# Patient Record
Sex: MALE | Race: WHITE | NOT HISPANIC OR LATINO
[De-identification: names, ages, dates, MRNs, and addresses within clinical notes are randomized per-mention and may not be internally consistent; named-entity substitution may affect disease eponyms.]

---

## 2017-03-16 ENCOUNTER — APPOINTMENT (OUTPATIENT)
Dept: INFECTIOUS DISEASE | Facility: CLINIC | Age: 47
End: 2017-03-16

## 2017-03-16 ENCOUNTER — OUTPATIENT (OUTPATIENT)
Dept: OUTPATIENT SERVICES | Facility: HOSPITAL | Age: 47
LOS: 1 days | End: 2017-03-16

## 2017-03-16 VITALS
WEIGHT: 266 LBS | RESPIRATION RATE: 14 BRPM | HEIGHT: 73 IN | HEART RATE: 90 BPM | SYSTOLIC BLOOD PRESSURE: 128 MMHG | TEMPERATURE: 98.6 F | BODY MASS INDEX: 35.25 KG/M2 | DIASTOLIC BLOOD PRESSURE: 110 MMHG

## 2017-03-16 DIAGNOSIS — Z82.49 FAMILY HISTORY OF ISCHEMIC HEART DISEASE AND OTHER DISEASES OF THE CIRCULATORY SYSTEM: ICD-10-CM

## 2017-03-16 DIAGNOSIS — Z87.2 PERSONAL HISTORY OF DISEASES OF THE SKIN AND SUBCUTANEOUS TISSUE: ICD-10-CM

## 2017-03-16 DIAGNOSIS — Z83.3 FAMILY HISTORY OF DIABETES MELLITUS: ICD-10-CM

## 2017-03-16 DIAGNOSIS — Z87.09 PERSONAL HISTORY OF OTHER DISEASES OF THE RESPIRATORY SYSTEM: ICD-10-CM

## 2017-03-16 PROBLEM — Z00.00 ENCOUNTER FOR PREVENTIVE HEALTH EXAMINATION: Status: ACTIVE | Noted: 2017-03-16

## 2017-03-16 LAB
ALBUMIN SERPL ELPH-MCNC: 4.1 G/DL — SIGNIFICANT CHANGE UP (ref 3.4–5)
ALP SERPL-CCNC: 86 U/L — SIGNIFICANT CHANGE UP (ref 40–120)
ALT FLD-CCNC: 105 U/L — HIGH (ref 12–42)
ANION GAP SERPL CALC-SCNC: 9 MMOL/L — SIGNIFICANT CHANGE UP (ref 9–16)
APPEARANCE UR: CLEAR — SIGNIFICANT CHANGE UP
AST SERPL-CCNC: 72 U/L — HIGH (ref 15–37)
BASOPHILS NFR BLD AUTO: 0.8 % — SIGNIFICANT CHANGE UP (ref 0–2)
BILIRUB SERPL-MCNC: 0.4 MG/DL — SIGNIFICANT CHANGE UP (ref 0.2–1.2)
BILIRUB UR-MCNC: NEGATIVE — SIGNIFICANT CHANGE UP
BUN SERPL-MCNC: 16 MG/DL — SIGNIFICANT CHANGE UP (ref 7–23)
CALCIUM SERPL-MCNC: 8.5 MG/DL — SIGNIFICANT CHANGE UP (ref 8.5–10.5)
CHLORIDE SERPL-SCNC: 105 MMOL/L — SIGNIFICANT CHANGE UP (ref 96–108)
CHOLEST SERPL-MCNC: 235 MG/DL — HIGH
CO2 SERPL-SCNC: 25 MMOL/L — SIGNIFICANT CHANGE UP (ref 22–31)
COLOR SPEC: YELLOW — SIGNIFICANT CHANGE UP
CREAT SERPL-MCNC: 0.86 MG/DL — SIGNIFICANT CHANGE UP (ref 0.5–1.3)
DIFF PNL FLD: NEGATIVE — SIGNIFICANT CHANGE UP
EOSINOPHIL NFR BLD AUTO: 6.1 % — HIGH (ref 0–6)
GLUCOSE SERPL-MCNC: 106 MG/DL — HIGH (ref 70–99)
GLUCOSE UR QL: NEGATIVE — SIGNIFICANT CHANGE UP
HBA1C BLD-MCNC: 6.3 % — HIGH (ref 4.8–5.6)
HCT VFR BLD CALC: 45 % — SIGNIFICANT CHANGE UP (ref 39–50)
HCV AB S/CO SERPL IA: 0.1 S/CO — SIGNIFICANT CHANGE UP
HCV AB SERPL-IMP: SIGNIFICANT CHANGE UP
HDLC SERPL-MCNC: 48 MG/DL — SIGNIFICANT CHANGE UP
HGB BLD-MCNC: 15.7 G/DL — SIGNIFICANT CHANGE UP (ref 13–17)
KETONES UR-MCNC: NEGATIVE — SIGNIFICANT CHANGE UP
LEUKOCYTE ESTERASE UR-ACNC: NEGATIVE — SIGNIFICANT CHANGE UP
LIPID PNL WITH DIRECT LDL SERPL: 146 MG/DL — HIGH
LYMPHOCYTES # BLD AUTO: 32.2 % — SIGNIFICANT CHANGE UP (ref 13–44)
MCHC RBC-ENTMCNC: 30.7 PG — SIGNIFICANT CHANGE UP (ref 27–34)
MCHC RBC-ENTMCNC: 34.9 G/DL — SIGNIFICANT CHANGE UP (ref 32–36)
MCV RBC AUTO: 87.9 FL — SIGNIFICANT CHANGE UP (ref 80–100)
MONOCYTES NFR BLD AUTO: 7.4 % — SIGNIFICANT CHANGE UP (ref 2–14)
NEUTROPHILS NFR BLD AUTO: 53.5 % — SIGNIFICANT CHANGE UP (ref 43–77)
NITRITE UR-MCNC: NEGATIVE — SIGNIFICANT CHANGE UP
PH UR: 5.5 — SIGNIFICANT CHANGE UP (ref 4–8)
PLATELET # BLD AUTO: 236 K/UL — SIGNIFICANT CHANGE UP (ref 150–400)
POTASSIUM SERPL-MCNC: 4.3 MMOL/L — SIGNIFICANT CHANGE UP (ref 3.5–5.3)
POTASSIUM SERPL-SCNC: 4.3 MMOL/L — SIGNIFICANT CHANGE UP (ref 3.5–5.3)
PROT SERPL-MCNC: 7.9 G/DL — SIGNIFICANT CHANGE UP (ref 6.4–8.2)
PROT UR-MCNC: NEGATIVE MG/DL — SIGNIFICANT CHANGE UP
RBC # BLD: 5.12 M/UL — SIGNIFICANT CHANGE UP (ref 4.2–5.8)
RBC # FLD: 13 % — SIGNIFICANT CHANGE UP (ref 10.3–16.9)
SODIUM SERPL-SCNC: 139 MMOL/L — SIGNIFICANT CHANGE UP (ref 135–145)
SP GR SPEC: 1.02 — SIGNIFICANT CHANGE UP (ref 1–1.03)
TOTAL CHOLESTEROL/HDL RATIO MEASUREMENT: 4.9 RATIO — SIGNIFICANT CHANGE UP
TRIGL SERPL-MCNC: 203 MG/DL — HIGH
UROBILINOGEN FLD QL: 0.2 E.U./DL — SIGNIFICANT CHANGE UP
WBC # BLD: 8.5 K/UL — SIGNIFICANT CHANGE UP (ref 3.8–10.5)
WBC # FLD AUTO: 8.5 K/UL — SIGNIFICANT CHANGE UP (ref 3.8–10.5)

## 2017-03-17 PROBLEM — Z87.2 HISTORY OF PSORIASIS: Status: RESOLVED | Noted: 2017-03-16 | Resolved: 2017-03-17

## 2017-03-17 LAB
24R-OH-CALCIDIOL SERPL-MCNC: 8.2 NG/ML — LOW (ref 30–100)
T PALLIDUM AB TITR SER: NEGATIVE — SIGNIFICANT CHANGE UP

## 2017-03-18 LAB
HIV1 RNA # SERPL NAA+PROBE: SIGNIFICANT CHANGE UP
HIV1 RNA SERPL NAA+PROBE-LOG#: SIGNIFICANT CHANGE UP LG10COP/ML
M TB TUBERC IFN-G BLD QL: 0 IU/ML — SIGNIFICANT CHANGE UP
M TB TUBERC IFN-G BLD QL: 0.02 IU/ML — SIGNIFICANT CHANGE UP
M TB TUBERC IFN-G BLD QL: NEGATIVE — SIGNIFICANT CHANGE UP
MITOGEN IGNF BCKGRD COR BLD-ACNC: >10 IU/ML — SIGNIFICANT CHANGE UP

## 2017-03-20 DIAGNOSIS — Z21 ASYMPTOMATIC HUMAN IMMUNODEFICIENCY VIRUS [HIV] INFECTION STATUS: ICD-10-CM

## 2017-03-20 DIAGNOSIS — G47.00 INSOMNIA, UNSPECIFIED: ICD-10-CM

## 2017-03-20 DIAGNOSIS — J45.909 UNSPECIFIED ASTHMA, UNCOMPLICATED: ICD-10-CM

## 2017-03-20 DIAGNOSIS — L40.9 PSORIASIS, UNSPECIFIED: ICD-10-CM

## 2017-03-20 LAB
4/8 RATIO: 1.32 RATIO — SIGNIFICANT CHANGE UP (ref 0.9–3.6)
ABS CD8: 950 /UL — HIGH (ref 136–757)
CD3 BLASTS SPEC-ACNC: 2194 /UL — HIGH (ref 799–2171)
CD3 BLASTS SPEC-ACNC: 75 % — SIGNIFICANT CHANGE UP (ref 59–85)
CD4 %: 43 % — SIGNIFICANT CHANGE UP (ref 36–65)
CD8 %: 33 % — SIGNIFICANT CHANGE UP (ref 11–36)
T-CELL CD4 SUBSET PNL BLD: 1252 /UL — SIGNIFICANT CHANGE UP (ref 489–1457)

## 2017-03-30 ENCOUNTER — APPOINTMENT (OUTPATIENT)
Dept: INFECTIOUS DISEASE | Facility: CLINIC | Age: 47
End: 2017-03-30

## 2017-03-30 ENCOUNTER — OUTPATIENT (OUTPATIENT)
Dept: OUTPATIENT SERVICES | Facility: HOSPITAL | Age: 47
LOS: 1 days | End: 2017-03-30

## 2017-03-30 VITALS
SYSTOLIC BLOOD PRESSURE: 159 MMHG | OXYGEN SATURATION: 97 % | HEART RATE: 85 BPM | DIASTOLIC BLOOD PRESSURE: 86 MMHG | RESPIRATION RATE: 15 BRPM

## 2017-03-30 RX ORDER — EFAVIRENZ, EMTRICITABINE, AND TENOFOVIR DISOPROXIL FUMARATE 600; 200; 300 MG/1; MG/1; MG/1
600-200-300 TABLET, FILM COATED ORAL DAILY
Qty: 30 | Refills: 2 | Status: DISCONTINUED | COMMUNITY
Start: 2017-03-16 | End: 2017-03-30

## 2017-04-03 DIAGNOSIS — Z21 ASYMPTOMATIC HUMAN IMMUNODEFICIENCY VIRUS [HIV] INFECTION STATUS: ICD-10-CM

## 2017-04-03 DIAGNOSIS — E55.9 VITAMIN D DEFICIENCY, UNSPECIFIED: ICD-10-CM

## 2017-04-03 DIAGNOSIS — Z72.0 TOBACCO USE: ICD-10-CM

## 2017-04-03 DIAGNOSIS — R73.03 PREDIABETES: ICD-10-CM

## 2017-04-03 DIAGNOSIS — G47.33 OBSTRUCTIVE SLEEP APNEA (ADULT) (PEDIATRIC): ICD-10-CM

## 2017-04-27 ENCOUNTER — OUTPATIENT (OUTPATIENT)
Dept: OUTPATIENT SERVICES | Facility: HOSPITAL | Age: 47
LOS: 1 days | End: 2017-04-27

## 2017-04-27 ENCOUNTER — APPOINTMENT (OUTPATIENT)
Dept: INFECTIOUS DISEASE | Facility: CLINIC | Age: 47
End: 2017-04-27

## 2017-04-27 VITALS
HEART RATE: 82 BPM | WEIGHT: 259 LBS | BODY MASS INDEX: 34.33 KG/M2 | HEIGHT: 73 IN | OXYGEN SATURATION: 94 % | SYSTOLIC BLOOD PRESSURE: 141 MMHG | RESPIRATION RATE: 12 BRPM | DIASTOLIC BLOOD PRESSURE: 91 MMHG

## 2017-04-27 RX ORDER — CHOLECALCIFEROL (VITAMIN D3) 1250 MCG
1.25 MG CAPSULE ORAL
Qty: 8 | Refills: 0 | Status: DISCONTINUED | COMMUNITY
Start: 2017-03-23 | End: 2017-04-27

## 2017-05-05 ENCOUNTER — MEDICATION RENEWAL (OUTPATIENT)
Age: 47
End: 2017-05-05

## 2017-05-05 DIAGNOSIS — E78.5 HYPERLIPIDEMIA, UNSPECIFIED: ICD-10-CM

## 2017-05-05 DIAGNOSIS — R73.03 PREDIABETES: ICD-10-CM

## 2017-05-05 DIAGNOSIS — Z21 ASYMPTOMATIC HUMAN IMMUNODEFICIENCY VIRUS [HIV] INFECTION STATUS: ICD-10-CM

## 2017-05-05 DIAGNOSIS — Z72.0 TOBACCO USE: ICD-10-CM

## 2017-07-05 ENCOUNTER — MEDICATION RENEWAL (OUTPATIENT)
Age: 47
End: 2017-07-05

## 2017-07-06 ENCOUNTER — APPOINTMENT (OUTPATIENT)
Dept: INFECTIOUS DISEASE | Facility: CLINIC | Age: 47
End: 2017-07-06

## 2017-07-06 ENCOUNTER — OUTPATIENT (OUTPATIENT)
Dept: OUTPATIENT SERVICES | Facility: HOSPITAL | Age: 47
LOS: 1 days | End: 2017-07-06

## 2017-07-06 VITALS
WEIGHT: 241 LBS | RESPIRATION RATE: 14 BRPM | BODY MASS INDEX: 31.8 KG/M2 | DIASTOLIC BLOOD PRESSURE: 86 MMHG | TEMPERATURE: 98 F | HEART RATE: 88 BPM | SYSTOLIC BLOOD PRESSURE: 125 MMHG | OXYGEN SATURATION: 95 %

## 2017-07-06 LAB
ALBUMIN SERPL ELPH-MCNC: 4.3 G/DL — SIGNIFICANT CHANGE UP (ref 3.3–5)
ALP SERPL-CCNC: 67 U/L — SIGNIFICANT CHANGE UP (ref 40–120)
ALT FLD-CCNC: 26 U/L — SIGNIFICANT CHANGE UP (ref 10–45)
ANION GAP SERPL CALC-SCNC: 18 MMOL/L — HIGH (ref 5–17)
AST SERPL-CCNC: 26 U/L — SIGNIFICANT CHANGE UP (ref 10–40)
BILIRUB SERPL-MCNC: 0.5 MG/DL — SIGNIFICANT CHANGE UP (ref 0.2–1.2)
BUN SERPL-MCNC: 15 MG/DL — SIGNIFICANT CHANGE UP (ref 7–23)
CALCIUM SERPL-MCNC: 9.4 MG/DL — SIGNIFICANT CHANGE UP (ref 8.4–10.5)
CHLORIDE SERPL-SCNC: 98 MMOL/L — SIGNIFICANT CHANGE UP (ref 96–108)
CO2 SERPL-SCNC: 22 MMOL/L — SIGNIFICANT CHANGE UP (ref 22–31)
CREAT SERPL-MCNC: 1 MG/DL — SIGNIFICANT CHANGE UP (ref 0.5–1.3)
GLUCOSE SERPL-MCNC: 77 MG/DL — SIGNIFICANT CHANGE UP (ref 70–99)
HBA1C BLD-MCNC: 5.3 % — SIGNIFICANT CHANGE UP (ref 4–5.6)
POTASSIUM SERPL-MCNC: 3.9 MMOL/L — SIGNIFICANT CHANGE UP (ref 3.5–5.3)
POTASSIUM SERPL-SCNC: 3.9 MMOL/L — SIGNIFICANT CHANGE UP (ref 3.5–5.3)
PROT SERPL-MCNC: 7.8 G/DL — SIGNIFICANT CHANGE UP (ref 6–8.3)
SODIUM SERPL-SCNC: 138 MMOL/L — SIGNIFICANT CHANGE UP (ref 135–145)

## 2017-07-10 LAB
4/8 RATIO: 1.19 RATIO — SIGNIFICANT CHANGE UP (ref 0.9–3.6)
ABS CD8: 809 /UL — HIGH (ref 136–757)
CD3 BLASTS SPEC-ACNC: 1764 /UL — SIGNIFICANT CHANGE UP (ref 799–2171)
CD3 BLASTS SPEC-ACNC: 76 % — SIGNIFICANT CHANGE UP (ref 59–85)
CD4 %: 42 % — SIGNIFICANT CHANGE UP (ref 36–65)
CD8 %: 35 % — SIGNIFICANT CHANGE UP (ref 11–36)
HIV-1 VIRAL LOAD RESULT: SIGNIFICANT CHANGE UP
HIV1 RNA # SERPL NAA+PROBE: SIGNIFICANT CHANGE UP
HIV1 RNA SER-IMP: SIGNIFICANT CHANGE UP
HIV1 RNA SERPL NAA+PROBE-ACNC: SIGNIFICANT CHANGE UP
HIV1 RNA SERPL NAA+PROBE-LOG#: SIGNIFICANT CHANGE UP LG COP/ML
T-CELL CD4 SUBSET PNL BLD: 965 /UL — SIGNIFICANT CHANGE UP (ref 489–1457)

## 2017-07-13 DIAGNOSIS — R73.03 PREDIABETES: ICD-10-CM

## 2017-07-13 DIAGNOSIS — Z21 ASYMPTOMATIC HUMAN IMMUNODEFICIENCY VIRUS [HIV] INFECTION STATUS: ICD-10-CM

## 2017-07-13 DIAGNOSIS — L40.9 PSORIASIS, UNSPECIFIED: ICD-10-CM

## 2017-07-13 DIAGNOSIS — J45.909 UNSPECIFIED ASTHMA, UNCOMPLICATED: ICD-10-CM

## 2017-07-13 DIAGNOSIS — Z72.0 TOBACCO USE: ICD-10-CM

## 2017-07-27 ENCOUNTER — MEDICATION RENEWAL (OUTPATIENT)
Age: 47
End: 2017-07-27

## 2017-08-28 ENCOUNTER — MEDICATION RENEWAL (OUTPATIENT)
Age: 47
End: 2017-08-28

## 2017-09-25 ENCOUNTER — RX RENEWAL (OUTPATIENT)
Age: 47
End: 2017-09-25

## 2017-10-17 ENCOUNTER — OUTPATIENT (OUTPATIENT)
Dept: OUTPATIENT SERVICES | Facility: HOSPITAL | Age: 47
LOS: 1 days | End: 2017-10-17

## 2017-10-17 ENCOUNTER — APPOINTMENT (OUTPATIENT)
Dept: INFECTIOUS DISEASE | Facility: CLINIC | Age: 47
End: 2017-10-17
Payer: COMMERCIAL

## 2017-10-17 VITALS
HEIGHT: 73 IN | HEART RATE: 89 BPM | BODY MASS INDEX: 33 KG/M2 | WEIGHT: 249 LBS | DIASTOLIC BLOOD PRESSURE: 82 MMHG | OXYGEN SATURATION: 98 % | SYSTOLIC BLOOD PRESSURE: 141 MMHG | RESPIRATION RATE: 11 BRPM | TEMPERATURE: 98.3 F

## 2017-10-17 DIAGNOSIS — G47.33 OBSTRUCTIVE SLEEP APNEA (ADULT) (PEDIATRIC): ICD-10-CM

## 2017-10-17 DIAGNOSIS — Z23 ENCOUNTER FOR IMMUNIZATION: ICD-10-CM

## 2017-10-17 DIAGNOSIS — R73.03 PREDIABETES.: ICD-10-CM

## 2017-10-17 DIAGNOSIS — G47.00 INSOMNIA, UNSPECIFIED: ICD-10-CM

## 2017-10-17 LAB
ALBUMIN SERPL ELPH-MCNC: 4.1 G/DL — SIGNIFICANT CHANGE UP (ref 3.3–5)
ALP SERPL-CCNC: 58 U/L — SIGNIFICANT CHANGE UP (ref 40–120)
ALT FLD-CCNC: 26 U/L — SIGNIFICANT CHANGE UP (ref 10–45)
ANION GAP SERPL CALC-SCNC: 17 MMOL/L — SIGNIFICANT CHANGE UP (ref 5–17)
AST SERPL-CCNC: 22 U/L — SIGNIFICANT CHANGE UP (ref 10–40)
BILIRUB SERPL-MCNC: 0.5 MG/DL — SIGNIFICANT CHANGE UP (ref 0.2–1.2)
BUN SERPL-MCNC: 15 MG/DL — SIGNIFICANT CHANGE UP (ref 7–23)
CALCIUM SERPL-MCNC: 9.2 MG/DL — SIGNIFICANT CHANGE UP (ref 8.4–10.5)
CHLORIDE SERPL-SCNC: 98 MMOL/L — SIGNIFICANT CHANGE UP (ref 96–108)
CHOLEST SERPL-MCNC: 171 MG/DL — SIGNIFICANT CHANGE UP (ref 10–199)
CO2 SERPL-SCNC: 23 MMOL/L — SIGNIFICANT CHANGE UP (ref 22–31)
CREAT SERPL-MCNC: 1.03 MG/DL — SIGNIFICANT CHANGE UP (ref 0.5–1.3)
GLUCOSE SERPL-MCNC: 102 MG/DL — HIGH (ref 70–99)
HCT VFR BLD CALC: 41.4 % — SIGNIFICANT CHANGE UP (ref 39–50)
HDLC SERPL-MCNC: 48 MG/DL — SIGNIFICANT CHANGE UP (ref 40–125)
HGB BLD-MCNC: 14.3 G/DL — SIGNIFICANT CHANGE UP (ref 13–17)
LIPID PNL WITH DIRECT LDL SERPL: 102 MG/DL — SIGNIFICANT CHANGE UP
MCHC RBC-ENTMCNC: 30.5 PG — SIGNIFICANT CHANGE UP (ref 27–34)
MCHC RBC-ENTMCNC: 34.5 G/DL — SIGNIFICANT CHANGE UP (ref 32–36)
MCV RBC AUTO: 88.3 FL — SIGNIFICANT CHANGE UP (ref 80–100)
PLATELET # BLD AUTO: 235 K/UL — SIGNIFICANT CHANGE UP (ref 150–400)
POTASSIUM SERPL-MCNC: 3.6 MMOL/L — SIGNIFICANT CHANGE UP (ref 3.5–5.3)
POTASSIUM SERPL-SCNC: 3.6 MMOL/L — SIGNIFICANT CHANGE UP (ref 3.5–5.3)
PROT SERPL-MCNC: 8 G/DL — SIGNIFICANT CHANGE UP (ref 6–8.3)
RBC # BLD: 4.69 M/UL — SIGNIFICANT CHANGE UP (ref 4.2–5.8)
RBC # FLD: 13.9 % — SIGNIFICANT CHANGE UP (ref 10.3–16.9)
SODIUM SERPL-SCNC: 138 MMOL/L — SIGNIFICANT CHANGE UP (ref 135–145)
TOTAL CHOLESTEROL/HDL RATIO MEASUREMENT: 3.6 RATIO — SIGNIFICANT CHANGE UP (ref 3.4–9.6)
TRIGL SERPL-MCNC: 107 MG/DL — SIGNIFICANT CHANGE UP (ref 10–149)
WBC # BLD: 11.4 K/UL — HIGH (ref 3.8–10.5)
WBC # FLD AUTO: 11.4 K/UL — HIGH (ref 3.8–10.5)

## 2017-10-17 PROCEDURE — 99214 OFFICE O/P EST MOD 30 MIN: CPT | Mod: GC

## 2017-10-18 LAB
4/8 RATIO: 1.62 RATIO — SIGNIFICANT CHANGE UP (ref 0.9–3.6)
ABS CD8: 970 /UL — HIGH (ref 136–757)
CD3 BLASTS SPEC-ACNC: 2528 /UL — HIGH (ref 799–2171)
CD3 BLASTS SPEC-ACNC: 76 % — SIGNIFICANT CHANGE UP (ref 59–85)
CD4 %: 47 % — SIGNIFICANT CHANGE UP (ref 36–65)
CD8 %: 29 % — SIGNIFICANT CHANGE UP (ref 11–36)
HIV-1 VIRAL LOAD RESULT: SIGNIFICANT CHANGE UP
HIV1 RNA # SERPL NAA+PROBE: SIGNIFICANT CHANGE UP
HIV1 RNA SER-IMP: SIGNIFICANT CHANGE UP
HIV1 RNA SERPL NAA+PROBE-ACNC: SIGNIFICANT CHANGE UP
HIV1 RNA SERPL NAA+PROBE-LOG#: SIGNIFICANT CHANGE UP LG COP/ML
T-CELL CD4 SUBSET PNL BLD: 1571 /UL — HIGH (ref 489–1457)

## 2017-10-25 DIAGNOSIS — G47.00 INSOMNIA, UNSPECIFIED: ICD-10-CM

## 2017-10-25 DIAGNOSIS — F17.200 NICOTINE DEPENDENCE, UNSPECIFIED, UNCOMPLICATED: ICD-10-CM

## 2017-10-25 DIAGNOSIS — Z23 ENCOUNTER FOR IMMUNIZATION: ICD-10-CM

## 2017-10-25 DIAGNOSIS — G47.33 OBSTRUCTIVE SLEEP APNEA (ADULT) (PEDIATRIC): ICD-10-CM

## 2017-10-25 DIAGNOSIS — B20 HUMAN IMMUNODEFICIENCY VIRUS [HIV] DISEASE: ICD-10-CM

## 2018-04-19 ENCOUNTER — APPOINTMENT (OUTPATIENT)
Dept: INFECTIOUS DISEASE | Facility: CLINIC | Age: 48
End: 2018-04-19
Payer: COMMERCIAL

## 2018-04-19 ENCOUNTER — OUTPATIENT (OUTPATIENT)
Dept: OUTPATIENT SERVICES | Facility: HOSPITAL | Age: 48
LOS: 1 days | End: 2018-04-19

## 2018-04-19 ENCOUNTER — EMERGENCY (EMERGENCY)
Facility: HOSPITAL | Age: 48
LOS: 1 days | Discharge: HOME | End: 2018-04-19
Attending: EMERGENCY MEDICINE | Admitting: EMERGENCY MEDICINE
Payer: COMMERCIAL

## 2018-04-19 VITALS
OXYGEN SATURATION: 95 % | RESPIRATION RATE: 20 BRPM | HEIGHT: 73 IN | WEIGHT: 235.01 LBS | TEMPERATURE: 98 F | HEART RATE: 108 BPM | SYSTOLIC BLOOD PRESSURE: 134 MMHG | DIASTOLIC BLOOD PRESSURE: 81 MMHG

## 2018-04-19 VITALS
RESPIRATION RATE: 18 BRPM | OXYGEN SATURATION: 97 % | DIASTOLIC BLOOD PRESSURE: 77 MMHG | HEART RATE: 99 BPM | SYSTOLIC BLOOD PRESSURE: 130 MMHG | TEMPERATURE: 98 F

## 2018-04-19 VITALS
DIASTOLIC BLOOD PRESSURE: 85 MMHG | TEMPERATURE: 98 F | SYSTOLIC BLOOD PRESSURE: 127 MMHG | WEIGHT: 233 LBS | BODY MASS INDEX: 30.88 KG/M2 | RESPIRATION RATE: 16 BRPM | HEIGHT: 73 IN | HEART RATE: 113 BPM | OXYGEN SATURATION: 96 %

## 2018-04-19 DIAGNOSIS — R06.02 SHORTNESS OF BREATH: ICD-10-CM

## 2018-04-19 DIAGNOSIS — R05 COUGH: ICD-10-CM

## 2018-04-19 DIAGNOSIS — F17.200 NICOTINE DEPENDENCE, UNSPECIFIED, UNCOMPLICATED: ICD-10-CM

## 2018-04-19 DIAGNOSIS — J44.9 CHRONIC OBSTRUCTIVE PULMONARY DISEASE, UNSPECIFIED: ICD-10-CM

## 2018-04-19 DIAGNOSIS — E55.9 VITAMIN D DEFICIENCY, UNSPECIFIED: ICD-10-CM

## 2018-04-19 LAB
ALBUMIN SERPL ELPH-MCNC: 4.2 G/DL — SIGNIFICANT CHANGE UP (ref 3.3–5)
ALP SERPL-CCNC: 79 U/L — SIGNIFICANT CHANGE UP (ref 40–120)
ALT FLD-CCNC: 19 U/L — SIGNIFICANT CHANGE UP (ref 10–45)
ANION GAP SERPL CALC-SCNC: 11 MMOL/L — SIGNIFICANT CHANGE UP (ref 5–17)
APTT BLD: 31.1 SEC — SIGNIFICANT CHANGE UP (ref 27.5–37.4)
AST SERPL-CCNC: 28 U/L — SIGNIFICANT CHANGE UP (ref 10–40)
BASOPHILS NFR BLD AUTO: 0.5 % — SIGNIFICANT CHANGE UP (ref 0–2)
BILIRUB SERPL-MCNC: 0.3 MG/DL — SIGNIFICANT CHANGE UP (ref 0.2–1.2)
BUN SERPL-MCNC: 14 MG/DL — SIGNIFICANT CHANGE UP (ref 7–23)
CALCIUM SERPL-MCNC: 8.9 MG/DL — SIGNIFICANT CHANGE UP (ref 8.4–10.5)
CHLORIDE SERPL-SCNC: 102 MMOL/L — SIGNIFICANT CHANGE UP (ref 96–108)
CO2 SERPL-SCNC: 26 MMOL/L — SIGNIFICANT CHANGE UP (ref 22–31)
CREAT SERPL-MCNC: 0.86 MG/DL — SIGNIFICANT CHANGE UP (ref 0.5–1.3)
EOSINOPHIL NFR BLD AUTO: 10.8 % — HIGH (ref 0–6)
EXTRA SST TUBE: SIGNIFICANT CHANGE UP
GLUCOSE SERPL-MCNC: 126 MG/DL — HIGH (ref 70–99)
HCT VFR BLD CALC: 40.5 % — SIGNIFICANT CHANGE UP (ref 39–50)
HGB BLD-MCNC: 13.8 G/DL — SIGNIFICANT CHANGE UP (ref 13–17)
INR BLD: 1.04 — SIGNIFICANT CHANGE UP (ref 0.88–1.16)
LYMPHOCYTES # BLD AUTO: 27.4 % — SIGNIFICANT CHANGE UP (ref 13–44)
MCHC RBC-ENTMCNC: 29.1 PG — SIGNIFICANT CHANGE UP (ref 27–34)
MCHC RBC-ENTMCNC: 34.1 G/DL — SIGNIFICANT CHANGE UP (ref 32–36)
MCV RBC AUTO: 85.4 FL — SIGNIFICANT CHANGE UP (ref 80–100)
MONOCYTES NFR BLD AUTO: 8.5 % — SIGNIFICANT CHANGE UP (ref 2–14)
NEUTROPHILS NFR BLD AUTO: 52.8 % — SIGNIFICANT CHANGE UP (ref 43–77)
PLATELET # BLD AUTO: 217 K/UL — SIGNIFICANT CHANGE UP (ref 150–400)
POTASSIUM SERPL-MCNC: 4 MMOL/L — SIGNIFICANT CHANGE UP (ref 3.5–5.3)
POTASSIUM SERPL-SCNC: 4 MMOL/L — SIGNIFICANT CHANGE UP (ref 3.5–5.3)
PROT SERPL-MCNC: 8 G/DL — SIGNIFICANT CHANGE UP (ref 6–8.3)
PROTHROM AB SERPL-ACNC: 11.5 SEC — SIGNIFICANT CHANGE UP (ref 9.8–12.7)
RBC # BLD: 4.74 M/UL — SIGNIFICANT CHANGE UP (ref 4.2–5.8)
RBC # FLD: 13.5 % — SIGNIFICANT CHANGE UP (ref 10.3–16.9)
SODIUM SERPL-SCNC: 139 MMOL/L — SIGNIFICANT CHANGE UP (ref 135–145)
WBC # BLD: 11 K/UL — HIGH (ref 3.8–10.5)
WBC # FLD AUTO: 11 K/UL — HIGH (ref 3.8–10.5)

## 2018-04-19 PROCEDURE — 93005 ELECTROCARDIOGRAM TRACING: CPT

## 2018-04-19 PROCEDURE — 99284 EMERGENCY DEPT VISIT MOD MDM: CPT | Mod: 25

## 2018-04-19 PROCEDURE — 96374 THER/PROPH/DIAG INJ IV PUSH: CPT

## 2018-04-19 PROCEDURE — 85610 PROTHROMBIN TIME: CPT

## 2018-04-19 PROCEDURE — 71046 X-RAY EXAM CHEST 2 VIEWS: CPT | Mod: 26

## 2018-04-19 PROCEDURE — 93010 ELECTROCARDIOGRAM REPORT: CPT

## 2018-04-19 PROCEDURE — 36415 COLL VENOUS BLD VENIPUNCTURE: CPT

## 2018-04-19 PROCEDURE — 85730 THROMBOPLASTIN TIME PARTIAL: CPT

## 2018-04-19 PROCEDURE — 96375 TX/PRO/DX INJ NEW DRUG ADDON: CPT

## 2018-04-19 PROCEDURE — 85025 COMPLETE CBC W/AUTO DIFF WBC: CPT

## 2018-04-19 PROCEDURE — 94640 AIRWAY INHALATION TREATMENT: CPT

## 2018-04-19 PROCEDURE — 71046 X-RAY EXAM CHEST 2 VIEWS: CPT

## 2018-04-19 PROCEDURE — 99285 EMERGENCY DEPT VISIT HI MDM: CPT | Mod: 25

## 2018-04-19 PROCEDURE — 99215 OFFICE O/P EST HI 40 MIN: CPT

## 2018-04-19 PROCEDURE — 80053 COMPREHEN METABOLIC PANEL: CPT

## 2018-04-19 RX ORDER — SODIUM CHLORIDE 9 MG/ML
2000 INJECTION INTRAMUSCULAR; INTRAVENOUS; SUBCUTANEOUS ONCE
Qty: 0 | Refills: 0 | Status: COMPLETED | OUTPATIENT
Start: 2018-04-19 | End: 2018-04-19

## 2018-04-19 RX ORDER — IPRATROPIUM/ALBUTEROL SULFATE 18-103MCG
3 AEROSOL WITH ADAPTER (GRAM) INHALATION ONCE
Qty: 0 | Refills: 0 | Status: COMPLETED | OUTPATIENT
Start: 2018-04-19 | End: 2018-04-19

## 2018-04-19 RX ORDER — AZITHROMYCIN 500 MG/1
1 TABLET, FILM COATED ORAL
Qty: 4 | Refills: 0 | OUTPATIENT
Start: 2018-04-19 | End: 2018-04-22

## 2018-04-19 RX ORDER — NICOTINE 21 MG/24HR
14 PATCH, TRANSDERMAL 24 HOURS TRANSDERMAL
Qty: 30 | Refills: 1 | Status: DISCONTINUED | COMMUNITY
Start: 2017-03-30 | End: 2018-04-19

## 2018-04-19 RX ORDER — AZITHROMYCIN 500 MG/1
500 TABLET, FILM COATED ORAL ONCE
Qty: 0 | Refills: 0 | Status: COMPLETED | OUTPATIENT
Start: 2018-04-19 | End: 2018-04-19

## 2018-04-19 RX ORDER — BUPROPION HYDROCHLORIDE 150 MG/1
150 TABLET, FILM COATED, EXTENDED RELEASE ORAL
Qty: 60 | Refills: 2 | Status: DISCONTINUED | COMMUNITY
Start: 2017-03-30 | End: 2018-04-19

## 2018-04-19 RX ORDER — ALBUTEROL 90 MCG
90 AEROSOL (GRAM) INHALATION
Refills: 0 | Status: DISCONTINUED | COMMUNITY
End: 2018-04-19

## 2018-04-19 RX ORDER — CEFTRIAXONE 500 MG/1
1 INJECTION, POWDER, FOR SOLUTION INTRAMUSCULAR; INTRAVENOUS ONCE
Qty: 0 | Refills: 0 | Status: COMPLETED | OUTPATIENT
Start: 2018-04-19 | End: 2018-04-19

## 2018-04-19 RX ADMIN — Medication 40 MILLIGRAM(S): at 19:22

## 2018-04-19 RX ADMIN — AZITHROMYCIN 255 MILLIGRAM(S): 500 TABLET, FILM COATED ORAL at 19:22

## 2018-04-19 RX ADMIN — Medication 3 MILLILITER(S): at 19:22

## 2018-04-19 RX ADMIN — SODIUM CHLORIDE 1000 MILLILITER(S): 9 INJECTION INTRAMUSCULAR; INTRAVENOUS; SUBCUTANEOUS at 18:51

## 2018-04-19 RX ADMIN — CEFTRIAXONE 100 GRAM(S): 500 INJECTION, POWDER, FOR SOLUTION INTRAMUSCULAR; INTRAVENOUS at 20:39

## 2018-04-19 NOTE — ED ADULT NURSE NOTE - PMH
Asthma    COPD (chronic obstructive pulmonary disease)    HIV (human immunodeficiency virus infection)

## 2018-04-19 NOTE — ED PROVIDER NOTE - MEDICAL DECISION MAKING DETAILS
afebrile pt, hiv on meds, smoker, hx of asthma and copd, xray noted no obvious infiltrate, given hx of copd/asthma, smoker status, and hx of hiv, will trial of nebs w/ steroids, rocephin and azithromycin, pt no tachypnea, no resp distress, speaking in full sentences, well appearing on exam

## 2018-04-19 NOTE — ED PROVIDER NOTE - PROGRESS NOTE DETAILS
pt feels improved xray no obvious consolidation, possible asthma/copd exacerbation w/ active smoking status, vs bronchitis, will give azithromycin for antiinflammatory purpose

## 2018-04-19 NOTE — ED ADULT NURSE NOTE - OBJECTIVE STATEMENT
Patient arrived to ED via walk-in, AAOx3, in NAD, tachy, complaining of productive cough with green sputum and ROB for three days.  Patient denies any CP, SOB at rest, fevers, chills, N/V/D, abdominal pain or any other complaints.  PIV placed, labs drawn and sent, EKG done.  Will continue with plan of care.

## 2018-04-19 NOTE — ED PROVIDER NOTE - OBJECTIVE STATEMENT
48 yom pw productive cough x 2 days, hx of HIV (last checked was 6 mo ago, reported not low, undetectable VL, on medications), COPD/Asthma, daily smoker.  no fever.  +sob.  no cp, no pleurisy, no leg pain or swelling.  no hx of CAD or PE.

## 2018-04-19 NOTE — ED ADULT TRIAGE NOTE - CHIEF COMPLAINT QUOTE
"Dr. Pacheco sent me in they think I have pneumonia" SOB and productive cough for 3 days; denies fevers

## 2018-04-19 NOTE — ED PROVIDER NOTE - PHYSICAL EXAMINATION
CON: ao x 3, HENMT: clear oropharynx, soft neck, HEAD: atraumatic, CV: rrr, equal pulses b/l, RESP: mild end exp wheezing, no tachypnea, no respiratory distress, GI: +BS, soft, nontender, no rebound, no guarding, MSK: no deformities, NEURO: no gross motor or sensory deficit

## 2018-05-01 ENCOUNTER — APPOINTMENT (OUTPATIENT)
Dept: INFECTIOUS DISEASE | Facility: CLINIC | Age: 48
End: 2018-05-01

## 2018-05-01 ENCOUNTER — OUTPATIENT (OUTPATIENT)
Dept: OUTPATIENT SERVICES | Facility: HOSPITAL | Age: 48
LOS: 1 days | End: 2018-05-01

## 2018-05-01 DIAGNOSIS — E55.9 VITAMIN D DEFICIENCY, UNSPECIFIED: ICD-10-CM

## 2018-05-01 DIAGNOSIS — J44.1 CHRONIC OBSTRUCTIVE PULMONARY DISEASE WITH (ACUTE) EXACERBATION: ICD-10-CM

## 2018-05-01 DIAGNOSIS — Z00.00 ENCOUNTER FOR GENERAL ADULT MEDICAL EXAMINATION WITHOUT ABNORMAL FINDINGS: ICD-10-CM

## 2018-05-01 DIAGNOSIS — Z72.0 TOBACCO USE: ICD-10-CM

## 2018-05-01 DIAGNOSIS — B20 HUMAN IMMUNODEFICIENCY VIRUS [HIV] DISEASE: ICD-10-CM

## 2018-05-04 ENCOUNTER — TRANSCRIPTION ENCOUNTER (OUTPATIENT)
Age: 48
End: 2018-05-04

## 2018-05-11 DIAGNOSIS — F32.9 MAJOR DEPRESSIVE DISORDER, SINGLE EPISODE, UNSPECIFIED: ICD-10-CM

## 2018-05-15 ENCOUNTER — TRANSCRIPTION ENCOUNTER (OUTPATIENT)
Age: 48
End: 2018-05-15

## 2018-06-16 ENCOUNTER — MEDICATION RENEWAL (OUTPATIENT)
Age: 48
End: 2018-06-16

## 2018-06-18 ENCOUNTER — APPOINTMENT (OUTPATIENT)
Dept: INFECTIOUS DISEASE | Facility: CLINIC | Age: 48
End: 2018-06-18

## 2018-07-18 ENCOUNTER — INPATIENT (INPATIENT)
Facility: HOSPITAL | Age: 48
LOS: 0 days | Discharge: HOME | DRG: 872 | End: 2018-07-19
Attending: INTERNAL MEDICINE | Admitting: INTERNAL MEDICINE
Payer: SELF-PAY

## 2018-07-18 VITALS
DIASTOLIC BLOOD PRESSURE: 65 MMHG | OXYGEN SATURATION: 95 % | RESPIRATION RATE: 20 BRPM | HEIGHT: 73 IN | WEIGHT: 235.01 LBS | TEMPERATURE: 100 F | HEART RATE: 108 BPM | SYSTOLIC BLOOD PRESSURE: 100 MMHG

## 2018-07-18 DIAGNOSIS — E87.8 OTHER DISORDERS OF ELECTROLYTE AND FLUID BALANCE, NOT ELSEWHERE CLASSIFIED: ICD-10-CM

## 2018-07-18 DIAGNOSIS — A41.9 SEPSIS, UNSPECIFIED ORGANISM: ICD-10-CM

## 2018-07-18 DIAGNOSIS — Z29.9 ENCOUNTER FOR PROPHYLACTIC MEASURES, UNSPECIFIED: ICD-10-CM

## 2018-07-18 DIAGNOSIS — R63.8 OTHER SYMPTOMS AND SIGNS CONCERNING FOOD AND FLUID INTAKE: ICD-10-CM

## 2018-07-18 DIAGNOSIS — B20 HUMAN IMMUNODEFICIENCY VIRUS [HIV] DISEASE: ICD-10-CM

## 2018-07-18 DIAGNOSIS — Z91.89 OTHER SPECIFIED PERSONAL RISK FACTORS, NOT ELSEWHERE CLASSIFIED: ICD-10-CM

## 2018-07-18 DIAGNOSIS — K52.9 NONINFECTIVE GASTROENTERITIS AND COLITIS, UNSPECIFIED: ICD-10-CM

## 2018-07-18 PROBLEM — J45.909 UNSPECIFIED ASTHMA, UNCOMPLICATED: Chronic | Status: ACTIVE | Noted: 2018-04-19

## 2018-07-18 PROBLEM — J44.9 CHRONIC OBSTRUCTIVE PULMONARY DISEASE, UNSPECIFIED: Chronic | Status: ACTIVE | Noted: 2018-04-19

## 2018-07-18 LAB
ALBUMIN SERPL ELPH-MCNC: 3.9 G/DL — SIGNIFICANT CHANGE UP (ref 3.3–5)
ALP SERPL-CCNC: 55 U/L — SIGNIFICANT CHANGE UP (ref 40–120)
ALT FLD-CCNC: 20 U/L — SIGNIFICANT CHANGE UP (ref 10–45)
ANION GAP SERPL CALC-SCNC: 16 MMOL/L — SIGNIFICANT CHANGE UP (ref 5–17)
AST SERPL-CCNC: 30 U/L — SIGNIFICANT CHANGE UP (ref 10–40)
BASOPHILS NFR BLD AUTO: 0.1 % — SIGNIFICANT CHANGE UP (ref 0–2)
BILIRUB SERPL-MCNC: 0.6 MG/DL — SIGNIFICANT CHANGE UP (ref 0.2–1.2)
BUN SERPL-MCNC: 9 MG/DL — SIGNIFICANT CHANGE UP (ref 7–23)
C DIFF GDH STL QL: NEGATIVE — SIGNIFICANT CHANGE UP
C DIFF GDH STL QL: SIGNIFICANT CHANGE UP
CALCIUM SERPL-MCNC: 8.2 MG/DL — LOW (ref 8.4–10.5)
CHLORIDE SERPL-SCNC: 93 MMOL/L — LOW (ref 96–108)
CO2 SERPL-SCNC: 21 MMOL/L — LOW (ref 22–31)
CREAT SERPL-MCNC: 0.97 MG/DL — SIGNIFICANT CHANGE UP (ref 0.5–1.3)
EOSINOPHIL NFR BLD AUTO: 0.1 % — SIGNIFICANT CHANGE UP (ref 0–6)
EXTRA BLUE TOP TUBE: SIGNIFICANT CHANGE UP
EXTRA SST TUBE: SIGNIFICANT CHANGE UP
GLUCOSE SERPL-MCNC: 118 MG/DL — HIGH (ref 70–99)
HCT VFR BLD CALC: 37.6 % — LOW (ref 39–50)
HGB BLD-MCNC: 12.4 G/DL — LOW (ref 13–17)
LACTATE SERPL-SCNC: 1.3 MMOL/L — SIGNIFICANT CHANGE UP (ref 0.5–2)
LIDOCAIN IGE QN: 16 U/L — SIGNIFICANT CHANGE UP (ref 7–60)
LYMPHOCYTES # BLD AUTO: 6.7 % — LOW (ref 13–44)
MCHC RBC-ENTMCNC: 28.8 PG — SIGNIFICANT CHANGE UP (ref 27–34)
MCHC RBC-ENTMCNC: 33 G/DL — SIGNIFICANT CHANGE UP (ref 32–36)
MCV RBC AUTO: 87.2 FL — SIGNIFICANT CHANGE UP (ref 80–100)
MONOCYTES NFR BLD AUTO: 6.9 % — SIGNIFICANT CHANGE UP (ref 2–14)
NEUTROPHILS NFR BLD AUTO: 86.2 % — HIGH (ref 43–77)
PLATELET # BLD AUTO: 173 K/UL — SIGNIFICANT CHANGE UP (ref 150–400)
POTASSIUM SERPL-MCNC: 3 MMOL/L — LOW (ref 3.5–5.3)
POTASSIUM SERPL-SCNC: 3 MMOL/L — LOW (ref 3.5–5.3)
PROT SERPL-MCNC: 7.3 G/DL — SIGNIFICANT CHANGE UP (ref 6–8.3)
RBC # BLD: 4.31 M/UL — SIGNIFICANT CHANGE UP (ref 4.2–5.8)
RBC # FLD: 13.6 % — SIGNIFICANT CHANGE UP (ref 10.3–16.9)
SODIUM SERPL-SCNC: 130 MMOL/L — LOW (ref 135–145)
WBC # BLD: 18.2 K/UL — HIGH (ref 3.8–10.5)
WBC # FLD AUTO: 18.2 K/UL — HIGH (ref 3.8–10.5)

## 2018-07-18 PROCEDURE — 99285 EMERGENCY DEPT VISIT HI MDM: CPT | Mod: 25

## 2018-07-18 PROCEDURE — 74177 CT ABD & PELVIS W/CONTRAST: CPT | Mod: 26

## 2018-07-18 PROCEDURE — 99223 1ST HOSP IP/OBS HIGH 75: CPT | Mod: GC

## 2018-07-18 PROCEDURE — 93010 ELECTROCARDIOGRAM REPORT: CPT

## 2018-07-18 RX ORDER — SERTRALINE 25 MG/1
50 TABLET, FILM COATED ORAL AT BEDTIME
Qty: 0 | Refills: 0 | Status: DISCONTINUED | OUTPATIENT
Start: 2018-07-18 | End: 2018-07-19

## 2018-07-18 RX ORDER — ZOLPIDEM TARTRATE 10 MG/1
5 TABLET ORAL AT BEDTIME
Qty: 0 | Refills: 0 | Status: DISCONTINUED | OUTPATIENT
Start: 2018-07-18 | End: 2018-07-19

## 2018-07-18 RX ORDER — IOHEXOL 300 MG/ML
30 INJECTION, SOLUTION INTRAVENOUS ONCE
Qty: 0 | Refills: 0 | Status: COMPLETED | OUTPATIENT
Start: 2018-07-18 | End: 2018-07-18

## 2018-07-18 RX ORDER — ACETAMINOPHEN 500 MG
650 TABLET ORAL EVERY 6 HOURS
Qty: 0 | Refills: 0 | Status: DISCONTINUED | OUTPATIENT
Start: 2018-07-18 | End: 2018-07-19

## 2018-07-18 RX ORDER — BUDESONIDE AND FORMOTEROL FUMARATE DIHYDRATE 160; 4.5 UG/1; UG/1
2 AEROSOL RESPIRATORY (INHALATION)
Qty: 0 | Refills: 0 | COMMUNITY

## 2018-07-18 RX ORDER — BUDESONIDE AND FORMOTEROL FUMARATE DIHYDRATE 160; 4.5 UG/1; UG/1
2 AEROSOL RESPIRATORY (INHALATION)
Qty: 0 | Refills: 0 | Status: DISCONTINUED | OUTPATIENT
Start: 2018-07-18 | End: 2018-07-19

## 2018-07-18 RX ORDER — SODIUM CHLORIDE 9 MG/ML
3 INJECTION INTRAMUSCULAR; INTRAVENOUS; SUBCUTANEOUS ONCE
Qty: 0 | Refills: 0 | Status: COMPLETED | OUTPATIENT
Start: 2018-07-18 | End: 2018-07-18

## 2018-07-18 RX ORDER — METRONIDAZOLE 500 MG
500 TABLET ORAL ONCE
Qty: 0 | Refills: 0 | Status: COMPLETED | OUTPATIENT
Start: 2018-07-18 | End: 2018-07-18

## 2018-07-18 RX ORDER — ALBUTEROL 90 UG/1
2 AEROSOL, METERED ORAL EVERY 6 HOURS
Qty: 0 | Refills: 0 | Status: DISCONTINUED | OUTPATIENT
Start: 2018-07-18 | End: 2018-07-19

## 2018-07-18 RX ORDER — CIPROFLOXACIN LACTATE 400MG/40ML
400 VIAL (ML) INTRAVENOUS ONCE
Qty: 0 | Refills: 0 | Status: COMPLETED | OUTPATIENT
Start: 2018-07-18 | End: 2018-07-18

## 2018-07-18 RX ORDER — DOLUTEGRAVIR SODIUM 25 MG/1
50 TABLET, FILM COATED ORAL DAILY
Qty: 0 | Refills: 0 | Status: DISCONTINUED | OUTPATIENT
Start: 2018-07-18 | End: 2018-07-19

## 2018-07-18 RX ORDER — SODIUM CHLORIDE 9 MG/ML
500 INJECTION INTRAMUSCULAR; INTRAVENOUS; SUBCUTANEOUS
Qty: 0 | Refills: 0 | Status: COMPLETED | OUTPATIENT
Start: 2018-07-18 | End: 2018-07-18

## 2018-07-18 RX ORDER — EMTRICITABINE AND TENOFOVIR DISOPROXIL FUMARATE 200; 300 MG/1; MG/1
1 TABLET, FILM COATED ORAL DAILY
Qty: 0 | Refills: 0 | Status: DISCONTINUED | OUTPATIENT
Start: 2018-07-18 | End: 2018-07-19

## 2018-07-18 RX ORDER — POTASSIUM CHLORIDE 20 MEQ
40 PACKET (EA) ORAL ONCE
Qty: 0 | Refills: 0 | Status: COMPLETED | OUTPATIENT
Start: 2018-07-18 | End: 2018-07-18

## 2018-07-18 RX ADMIN — SODIUM CHLORIDE 2000 MILLILITER(S): 9 INJECTION INTRAMUSCULAR; INTRAVENOUS; SUBCUTANEOUS at 16:15

## 2018-07-18 RX ADMIN — SODIUM CHLORIDE 2000 MILLILITER(S): 9 INJECTION INTRAMUSCULAR; INTRAVENOUS; SUBCUTANEOUS at 17:00

## 2018-07-18 RX ADMIN — IOHEXOL 30 MILLILITER(S): 300 INJECTION, SOLUTION INTRAVENOUS at 16:55

## 2018-07-18 RX ADMIN — SODIUM CHLORIDE 2000 MILLILITER(S): 9 INJECTION INTRAMUSCULAR; INTRAVENOUS; SUBCUTANEOUS at 15:45

## 2018-07-18 RX ADMIN — Medication 40 MILLIEQUIVALENT(S): at 16:55

## 2018-07-18 RX ADMIN — Medication 100 MILLIGRAM(S): at 17:30

## 2018-07-18 RX ADMIN — SODIUM CHLORIDE 2000 MILLILITER(S): 9 INJECTION INTRAMUSCULAR; INTRAVENOUS; SUBCUTANEOUS at 16:00

## 2018-07-18 RX ADMIN — SODIUM CHLORIDE 2000 MILLILITER(S): 9 INJECTION INTRAMUSCULAR; INTRAVENOUS; SUBCUTANEOUS at 16:30

## 2018-07-18 RX ADMIN — Medication 200 MILLIGRAM(S): at 16:23

## 2018-07-18 RX ADMIN — SODIUM CHLORIDE 3 MILLILITER(S): 9 INJECTION INTRAMUSCULAR; INTRAVENOUS; SUBCUTANEOUS at 16:23

## 2018-07-18 RX ADMIN — SODIUM CHLORIDE 2000 MILLILITER(S): 9 INJECTION INTRAMUSCULAR; INTRAVENOUS; SUBCUTANEOUS at 17:15

## 2018-07-18 NOTE — ED ADULT TRIAGE NOTE - CHIEF COMPLAINT QUOTE
Patient c/o fever , chills , abdominal pain , nausea and diarrhea for 2 days , denies any vomiting .

## 2018-07-18 NOTE — H&P ADULT - NSHPLABSRESULTS_GEN_ALL_CORE
.  LABS:                         12.4   18.2  )-----------( 173      ( 18 Jul 2018 15:51 )             37.6     07-18    130<L>  |  93<L>  |  9   ----------------------------<  118<H>  3.0<L>   |  21<L>  |  0.97    Ca    8.2<L>      18 Jul 2018 15:51    TPro  7.3  /  Alb  3.9  /  TBili  0.6  /  DBili  x   /  AST  30  /  ALT  20  /  AlkPhos  55  07-18              Lactate, Blood: 1.3 mmoL/L (07-18 @ 15:51)      RADIOLOGY, EKG & ADDITIONAL TESTS: Reviewed.

## 2018-07-18 NOTE — H&P ADULT - PROBLEM SELECTOR PLAN 2
Pt with colitis of descending colon and sigmoid colon seen on CT abd/pelvis without abscess or free air.  - f/u GI PCR

## 2018-07-18 NOTE — H&P ADULT - HISTORY OF PRESENT ILLNESS
48M with HIV on HAART (unknown CD4, VL undetectable) presenting to ED with abd pain x 2 days preceded by nonbloody watery diarrhea. Pt states that the abd pain is 0/10, diffusely throughout the lower abdomen, and of an intermittent cramping nature. States that he has had >10 episodes of diarrhea per day over the last 2 days and has experienced subjective fevers. His  has the same symptoms. Denies any recent travel, abx use, nausea, vomiting.     ED Vitals: T 99.8 F, , /65, RR 20, O2 95% on RA  ED Labs: WBC 18.2K with neutrophil 86% predominance, Hgb 12.4, Na 130, K 3, Cl 93, CO2 21, lactate 1.3  C diff negative  CT abd/pelvis with PO contrast: colitis of descending and sigmoid colon, no abscess or free air  ED adm: cipro 400mg x1, Flagyl 500mg x1,  cc bolus x 6 (total 3L given)  CXR clear 48M with HIV on HAART (unknown CD4, VL undetectable) presenting to ED with abd pain x 2 days preceded by nonbloody watery diarrhea. Pt states that the abd pain is 4/10, diffusely throughout the lower abdomen, and of an intermittent cramping nature. States that he has had >10 episodes of diarrhea per day over the last 2 days and has experienced subjective fevers. His  has the same symptoms. Denies any recent travel, abx use, dietary changes, nausea, vomiting, chest pain, SOB, dysuria.    ED Vitals: T 99.8 F, , /65, RR 20, O2 95% on RA  ED Labs: WBC 18.2K with neutrophil 86% predominance, Hgb 12.4, Na 130, K 3, Cl 93, CO2 21, lactate 1.3  C diff negative  CT abd/pelvis with PO contrast: colitis of descending and sigmoid colon, no abscess or free air  ED adm: cipro 400mg x1, Flagyl 500mg x1,  cc bolus x 6 (total 3L given)  CXR clear

## 2018-07-18 NOTE — ED ADULT NURSE NOTE - OBJECTIVE STATEMENT
pt to ER w/ report of subjective fevers, abd pain, nausea, and diarrhea for two days.  Pt denies cp/sob.  Breathing unlabored, skin warm and dry. IV access established, labs drawn and sent. Will continue to monitor.

## 2018-07-18 NOTE — H&P ADULT - FAMILY HISTORY
Father  Still living? Unknown  Family history of diabetes mellitus, Age at diagnosis: Age Unknown  Family history of glaucoma, Age at diagnosis: Age Unknown  Family history of other heart disease, Age at diagnosis: Age Unknown

## 2018-07-18 NOTE — H&P ADULT - PROBLEM SELECTOR PLAN 1
Pt met sepsis criteria on admission with tachycardia and leukocytosis (, WBC 18.2k). Sepsis protocol total 3L NS given, lactate WNL. Likely 2/2 colitis seen on CT abd/pelvis.   - f/u BCx x2  - s/p IV ciprofloxacin 400mg x1 and metronidazole 500mg x1 in ED  - f/u GI PCR stool  - reperfusion assessment performed, well-perfusing  - Pt met sepsis criteria on admission with tachycardia and leukocytosis (, WBC 18.2k). Sepsis protocol total 3L NS given, lactate WNL. Likely 2/2 colitis seen on CT abd/pelvis.   - f/u BCx x2  - s/p IV ciprofloxacin 400mg x1 and metronidazole 500mg x1 in ED  - f/u GI PCR stool  - reperfusion assessment performed, well-perfusing  - c/w ciprofloxacin and flagyl Pt met sepsis criteria on admission with tachycardia and leukocytosis (, WBC 18.2k). Sepsis protocol total 3L NS given, lactate WNL. Likely 2/2 colitis seen on CT abd/pelvis.   Differentials include E. Coli, Giardia, Campylobacter, Cryptosporidium, vs. less likely viral.  - f/u GI PCR stool  - f/u BCx x2  - s/p IV ciprofloxacin 400mg x1 and metronidazole 500mg x1 in ED  - reperfusion assessment performed, well-perfusing  - c/w ciprofloxacin and flagyl Pt met sepsis criteria on admission with tachycardia and leukocytosis (, WBC 18.2k). Sepsis protocol total 3L NS given, lactate WNL. Likely 2/2 colitis seen on CT abd/pelvis.   Differentials include E. Coli, Giardia, Campylobacter, Cryptosporidium, vs. less likely viral.  - f/u GI PCR stool  - f/u BCx x2  - s/p IV ciprofloxacin 400mg x1 and metronidazole 500mg x1 in ED  - reperfusion assessment performed, well-perfusing  - c/w ciprofloxacin and flagyl  -LA wnl, pt s/p 3L NS

## 2018-07-18 NOTE — ED PROVIDER NOTE - OBJECTIVE STATEMENT
P Pt is HIV positive (unknown CD4 at this time, but no hx low CD4 or opportunistic infections, VL undetectable, on HAART) p/w 2 days of abd pain, diarrhea, and tactile fever. Diarrhea preceded the abdominal pain. The pain is intermittent, cramping, currently 0/10 on the pain scale.  The pain is in the diffuse lower abdomen. No n/v. The diarrhea is watery, brown in color. No blood or mucous. Pt reports > 10 episodes per day. No recent travel or abx use. Pt's  w/ same sx.

## 2018-07-18 NOTE — H&P ADULT - NSHPPHYSICALEXAM_GEN_ALL_CORE
.  VITAL SIGNS:  T(C): 37.7 (07-18-18 @ 22:03), Max: 37.7 (07-18-18 @ 15:17)  T(F): 99.8 (07-18-18 @ 22:03), Max: 99.8 (07-18-18 @ 15:17)  HR: 97 (07-18-18 @ 22:03) (94 - 108)  BP: 99/64 (07-18-18 @ 22:03) (99/64 - 121/71)  BP(mean): --  RR: 20 (07-18-18 @ 22:03) (20 - 20)  SpO2: 96% (07-18-18 @ 22:03) (95% - 96%)  Wt(kg): --    PHYSICAL EXAM:    Constitutional: obese male resting in bed, appears sleepy but in no acute distress  Head: NC/AT  Eyes: EOMI, anicteric sclera  ENT: no nasal discharge; uvula midline, no oropharyngeal erythema or exudates; MMM  Neck: supple; erythema around neck  Respiratory: air movement bilaterally however limited air movement; minimal wheezing; no rhonchi/crackles, no retractions  Cardiac: +S1/S2; RRR; no M/R/G; PMI non-displaced  Gastrointestinal: abdomen soft, tender to deep palpation lower abdomen; no rebound or guarding; +BSx4, not hyperactive  Back: spine midline, no bony tenderness or step-offs; no CVAT B/L  Extremities: WWP, no clubbing or cyanosis; chronic scar on RLE; no peripheral edema;   Musculoskeletal: NROM x4; no joint swelling, tenderness or erythema  Vascular: 2+ radial, PT pulses B/L  Dermatologic: skin warm, dry and intact; R elbow psoriatic lesion; R anterior chest and left antecubital fossa with folliculitis, slightly erythematous but does not appear infected  Lymphatic: no submandibular or cervical LAD  Neurologic: AAOx3; CNII-XII grossly intact; no focal deficits  Psychiatric: affect and characteristics of appearance, verbalizations, behaviors are appropriate

## 2018-07-18 NOTE — H&P ADULT - PROBLEM SELECTOR PLAN 3
Pt with electrolyte abnormalities, showing Na 130, K 3.0, Cl 93 and CO2 21. Likely 2/2 multiple episodes of watery diarrhea.   - s/p 3L NS resuscitation  - monitor BMP and replete PRN

## 2018-07-18 NOTE — ED PROVIDER NOTE - PHYSICAL EXAMINATION
Constitutional: Well appearing, well nourished, awake, alert, oriented to person, place, time/situation and in no apparent distress.  ENMT: Airway patent. Normal MM  Eyes: Clear bilaterally  Cardiac: Normal rate, regular rhythm.  Heart sounds S1, S2.  No murmurs, rubs or gallops.  Respiratory: Breaths sounds equal and clear b/l. No increased WOB, tachypnea, hypoxia, or accessory mm use. Pt speaks in full sentences.   Gastrointestinal: Abd soft, ND, NABS. + difffuse abd ttp. No guarding, rebound, or rigidity. No pulsatile abdominal masses. No organomegaly appreciated. No CVAT   Musculoskeletal: Range of motion is not limited  Neuro: Alert and oriented, grossly non focal  Skin: Skin normal color for race, warm, dry and intact. No evidence of rash.  Psych: Alert and oriented to person, place, time/situation. normal mood and affect. no apparent risk to self or others.

## 2018-07-18 NOTE — ED PROVIDER NOTE - MEDICAL DECISION MAKING DETAILS
Pt p/w abd pain, diarrhea. DDx include colitis, diverticulitis, bacterial diarrheal infection, viral illness. Check labs, CT a/p, stool studies. IVF, abx, NPO. Dispo pending w/u and clinical status never used

## 2018-07-18 NOTE — H&P ADULT - NSHPSOCIALHISTORY_GEN_ALL_CORE
Unemployed, lives with  at home, endorses tobacco use 1/2 PPD x 25 yrs, occasional/rare alcohol use, no drug use

## 2018-07-18 NOTE — H&P ADULT - ASSESSMENT
48M with HIV on HAART (no history of CD4 <200), asthma/COPD, depression, psoriasis, insomnia presenting with diarrhea found to be septic 2/2 descending and sigmoid colitis.

## 2018-07-19 ENCOUNTER — TRANSCRIPTION ENCOUNTER (OUTPATIENT)
Age: 48
End: 2018-07-19

## 2018-07-19 VITALS
SYSTOLIC BLOOD PRESSURE: 99 MMHG | DIASTOLIC BLOOD PRESSURE: 59 MMHG | HEART RATE: 87 BPM | OXYGEN SATURATION: 96 % | RESPIRATION RATE: 17 BRPM | TEMPERATURE: 99 F

## 2018-07-19 DIAGNOSIS — F32.9 MAJOR DEPRESSIVE DISORDER, SINGLE EPISODE, UNSPECIFIED: ICD-10-CM

## 2018-07-19 DIAGNOSIS — J44.9 CHRONIC OBSTRUCTIVE PULMONARY DISEASE, UNSPECIFIED: ICD-10-CM

## 2018-07-19 LAB
ANION GAP SERPL CALC-SCNC: 13 MMOL/L — SIGNIFICANT CHANGE UP (ref 5–17)
BASOPHILS NFR BLD AUTO: 0.1 % — SIGNIFICANT CHANGE UP (ref 0–2)
BUN SERPL-MCNC: 8 MG/DL — SIGNIFICANT CHANGE UP (ref 7–23)
CALCIUM SERPL-MCNC: 8.1 MG/DL — LOW (ref 8.4–10.5)
CHLORIDE SERPL-SCNC: 101 MMOL/L — SIGNIFICANT CHANGE UP (ref 96–108)
CO2 SERPL-SCNC: 24 MMOL/L — SIGNIFICANT CHANGE UP (ref 22–31)
CREAT SERPL-MCNC: 0.94 MG/DL — SIGNIFICANT CHANGE UP (ref 0.5–1.3)
CULTURE RESULTS: SIGNIFICANT CHANGE UP
EOSINOPHIL NFR BLD AUTO: 0.8 % — SIGNIFICANT CHANGE UP (ref 0–6)
GLUCOSE SERPL-MCNC: 103 MG/DL — HIGH (ref 70–99)
HCT VFR BLD CALC: 38.3 % — LOW (ref 39–50)
HGB BLD-MCNC: 13 G/DL — SIGNIFICANT CHANGE UP (ref 13–17)
LYMPHOCYTES # BLD AUTO: 12.3 % — LOW (ref 13–44)
MAGNESIUM SERPL-MCNC: 1.8 MG/DL — SIGNIFICANT CHANGE UP (ref 1.6–2.6)
MCHC RBC-ENTMCNC: 29.1 PG — SIGNIFICANT CHANGE UP (ref 27–34)
MCHC RBC-ENTMCNC: 33.9 G/DL — SIGNIFICANT CHANGE UP (ref 32–36)
MCV RBC AUTO: 85.7 FL — SIGNIFICANT CHANGE UP (ref 80–100)
MONOCYTES NFR BLD AUTO: 7 % — SIGNIFICANT CHANGE UP (ref 2–14)
NEUTROPHILS NFR BLD AUTO: 79.8 % — HIGH (ref 43–77)
PHOSPHATE SERPL-MCNC: 2.8 MG/DL — SIGNIFICANT CHANGE UP (ref 2.5–4.5)
PLATELET # BLD AUTO: 160 K/UL — SIGNIFICANT CHANGE UP (ref 150–400)
POTASSIUM SERPL-MCNC: 3.4 MMOL/L — LOW (ref 3.5–5.3)
POTASSIUM SERPL-SCNC: 3.4 MMOL/L — LOW (ref 3.5–5.3)
RBC # BLD: 4.47 M/UL — SIGNIFICANT CHANGE UP (ref 4.2–5.8)
RBC # FLD: 14.1 % — SIGNIFICANT CHANGE UP (ref 10.3–16.9)
SODIUM SERPL-SCNC: 138 MMOL/L — SIGNIFICANT CHANGE UP (ref 135–145)
SPECIMEN SOURCE: SIGNIFICANT CHANGE UP
WBC # BLD: 15.3 K/UL — HIGH (ref 3.8–10.5)
WBC # FLD AUTO: 15.3 K/UL — HIGH (ref 3.8–10.5)

## 2018-07-19 PROCEDURE — 80048 BASIC METABOLIC PNL TOTAL CA: CPT

## 2018-07-19 PROCEDURE — 87536 HIV-1 QUANT&REVRSE TRNSCRPJ: CPT

## 2018-07-19 PROCEDURE — 87449 NOS EACH ORGANISM AG IA: CPT

## 2018-07-19 PROCEDURE — 80053 COMPREHEN METABOLIC PANEL: CPT

## 2018-07-19 PROCEDURE — 84100 ASSAY OF PHOSPHORUS: CPT

## 2018-07-19 PROCEDURE — 93005 ELECTROCARDIOGRAM TRACING: CPT

## 2018-07-19 PROCEDURE — 94640 AIRWAY INHALATION TREATMENT: CPT

## 2018-07-19 PROCEDURE — 83735 ASSAY OF MAGNESIUM: CPT

## 2018-07-19 PROCEDURE — 87324 CLOSTRIDIUM AG IA: CPT

## 2018-07-19 PROCEDURE — 36415 COLL VENOUS BLD VENIPUNCTURE: CPT

## 2018-07-19 PROCEDURE — 87040 BLOOD CULTURE FOR BACTERIA: CPT

## 2018-07-19 PROCEDURE — 85025 COMPLETE CBC W/AUTO DIFF WBC: CPT

## 2018-07-19 PROCEDURE — 87186 SC STD MICRODIL/AGAR DIL: CPT

## 2018-07-19 PROCEDURE — 83690 ASSAY OF LIPASE: CPT

## 2018-07-19 PROCEDURE — 96374 THER/PROPH/DIAG INJ IV PUSH: CPT | Mod: XU

## 2018-07-19 PROCEDURE — 87507 IADNA-DNA/RNA PROBE TQ 12-25: CPT

## 2018-07-19 PROCEDURE — 96375 TX/PRO/DX INJ NEW DRUG ADDON: CPT

## 2018-07-19 PROCEDURE — 83605 ASSAY OF LACTIC ACID: CPT

## 2018-07-19 PROCEDURE — 74177 CT ABD & PELVIS W/CONTRAST: CPT

## 2018-07-19 PROCEDURE — 99285 EMERGENCY DEPT VISIT HI MDM: CPT | Mod: 25

## 2018-07-19 PROCEDURE — 99239 HOSP IP/OBS DSCHRG MGMT >30: CPT

## 2018-07-19 PROCEDURE — 85027 COMPLETE CBC AUTOMATED: CPT

## 2018-07-19 RX ORDER — POTASSIUM CHLORIDE 20 MEQ
40 PACKET (EA) ORAL ONCE
Qty: 0 | Refills: 0 | Status: COMPLETED | OUTPATIENT
Start: 2018-07-19 | End: 2018-07-19

## 2018-07-19 RX ORDER — CIPROFLOXACIN LACTATE 400MG/40ML
1 VIAL (ML) INTRAVENOUS
Qty: 0 | Refills: 0 | COMMUNITY
Start: 2018-07-19

## 2018-07-19 RX ORDER — METRONIDAZOLE 500 MG
1 TABLET ORAL
Qty: 0 | Refills: 0 | COMMUNITY
Start: 2018-07-19

## 2018-07-19 RX ORDER — HEPARIN SODIUM 5000 [USP'U]/ML
5000 INJECTION INTRAVENOUS; SUBCUTANEOUS EVERY 8 HOURS
Qty: 0 | Refills: 0 | Status: DISCONTINUED | OUTPATIENT
Start: 2018-07-19 | End: 2018-07-19

## 2018-07-19 RX ORDER — CIPROFLOXACIN LACTATE 400MG/40ML
500 VIAL (ML) INTRAVENOUS EVERY 12 HOURS
Qty: 0 | Refills: 0 | Status: DISCONTINUED | OUTPATIENT
Start: 2018-07-19 | End: 2018-07-19

## 2018-07-19 RX ORDER — CEFDINIR 250 MG/5ML
1 POWDER, FOR SUSPENSION ORAL
Qty: 10 | Refills: 0 | OUTPATIENT
Start: 2018-07-19 | End: 2018-07-23

## 2018-07-19 RX ORDER — SODIUM CHLORIDE 9 MG/ML
500 INJECTION INTRAMUSCULAR; INTRAVENOUS; SUBCUTANEOUS ONCE
Qty: 0 | Refills: 0 | Status: COMPLETED | OUTPATIENT
Start: 2018-07-19 | End: 2018-07-19

## 2018-07-19 RX ORDER — METRONIDAZOLE 500 MG
500 TABLET ORAL EVERY 8 HOURS
Qty: 0 | Refills: 0 | Status: DISCONTINUED | OUTPATIENT
Start: 2018-07-19 | End: 2018-07-19

## 2018-07-19 RX ORDER — CEFPODOXIME PROXETIL 100 MG
200 TABLET ORAL ONCE
Qty: 0 | Refills: 0 | Status: COMPLETED | OUTPATIENT
Start: 2018-07-19 | End: 2018-07-19

## 2018-07-19 RX ORDER — POTASSIUM CHLORIDE 20 MEQ
20 PACKET (EA) ORAL ONCE
Qty: 0 | Refills: 0 | Status: DISCONTINUED | OUTPATIENT
Start: 2018-07-19 | End: 2018-07-19

## 2018-07-19 RX ORDER — SODIUM CHLORIDE 9 MG/ML
1000 INJECTION INTRAMUSCULAR; INTRAVENOUS; SUBCUTANEOUS
Qty: 0 | Refills: 0 | Status: DISCONTINUED | OUTPATIENT
Start: 2018-07-19 | End: 2018-07-19

## 2018-07-19 RX ORDER — SERTRALINE 25 MG/1
50 TABLET, FILM COATED ORAL DAILY
Qty: 0 | Refills: 0 | Status: DISCONTINUED | OUTPATIENT
Start: 2018-07-19 | End: 2018-07-19

## 2018-07-19 RX ADMIN — EMTRICITABINE AND TENOFOVIR DISOPROXIL FUMARATE 1 TABLET(S): 200; 300 TABLET, FILM COATED ORAL at 04:58

## 2018-07-19 RX ADMIN — Medication 500 MILLIGRAM(S): at 09:30

## 2018-07-19 RX ADMIN — Medication 650 MILLIGRAM(S): at 00:04

## 2018-07-19 RX ADMIN — Medication 200 MILLIGRAM(S): at 16:25

## 2018-07-19 RX ADMIN — Medication 500 MILLIGRAM(S): at 04:59

## 2018-07-19 RX ADMIN — DOLUTEGRAVIR SODIUM 50 MILLIGRAM(S): 25 TABLET, FILM COATED ORAL at 04:58

## 2018-07-19 RX ADMIN — SERTRALINE 50 MILLIGRAM(S): 25 TABLET, FILM COATED ORAL at 14:05

## 2018-07-19 RX ADMIN — EMTRICITABINE AND TENOFOVIR DISOPROXIL FUMARATE 1 TABLET(S): 200; 300 TABLET, FILM COATED ORAL at 13:17

## 2018-07-19 RX ADMIN — HEPARIN SODIUM 5000 UNIT(S): 5000 INJECTION INTRAVENOUS; SUBCUTANEOUS at 13:17

## 2018-07-19 RX ADMIN — BUDESONIDE AND FORMOTEROL FUMARATE DIHYDRATE 2 PUFF(S): 160; 4.5 AEROSOL RESPIRATORY (INHALATION) at 10:11

## 2018-07-19 RX ADMIN — SODIUM CHLORIDE 125 MILLILITER(S): 9 INJECTION INTRAMUSCULAR; INTRAVENOUS; SUBCUTANEOUS at 09:30

## 2018-07-19 RX ADMIN — HEPARIN SODIUM 5000 UNIT(S): 5000 INJECTION INTRAVENOUS; SUBCUTANEOUS at 05:29

## 2018-07-19 RX ADMIN — Medication 1 APPLICATION(S): at 09:30

## 2018-07-19 RX ADMIN — Medication 40 MILLIEQUIVALENT(S): at 07:53

## 2018-07-19 RX ADMIN — SODIUM CHLORIDE 125 MILLILITER(S): 9 INJECTION INTRAMUSCULAR; INTRAVENOUS; SUBCUTANEOUS at 17:10

## 2018-07-19 RX ADMIN — DOLUTEGRAVIR SODIUM 50 MILLIGRAM(S): 25 TABLET, FILM COATED ORAL at 13:17

## 2018-07-19 RX ADMIN — SODIUM CHLORIDE 1000 MILLILITER(S): 9 INJECTION INTRAMUSCULAR; INTRAVENOUS; SUBCUTANEOUS at 17:11

## 2018-07-19 NOTE — PROGRESS NOTE ADULT - ASSESSMENT
48M with HIV on HAART (no history of CD4 <200), asthma/COPD, depression, psoriasis, insomnia presenting with diarrhea found to be septic 2/2 descending and sigmoid colitis. 48M with HIV on HAART (no history of CD4 <200), asthma/COPD, depression, psoriasis, insomnia presenting with non-bloody, watery diarrhea found to have sepsis 2/2 descending and sigmoid colitis.

## 2018-07-19 NOTE — PROGRESS NOTE ADULT - PROBLEM SELECTOR PLAN 1
Pt met sepsis criteria on admission with tachycardia and leukocytosis (, WBC 18.2k). Sepsis protocol total 3L NS given, lactate WNL. Likely 2/2 colitis seen on CT abd/pelvis.   Differentials include E. Coli, Giardia, Campylobacter, Cryptosporidium, vs. less likely viral.  - f/u GI PCR stool  - f/u BCx x2  - s/p IV ciprofloxacin 400mg x1 and metronidazole 500mg x1 in ED  - reperfusion assessment performed, well-perfusing  - c/w ciprofloxacin and flagyl  -LA wnl, pt s/p 3L NS Pt met sepsis criteria on admission with tachycardia and leukocytosis (, WBC 18.2k). Sepsis protocol total 3L NS given, lactate WNL. Likely 2/2 colitis seen on CT abd/pelvis.   Differentials include E. Coli, Salmonella, Shigella, Giardia, Campylobacter, Cryptosporidium, vs. less likely viral.  - f/u GI PCR stool  - f/u BCx x2  - s/p IV ciprofloxacin 400mg x1 and metronidazole 500mg x1 in ED  - c/w ciprofloxacin and flagyl PO  - reperfusion assessment performed, well-perfusing  - LA wnl, pt s/p 3L NS

## 2018-07-19 NOTE — PROGRESS NOTE ADULT - PROBLEM SELECTOR PLAN 2
Pt with colitis of descending colon and sigmoid colon seen on CT abd/pelvis without abscess or free air.  - f/u GI PCR  - antibiotics as above. Pt with colitis of descending colon and sigmoid colon seen on CT abd/pelvis without abscess or free air. Likely infectious due to WBC count, fever, sick contact vs less likely inflammatory (hx of psoriasis)  - f/u GI PCR  - antibiotics as above.

## 2018-07-19 NOTE — DISCHARGE NOTE ADULT - INSTRUCTIONS
No fluid or diet restrictions. Please drink plenty of water to stay hydrated as you are losing volume through your diarrhea.

## 2018-07-19 NOTE — PROGRESS NOTE ADULT - ATTENDING COMMENTS
Patient was seen and examined by me at bedside. I agree with resident's note, subjective, objective physical exam, assessment and plan with following modifications/additions.     1) Sepsis (POA) 2/2 infectious colitis  2) Colitis 2/2 Shigella, E. coli: enteroinvasive and enteropathogenic.   3) Abdominal pain  4) diarrhea  5) HIV    Plan: Patient is clinically improved. Still with watery diarrhea. HIV consulted.   Will DC patient home on Cefdinir PO for 5 days.   Patient to follow up with Dr. Pacheco next Tuesday.

## 2018-07-19 NOTE — PROGRESS NOTE ADULT - PROBLEM SELECTOR PROBLEM 5
Nutrition, metabolism, and development symptoms Chronic obstructive pulmonary disease, unspecified COPD type

## 2018-07-19 NOTE — MEDICAL STUDENT PROGRESS NOTE(EDUCATION) - SUBJECTIVE AND OBJECTIVE BOX
INTERVAL HPI/OVERNIGHT EVENTS: Pt reports 11 episodes of watery diarrhea since midnight. Fever peaked to 101.7 w/ , RR 22 overnight.     Subjective: Patient seen and examined at bedside. Pt continues to c/o crampy abdominal pain (4/10) focused to LLQ with episodes of watery diarrhea. Admits to feeling feverish with chills. Patient notes skin changes (previously undocumented in chart) with a petechial rash on his bilateral upper extremities and upper abdomen that started 2 days ago with GI symptoms. Denies N/V, chest pain, SOB.    Objective:     VITAL SIGNS:  ICU Vital Signs Last 24 Hrs  T(C): 37 (19 Jul 2018 09:12), Max: 38.7 (18 Jul 2018 23:47)  T(F): 98.6 (19 Jul 2018 09:12), Max: 101.7 (18 Jul 2018 23:47)  HR: 95 (19 Jul 2018 09:12) (89 - 108)  BP: 106/67 (19 Jul 2018 09:12) (99/64 - 121/71)  BP(mean): --  ABP: --  ABP(mean): --  RR: 16 (19 Jul 2018 09:12)(20 - 22)  SpO2: 96% (19 Jul 2018 09:12) (95% - 97%)      PHYSICAL EXAM:    General: Laying in bed, looks unwell, in NAD  HEENT: NC/AT; PERRL, clear conjunctiva  Neck: supple, no JVD noted  Respiratory: scattered wheezes b/l  Cardiovascular: +S1/S2; RRR, no murmurs, rubs, or gallops  Abdomen: soft, non-distended, TTP in lower quadrants L>R; +BS in all 4 quadrants  Extremities: WWP, 2+ peripheral pulses b/l; no LE edema  Skin: normal color and turgor; psoriasis lesions noted on both arms, petechiae on bilateral forarms and R upper abdomen.   Neurological: AOx3, no focal deficits noted    MEDICATIONS:  MEDICATIONS  (STANDING):  buDESOnide  80 MICROgram(s)/formoterol 4.5 MICROgram(s) Inhaler 2 Puff(s) Inhalation two times a day  ciprofloxacin     Tablet 500 milliGRAM(s) Oral every 12 hours  clobetasol 0.05% Cream 1 Application(s) Topical two times a day  dolutegravir 50 milliGRAM(s) Oral daily  emtricitabine 200 mG/tenofovir alafenamide 25 mG (DESCOVY) Tablet 1 Tablet(s) Oral daily  heparin  Injectable 5000 Unit(s) SubCutaneous every 8 hours  metroNIDAZOLE    Tablet 500 milliGRAM(s) Oral every 8 hours  potassium chloride    Tablet ER 40 milliEquivalent(s) Oral once  sertraline 50 milliGRAM(s) Oral at bedtime    MEDICATIONS  (PRN):  acetaminophen   Tablet 650 milliGRAM(s) Oral every 6 hours PRN For Temp greater than 38 C (100.4 F)  ALBUTerol    90 MICROgram(s) HFA Inhaler 2 Puff(s) Inhalation every 6 hours PRN Shortness of Breath and/or Wheezing  zolpidem 5 milliGRAM(s) Oral at bedtime PRN Insomnia      ALLERGIES: No Known Allergies    LABS:                        13.0   15.3  )-----------( 160      ( 19 Jul 2018 05:48 )             38.3     07-19    138  |  101  |  8   ----------------------------<  103<H>  3.4<L>   |  24  |  0.94    Ca    8.1<L>      19 Jul 2018 05:48  Phos  2.8     07-19  Mg     1.8     07-19    TPro  7.3  /  Alb  3.9  /  TBili  0.6  /  DBili  x   /  AST  30  /  ALT  20  /  AlkPhos  55  07-18      RADIOLOGY & ADDITIONAL TESTS:     CT A/P; 1.  Findings consistent with colitis involving the descending and sigmoid colon. No pericolonic fluid collection or abscess is identified. No free air. Additionally, there is thickening of the cecum with surrounding inflammatory change, suggestive of an additional focal colitis. Infectious and inflammatory etiologies should be considered.

## 2018-07-19 NOTE — DISCHARGE NOTE ADULT - HOSPITAL COURSE
The patient is a 47 yo male with PMH of HIV controlled with HAART (CD4 >1500 cells/mL; VLUD 10/17/17), COPD, anxiety, depression, and psoriasis presenting with crampy abdominal pain and watery diarrhea lasting two days with fever (Tmax 101.7), WBC 18.2, admitted for sepsis likely 2/2 colitis. Given 3 L NS in the ED for adequate reperfusion. Reperfusion assessment completed. GI PCR panel was sent and new CD4 and viral load were sent. Patient was given IV Cipro 500x1 and IV Flagyl 500 x1 in the ED. Overnight, the patient was switched over to PO of these same medications as they have the same bioavailability as the IV form. Patient continued to have diarrhea and abdominal pain throughout the night. In the morning, his symptoms began to improve and he was able to tolerate PO intake at both breakfast and lunch. GI PCR came back positive for Shigella/ EIEC and EPEC. Plan is to send patient home with Cefdinir BID for 5 days with follow-up with PCP next Tuesday (7/24).

## 2018-07-19 NOTE — MEDICAL STUDENT PROGRESS NOTE(EDUCATION) - NS MD HP STUD ASPLAN ASSES FT
CALVIN is a 49 yo male with PMH of HIV controlled with HAART (CD4 >1500 cells/mL; VLUD 10/17/17), COPD, anxiety, depression, and psoriasis presenting with crampy abdominal pain and watery diarrhea lasting two days with fever (Tmax 101.7), WBC 18.2, admitted for sepsis likely 2/2 colitis.

## 2018-07-19 NOTE — PROGRESS NOTE ADULT - PROBLEM SELECTOR PLAN 3
Pt with electrolyte abnormalities, showing Na 130, K 3.0, Cl 93 and CO2 21. Likely 2/2 multiple episodes of watery diarrhea.   - s/p 3L NS resuscitation  -Continue to trend BMP  - monitor BMP and replete PRN Pt with electrolyte abnormalities, showing Na 130, K 3.0, Cl 93 and CO2 21. Likely 2/2 multiple episodes of watery diarrhea.   - s/p 3L NS resuscitation  - repleted K+ x2.  -Continue to trend BMP  - monitor BMP and replete PRN

## 2018-07-19 NOTE — MEDICAL STUDENT PROGRESS NOTE(EDUCATION) - NS MD HP STUD ASPLAN PLAN FT
Problem/Plan - 1:  ·  Problem: Sepsis.  Plan: Pt met sepsis criteria on admission with tachycardia and leukocytosis (, WBC 18.2k). Sepsis protocol total 3L NS given, lactate WNL. Likely 2/2 colitis seen on CT abd/pelvis.   Differentials include E. Coli, Salmonella, Shigella, Giardia, Campylobacter, Cryptosporidium, vs. less likely viral.  - f/u GI PCR stool  - f/u BCx x2  - s/p IV ciprofloxacin 400mg x1 and metronidazole 500mg x1 in ED  - c/w ciprofloxacin 500 mg ORAL q12 and flagyl 500 mg ORAL q8  - reperfusion assessment performed, well-perfusing  - LA wnl, pt s/p 3L NS.     Problem/Plan - 2:  ·  Problem: Colitis, acute.  Plan: Pt with colitis of descending colon and sigmoid colon seen on CT abd/pelvis without abscess or free air. Likely infectious due to WBC count, fever, sick contact vs less likely inflammatory (hx of psoriasis)  - f/u GI PCR  - antibiotics as above.     Problem/Plan - 3:  ·  Problem: Electrolyte abnormality.  Plan: Pt with electrolyte abnormalities, showing Na 130, K 3.0, Cl 93 and CO2 21. Likely 2/2 multiple episodes of watery diarrhea.   - s/p 3L NS resuscitation  - repleted K+ x2.  -Continue to trend BMP  - monitor BMP and replete PRN.     Problem/Plan - 4:  ·  Problem: HIV (human immunodeficiency virus infection).  Plan: Pt on HAART therapy.   - c/w dolutegravir 50 mg oral qd, emtricitabine 200mg/ tenofovir alafenamide 25 mg (descovy) oral qd   - f/u CD4  - f/u VL  - Notify HIV team (Dr. Pacheco).      Problem/Plan - 5:  ·  Problem: Chronic obstructive pulmonary disease, unspecified COPD type. Plan: Not an acute exacerbation.   - Continue with home albuterol PRN  - Continue Symbicort.     Problem/Plan - 6:  Problem: Depression, unspecified depression type.Plan: -Continue home Zoloft.     Problem/Plan - 7:  ·  Problem: Nutrition, metabolism, and development symptoms. Plan: F: none, s/p 3L NS in ED  E: replete PRN  N: regular diet    -continue zolpidem for insomnia.     Problem/Plan - 8:  ·  Problem: Prophylactic measure.  Plan: HSQ.      Problem/Plan - 9:  ·  Problem: Transition of care performed with sharing of clinical summary.  Plan: Dr. Pacheco's patient, HIV team contacted 7/19/18. Aware that patient is hospitalized.

## 2018-07-19 NOTE — PROGRESS NOTE ADULT - SUBJECTIVE AND OBJECTIVE BOX
INTERVAL HPI/OVERNIGHT EVENTS:    SUBJECTIVE: Patient seen and examined at bedside.    OBJECTIVE:    VITAL SIGNS:  ICU Vital Signs Last 24 Hrs  T(C): 36.6 (19 Jul 2018 05:26), Max: 38.7 (18 Jul 2018 23:47)  T(F): 97.9 (19 Jul 2018 05:26), Max: 101.7 (18 Jul 2018 23:47)  HR: 89 (19 Jul 2018 05:26) (89 - 108)  BP: 104/69 (19 Jul 2018 05:26) (99/64 - 121/71)  BP(mean): --  ABP: --  ABP(mean): --  RR: 20 (19 Jul 2018 05:26) (20 - 22)  SpO2: 97% (19 Jul 2018 05:26) (95% - 97%)        CAPILLARY BLOOD GLUCOSE          PHYSICAL EXAM:    General: NAD  HEENT: NC/AT; PERRL, clear conjunctiva  Neck: supple  Respiratory: lungs CTA b/l, no wheezes or rhonchi  Cardiovascular: +S1/S2; RRR, no murmurs, rubs, or gallops  Abdomen: soft, non-tender, non-distended; +BS in all 4 quadrants  Extremities: WWP, 2+ peripheral pulses b/l; no LE edema  Skin: normal color and turgor; no rash  Neurological: AOx3, no focal deficits noted    MEDICATIONS:  MEDICATIONS  (STANDING):  buDESOnide  80 MICROgram(s)/formoterol 4.5 MICROgram(s) Inhaler 2 Puff(s) Inhalation two times a day  ciprofloxacin     Tablet 500 milliGRAM(s) Oral every 12 hours  clobetasol 0.05% Cream 1 Application(s) Topical two times a day  dolutegravir 50 milliGRAM(s) Oral daily  emtricitabine 200 mG/tenofovir alafenamide 25 mG (DESCOVY) Tablet 1 Tablet(s) Oral daily  heparin  Injectable 5000 Unit(s) SubCutaneous every 8 hours  metroNIDAZOLE    Tablet 500 milliGRAM(s) Oral every 8 hours  potassium chloride    Tablet ER 40 milliEquivalent(s) Oral once  sertraline 50 milliGRAM(s) Oral at bedtime    MEDICATIONS  (PRN):  acetaminophen   Tablet 650 milliGRAM(s) Oral every 6 hours PRN For Temp greater than 38 C (100.4 F)  ALBUTerol    90 MICROgram(s) HFA Inhaler 2 Puff(s) Inhalation every 6 hours PRN Shortness of Breath and/or Wheezing  zolpidem 5 milliGRAM(s) Oral at bedtime PRN Insomnia      ALLERGIES:  Allergies    No Known Allergies    Intolerances        LABS:                        13.0   15.3  )-----------( 160      ( 19 Jul 2018 05:48 )             38.3     07-19    138  |  101  |  8   ----------------------------<  103<H>  3.4<L>   |  24  |  0.94    Ca    8.1<L>      19 Jul 2018 05:48  Phos  2.8     07-19  Mg     1.8     07-19    TPro  7.3  /  Alb  3.9  /  TBili  0.6  /  DBili  x   /  AST  30  /  ALT  20  /  AlkPhos  55  07-18          RADIOLOGY & ADDITIONAL TESTS: INTERVAL HPI/OVERNIGHT EVENTS: 10 episodes of diarrhea overnight. Fever overnight to 101.7.    SUBJECTIVE: Patient seen and examined at bedside. This morning the patient is continuing to complain of lower abdominal crampy pain and continued episode of watery diarrhea. Endorses subjective fever, chills, and also mentions a rash on his abdomen and forearm. Otherwise, the patient denies chest pain, palpitations, nausea, or vomiting.     OBJECTIVE:    VITAL SIGNS:  ICU Vital Signs Last 24 Hrs  T(C): 36.6 (19 Jul 2018 05:26), Max: 38.7 (18 Jul 2018 23:47)  T(F): 97.9 (19 Jul 2018 05:26), Max: 101.7 (18 Jul 2018 23:47)  HR: 89 (19 Jul 2018 05:26) (89 - 108)  BP: 104/69 (19 Jul 2018 05:26) (99/64 - 121/71)  BP(mean): --  ABP: --  ABP(mean): --  RR: 20 (19 Jul 2018 05:26) (20 - 22)  SpO2: 97% (19 Jul 2018 05:26) (95% - 97%)      PHYSICAL EXAM:    General: Laying in bed, looks unwell, in NAD  HEENT: NC/AT; PERRL, clear conjunctiva  Neck: supple, no JVD noted  Respiratory: scattered wheezes b/l  Cardiovascular: +S1/S2; RRR, no murmurs, rubs, or gallops  Abdomen: soft, non-distended, TTP in lower quadrants L>R; +BS in all 4 quadrants  Extremities: WWP, 2+ peripheral pulses b/l; no LE edema  Skin: normal color and turgor; psoriasis lesions noted on both arms, petechiae on R upper abdomen.   Neurological: AOx3, no focal deficits noted    MEDICATIONS:  MEDICATIONS  (STANDING):  buDESOnide  80 MICROgram(s)/formoterol 4.5 MICROgram(s) Inhaler 2 Puff(s) Inhalation two times a day  ciprofloxacin     Tablet 500 milliGRAM(s) Oral every 12 hours  clobetasol 0.05% Cream 1 Application(s) Topical two times a day  dolutegravir 50 milliGRAM(s) Oral daily  emtricitabine 200 mG/tenofovir alafenamide 25 mG (DESCOVY) Tablet 1 Tablet(s) Oral daily  heparin  Injectable 5000 Unit(s) SubCutaneous every 8 hours  metroNIDAZOLE    Tablet 500 milliGRAM(s) Oral every 8 hours  potassium chloride    Tablet ER 40 milliEquivalent(s) Oral once  sertraline 50 milliGRAM(s) Oral at bedtime    MEDICATIONS  (PRN):  acetaminophen   Tablet 650 milliGRAM(s) Oral every 6 hours PRN For Temp greater than 38 C (100.4 F)  ALBUTerol    90 MICROgram(s) HFA Inhaler 2 Puff(s) Inhalation every 6 hours PRN Shortness of Breath and/or Wheezing  zolpidem 5 milliGRAM(s) Oral at bedtime PRN Insomnia      ALLERGIES:  Allergies    No Known Allergies    LABS:                        13.0   15.3  )-----------( 160      ( 19 Jul 2018 05:48 )             38.3     07-19    138  |  101  |  8   ----------------------------<  103<H>  3.4<L>   |  24  |  0.94    Ca    8.1<L>      19 Jul 2018 05:48  Phos  2.8     07-19  Mg     1.8     07-19    TPro  7.3  /  Alb  3.9  /  TBili  0.6  /  DBili  x   /  AST  30  /  ALT  20  /  AlkPhos  55  07-18      RADIOLOGY & ADDITIONAL TESTS:     CT A/P; 1.  Findings consistent with colitis involving the descending and sigmoid colon. No pericolonic fluid collection or abscess is identified. No free air. Additionally, there is thickening of the cecum with surrounding inflammatory change, suggestive of an additional focal colitis. Infectious and inflammatory etiologies should be considered.

## 2018-07-19 NOTE — DISCHARGE NOTE ADULT - SECONDARY DIAGNOSIS.
Colitis, acute HIV (human immunodeficiency virus infection) Chronic obstructive pulmonary disease, unspecified COPD type Electrolyte abnormality

## 2018-07-19 NOTE — DISCHARGE NOTE ADULT - ADDITIONAL INSTRUCTIONS
The patient is a 48M with HIV on HAART (VL undetectable), COPD/asthma,  presenting to ED with abd pain x 2 days preceded by nonbloody watery diarrhea. Pt states that the abd pain is 4/10, diffusely throughout the lower abdomen, and of an intermittent cramping nature. States that he has had >10 episodes of diarrhea per day over the last 2 days and has experienced subjective fevers. His  has the same symptoms. Denies any recent travel, abx use, dietary changes, nausea, vomiting, chest pain, SOB, dysuria.    ED Vitals: T 99.8 F, , /65, RR 20, O2 95% on RA  ED Labs: WBC 18.2K with neutrophil 86% predominance, Hgb 12.4, Na 130, K 3, Cl 93, CO2 21, lactate 1.3  C diff negative  CT abd/pelvis with PO contrast: colitis of descending and sigmoid colon, no abscess or free air  ED adm: cipro 400mg x1, Flagyl 500mg x1,  cc bolus x 6 (total 3L given)  CXR clear Please follow up with Dr. Pacheco next Tuesday 7/24/18 at 10 AM.

## 2018-07-19 NOTE — DISCHARGE NOTE ADULT - PLAN OF CARE
Resolution of infection with complete course of antibiotics. You came to the hospital with abdominal pain and diarrhea. You also had a high white blood cell count, fever, and a high heart rate which means that you had sepsis. You were started on antibiotics to help treat this. You were also given IV fluids as part of sepsis treatment. We sent a GI panel to determine exactly what organism caused your infection so that we could choose the antibioitic that would best treat your specific infection. You came to the hospital with abdominal pain and diarrhea. Imaging showed that you had an infection in the descending and sigmoid colon causing your symptoms. We started you on antibiotics to help treat your the colitis and thus the diarrhea. We sent a GI panel to determine exactly what organism caused your infection so that we could choose the antibioitic that would best treat your specific infection. This is a chronic condition for you. We continued you on your home medications. Please follow-up as needed with Dr. Pacheco as appropriate. This is a chronic condition for you. We continued you on your home medications. Please follow-up as needed with your primary care physician. You came into the hospital with a low level of potassium, likely due to loss of it in the diarrhea. We gave you some potassium back to bring it to a normal level. You came to the hospital with abdominal pain and diarrhea. You also had a high white blood cell count, fever, and a high heart rate which means that you had sepsis. You were started on antibiotics to help treat this. You were also given IV fluids as part of sepsis treatment. We sent a GI panel to determine exactly what organism caused your infection so that we could choose the antibiotic (Cefdinir) that would best treat your specific infection. You came to the hospital with abdominal pain and diarrhea. Imaging showed that you had an infection in the descending and sigmoid colon causing your symptoms. We started you on antibiotics to help treat your the colitis and thus the diarrhea. We sent a GI panel to determine exactly what organism caused your infection so that we could choose the antibiotic (Cefdinir) that would best treat your specific infection.

## 2018-07-19 NOTE — CONSULT NOTE ADULT - SUBJECTIVE AND OBJECTIVE BOX
INTERNAL MEDICINE SERVICE INITIAL CONSULT NOTE    HPI:  48M with HIV on HAART (unknown CD4, VL undetectable) presenting to ED with abd pain x 2 days preceded by nonbloody watery diarrhea. Pt states that the abd pain is 4/10, diffusely throughout the lower abdomen, and of an intermittent cramping nature. States that he has had >10 episodes of diarrhea per day over the last 2 days and has experienced subjective fevers. His  has the same symptoms. Denies any recent travel, abx use, dietary changes, nausea, vomiting, chest pain, SOB, dysuria.      ADDITIONAL HIV HPI: Patient known to [facility], last seen in October 2017, VLUD and CD4 1571, on descovy and tivicay. States he is compliant with medication, may have missed 5 doses in the last month due to travels. Diarrhea improved but still with malaise. Able to eat and drink without issues.     REVIEW OF SYSTEMS:   REVIEW OF SYSTEMS:    CONSTITUTIONAL: Patient denies weakness, fevers or chills  EYES/ENT: Patient denies visual changes;  denies vertigo or throat pain   NECK: Patient denies pain or stiffness  RESPIRATORY: Patient denies cough, wheezing, hemoptysis; denies shortness of breath  CARDIOVASCULAR: Patient denies chest pain or palpitations  GASTROINTESTINAL: As above  GENITOURINARY: Patient denies dysuria, frequency or hematuria  NEUROLOGICAL: Patient denies numbness or weakness  SKIN: Patient denies itching, burning, rashes, or lesions   All other review of systems is negative unless indicated above.  Otherwise negative except as specified in HPI    MEDICATIONS:  MEDICATIONS  (STANDING):  buDESOnide  80 MICROgram(s)/formoterol 4.5 MICROgram(s) Inhaler 2 Puff(s) Inhalation two times a day  clobetasol 0.05% Cream 1 Application(s) Topical two times a day  dolutegravir 50 milliGRAM(s) Oral daily  emtricitabine 200 mG/tenofovir alafenamide 25 mG (DESCOVY) Tablet 1 Tablet(s) Oral daily  heparin  Injectable 5000 Unit(s) SubCutaneous every 8 hours  sertraline 50 milliGRAM(s) Oral daily  sodium chloride 0.9% Bolus 500 milliLiter(s) IV Bolus once  sodium chloride 0.9%. 1000 milliLiter(s) (125 mL/Hr) IV Continuous <Continuous>    MEDICATIONS  (PRN):  acetaminophen   Tablet 650 milliGRAM(s) Oral every 6 hours PRN For Temp greater than 38 C (100.4 F)  ALBUTerol    90 MICROgram(s) HFA Inhaler 2 Puff(s) Inhalation every 6 hours PRN Shortness of Breath and/or Wheezing  zolpidem 5 milliGRAM(s) Oral at bedtime PRN Insomnia      ALLERGIES:  Allergies    No Known Allergies    Intolerances        VITAL SIGNS:  Vital Signs Last 24 Hrs  T(C): 37.4 (19 Jul 2018 16:02), Max: 38.7 (18 Jul 2018 23:47)  T(F): 99.4 (19 Jul 2018 16:02), Max: 101.7 (18 Jul 2018 23:47)  HR: 87 (19 Jul 2018 16:02) (87 - 100)  BP: 99/59 (19 Jul 2018 16:02) (99/59 - 121/71)  BP(mean): --  RR: 17 (19 Jul 2018 16:02) (16 - 22)  SpO2: 96% (19 Jul 2018 16:02) (95% - 97%)      PHYSICAL EXAM:  Constitutional: WDWN resting comfortably in bed; NAD  Head: NC/AT  Eyes: PERRL, EOMI, anicteric sclera  ENT: no nasal discharge; uvula midline, no oropharyngeal erythema or exudates; MMM  Neck: supple; no JVD or thyromegaly  Respiratory: CTA B/L; no W/R/R, no retractions  Cardiac: +S1/S2; RRR; no M/R/G; PMI non-displaced  Gastrointestinal: Left sided pain on deep palpation  Back: spine midline, no bony tenderness or step-offs; no CVAT B/L  Extremities: WWP, no clubbing or cyanosis; no peripheral edema  Musculoskeletal: NROM x4; no joint swelling, tenderness or erythema  Vascular: 2+ radial, femoral, DP/PT pulses B/L  Dermatologic: skin warm, dry and intact; no rashes, wounds, or scars  Lymphatic: no submandibular or cervical LAD  Neurologic: AAOx3; CNII-XII grossly intact; no focal deficits  Psychiatric: affect and characteristics of appearance, verbalizations, behaviors are appropriate    LABS:                        13.0   15.3  )-----------( 160      ( 19 Jul 2018 05:48 )             38.3     07-19    138  |  101  |  8   ----------------------------<  103<H>  3.4<L>   |  24  |  0.94    Ca    8.1<L>      19 Jul 2018 05:48  Phos  2.8     07-19  Mg     1.8     07-19    TPro  7.3  /  Alb  3.9  /  TBili  0.6  /  DBili  x   /  AST  30  /  ALT  20  /  AlkPhos  55  07-18            CAPILLARY BLOOD GLUCOSE          GI PCR Panel, Stool (collected 07-19-18 @ 08:25)  Source: .Stool Feces  Final Report (07-19-18 @ 12:48):    Shigella/Enteroinvasive E. coli (EIEC)    Enteropathogenic E. coli (EPEC)    DETECTED by PCR    *******Please Note:*******    GI panel PCR evaluates for:    Campylobacter, Plesiomonas shigelloides, Salmonella,    Vibrio, Yersinia enterocolitica, Enteroaggregative    Escherichia coli (EAEC), Enteropathogenic E.coli (EPEC),    Enterotoxigenic E. coli (ETEC) lt/st, Shiga-like    toxin-producing E. coli (STEC) stx1/stx2,    Shigella/ Enteroinvasive E. coli (EIEC), Cryptosporidium,    Cyclospora cayetanensis, Entamoeba histolytica,    Giardia lamblia, Adenovirus F 40/41, Astrovirus,    Norovirus GI/GII, Rotavirus A, Sapovirus    Culture - Blood (collected 07-18-18 @ 18:50)  Source: .Blood Blood-Peripheral  Preliminary Report (07-19-18 @ 07:01):    No growth at 12 hours    Culture - Blood (collected 07-18-18 @ 18:50)  Source: .Blood Blood-Peripheral  Preliminary Report (07-19-18 @ 07:01):    No growth at 12 hours          RADIOLOGY & ADDITIONAL TESTS: Reviewed.

## 2018-07-19 NOTE — PROGRESS NOTE ADULT - PROBLEM SELECTOR PLAN 5
F: none, s/p 3L NS in ED  E: replete PRN  N: regular diet Not an acute exacerbation.   - Continue with home albuterol PRN  - Continue Symbicort

## 2018-07-19 NOTE — DISCHARGE NOTE ADULT - PATIENT PORTAL LINK FT
You can access the [facility] Patient Portal, offered by [facility], by registering with the following website: [email or web address]

## 2018-07-19 NOTE — DISCHARGE NOTE ADULT - CARE PROVIDER_API CALL
Tammi Pacheco (MD), Internal Medicine  [address] [address]  Phone: [phone]  Fax: [phone]

## 2018-07-19 NOTE — CONSULT NOTE ADULT - ASSESSMENT
Patient is a 48M with HIV last seen in October 2017, VLUD and CD4 1571, on descovy and tivicay, presented with diarrhea  #HIV   - Continue on descovy tivicay  - Will follow up CD4 count and viral load in clinic on tuesday, patient as appointment at 10am  - No other active HIV issues    Discussed with attending and primary team

## 2018-07-19 NOTE — PROGRESS NOTE ADULT - PROBLEM SELECTOR PLAN 7
Dr. Pachceo's patient, to be contacted in AM along with HIV consult F: none, s/p 3L NS in ED  E: replete PRN  N: regular diet    -continue zolpidem for insomnia.

## 2018-07-19 NOTE — DISCHARGE NOTE ADULT - CARE PLAN
Principal Discharge DX:	Sepsis, due to unspecified organism  Goal:	Resolution of infection with complete course of antibiotics.  Assessment and plan of treatment:	You came to the hospital with abdominal pain and diarrhea. You also had a high white blood cell count, fever, and a high heart rate which means that you had sepsis. You were started on antibiotics to help treat this. You were also given IV fluids as part of sepsis treatment. We sent a GI panel to determine exactly what organism caused your infection so that we could choose the antibioitic that would best treat your specific infection.  Secondary Diagnosis:	Colitis, acute  Assessment and plan of treatment:	You came to the hospital with abdominal pain and diarrhea. Imaging showed that you had an infection in the descending and sigmoid colon causing your symptoms. We started you on antibiotics to help treat your the colitis and thus the diarrhea. We sent a GI panel to determine exactly what organism caused your infection so that we could choose the antibioitic that would best treat your specific infection.  Secondary Diagnosis:	HIV (human immunodeficiency virus infection)  Assessment and plan of treatment:	This is a chronic condition for you. We continued you on your home medications. Please follow-up as needed with Dr. Pacheco as appropriate.  Secondary Diagnosis:	Chronic obstructive pulmonary disease, unspecified COPD type  Assessment and plan of treatment:	This is a chronic condition for you. We continued you on your home medications. Please follow-up as needed with your primary care physician.  Secondary Diagnosis:	Electrolyte abnormality  Assessment and plan of treatment:	You came into the hospital with a low level of potassium, likely due to loss of it in the diarrhea. We gave you some potassium back to bring it to a normal level. Principal Discharge DX:	Sepsis, due to unspecified organism  Goal:	Resolution of infection with complete course of antibiotics.  Assessment and plan of treatment:	You came to the hospital with abdominal pain and diarrhea. You also had a high white blood cell count, fever, and a high heart rate which means that you had sepsis. You were started on antibiotics to help treat this. You were also given IV fluids as part of sepsis treatment. We sent a GI panel to determine exactly what organism caused your infection so that we could choose the antibiotic (Cefdinir) that would best treat your specific infection.  Secondary Diagnosis:	Colitis, acute  Assessment and plan of treatment:	You came to the hospital with abdominal pain and diarrhea. Imaging showed that you had an infection in the descending and sigmoid colon causing your symptoms. We started you on antibiotics to help treat your the colitis and thus the diarrhea. We sent a GI panel to determine exactly what organism caused your infection so that we could choose the antibiotic (Cefdinir) that would best treat your specific infection.  Secondary Diagnosis:	HIV (human immunodeficiency virus infection)  Assessment and plan of treatment:	This is a chronic condition for you. We continued you on your home medications. Please follow-up as needed with Dr. Pacheco as appropriate.  Secondary Diagnosis:	Chronic obstructive pulmonary disease, unspecified COPD type  Assessment and plan of treatment:	This is a chronic condition for you. We continued you on your home medications. Please follow-up as needed with your primary care physician.  Secondary Diagnosis:	Electrolyte abnormality  Assessment and plan of treatment:	You came into the hospital with a low level of potassium, likely due to loss of it in the diarrhea. We gave you some potassium back to bring it to a normal level.

## 2018-07-19 NOTE — DISCHARGE NOTE ADULT - MEDICATION SUMMARY - MEDICATIONS TO TAKE
I will START or STAY ON the medications listed below when I get home from the hospital:    sertraline 50 mg oral tablet  -- 1 tab(s) by mouth once a day  -- Indication: For Depression, unspecified depression type    Descovy 200 mg-25 mg oral tablet  -- 1 tab(s) by mouth once a day  -- Indication: For HIV (human immunodeficiency virus infection)    Tivicay 50 mg oral tablet  -- 1 tab(s) by mouth once a day  -- Indication: For HIV (human immunodeficiency virus infection)    Ambien 10 mg oral tablet  -- 1 tab(s) by mouth once a day (at bedtime)  -- Indication: For Insomnia    ProAir HFA 90 mcg/inh inhalation aerosol  -- 2 puff(s) inhaled 4 times a day, As Needed  -- Indication: For Chronic obstructive pulmonary disease, unspecified COPD type    Symbicort 80 mcg-4.5 mcg/inh inhalation aerosol  -- 2 puff(s) inhaled 2 times a day  -- Indication: For Chronic obstructive pulmonary disease, unspecified COPD type    cefdinir 300 mg oral capsule  -- 1 cap(s) by mouth every 12 hours   -- Finish all this medication unless otherwise directed by prescriber.    -- Indication: For Colitis, acute    Clobex 0.05% topical spray  -- Apply on skin to affected area once a day  -- Indication: For Psoriasis

## 2018-07-20 LAB
4/8 RATIO: 0.92 RATIO — SIGNIFICANT CHANGE UP (ref 0.9–3.6)
ABS CD8: 681 /UL — SIGNIFICANT CHANGE UP (ref 136–757)
CD16+CD56+ CELLS NFR BLD: 8 % — SIGNIFICANT CHANGE UP (ref 4–25)
CD16+CD56+ CELLS NFR SPEC: 156 /UL — SIGNIFICANT CHANGE UP (ref 64–494)
CD19 BLASTS SPEC-ACNC: 17 % — SIGNIFICANT CHANGE UP (ref 5–22)
CD19 BLASTS SPEC-ACNC: 322 /UL — SIGNIFICANT CHANGE UP (ref 68–528)
CD3 BLASTS SPEC-ACNC: 1331 /UL — SIGNIFICANT CHANGE UP (ref 799–2171)
CD3 BLASTS SPEC-ACNC: 73 % — SIGNIFICANT CHANGE UP (ref 59–85)
CD4 %: 35 % — LOW (ref 36–65)
CD8 %: 38 % — HIGH (ref 11–36)
HIV-1 VIRAL LOAD RESULT: SIGNIFICANT CHANGE UP
HIV1 RNA # SERPL NAA+PROBE: SIGNIFICANT CHANGE UP
HIV1 RNA SER-IMP: SIGNIFICANT CHANGE UP
HIV1 RNA SERPL NAA+PROBE-ACNC: SIGNIFICANT CHANGE UP
HIV1 RNA SERPL NAA+PROBE-LOG#: SIGNIFICANT CHANGE UP LG COP/ML
T-CELL CD4 SUBSET PNL BLD: 629 /UL — SIGNIFICANT CHANGE UP (ref 489–1457)

## 2018-07-22 LAB
-  AMPICILLIN: SIGNIFICANT CHANGE UP
-  CIPROFLOXACIN: SIGNIFICANT CHANGE UP
-  TRIMETHOPRIM/SULFAMETHOXAZOLE: SIGNIFICANT CHANGE UP
CULTURE RESULTS: SIGNIFICANT CHANGE UP
METHOD TYPE: SIGNIFICANT CHANGE UP
ORGANISM # SPEC MICROSCOPIC CNT: SIGNIFICANT CHANGE UP
ORGANISM # SPEC MICROSCOPIC CNT: SIGNIFICANT CHANGE UP

## 2018-07-23 LAB
CULTURE RESULTS: SIGNIFICANT CHANGE UP
CULTURE RESULTS: SIGNIFICANT CHANGE UP
SPECIMEN SOURCE: SIGNIFICANT CHANGE UP
SPECIMEN SOURCE: SIGNIFICANT CHANGE UP

## 2018-07-24 ENCOUNTER — APPOINTMENT (OUTPATIENT)
Dept: INFECTIOUS DISEASE | Facility: CLINIC | Age: 48
End: 2018-07-24

## 2018-07-24 ENCOUNTER — OUTPATIENT (OUTPATIENT)
Dept: OUTPATIENT SERVICES | Facility: HOSPITAL | Age: 48
LOS: 1 days | End: 2018-07-24

## 2018-07-24 VITALS
HEART RATE: 90 BPM | SYSTOLIC BLOOD PRESSURE: 131 MMHG | HEIGHT: 73 IN | DIASTOLIC BLOOD PRESSURE: 99 MMHG | OXYGEN SATURATION: 96 % | RESPIRATION RATE: 12 BRPM | BODY MASS INDEX: 30.35 KG/M2 | WEIGHT: 229 LBS | TEMPERATURE: 98 F

## 2018-07-24 DIAGNOSIS — Z21 ASYMPTOMATIC HUMAN IMMUNODEFICIENCY VIRUS [HIV] INFECTION STATUS: ICD-10-CM

## 2018-07-24 DIAGNOSIS — K52.9 NONINFECTIVE GASTROENTERITIS AND COLITIS, UNSPECIFIED: ICD-10-CM

## 2018-07-24 DIAGNOSIS — A09 INFECTIOUS GASTROENTERITIS AND COLITIS, UNSPECIFIED: ICD-10-CM

## 2018-07-24 DIAGNOSIS — A41.9 SEPSIS, UNSPECIFIED ORGANISM: ICD-10-CM

## 2018-07-24 DIAGNOSIS — Z83.511 FAMILY HISTORY OF GLAUCOMA: ICD-10-CM

## 2018-07-24 DIAGNOSIS — F41.9 ANXIETY DISORDER, UNSPECIFIED: ICD-10-CM

## 2018-07-24 DIAGNOSIS — L40.9 PSORIASIS, UNSPECIFIED: ICD-10-CM

## 2018-07-24 DIAGNOSIS — F32.9 MAJOR DEPRESSIVE DISORDER, SINGLE EPISODE, UNSPECIFIED: ICD-10-CM

## 2018-07-24 DIAGNOSIS — Z82.49 FAMILY HISTORY OF ISCHEMIC HEART DISEASE AND OTHER DISEASES OF THE CIRCULATORY SYSTEM: ICD-10-CM

## 2018-07-24 DIAGNOSIS — A03.9 SHIGELLOSIS, UNSPECIFIED: ICD-10-CM

## 2018-07-24 DIAGNOSIS — B96.20 UNSPECIFIED ESCHERICHIA COLI [E. COLI] AS THE CAUSE OF DISEASES CLASSIFIED ELSEWHERE: ICD-10-CM

## 2018-07-24 DIAGNOSIS — G47.00 INSOMNIA, UNSPECIFIED: ICD-10-CM

## 2018-07-24 DIAGNOSIS — J44.9 CHRONIC OBSTRUCTIVE PULMONARY DISEASE, UNSPECIFIED: ICD-10-CM

## 2018-07-24 DIAGNOSIS — Z83.3 FAMILY HISTORY OF DIABETES MELLITUS: ICD-10-CM

## 2018-07-24 DIAGNOSIS — F17.210 NICOTINE DEPENDENCE, CIGARETTES, UNCOMPLICATED: ICD-10-CM

## 2018-07-24 DIAGNOSIS — E87.8 OTHER DISORDERS OF ELECTROLYTE AND FLUID BALANCE, NOT ELSEWHERE CLASSIFIED: ICD-10-CM

## 2018-07-24 DIAGNOSIS — Z79.52 LONG TERM (CURRENT) USE OF SYSTEMIC STEROIDS: ICD-10-CM

## 2018-07-24 RX ORDER — ATORVASTATIN CALCIUM 10 MG/1
10 TABLET, FILM COATED ORAL
Qty: 30 | Refills: 3 | Status: DISCONTINUED | COMMUNITY
Start: 2017-04-27 | End: 2018-07-24

## 2018-07-24 NOTE — HEALTH RISK ASSESSMENT
[Intercurrent hospitalizations] : was admitted to the hospital  [No falls in past year] : Patient reported no falls in the past year [0] : 2) Feeling down, depressed, or hopeless: Not at all (0) [] : No [[id number]] : 0

## 2018-07-24 NOTE — ASSESSMENT
[FreeTextEntry1] : 48 M w/ HIV (VL UD, CD4 629 checked July 2018) on Tivicay and Descovy, Psoriasis, tobacco use and asthma use presenting for post hospital follow up visit and also w/ complaints of right ear pressure and discomfort. \par \par #Colitits \par -Patient w/ recent hospitalization from 7/18-7/19 for colitis secondary to shigella/ EIEC and EPEC. Patient discharged on 5 days of Cefdinir. Will complete course on 7/25. \par -patient reports improvement of abdominal pain and bowel movement consistency \par \par #Right ear external otitis media \par -Right ear w/ normal TM that is non-erythemaouts and visible cone of light \par -erythematous middle ear that is tender\par -Patient given prescription for Cipro ear drops \par -If no improvement of symtpoms enocuraged patient to return to clinic in 1 week \par \par #HIV \par -VL UD, CD4 629, checked in July 2018\par -C/w Tivicay and Descovy\par -Encouraged patient to remain complaint to his medications\par \par #Psoriasis \par -Dermatology referral \par \par #Asthma\par -Currently no wheezing on exam and reports that it is well controlled\par -Refill for ProAir sent to pharmacy \par \par #Tobacco use \par -Counselled patient for 4 min regarding tobacco cessation \par -Patient reports that he is willing to quit tobacco use this month. Is open to medication use but will like to try on his own for now \par \par RTC in 3 months.

## 2018-07-24 NOTE — PHYSICAL EXAM
[No Acute Distress] : no acute distress [Well Nourished] : well nourished [Well Developed] : well developed [Well-Appearing] : well-appearing [Normal Sclera/Conjunctiva] : normal sclera/conjunctiva [PERRL] : pupils equal round and reactive to light [EOMI] : extraocular movements intact [Normal Outer Ear/Nose] : the outer ears and nose were normal in appearance [Normal Oropharynx] : the oropharynx was normal [No JVD] : no jugular venous distention [Supple] : supple [No Lymphadenopathy] : no lymphadenopathy [Thyroid Normal, No Nodules] : the thyroid was normal and there were no nodules present [No Respiratory Distress] : no respiratory distress  [Clear to Auscultation] : lungs were clear to auscultation bilaterally [No Accessory Muscle Use] : no accessory muscle use [Normal Rate] : normal rate  [Regular Rhythm] : with a regular rhythm [Normal S1, S2] : normal S1 and S2 [No Murmur] : no murmur heard [No Carotid Bruits] : no carotid bruits [No Abdominal Bruit] : a ~M bruit was not heard ~T in the abdomen [Pedal Pulses Present] : the pedal pulses are present [No Edema] : there was no peripheral edema [No Extremity Clubbing/Cyanosis] : no extremity clubbing/cyanosis [Normal Supraclavicular Nodes] : no supraclavicular lymphadenopathy [Normal Posterior Cervical Nodes] : no posterior cervical lymphadenopathy [Normal Anterior Cervical Nodes] : no anterior cervical lymphadenopathy [No Joint Swelling] : no joint swelling [Grossly Normal Strength/Tone] : grossly normal strength/tone [de-identified] : Right ear with erythematous outer ear. Tympanic membrane and cone of light well visualized. TM w/out erythema.  [de-identified] : Pt w/ erythemaous scaly plaques on extensor surfaces of bilateral arms.

## 2018-07-24 NOTE — HISTORY OF PRESENT ILLNESS
[de-identified] : 47 y/o M w/ hx of HIV (VL UD, CD4 629, checked in July 2018) on Descovy and Tivicay, psoriasis, asthma, insomnia, tobacco use who is presenting for follow up. 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 Patient was discharged on 5 days of Cefdinir for which he reports he only has one day remaining. Patient reports that he no longer is experiencing any abdominal tenderness and his bowel movements have become more formed. Has not noted any blood in his bowel movements. No associated fever/chills, headaches/dizziness. Of note patient reports a crackling/bubbling noise in his right ear that he first noted about 2 weeks ago. Does not report any decreased hearing. Patient reports he tried using some ear drops which did not provide him with any relief. He reports that he also flushed his ears with peroxide about 3 days ago which did not bring him any relief either. \par \par Patient is currently sexually active with his  of 18 years. He reports that he is has missed about 1 dose of his HIV medication.

## 2018-08-02 DIAGNOSIS — E78.5 HYPERLIPIDEMIA, UNSPECIFIED: ICD-10-CM

## 2018-08-02 DIAGNOSIS — H92.09 OTALGIA, UNSPECIFIED EAR: ICD-10-CM

## 2018-08-02 DIAGNOSIS — B20 HUMAN IMMUNODEFICIENCY VIRUS [HIV] DISEASE: ICD-10-CM

## 2018-08-02 DIAGNOSIS — L40.9 PSORIASIS, UNSPECIFIED: ICD-10-CM

## 2018-08-07 ENCOUNTER — APPOINTMENT (OUTPATIENT)
Dept: INFECTIOUS DISEASE | Facility: CLINIC | Age: 48
End: 2018-08-07

## 2018-08-07 ENCOUNTER — OUTPATIENT (OUTPATIENT)
Dept: OUTPATIENT SERVICES | Facility: HOSPITAL | Age: 48
LOS: 1 days | End: 2018-08-07

## 2018-08-21 DIAGNOSIS — F41.1 GENERALIZED ANXIETY DISORDER: ICD-10-CM

## 2018-08-27 LAB — CULTURE RESULTS: SIGNIFICANT CHANGE UP

## 2018-09-09 ENCOUNTER — INPATIENT (INPATIENT)
Facility: HOSPITAL | Age: 48
LOS: 1 days | Discharge: HOME | DRG: 312 | End: 2018-09-11
Attending: PSYCHIATRY & NEUROLOGY | Admitting: PSYCHIATRY & NEUROLOGY
Payer: MEDICAID

## 2018-09-09 VITALS
OXYGEN SATURATION: 98 % | DIASTOLIC BLOOD PRESSURE: 96 MMHG | TEMPERATURE: 99 F | SYSTOLIC BLOOD PRESSURE: 147 MMHG | HEART RATE: 106 BPM | RESPIRATION RATE: 18 BRPM | WEIGHT: 226.64 LBS

## 2018-09-09 DIAGNOSIS — B20 HUMAN IMMUNODEFICIENCY VIRUS [HIV] DISEASE: ICD-10-CM

## 2018-09-09 DIAGNOSIS — I61.9 NONTRAUMATIC INTRACEREBRAL HEMORRHAGE, UNSPECIFIED: ICD-10-CM

## 2018-09-09 DIAGNOSIS — Z29.9 ENCOUNTER FOR PROPHYLACTIC MEASURES, UNSPECIFIED: ICD-10-CM

## 2018-09-09 DIAGNOSIS — R63.8 OTHER SYMPTOMS AND SIGNS CONCERNING FOOD AND FLUID INTAKE: ICD-10-CM

## 2018-09-09 DIAGNOSIS — R29.898 OTHER SYMPTOMS AND SIGNS INVOLVING THE MUSCULOSKELETAL SYSTEM: ICD-10-CM

## 2018-09-09 DIAGNOSIS — Z91.89 OTHER SPECIFIED PERSONAL RISK FACTORS, NOT ELSEWHERE CLASSIFIED: ICD-10-CM

## 2018-09-09 DIAGNOSIS — J45.909 UNSPECIFIED ASTHMA, UNCOMPLICATED: ICD-10-CM

## 2018-09-09 LAB
ALBUMIN SERPL ELPH-MCNC: 4 G/DL — SIGNIFICANT CHANGE UP (ref 3.3–5)
ALP SERPL-CCNC: 94 U/L — SIGNIFICANT CHANGE UP (ref 40–120)
ALT FLD-CCNC: 15 U/L — SIGNIFICANT CHANGE UP (ref 10–45)
ANION GAP SERPL CALC-SCNC: 16 MMOL/L — SIGNIFICANT CHANGE UP (ref 5–17)
APTT BLD: 31.2 SEC — SIGNIFICANT CHANGE UP (ref 27.5–37.4)
AST SERPL-CCNC: 20 U/L — SIGNIFICANT CHANGE UP (ref 10–40)
BASOPHILS NFR BLD AUTO: 0.4 % — SIGNIFICANT CHANGE UP (ref 0–2)
BILIRUB SERPL-MCNC: 0.2 MG/DL — SIGNIFICANT CHANGE UP (ref 0.2–1.2)
BLD GP AB SCN SERPL QL: NEGATIVE — SIGNIFICANT CHANGE UP
BUN SERPL-MCNC: 10 MG/DL — SIGNIFICANT CHANGE UP (ref 7–23)
CALCIUM SERPL-MCNC: 9.6 MG/DL — SIGNIFICANT CHANGE UP (ref 8.4–10.5)
CHLORIDE SERPL-SCNC: 102 MMOL/L — SIGNIFICANT CHANGE UP (ref 96–108)
CO2 SERPL-SCNC: 25 MMOL/L — SIGNIFICANT CHANGE UP (ref 22–31)
CREAT SERPL-MCNC: 0.82 MG/DL — SIGNIFICANT CHANGE UP (ref 0.5–1.3)
EOSINOPHIL NFR BLD AUTO: 8.1 % — HIGH (ref 0–6)
GLUCOSE SERPL-MCNC: 106 MG/DL — HIGH (ref 70–99)
HCT VFR BLD CALC: 38 % — LOW (ref 39–50)
HGB BLD-MCNC: 12.6 G/DL — LOW (ref 13–17)
INR BLD: 1.01 — SIGNIFICANT CHANGE UP (ref 0.88–1.16)
LYMPHOCYTES # BLD AUTO: 28.5 % — SIGNIFICANT CHANGE UP (ref 13–44)
MCHC RBC-ENTMCNC: 28 PG — SIGNIFICANT CHANGE UP (ref 27–34)
MCHC RBC-ENTMCNC: 33.2 G/DL — SIGNIFICANT CHANGE UP (ref 32–36)
MCV RBC AUTO: 84.4 FL — SIGNIFICANT CHANGE UP (ref 80–100)
MONOCYTES NFR BLD AUTO: 9.8 % — SIGNIFICANT CHANGE UP (ref 2–14)
NEUTROPHILS NFR BLD AUTO: 53.2 % — SIGNIFICANT CHANGE UP (ref 43–77)
PLATELET # BLD AUTO: 252 K/UL — SIGNIFICANT CHANGE UP (ref 150–400)
POTASSIUM SERPL-MCNC: 3.3 MMOL/L — LOW (ref 3.5–5.3)
POTASSIUM SERPL-SCNC: 3.3 MMOL/L — LOW (ref 3.5–5.3)
PROT SERPL-MCNC: 7.7 G/DL — SIGNIFICANT CHANGE UP (ref 6–8.3)
PROTHROM AB SERPL-ACNC: 11.2 SEC — SIGNIFICANT CHANGE UP (ref 9.8–12.7)
RBC # BLD: 4.5 M/UL — SIGNIFICANT CHANGE UP (ref 4.2–5.8)
RBC # FLD: 14.1 % — SIGNIFICANT CHANGE UP (ref 10.3–16.9)
RH IG SCN BLD-IMP: POSITIVE — SIGNIFICANT CHANGE UP
SODIUM SERPL-SCNC: 143 MMOL/L — SIGNIFICANT CHANGE UP (ref 135–145)
TROPONIN T SERPL-MCNC: <0.01 NG/ML — SIGNIFICANT CHANGE UP (ref 0–0.01)
WBC # BLD: 7.2 K/UL — SIGNIFICANT CHANGE UP (ref 3.8–10.5)
WBC # FLD AUTO: 7.2 K/UL — SIGNIFICANT CHANGE UP (ref 3.8–10.5)

## 2018-09-09 PROCEDURE — 71045 X-RAY EXAM CHEST 1 VIEW: CPT | Mod: 26

## 2018-09-09 PROCEDURE — 72125 CT NECK SPINE W/O DYE: CPT | Mod: 26

## 2018-09-09 PROCEDURE — 93010 ELECTROCARDIOGRAM REPORT: CPT

## 2018-09-09 PROCEDURE — 99285 EMERGENCY DEPT VISIT HI MDM: CPT | Mod: 25

## 2018-09-09 PROCEDURE — 70450 CT HEAD/BRAIN W/O DYE: CPT | Mod: 26

## 2018-09-09 RX ORDER — ALBUTEROL 90 UG/1
2 AEROSOL, METERED ORAL EVERY 6 HOURS
Qty: 0 | Refills: 0 | Status: DISCONTINUED | OUTPATIENT
Start: 2018-09-09 | End: 2018-09-11

## 2018-09-09 RX ORDER — EMTRICITABINE AND TENOFOVIR DISOPROXIL FUMARATE 200; 300 MG/1; MG/1
1 TABLET, FILM COATED ORAL DAILY
Qty: 0 | Refills: 0 | Status: DISCONTINUED | OUTPATIENT
Start: 2018-09-09 | End: 2018-09-11

## 2018-09-09 RX ORDER — SERTRALINE 25 MG/1
50 TABLET, FILM COATED ORAL DAILY
Qty: 0 | Refills: 0 | Status: DISCONTINUED | OUTPATIENT
Start: 2018-09-09 | End: 2018-09-11

## 2018-09-09 RX ORDER — DOLUTEGRAVIR SODIUM 25 MG/1
50 TABLET, FILM COATED ORAL DAILY
Qty: 0 | Refills: 0 | Status: DISCONTINUED | OUTPATIENT
Start: 2018-09-09 | End: 2018-09-11

## 2018-09-09 RX ORDER — INFLUENZA VIRUS VACCINE 15; 15; 15; 15 UG/.5ML; UG/.5ML; UG/.5ML; UG/.5ML
0.5 SUSPENSION INTRAMUSCULAR ONCE
Qty: 0 | Refills: 0 | Status: COMPLETED | OUTPATIENT
Start: 2018-09-09 | End: 2018-09-09

## 2018-09-09 RX ORDER — IPRATROPIUM/ALBUTEROL SULFATE 18-103MCG
3 AEROSOL WITH ADAPTER (GRAM) INHALATION EVERY 6 HOURS
Qty: 0 | Refills: 0 | Status: DISCONTINUED | OUTPATIENT
Start: 2018-09-09 | End: 2018-09-11

## 2018-09-09 RX ORDER — BUDESONIDE AND FORMOTEROL FUMARATE DIHYDRATE 160; 4.5 UG/1; UG/1
2 AEROSOL RESPIRATORY (INHALATION)
Qty: 0 | Refills: 0 | Status: DISCONTINUED | OUTPATIENT
Start: 2018-09-09 | End: 2018-09-11

## 2018-09-09 RX ADMIN — BUDESONIDE AND FORMOTEROL FUMARATE DIHYDRATE 2 PUFF(S): 160; 4.5 AEROSOL RESPIRATORY (INHALATION) at 22:46

## 2018-09-09 NOTE — ED ADULT NURSE NOTE - OBJECTIVE STATEMENT
pt to ER w/ report of LOC and falling on Sat 9/8/18 at 3pm while sitting on park bench, and being brought to [facility] where he was dx'd w/ brain bleed.  pt signed out AMA last night and went home.  Pt noted to have approx 1 cm abrasion to forehead.  Pt reports some  deficit to R. hand first noted last night when leaving [facility].  No other neuro deficits noted:  PERRLA, arm and leg strength 5/5, smile and shoulder shrug symmetric, no dysmetria noted.  pt ambulatory.  IV access established, labs drawn and sent. Pt to CT.  Will continue to monitor.

## 2018-09-09 NOTE — PROGRESS NOTE ADULT - PROBLEM SELECTOR PLAN 1
-CT done here with no acute changes. NSG consulted by the ED, as per verbal signout, no intervention recommened, follow up official note.   -MRI head  -Syncope work up  -Echocardiogram  -vEEG  -PT -CT done here with no acute changes. NSG consulted by the ED, as per verbal signout, no intervention recommended, follow up official note.   -Follow up MRI head for further assessment.  -Syncope work up given patient with LOC with no recollection of events  -Echocardiogram  -vEEG to r/o seizures   -Frequent neuro checks  -PT -CT done here with no acute changes. NSG consulted by the ED, as per verbal sign out, no intervention recommended, follow up official note.   -Follow up MRI head for further assessment.  -Syncope work up given patient with LOC with no recollection of events  -Check orthostatics   -Echocardiogram; EKG with no ischemic changes, cardiac enzymes negative.   -vEEG to r/o seizures   -Frequent neuro checks  -PT -CT done here with no acute changes. NSG consulted by the ED, no urgent surgical intervention recommended.   -Obtain collateral regarding work up including imaging done at [facility] [place].  -Follow up MRI head for further assessment.  -Syncope work up given patient with LOC with no recollection of events  -Check orthostatics   -Echocardiogram; EKG with no ischemic changes, cardiac enzymes negative.   -vEEG to r/o seizures   -Frequent neuro checks  -PT

## 2018-09-09 NOTE — PROGRESS NOTE ADULT - SUBJECTIVE AND OBJECTIVE BOX
HPI:  Patient is a 49 yo M with pmhx of HIV, anxiety, asthma who presented with the following complaints. Patient states that last thing he remembers from the event was he was sitting on a park bench near a stadium and next thing he was at the [facility] ED, he has no recollection of the events, does not remember if he felt dizzy or lightheaded, does not remember if he fell. On arrival to the ED, he states he was experiencing right hand weakness, numbness and decrease sensation, he had imaging with CT done with showed a small ICH, he was supposed to be admitted for further work up with an MRI however did not like the facility so left AMA, went home and returned to the ED for re evaluation. He states his symptoms have improved since yesterday, denies any headaches, vision changes, speech/language or coordination symptoms. Denies any weakness/sensory changes in remaining limbs, no previous similar episode. He denies any alcohol or drug abuse. No past hx of cardiac dx, stroke or seizure disorder.     Pmhx: Asthma, HIV, Anxiety  Pshx: no s/f hx  FHx: Cardiac dx in father  Social: 1PPD since 1980s, denies any alcohol or drug abuse.    Medications:   Descovy 200 mg-25 mg oral tablet: 1 tab(s) orally once a day  Tivicay 50 mg oral tablet: 1 tab(s) orally once a day  ProAir HFA 90 mcg/inh inhalation aerosol: 2 puff(s) inhaled 4 times a day, As Needed  sertraline 50 mg oral tablet: 1 tab(s) orally once a day  Ambien 10 mg oral tablet: 1 tab(s) orally once a day (at bedtime)  Clobex 0.05% topical spray: Apply topically to affected area once a day  Symbicort 80 mcg-4.5 mcg/inh inhalation aerosol: 2 puff(s) inhaled 2 times a day    VITAL SIGNS:  T(C): 36.6 (09-09-18 @ 21:02), Max: 37 (09-09-18 @ 17:20)  T(F): 97.9 (09-09-18 @ 21:02), Max: 98.6 (09-09-18 @ 17:20)  HR: 96 (09-09-18 @ 21:02) (95 - 106)  BP: 149/82 (09-09-18 @ 21:02) (141/87 - 149/82)  BP(mean): --  RR: 18 (09-09-18 @ 21:02) (18 - 18)  SpO2: 98% (09-09-18 @ 21:02) (97% - 98%)  Wt(kg): --    PHYSICAL EXAM:  Constitutional: WDWN resting comfortably in bed; NAD  Head: NC/AT  Eyes: PERRL, EOMI, anicteric sclera  ENT: no nasal discharge; uvula midline, no oropharyngeal erythema or exudates; MMM  Neck: supple; no JVD or thyromegaly  Respiratory: CTA B/L; no W/R/R, no retractions  Cardiac: +S1/S2; RRR; no M/R/G; PMI non-displaced  Gastrointestinal: abdomen soft, NT/ND; no rebound or guarding; +BSx4  Back: spine midline, no bony tenderness or step-offs; no CVAT B/L  Extremities: WWP, no clubbing or cyanosis; no peripheral edema  Musculoskeletal: NROM x4; no joint swelling, tenderness or erythema  Vascular: 2+ radial, femoral, DP/PT pulses B/L  Dermatologic: skin warm, dry and intact; no rashes, wounds, or scars  Lymphatic: no submandibular or cervical LAD  Neurologic: AAOx3; CNII-XII grossly intact; no focal deficits  Psychiatric: affect and characteristics of appearance, verbalizations, behaviors are appropriate HPI:  Patient is a 47 yo M with pmhx of HIV, anxiety, asthma who presented with the following complaints. Patient states that last thing he remembers from the event was he was sitting on a park bench near a stadium and next thing he was at the [facility] ED, he has no recollection of the events, does not remember if he felt dizzy or lightheaded, does not remember if he fell. On arrival to the ED, he states he was experiencing right hand weakness, numbness and decrease sensation, he had imaging with CT done with showed a small ICH, he was supposed to be admitted for further work up with an MRI however did not like the facility so left AMA, went home and returned to the ED for re evaluation. He states his symptoms have improved since yesterday, denies any headaches, vision changes, speech/language or coordination symptoms. Denies any weakness/sensory changes in remaining limbs, no previous similar episode. He denies any alcohol or drug abuse. No past hx of cardiac dx, stroke or seizure disorder.     Pmhx: Asthma, HIV, Anxiety  Pshx: no s/f hx  FHx: Cardiac dx in father  Social: 1PPD since 1980s, denies any alcohol or drug abuse.    Medications:   Descovy 200 mg-25 mg oral tablet: 1 tab(s) orally once a day  Tivicay 50 mg oral tablet: 1 tab(s) orally once a day  ProAir HFA 90 mcg/inh inhalation aerosol: 2 puff(s) inhaled 4 times a day, As Needed  sertraline 50 mg oral tablet: 1 tab(s) orally once a day  Ambien 10 mg oral tablet: 1 tab(s) orally once a day (at bedtime)  Clobex 0.05% topical spray: Apply topically to affected area once a day  Symbicort 80 mcg-4.5 mcg/inh inhalation aerosol: 2 puff(s) inhaled 2 times a day    VITAL SIGNS:  T(C): 36.6 (09-09-18 @ 21:02), Max: 37 (09-09-18 @ 17:20)  T(F): 97.9 (09-09-18 @ 21:02), Max: 98.6 (09-09-18 @ 17:20)  HR: 96 (09-09-18 @ 21:02) (95 - 106)  BP: 149/82 (09-09-18 @ 21:02) (141/87 - 149/82)  BP(mean): --  RR: 18 (09-09-18 @ 21:02) (18 - 18)  SpO2: 98% (09-09-18 @ 21:02) (97% - 98%)  Wt(kg): --    PHYSICAL EXAM:  Constitutional: WDWN resting comfortably in bed; NAD  Head: NC/AT  Eyes: PERRL, EOMI, anicteric sclera  ENT: no nasal discharge; uvula midline, no oropharyngeal erythema or exudates; MMM  Neck: supple; no JVD or thyromegaly  Respiratory: CTA B/L; no W/R/R, no retractions  Cardiac: +S1/S2; RRR; no M/R/G; PMI non-displaced  Gastrointestinal: abdomen soft, NT/ND; no rebound or guarding; +BSx4  Back: spine midline, no bony tenderness or step-offs; no CVAT B/L  Extremities: WWP, no clubbing or cyanosis; no peripheral edema  Musculoskeletal: NROM x4; no joint swelling, tenderness or erythema  Vascular: 2+ radial, femoral, DP/PT pulses B/L  Dermatologic: skin warm, dry and intact; no rashes, wounds, or scars  Lymphatic: no submandibular or cervical LAD  Neurologic: Mental Status: AAXO3 to self, place and year.  Cranial Nerves: PERRL, EOMI, visual fields intact. No facial asymmetry, no facial droop present.     visual fields full, PERRL, EOMI, visual fields intact.   Cranial Nerv. Discs and eye grounds normal. No facial asymmetry, facial sens intact. Hearing normal. Tongue, palate, shrug normal. Muscle tone, bulk normal. Very subtle R upper limb pronator drift, weak wrist dorsiflexion and MCP finger extension. Otherwise power is intact. Patchy decrease in pinprick over dorsum of R hand. Sens otherwise normal. Gait and coord are normal. DTR symmetric. No long tract signs.  Psychiatric: affect and characteristics of appearance, verbalizations, behaviors are appropriate HPI:  Patient is a 49 yo M with pmhx of HIV, anxiety, asthma who presented with the following complaints. Patient states that last thing he remembers from the event was he was sitting on a park bench near a stadium and next thing he was at the [facility] ED, he has no recollection of the events, does not remember if he felt dizzy or lightheaded, does not remember if he fell. On arrival to the ED, he states he was experiencing right hand weakness, numbness and decrease sensation, he had imaging with CT done with showed a small left ICH, he was supposed to be admitted for further work up with an MRI however did not like the facility so left AMA, went home and returned to the ED for re evaluation. He states his symptoms have improved since yesterday, denies any headaches, vision changes, speech/language or coordination symptoms. Denies any weakness/sensory changes in remaining limbs, no previous similar episode. He denies any alcohol or drug abuse. No past hx of cardiac dx, stroke or seizure disorder.     Pmhx: Asthma, HIV, Anxiety  Pshx: no s/f hx  FHx: Cardiac dx in father  Social: 1PPD since 1980s, denies any alcohol or drug abuse.    Medications:   Descovy 200 mg-25 mg oral tablet: 1 tab(s) orally once a day  Tivicay 50 mg oral tablet: 1 tab(s) orally once a day  ProAir HFA 90 mcg/inh inhalation aerosol: 2 puff(s) inhaled 4 times a day, As Needed  sertraline 50 mg oral tablet: 1 tab(s) orally once a day  Ambien 10 mg oral tablet: 1 tab(s) orally once a day (at bedtime)  Clobex 0.05% topical spray: Apply topically to affected area once a day  Symbicort 80 mcg-4.5 mcg/inh inhalation aerosol: 2 puff(s) inhaled 2 times a day    VITAL SIGNS:  T(C): 36.6 (09-09-18 @ 21:02), Max: 37 (09-09-18 @ 17:20)  T(F): 97.9 (09-09-18 @ 21:02), Max: 98.6 (09-09-18 @ 17:20)  HR: 96 (09-09-18 @ 21:02) (95 - 106)  BP: 149/82 (09-09-18 @ 21:02) (141/87 - 149/82)  BP(mean): --  RR: 18 (09-09-18 @ 21:02) (18 - 18)  SpO2: 98% (09-09-18 @ 21:02) (97% - 98%)  Wt(kg): --    PHYSICAL EXAM:  Constitutional: WDWN resting comfortably in bed; NAD  Head: NC/AT  Eyes: PERRL, EOMI, anicteric sclera  ENT: no nasal discharge; uvula midline, no oropharyngeal erythema or exudates; MMM  Neck: supple; no JVD or thyromegaly  Respiratory: CTA B/L; no W/R/R, no retractions  Cardiac: +S1/S2; RRR; no M/R/G; PMI non-displaced  Gastrointestinal: abdomen soft, NT/ND; no rebound or guarding; +BSx4  Back: spine midline, no bony tenderness or step-offs; no CVAT B/L  Extremities: WWP, no clubbing or cyanosis; no peripheral edema  Musculoskeletal: NROM x4; no joint swelling, tenderness or erythema  Vascular: 2+ radial, femoral, DP/PT pulses B/L  Dermatologic: skin warm, dry and intact; no rashes, wounds, or scars  Lymphatic: no submandibular or cervical LAD  Neurologic: Mental Status: AAXO3 to self, place and year.  Cranial Nerves: PERRL, EOMI, visual fields intact. No facial asymmetry, no facial droop present.     visual fields full, PERRL, EOMI, visual fields intact.   Cranial Nerv. Discs and eye grounds normal. No facial asymmetry, facial sens intact. Hearing normal. Tongue, palate, shrug normal. Muscle tone, bulk normal. Very subtle R upper limb pronator drift, weak wrist dorsiflexion and MCP finger extension. Otherwise power is intact. Patchy decrease in pinprick over dorsum of R hand. Sens otherwise normal. Gait and coord are normal. DTR symmetric. No long tract signs.  Psychiatric: affect and characteristics of appearance, verbalizations, behaviors are appropriate HPI:  Patient is a 47 yo M with pmhx of HIV, anxiety, asthma who presented with the following complaints. Patient states that last thing he remembers from the event was he was sitting on a park bench near a stadium and next thing he was at the [facility] ED, he has no recollection of the events, does not remember if he felt dizzy or lightheaded, does not remember if he fell. On arrival to the ED, he states he was experiencing right hand weakness, numbness and decrease sensation, he had imaging with CT done with showed a small left ICH, he was supposed to be admitted for further work up with an MRI however did not like the facility so left AMA, went home and returned to the ED for re evaluation. He states his symptoms have improved since yesterday, denies any headaches, vision changes, speech/language or coordination symptoms. Denies any weakness/sensory changes in remaining limbs, no previous similar episode. He denies any alcohol or drug abuse. No past hx of cardiac dx, stroke or seizure disorder.     Pmhx: Asthma, HIV, Anxiety  Pshx: no s/f hx  FHx: Cardiac dx in father  Social: 1PPD since 1980s, denies any alcohol or drug abuse.    Medications:   Descovy 200 mg-25 mg oral tablet: 1 tab(s) orally once a day  Tivicay 50 mg oral tablet: 1 tab(s) orally once a day  ProAir HFA 90 mcg/inh inhalation aerosol: 2 puff(s) inhaled 4 times a day, As Needed  sertraline 50 mg oral tablet: 1 tab(s) orally once a day  Ambien 10 mg oral tablet: 1 tab(s) orally once a day (at bedtime)  Clobex 0.05% topical spray: Apply topically to affected area once a day  Symbicort 80 mcg-4.5 mcg/inh inhalation aerosol: 2 puff(s) inhaled 2 times a day    VITAL SIGNS:  T(C): 36.6 (09-09-18 @ 21:02), Max: 37 (09-09-18 @ 17:20)  T(F): 97.9 (09-09-18 @ 21:02), Max: 98.6 (09-09-18 @ 17:20)  HR: 96 (09-09-18 @ 21:02) (95 - 106)  BP: 149/82 (09-09-18 @ 21:02) (141/87 - 149/82)  BP(mean): --  RR: 18 (09-09-18 @ 21:02) (18 - 18)  SpO2: 98% (09-09-18 @ 21:02) (97% - 98%)  Wt(kg): --    PHYSICAL EXAM:  Constitutional: WDWN resting comfortably in bed; NAD  Head: NC/AT  Eyes: PERRL, EOMI, anicteric sclera  ENT: no nasal discharge; uvula midline, no oropharyngeal erythema or exudates; MMM  Neck: supple; no JVD or thyromegaly  Respiratory: CTA B/L; no W/R/R, no retractions  Cardiac: +S1/S2; RRR; no M/R/G; PMI non-displaced  Gastrointestinal: abdomen soft, NT/ND; no rebound or guarding; +BSx4  Back: spine midline, no bony tenderness or step-offs; no CVAT B/L  Extremities: WWP, no clubbing or cyanosis; no peripheral edema  Musculoskeletal: NROM x4; no joint swelling, tenderness or erythema  Vascular: 2+ radial, femoral, DP/PT pulses B/L  Dermatologic: skin warm, dry and intact; no rashes, wounds, or scars  Lymphatic: no submandibular or cervical LAD  Neurologic:  Mental status: AAOX3 to self, place and year. Speaking in full sentences, no dysarthria. Remote and distant memory intact. Fund of knowledge appropriate.   Cranial nerves: PERRL, EOMI, visual fields full to confrontation, face is symmetric with normal eye closure and smile, no facial droop, hearing is intact to finger rub, tongue protrudes in the midline    Motor:  Normal bulk and tone, Right  strength equal 3-4/5. All remaining muscle groups 5/5 strength.   Sensation: Decrease sensation in right hand as compared to left. All remaining sensation intact.   Coordination: Normal. No dysmetria on finger to nose test.   Reflexes: 2+ in upper and lower extremities  Gait: Normal. HPI:  Patient is a 47 yo M with pmhx of HIV, anxiety, asthma who presented with the following complaints. Patient states that last thing he remembers from the event was he was sitting on a park bench near a stadium and next thing he was at the [facility] ED, he has no recollection of the events, does not remember if he felt dizzy or lightheaded, does not remember if he fell. On arrival to the ED, he states he was experiencing right hand weakness, numbness and decrease sensation, he had imaging with CT done with showed a small left ICH, he was supposed to be admitted for further work up with an MRI however did not like the facility so left AMA, went home and returned to the ED for re evaluation. He states his symptoms have improved since yesterday, denies any headaches, vision changes, speech/language or coordination symptoms. Denies any weakness/sensory changes in remaining limbs, no previous similar episode. He denies any alcohol or drug abuse. No past hx of cardiac dx, stroke or seizure disorder.     Pmhx: Asthma, HIV, Anxiety  Pshx: no s/f hx  FHx: Cardiac dx in father  Social: 1 PPD since 1980s, denies any alcohol or drug abuse.    Medications:   Descovy 200 mg-25 mg oral tablet: 1 tab(s) orally once a day  Tivicay 50 mg oral tablet: 1 tab(s) orally once a day  ProAir HFA 90 mcg/inh inhalation aerosol: 2 puff(s) inhaled 4 times a day, As Needed  sertraline 50 mg oral tablet: 1 tab(s) orally once a day  Ambien 10 mg oral tablet: 1 tab(s) orally once a day (at bedtime)  Clobex 0.05% topical spray: Apply topically to affected area once a day  Symbicort 80 mcg-4.5 mcg/inh inhalation aerosol: 2 puff(s) inhaled 2 times a day    VITAL SIGNS:  T(C): 36.6 (09-09-18 @ 21:02), Max: 37 (09-09-18 @ 17:20)  T(F): 97.9 (09-09-18 @ 21:02), Max: 98.6 (09-09-18 @ 17:20)  HR: 96 (09-09-18 @ 21:02) (95 - 106)  BP: 149/82 (09-09-18 @ 21:02) (141/87 - 149/82)  BP(mean): --  RR: 18 (09-09-18 @ 21:02) (18 - 18)  SpO2: 98% (09-09-18 @ 21:02) (97% - 98%)  Wt(kg): --    PHYSICAL EXAM:  Constitutional: NAD.   Head: NC/AT  Eyes: PERRL, EOMI, anicteric sclera  ENT: no nasal discharge; uvula midline, no oropharyngeal erythema or exudates; MMM  Neck: supple; no JVD or thyromegaly  Respiratory: Mild expiratory wheezing lower lung fields, no crackles.   Cardiac: +S1/S2; RRR; no M/R/G; PMI non-displaced  Gastrointestinal: abdomen soft, NT/ND; no rebound or guarding; +BSx4  Back: spine midline, no bony tenderness or step-offs; no CVAT B/L  Extremities: WWP, no clubbing or cyanosis; no peripheral edema  Musculoskeletal: NROM x4; no joint swelling, tenderness or erythema  Vascular: 2+ radial, DP/PT pulses B/L  Dermatologic: Abrasions noted diffusely through out b/l extremities, no laceration.   Lymphatic: no submandibular or cervical LAD  Neurologic:  Mental status: AAOX3 to self, place and year. Speaking in full sentences, no dysarthria. Remote and distant memory intact. Fund of knowledge appropriate.   Cranial nerves: PERRL, EOMI, visual fields full to confrontation, face is symmetric with normal eye closure and smile, no facial droop, hearing is intact to finger rub, tongue protrudes in the midline    Motor:  Normal bulk and tone, Right  strength equal 3-4/5. All remaining muscle groups 5/5 strength.   Sensation: Decrease sensation in right hand as compared to left. All remaining sensation intact.   Coordination: Normal. No dysmetria on finger to nose test.   Reflexes: 2+ in upper and lower extremities  Gait: Normal. HPI:  Patient is a 47 yo M with pmhx of HIV, anxiety, asthma who presented with the following complaints. Patient states that last thing he remembers from the event was he was sitting on a park bench near a stadium and next thing he was at the [facility] ED, he has no recollection of the events, does not remember if he felt dizzy or lightheaded, does not remember if he fell. On arrival to the ED, he states he was experiencing right hand weakness, numbness and decrease sensation, he had imaging with CT done with showed a small left basal ganglia ICH, he was supposed to be admitted for further work up with an MRI however did not like the facility so left AMA, went home and returned to the ED for re evaluation. He states his symptoms have improved since yesterday, denies any headaches, vision changes, speech/language or coordination symptoms. Denies any weakness/sensory changes in remaining limbs, no previous similar episode. He denies any alcohol or drug abuse. No past hx of cardiac dx, stroke or seizure disorder.   Collateral was obtained by the NS team who speak to the team at [facility], as per their note, patient had a repeat CT head in 6 hours which was negative and the bleed had resolved, CT angio head/neck were done as well which were negative for any acute findings.     Pmhx: Asthma, HIV, Anxiety  Pshx: no s/f hx  FHx: Cardiac dx in father  Social: 1 PPD since 1980s, denies any alcohol or drug abuse.    Medications:   Descovy 200 mg-25 mg oral tablet: 1 tab(s) orally once a day  Tivicay 50 mg oral tablet: 1 tab(s) orally once a day  ProAir HFA 90 mcg/inh inhalation aerosol: 2 puff(s) inhaled 4 times a day, As Needed  sertraline 50 mg oral tablet: 1 tab(s) orally once a day  Ambien 10 mg oral tablet: 1 tab(s) orally once a day (at bedtime)  Clobex 0.05% topical spray: Apply topically to affected area once a day  Symbicort 80 mcg-4.5 mcg/inh inhalation aerosol: 2 puff(s) inhaled 2 times a day    VITAL SIGNS:  T(C): 36.6 (09-09-18 @ 21:02), Max: 37 (09-09-18 @ 17:20)  T(F): 97.9 (09-09-18 @ 21:02), Max: 98.6 (09-09-18 @ 17:20)  HR: 96 (09-09-18 @ 21:02) (95 - 106)  BP: 149/82 (09-09-18 @ 21:02) (141/87 - 149/82)  BP(mean): --  RR: 18 (09-09-18 @ 21:02) (18 - 18)  SpO2: 98% (09-09-18 @ 21:02) (97% - 98%)  Wt(kg): --    PHYSICAL EXAM:  Constitutional: NAD.   Head: NC/AT  Eyes: PERRL, EOMI, anicteric sclera  ENT: no nasal discharge; uvula midline, no oropharyngeal erythema or exudates; MMM  Neck: supple; no JVD or thyromegaly  Respiratory: Mild expiratory wheezing lower lung fields, no crackles.   Cardiac: +S1/S2; RRR; no M/R/G; PMI non-displaced  Gastrointestinal: abdomen soft, NT/ND; no rebound or guarding; +BSx4  Back: spine midline, no bony tenderness or step-offs; no CVAT B/L  Extremities: WWP, no clubbing or cyanosis; no peripheral edema  Musculoskeletal: NROM x4; no joint swelling, tenderness or erythema  Vascular: 2+ radial, DP/PT pulses B/L  Dermatologic: Abrasions noted diffusely through out b/l extremities, no laceration.   Lymphatic: no submandibular or cervical LAD  Neurologic:  Mental status: AAOX3 to self, place and year. Speaking in full sentences, no dysarthria. Remote and distant memory intact. Fund of knowledge appropriate.   Cranial nerves: PERRL, EOMI, visual fields full to confrontation, face is symmetric with normal eye closure and smile, no facial droop, hearing is intact to finger rub, tongue protrudes in the midline    Motor:  Normal bulk and tone, Right  strength 3-4/5. All remaining muscle groups 5/5 strength.   Sensation: Decrease sensation in right hand as compared to left. All remaining sensation intact.   Coordination: Normal. No dysmetria on finger to nose test.   Reflexes: 2+ in upper and lower extremities  Gait: Normal.

## 2018-09-09 NOTE — ED PROVIDER NOTE - OBJECTIVE STATEMENT
48 year old male with history of HIV, anxiety, RAD, recently diagnosed ICH at another area facility presents to ED today after leaving their facility AMA yesterday.  Patient states he was awaiting placement into an ICU bed when he became frustrated at the wait and signed himself out.  Patient states he went home yesterday and went to bed.  Upon awakening this afternoon, he decided to come to ED for re evaluation.  He states [place] and then next thing he recalls he was in the ED.  He denies any drug or alcohol use leading up to this event that he can recall.  On ED arrival today, patient notes ongonig right upper extremity weakness, which he states he began to feel yesterday after arrival to ED.  Patient denies associated headache, chest pain, shortness of breath changed from his baseline, fever, chills, changes in vision or any additional acute complaints or concerns at this time.

## 2018-09-09 NOTE — ED ADULT NURSE NOTE - CHIEF COMPLAINT QUOTE
" I have a brain bleed, and I left [facility] last night because I was waiting too long for an MRI". Denies any HA right now. CT results show "tiny hemorrhagic contusion in the anterior left basal ganglia".

## 2018-09-09 NOTE — PROGRESS NOTE ADULT - PROBLEM SELECTOR PLAN 5
VTE ppx: IMPROVE score of 0, no ppx indicated given low risk, ambulating.     Dispo: Admit to [facility]  Code Status: Full Code Diet: Regular  Replete lytes prn  No IVF indicated, tolerating PO intake. -Continue with home albuterol prn, symbicort BID  -Duonebs prn

## 2018-09-09 NOTE — PROGRESS NOTE ADULT - PROBLEM SELECTOR PLAN 6
1) PCP Contacted on Admission: NO. Dr. Tammi Pacheco: PCP: [phone]  2) Date of Contact with PCP: N/A  3) PCP Contacted at Discharge: N/A  4) Summary of Handoff Given to PCP: N/A   5) Post-Discharge Appointment Date and Location: N/A VTE ppx: IMPROVE score of 0, no ppx indicated given low risk, ambulating.     Dispo: Admit to [facility]  Code Status: Full Code Diet: Regular  Replete lytes prn  No IVF indicated, tolerating PO intake.

## 2018-09-09 NOTE — H&P ADULT - NSHPPHYSICALEXAM_GEN_ALL_CORE
147/96 mm Hg  106 /min 18 /min 98.6 F  WD/WN, NAD  Ecchymosis, erosions on limbs, both sides. Small hematoma over L forehead and over occiput. No arm pains. FROM at arm joints.  Neck supple; no pain with motion.  Cor RRS1S2, no m; carotids, pulses normal.  Neuro: Alert, normal speech, language, insight, judgement, mental status. Euthymic. Goal oriented.   Visual fields full, GOSIA, eye movements full. Discs and eye grounds normal. No facial asymmetry, facial sens intact. Hearing normal. Tongue, palate, shrug normal. Muscle tone, bulk normal. Very subtle R upper limb pronator drift, weak wrist dorsiflexion and MCP finger extension. Otherwise power is intact. Patchy decrease in pinprick over dorsum of R hand. Sens otherwise normal. Gait and coord are normal. DTR symmetric. No long tract signs.

## 2018-09-09 NOTE — PROGRESS NOTE ADULT - PROBLEM SELECTOR PLAN 3
-Continue with home albuterol prn, symbicort BID  -Duonebs prn -Continue with home Descovy and Tivicay daily -See plan above.  -PT

## 2018-09-09 NOTE — ED ADULT TRIAGE NOTE - CHIEF COMPLAINT QUOTE
" I have a brain bleed, and I left [facility] because I was waiting too long for an MRI". Denies any HA right now. " I have a brain bleed, and I left [facility] last night because I was waiting too long for an MRI". Denies any HA right now. CT results show "tiny hemorrhagic contusion in the anterior left basal ganglia".

## 2018-09-09 NOTE — ED PROVIDER NOTE - PROGRESS NOTE DETAILS
Patient without acute process on CT head.  I did speak w neuro sx on call given patient's imaging report from yesterday.  Will see in ED, however do not feel they will have any further recs for imaging at this time.  Given patient's RUE weakness, will consult with neuro on call.  Patient aware of plan and appears to be resting comfortably.

## 2018-09-09 NOTE — H&P ADULT - HISTORY OF PRESENT ILLNESS
In brief,  Came to in [facility] ED; last thing he remembers is sitting on a park bench near [facility]. No recollection of events; no clue as to what could have happened. Has erosions over limbs on both sides and small bump on L forehead and occiput. In ED noted some trouble with R hand, with poor  and hand movements at the wrist. No arm or neck pain. No headaches, visual, speech, language, coordination symptoms.     No hx of seizure like or stroke like spells. No hx of heart disease, cardiac symptoms. On antiHIV tx.     Has asthma, depression, on meds.    No signif fam hx

## 2018-09-09 NOTE — ED PROVIDER NOTE - NEUROLOGICAL, MLM
Alert and oriented, no facial asymmetry.  Speech is not slurred.  3+/5 strength to RUE.  5/5 strength to bilateral LEs and LUE.  Sensation intact x 4 extremities.

## 2018-09-09 NOTE — PROGRESS NOTE ADULT - ASSESSMENT
Patient is a 49 yo M with pmhx of HIV, anxiety, asthma presenting with right hand numbness, weakness and decrease ROM, recent CT with small left basal ganglia ICH, CT on admission with no acute intracranial findings, admitted to [facility] for further management.

## 2018-09-09 NOTE — PATIENT PROFILE ADULT. - TEACHING/LEARNING LEARNING PREFERENCES
computer/internet/group instruction/audio/verbal instruction/individual instruction/written material/skill demonstration

## 2018-09-09 NOTE — ED PROVIDER NOTE - MEDICAL DECISION MAKING DETAILS
pt in ED with c pt in ED with cc ICH on outside facility and RUE weakness - most pronounced with decreased  strength.  Labs and imaging reviewed.  CT head without evidence of ICH.  I did speak with neuro sx on call in consultation re further imaging, etc.  Neuro sx will see patient, however they do not feel further imaging is warranted at this time.  Given patient's deficits, I spoke with neuro on call, Dr. Horn, who is agreeable to plan for admission to his service with further neuro eval.  He is in ED to eval patient at this time.  Patient aware of plan for admission and agreeable.  Will admit at this time.

## 2018-09-09 NOTE — PROGRESS NOTE ADULT - PROBLEM SELECTOR PLAN 4
Diet: Regular  Replete lytes prn  No IVF indicated, tolerating PO intake. -Continue with home albuterol prn, symbicort BID  -Duonebs prn -Continue with home Descovy and Tivicay daily

## 2018-09-09 NOTE — H&P ADULT - ASSESSMENT
1. Spell, with loss of consciousness, cause undetermined.   2. Intracerebral hemorrhage, L, on prior CT  3. R radial nerve palsy (+/- CNS origin?)    Need to:  1. Investigate cause of spell: Seizure v syncope (with head trauma and concussion explaining prolonged loss of consciousness) should be considered. Check vEEG, cardiac investigations for syncope  2. Follow up on blood/contusion noted on original CT: significance, anatomy? MRI of brain C-/C+  3. PT for R hand weakness, splint    Further actions to depend on additional data.    Discussed above at length with pt ( present). They expressed understanding.

## 2018-09-09 NOTE — PROGRESS NOTE ADULT - PROBLEM SELECTOR PLAN 7
1) PCP Contacted on Admission: NO. Dr. Tammi Pacheco: PCP: [phone]  2) Date of Contact with PCP: N/A  3) PCP Contacted at Discharge: N/A  4) Summary of Handoff Given to PCP: N/A   5) Post-Discharge Appointment Date and Location: N/A VTE ppx: IMPROVE score of 0, no ppx indicated given low risk, ambulating.     Dispo: Admit to [facility]  Code Status: Full Code

## 2018-09-09 NOTE — H&P ADULT - NSHPLABSRESULTS_GEN_ALL_CORE
CT head here: neg for blood; at [facility] yesterday: L basal ganglia blood ("contusion"?).  Others per records (reviewed)

## 2018-09-09 NOTE — ED PROVIDER NOTE - NEURO NEGATIVE STATEMENT, MLM
+ recent loss of consciousness, no gait abnormality, no headache, no sensory deficits, + RUE weakness.

## 2018-09-10 DIAGNOSIS — E87.6 HYPOKALEMIA: ICD-10-CM

## 2018-09-10 DIAGNOSIS — R55 SYNCOPE AND COLLAPSE: ICD-10-CM

## 2018-09-10 DIAGNOSIS — F41.9 ANXIETY DISORDER, UNSPECIFIED: ICD-10-CM

## 2018-09-10 LAB
ANION GAP SERPL CALC-SCNC: 13 MMOL/L — SIGNIFICANT CHANGE UP (ref 5–17)
ANION GAP SERPL CALC-SCNC: 14 MMOL/L — SIGNIFICANT CHANGE UP (ref 5–17)
BASOPHILS NFR BLD AUTO: 0.4 % — SIGNIFICANT CHANGE UP (ref 0–2)
BUN SERPL-MCNC: 10 MG/DL — SIGNIFICANT CHANGE UP (ref 7–23)
BUN SERPL-MCNC: 10 MG/DL — SIGNIFICANT CHANGE UP (ref 7–23)
CALCIUM SERPL-MCNC: 8.7 MG/DL — SIGNIFICANT CHANGE UP (ref 8.4–10.5)
CALCIUM SERPL-MCNC: 9.4 MG/DL — SIGNIFICANT CHANGE UP (ref 8.4–10.5)
CHLORIDE SERPL-SCNC: 102 MMOL/L — SIGNIFICANT CHANGE UP (ref 96–108)
CHLORIDE SERPL-SCNC: 103 MMOL/L — SIGNIFICANT CHANGE UP (ref 96–108)
CO2 SERPL-SCNC: 24 MMOL/L — SIGNIFICANT CHANGE UP (ref 22–31)
CO2 SERPL-SCNC: 25 MMOL/L — SIGNIFICANT CHANGE UP (ref 22–31)
CREAT SERPL-MCNC: 0.73 MG/DL — SIGNIFICANT CHANGE UP (ref 0.5–1.3)
CREAT SERPL-MCNC: 0.83 MG/DL — SIGNIFICANT CHANGE UP (ref 0.5–1.3)
EOSINOPHIL NFR BLD AUTO: 6.4 % — HIGH (ref 0–6)
GLUCOSE SERPL-MCNC: 131 MG/DL — HIGH (ref 70–99)
GLUCOSE SERPL-MCNC: 84 MG/DL — SIGNIFICANT CHANGE UP (ref 70–99)
HCT VFR BLD CALC: 36.3 % — LOW (ref 39–50)
HGB BLD-MCNC: 12.2 G/DL — LOW (ref 13–17)
LYMPHOCYTES # BLD AUTO: 24.1 % — SIGNIFICANT CHANGE UP (ref 13–44)
MAGNESIUM SERPL-MCNC: 1.7 MG/DL — SIGNIFICANT CHANGE UP (ref 1.6–2.6)
MCHC RBC-ENTMCNC: 28.5 PG — SIGNIFICANT CHANGE UP (ref 27–34)
MCHC RBC-ENTMCNC: 33.6 G/DL — SIGNIFICANT CHANGE UP (ref 32–36)
MCV RBC AUTO: 84.8 FL — SIGNIFICANT CHANGE UP (ref 80–100)
MONOCYTES NFR BLD AUTO: 11.9 % — SIGNIFICANT CHANGE UP (ref 2–14)
NEUTROPHILS NFR BLD AUTO: 57.2 % — SIGNIFICANT CHANGE UP (ref 43–77)
PCP SPEC-MCNC: SIGNIFICANT CHANGE UP
PLATELET # BLD AUTO: 229 K/UL — SIGNIFICANT CHANGE UP (ref 150–400)
POTASSIUM SERPL-MCNC: 2.9 MMOL/L — CRITICAL LOW (ref 3.5–5.3)
POTASSIUM SERPL-MCNC: 4.1 MMOL/L — SIGNIFICANT CHANGE UP (ref 3.5–5.3)
POTASSIUM SERPL-SCNC: 2.9 MMOL/L — CRITICAL LOW (ref 3.5–5.3)
POTASSIUM SERPL-SCNC: 4.1 MMOL/L — SIGNIFICANT CHANGE UP (ref 3.5–5.3)
RBC # BLD: 4.28 M/UL — SIGNIFICANT CHANGE UP (ref 4.2–5.8)
RBC # FLD: 14.1 % — SIGNIFICANT CHANGE UP (ref 10.3–16.9)
SODIUM SERPL-SCNC: 140 MMOL/L — SIGNIFICANT CHANGE UP (ref 135–145)
SODIUM SERPL-SCNC: 141 MMOL/L — SIGNIFICANT CHANGE UP (ref 135–145)
WBC # BLD: 7.3 K/UL — SIGNIFICANT CHANGE UP (ref 3.8–10.5)
WBC # FLD AUTO: 7.3 K/UL — SIGNIFICANT CHANGE UP (ref 3.8–10.5)

## 2018-09-10 PROCEDURE — 93306 TTE W/DOPPLER COMPLETE: CPT | Mod: 26

## 2018-09-10 PROCEDURE — 99222 1ST HOSP IP/OBS MODERATE 55: CPT

## 2018-09-10 PROCEDURE — 72141 MRI NECK SPINE W/O DYE: CPT | Mod: 26

## 2018-09-10 PROCEDURE — 99233 SBSQ HOSP IP/OBS HIGH 50: CPT

## 2018-09-10 PROCEDURE — 70553 MRI BRAIN STEM W/O & W/DYE: CPT | Mod: 26

## 2018-09-10 RX ORDER — POTASSIUM CHLORIDE 20 MEQ
10 PACKET (EA) ORAL
Qty: 0 | Refills: 0 | Status: COMPLETED | OUTPATIENT
Start: 2018-09-10 | End: 2018-09-10

## 2018-09-10 RX ORDER — POTASSIUM CHLORIDE 20 MEQ
40 PACKET (EA) ORAL ONCE
Qty: 0 | Refills: 0 | Status: COMPLETED | OUTPATIENT
Start: 2018-09-10 | End: 2018-09-10

## 2018-09-10 RX ORDER — POTASSIUM CHLORIDE 20 MEQ
40 PACKET (EA) ORAL ONCE
Qty: 0 | Refills: 0 | Status: DISCONTINUED | OUTPATIENT
Start: 2018-09-10 | End: 2018-09-10

## 2018-09-10 RX ORDER — POTASSIUM CHLORIDE 20 MEQ
40 PACKET (EA) ORAL EVERY 4 HOURS
Qty: 0 | Refills: 0 | Status: COMPLETED | OUTPATIENT
Start: 2018-09-10 | End: 2018-09-10

## 2018-09-10 RX ADMIN — Medication 100 MILLIEQUIVALENT(S): at 10:19

## 2018-09-10 RX ADMIN — BUDESONIDE AND FORMOTEROL FUMARATE DIHYDRATE 2 PUFF(S): 160; 4.5 AEROSOL RESPIRATORY (INHALATION) at 07:31

## 2018-09-10 RX ADMIN — SERTRALINE 50 MILLIGRAM(S): 25 TABLET, FILM COATED ORAL at 11:55

## 2018-09-10 RX ADMIN — Medication 40 MILLIEQUIVALENT(S): at 22:25

## 2018-09-10 RX ADMIN — Medication 100 MILLIEQUIVALENT(S): at 11:16

## 2018-09-10 RX ADMIN — Medication 100 MILLIEQUIVALENT(S): at 09:26

## 2018-09-10 RX ADMIN — Medication 40 MILLIEQUIVALENT(S): at 18:04

## 2018-09-10 RX ADMIN — Medication 40 MILLIEQUIVALENT(S): at 11:55

## 2018-09-10 RX ADMIN — BUDESONIDE AND FORMOTEROL FUMARATE DIHYDRATE 2 PUFF(S): 160; 4.5 AEROSOL RESPIRATORY (INHALATION) at 18:04

## 2018-09-10 RX ADMIN — EMTRICITABINE AND TENOFOVIR DISOPROXIL FUMARATE 1 TABLET(S): 200; 300 TABLET, FILM COATED ORAL at 11:55

## 2018-09-10 RX ADMIN — DOLUTEGRAVIR SODIUM 50 MILLIGRAM(S): 25 TABLET, FILM COATED ORAL at 11:55

## 2018-09-10 NOTE — PHYSICAL THERAPY INITIAL EVALUATION ADULT - ACTIVE RANGE OF MOTION EXAMINATION, REHAB EVAL
Pt unable to extend right wrist and fingers against gravity. Pt has difficulty with finger opposition, especially pincer . Right forearm supination is also difficult but able to perform.

## 2018-09-10 NOTE — PHYSICAL THERAPY INITIAL EVALUATION ADULT - ADDITIONAL COMMENTS
Prior to incident, pt was fully independent without advice for all ADLs and mobility. Pt lives in apt with his  - 5 steps with rail to access residence.

## 2018-09-10 NOTE — PHYSICAL THERAPY INITIAL EVALUATION ADULT - PERTINENT HX OF CURRENT PROBLEM, REHAB EVAL
Pt is 47yo male with unknown conditions leading to an admission to [facility] on 9/8.  There, he was found to have a small left basal ganglia hemorrhage which was resolved on 6hr stability scan there.  Currently, patient's NCHCT is unremarkable. He complains of RUE weakness.

## 2018-09-10 NOTE — PHYSICAL THERAPY INITIAL EVALUATION ADULT - PLANNED THERAPY INTERVENTIONS, PT EVAL
Pt does not require physical therapy interventions at this time. Please order new P.T. consult if pt has change in status.

## 2018-09-10 NOTE — PROGRESS NOTE ADULT - ATTENDING COMMENTS
48 year old male with hx HIV (on ART, reportedly VLUD and CD4 ct >900), adm after syncopal episode , also found to have R arm weakness.  Pt denies any preceeding pre-synopal symptoms (lightheadedness, blurry vision, etc) prior to the fall, which happened when he was sitting on bench.  Has never has syncope before and feels back to normal, except for R hand weakness which he noticed when he woke up.    No N/T/neck pain/urinary changes. No Ha/N/V.    Weakness has shown some improvement since arrival    CTH at [facility] reportedly showed small L ICH at the BG, although repeat scan does not clearly demonstrate this.  As per my read there is an area of hypodensity at the L BG, with an area of isodensity within, possibly representing petechial conversion of subacute stroke, althougg small subacute ICH is a consideration Not a typical location for a traumatic hemorrhage.    On exam, pt is AAOx3, speech fluent and nondysarthric, follows commands  CN- PERRL, EOMI, face symmetrcial, tongue midline  M- R/L (5,5)  Deltoid (5,5), biceps (5,5), triceps (4+,5), BR (4+,5), protonation (5,5), supination (4+/5), wrist ext (2+,5), wrist flex (5,5), finger ext (3,5), finger flexors (5,5), fingers spread (4/5 but likely limited by difficulty with extension), APB (4+,5 however previously had tendon repair at palmar side of R thumb), FPB and FPL (5,5)  Hip flexion (5,5), knee flex (5,5), knee ext (5,5), dorsiflex (5,5), plantarflex (5,5)  S- patchy sensory loss to PP but most notable around dorsal surface of the thumb and dorsal/lateral forearm  R- 1+ biceps, triceps, BR bilat, 1+ patellars and AJ's , toes down  C-FTN bilat    Echo 9/10: EF60-65% with trace MR/TR/SC no wall abnormalities.     Utox pos for amphet    Etiology of syncope unclear, possibly vasovagal.   CTH concerning for small BG hemorrhage vs. hemorrhagic conversion of small stroke. Awaiting workup inc EEG and Mr imaging.  MR brain w and wo con to better evaluate L BG findings (contrast given hx HIV)  CD4 ct and VL  Although R arm weakness is most consistent with radial palsy at level of axilla, however given possible APB and finger spread weakness following trauma would image C spine  Cardiology as an outpt for further workup (may need long term holter monitoring/loop recorder)  Discussion regarding Utox findings of pos amphetamines, as cocaine can be assoc with vasculopathy/stroke

## 2018-09-10 NOTE — CONSULT NOTE ADULT - SUBJECTIVE AND OBJECTIVE BOX
HISTORY OF PRESENT ILLNESS:   As per primary team H&P  "Patient is a 49 yo M with pmhx of HIV, anxiety, asthma who presented with the following complaints. Patient states that last thing he remembers from the event was he was sitting [place] and next thing he was at the [facility] ED, he has no recollection of the events, does not remember if he felt dizzy or lightheaded, does not remember if he fell. On arrival to the ED, he states he was experiencing right hand weakness, numbness and decrease sensation, he had imaging with CT done with showed a small ICH, he was supposed to be admitted for further work up with an MRI however did not like the facility so left AMA, went home and returned to the ED for re evaluation. He states his symptoms have improved since yesterday, denies any headaches, vision changes, speech/language or coordination symptoms. Denies any weakness/sensory changes in remaining limbs, no previous similar episode. He denies any alcohol or drug abuse. No past hx of cardiac dx, stroke or seizure disorder. "    As per neurosurgeon at [facility], patient was admitted to neurology.  He had a small IPH in left basal ganglia, however, it resolved on a 6 hour CT scan at that facility.  A CTA of head and neck were done as well, which were negative.  Neurology at [facility] wanted MRI for further work up of symptoms.    PAST MEDICAL & SURGICAL HISTORY:  HIV (human immunodeficiency virus infection)  Asthma  COPD (chronic obstructive pulmonary disease)  No significant past surgical history    FAMILY HISTORY:  Family history of other heart disease (Father)  Family history of glaucoma (Father)  Family history of diabetes mellitus (Father)      SOCIAL HISTORY:  Tobacco Use: active cigarette smoker  EtOH use: denies  Substance: denies    Allergies    No Known Allergies    Intolerances        REVIEW OF SYSTEMS  All other ROS negative      MEDICATIONS:  Antibiotics:  dolutegravir 50 milliGRAM(s) Oral daily  emtricitabine 200 mG/tenofovir alafenamide 25 mG (DESCOVY) Tablet 1 Tablet(s) Oral daily    Neuro:  sertraline 50 milliGRAM(s) Oral daily    Anticoagulation:    OTHER:  ALBUTerol    90 MICROgram(s) HFA Inhaler 2 Puff(s) Inhalation every 6 hours PRN  ALBUTerol/ipratropium for Nebulization 3 milliLiter(s) Nebulizer every 6 hours PRN  buDESOnide  80 MICROgram(s)/formoterol 4.5 MICROgram(s) Inhaler 2 Puff(s) Inhalation two times a day  influenza   Vaccine 0.5 milliLiter(s) IntraMuscular once    IVF:      Vital Signs Last 24 Hrs  T(C): 36.8 (09 Sep 2018 21:40), Max: 37 (09 Sep 2018 17:20)  T(F): 98.3 (09 Sep 2018 21:40), Max: 98.6 (09 Sep 2018 17:20)  HR: 94 (09 Sep 2018 21:40) (94 - 106)  BP: 126/79 (09 Sep 2018 21:40) (126/79 - 149/82)  BP(mean): --  RR: 20 (09 Sep 2018 21:40) (18 - 20)  SpO2: 95% (09 Sep 2018 21:40) (95% - 98%)    PHYSICAL EXAM:  Constitutional: NAD, well groomed, well nourished  Respiratory: breathing non-labored, symmetrical chest wall movement  Cardiovascuar: RRR, no murmurs  Gastrointestinal: abdomen soft, non tender  Genitourinary: exam defferred  Neurological:  AAOX3. Verbal function intact  Cranial Nerves: II-XII intact  Motor: 5/5 power in b/l UE and LE  Sensation: intact to touch in al extremities  Pronator Drift: ***  Dysmetria: ***      LABS:                        12.6   7.2   )-----------( 252      ( 09 Sep 2018 17:54 )             38.0     09-09    143  |  102  |  10  ----------------------------<  106<H>  3.3<L>   |  25  |  0.82    Ca    9.6      09 Sep 2018 17:54    TPro  7.7  /  Alb  4.0  /  TBili  0.2  /  DBili  x   /  AST  20  /  ALT  15  /  AlkPhos  94  09-09    PT/INR - ( 09 Sep 2018 17:54 )   PT: 11.2 sec;   INR: 1.01          PTT - ( 09 Sep 2018 17:54 )  PTT:31.2 sec      RADIOLOGY & ADDITIONAL STUDIES:  < from: CT Head No Cont (09.09.18 @ 18:16) >  IMPRESSION:     No acute intracranial abnormality or calvarial fracture.    < end of copied text >    < from: CT Cervical Spine No Cont (09.09.18 @ 18:14) >  IMPRESSION:    No definite acute fracture or spondylolisthesis on this motion degraded   examination.    < end of copied text >      Assessment:  47 y/o male with unknown conditions leading to an admission to [facility] on 9/8.  There, he was found to have a small left BG hemorrhage which was resolved on 6hr stability scan there.  Currently, patient's NCHCT is unremarkable.    Plan:  No neurosurgical interventions  Recommend Neurologic evaluation of patient's hand weakness/complaints.  Further medical management and work up as per neurology team  To be discussed with Dr. Ibarra HISTORY OF PRESENT ILLNESS:   As per primary team H&P (reviewed with patient):  "Patient is a 49 yo M with pmhx of HIV, anxiety, asthma who presented with the following complaints. Patient states that last thing he remembers from the event was he was sitting on a park bench near a stadium and next thing he was at the [facility] ED, he has no recollection of the events, does not remember if he felt dizzy or lightheaded, does not remember if he fell. On arrival to the ED, he states he was experiencing right hand weakness, numbness and decrease sensation, he had imaging with CT done with showed a small ICH, he was supposed to be admitted for further work up with an MRI however did not like the facility so left AMA, went home and returned to the ED for re evaluation. He states his symptoms have improved since yesterday, denies any headaches, vision changes, speech/language or coordination symptoms. Denies any weakness/sensory changes in remaining limbs, no previous similar episode. He denies any alcohol or drug abuse. No past hx of cardiac dx, stroke or seizure disorder. "    Currently, patient continues have right hand numbness and weakness.  He is right hand dominant.    As per neurosurgeon at [facility], patient was admitted to neurology.  He had a small IPH in left basal ganglia, however, it resolved on a 6 hour CT scan at that facility.  A CTA of head and neck were done as well, which were negative.  Neurology at [facility] wanted MRI for further work up of symptoms.    PAST MEDICAL & SURGICAL HISTORY:  HIV (human immunodeficiency virus infection)  Asthma  COPD (chronic obstructive pulmonary disease)  No significant past surgical history    FAMILY HISTORY:  Family history of other heart disease (Father)  Family history of glaucoma (Father)  Family history of diabetes mellitus (Father)      SOCIAL HISTORY:  Tobacco Use: active cigarette smoker  EtOH use: denies  Substance: denies    Allergies    No Known Allergies    Intolerances        REVIEW OF SYSTEMS  All other ROS negative      MEDICATIONS:  Antibiotics:  dolutegravir 50 milliGRAM(s) Oral daily  emtricitabine 200 mG/tenofovir alafenamide 25 mG (DESCOVY) Tablet 1 Tablet(s) Oral daily    Neuro:  sertraline 50 milliGRAM(s) Oral daily    Anticoagulation:    OTHER:  ALBUTerol    90 MICROgram(s) HFA Inhaler 2 Puff(s) Inhalation every 6 hours PRN  ALBUTerol/ipratropium for Nebulization 3 milliLiter(s) Nebulizer every 6 hours PRN  buDESOnide  80 MICROgram(s)/formoterol 4.5 MICROgram(s) Inhaler 2 Puff(s) Inhalation two times a day  influenza   Vaccine 0.5 milliLiter(s) IntraMuscular once    IVF:      Vital Signs Last 24 Hrs  T(C): 36.8 (09 Sep 2018 21:40), Max: 37 (09 Sep 2018 17:20)  T(F): 98.3 (09 Sep 2018 21:40), Max: 98.6 (09 Sep 2018 17:20)  HR: 94 (09 Sep 2018 21:40) (94 - 106)  BP: 126/79 (09 Sep 2018 21:40) (126/79 - 149/82)  BP(mean): --  RR: 20 (09 Sep 2018 21:40) (18 - 20)  SpO2: 95% (09 Sep 2018 21:40) (95% - 98%)    PHYSICAL EXAM:  Constitutional: NAD, well groomed, well nourished  Respiratory: breathing non-labored, symmetrical chest wall movement  Cardiovascuar: RRR, no murmurs  Gastrointestinal: abdomen soft, non tender  Genitourinary: exam defferred  Neurological:  AAOX3. Verbal function intact  Cranial Nerves: II-XII intact  Motor: 4/5 in right hand , otherwise 5/5 power in b/l UE and LE  Sensation: (+) diminished sensation in c6, c7 hand dermatomes. intact to touch in all extremities otherwise  Pronator Drift: (+) on right  Dysmetria: not present      LABS:                        12.6   7.2   )-----------( 252      ( 09 Sep 2018 17:54 )             38.0     09-09    143  |  102  |  10  ----------------------------<  106<H>  3.3<L>   |  25  |  0.82    Ca    9.6      09 Sep 2018 17:54    TPro  7.7  /  Alb  4.0  /  TBili  0.2  /  DBili  x   /  AST  20  /  ALT  15  /  AlkPhos  94  09-09    PT/INR - ( 09 Sep 2018 17:54 )   PT: 11.2 sec;   INR: 1.01          PTT - ( 09 Sep 2018 17:54 )  PTT:31.2 sec      RADIOLOGY & ADDITIONAL STUDIES:  < from: CT Head No Cont (09.09.18 @ 18:16) >  IMPRESSION:     No acute intracranial abnormality or calvarial fracture.    < end of copied text >    < from: CT Cervical Spine No Cont (09.09.18 @ 18:14) >  IMPRESSION:    No definite acute fracture or spondylolisthesis on this motion degraded   examination.    < end of copied text >      Assessment:  49 y/o male with unknown conditions leading to an admission to [facility] on 9/8.  There, he was found to have a small left BG hemorrhage which was resolved on 6hr stability scan there.  Currently, patient's NCHCT is unremarkable.    Plan:  No neurosurgical interventions  Recommend Neurologic evaluation of patient's hand weakness & numbness.  Further medical management and work up as per neurology team  To be discussed with Dr. Ibarra

## 2018-09-10 NOTE — PROGRESS NOTE ADULT - PROBLEM SELECTOR PLAN 3
Pt initially w/ K3.3 downtrended to 2.9 on 9/10. Ordered IV and po repletion with plan for close follow up with BMP.

## 2018-09-10 NOTE — PROGRESS NOTE ADULT - ASSESSMENT
48yoM with PMH of HIV ( on Descovoy/Tricay), HTN(no meds), and Asthma(Proair/Symbicort) presenting after being found to have L-basal ganglia ICH on CTH with resolution on CT-head 6h afterward at [facility] (along with negative CT-angio-neck/head), at which point pt left AMA and came to [facility] ED, admitted for workup of cause of LOC with Syncope workup and VEEG monitoring.     1. Spell, with loss of consciousness, cause undetermined.   2. Intracerebral hemorrhage, L, on prior CT  3. R radial nerve palsy (+/- CNS origin?)    Need to:  1. Investigate cause of spell: Seizure v syncope (with head trauma and concussion explaining prolonged loss of consciousness) should be considered. Check vEEG, cardiac investigations for syncope  2. Follow up on blood/contusion noted on original CT: significance, anatomy? MRI of brain C-/C+  3. PT for R hand weakness, splint    Further actions to depend on additional data.    Discussed above at length with pt ( present). They expressed understanding. 48yoM with PMH of HIV ( on Descovoy/Tricay), HTN(no meds), and Asthma(Proair/Symbicort) presenting after being found to have L-basal ganglia ICH on CTH with resolution on CT-head 6h afterward at [facility] (along with negative CT-angio-neck/head), at which point pt left AMA and came to [facility] ED, admitted for workup of cause of LOC with Syncope workup and VEEG monitoring.

## 2018-09-10 NOTE — PROGRESS NOTE ADULT - PROBLEM SELECTOR PLAN 1
Pt with unknown cause of LO; denying any pre-episodal palpitations, chest pain, confusion, HA or seizure activity. Pt admitted for further workup of syncope.  - Pt on VEEG- no seizure activity thus far   - Echo 9/10: EF60-65% with trace MR/TR/DC no wall abnormalities.   - MRI head- pending read

## 2018-09-10 NOTE — PROGRESS NOTE ADULT - SUBJECTIVE AND OBJECTIVE BOX
OVERNIGHT EVENTS:     SUBJECTIVE / INTERVAL HPI: Patient seen and examined at bedside.     VITAL SIGNS:  Vital Signs Last 24 Hrs  T(C): 36.9 (10 Sep 2018 10:25), Max: 37 (09 Sep 2018 17:20)  T(F): 98.4 (10 Sep 2018 10:25), Max: 98.6 (09 Sep 2018 17:20)  HR: 97 (10 Sep 2018 12:28) (83 - 106)  BP: 145/89 (10 Sep 2018 12:28) (121/66 - 149/82)  BP(mean): --  RR: 20 (10 Sep 2018 10:25) (16 - 20)  SpO2: 97% (10 Sep 2018 12:28) (95% - 98%)    PHYSICAL EXAM:    General: WDWN  HEENT: NC/AT; PERRL, anicteric sclera; MMM  Neck: supple  Cardiovascular: +S1/S2, RRR  Respiratory: CTA B/L; no W/R/R  Gastrointestinal: soft, NT/ND; +BSx4  Extremities: WWP; no edema, clubbing or cyanosis  Vascular: 2+ radial, DP/PT pulses B/L  Neurological: AAOx3; no focal deficits    MEDICATIONS:  MEDICATIONS  (STANDING):  buDESOnide  80 MICROgram(s)/formoterol 4.5 MICROgram(s) Inhaler 2 Puff(s) Inhalation two times a day  dolutegravir 50 milliGRAM(s) Oral daily  emtricitabine 200 mG/tenofovir alafenamide 25 mG (DESCOVY) Tablet 1 Tablet(s) Oral daily  potassium chloride    Tablet ER 40 milliEquivalent(s) Oral every 4 hours  sertraline 50 milliGRAM(s) Oral daily    MEDICATIONS  (PRN):  ALBUTerol    90 MICROgram(s) HFA Inhaler 2 Puff(s) Inhalation every 6 hours PRN Shortness of Breath and/or Wheezing  ALBUTerol/ipratropium for Nebulization 3 milliLiter(s) Nebulizer every 6 hours PRN Shortness of Breath and/or Wheezing      ALLERGIES:  Allergies    No Known Allergies    Intolerances        LABS:                        12.2   7.3   )-----------( 229      ( 10 Sep 2018 06:00 )             36.3     09-10    140  |  102  |  10  ----------------------------<  131<H>  2.9<LL>   |  24  |  0.73    Ca    8.7      10 Sep 2018 06:00  Mg     1.7     09-10    TPro  7.7  /  Alb  4.0  /  TBili  0.2  /  DBili  x   /  AST  20  /  ALT  15  /  AlkPhos  94  09-09    PT/INR - ( 09 Sep 2018 17:54 )   PT: 11.2 sec;   INR: 1.01          PTT - ( 09 Sep 2018 17:54 )  PTT:31.2 sec    CAPILLARY BLOOD GLUCOSE          RADIOLOGY & ADDITIONAL TESTS: Reviewed. OVERNIGHT EVENTS: NAEO admitted to 7W     SUBJECTIVE / INTERVAL HPI: Patient seen and examined at bedside. Pt mentions R-hand decreased sensation has improved, now only present on thenar/radial distribution. Motor weakness has also improved of RUE with improved .     VITAL SIGNS:  Vital Signs Last 24 Hrs  T(C): 36.9 (10 Sep 2018 10:25), Max: 37 (09 Sep 2018 17:20)  T(F): 98.4 (10 Sep 2018 10:25), Max: 98.6 (09 Sep 2018 17:20)  HR: 97 (10 Sep 2018 12:28) (83 - 106)  BP: 145/89 (10 Sep 2018 12:28) (121/66 - 149/82)  BP(mean): --  RR: 20 (10 Sep 2018 10:25) (16 - 20)  SpO2: 97% (10 Sep 2018 12:28) (95% - 98%)    PHYSICAL EXAM:    General: WDWN  HEENT: NC/AT; PERRL, anicteric sclera; MMM  Neck: supple  Cardiovascular: +S1/S2, RRR  Respiratory: CTA B/L; no W/R/R  Gastrointestinal: soft, NT/ND; +BSx4  Extremities: WWP; no edema, clubbing or cyanosis  Vascular: 2+ radial, DP/PT pulses B/L  Neurological: AAOx3; no focal deficits    MEDICATIONS:  MEDICATIONS  (STANDING):  buDESOnide  80 MICROgram(s)/formoterol 4.5 MICROgram(s) Inhaler 2 Puff(s) Inhalation two times a day  dolutegravir 50 milliGRAM(s) Oral daily  emtricitabine 200 mG/tenofovir alafenamide 25 mG (DESCOVY) Tablet 1 Tablet(s) Oral daily  potassium chloride    Tablet ER 40 milliEquivalent(s) Oral every 4 hours  sertraline 50 milliGRAM(s) Oral daily    MEDICATIONS  (PRN):  ALBUTerol    90 MICROgram(s) HFA Inhaler 2 Puff(s) Inhalation every 6 hours PRN Shortness of Breath and/or Wheezing  ALBUTerol/ipratropium for Nebulization 3 milliLiter(s) Nebulizer every 6 hours PRN Shortness of Breath and/or Wheezing      ALLERGIES:  Allergies    No Known Allergies    Intolerances        LABS:                        12.2   7.3   )-----------( 229      ( 10 Sep 2018 06:00 )             36.3     09-10    140  |  102  |  10  ----------------------------<  131<H>  2.9<LL>   |  24  |  0.73    Ca    8.7      10 Sep 2018 06:00  Mg     1.7     09-10    TPro  7.7  /  Alb  4.0  /  TBili  0.2  /  DBili  x   /  AST  20  /  ALT  15  /  AlkPhos  94  09-09    PT/INR - ( 09 Sep 2018 17:54 )   PT: 11.2 sec;   INR: 1.01          PTT - ( 09 Sep 2018 17:54 )  PTT:31.2 sec    CAPILLARY BLOOD GLUCOSE          RADIOLOGY & ADDITIONAL TESTS: Reviewed. OVERNIGHT EVENTS: NAEO admitted to 7Wo     SUBJECTIVE / INTERVAL HPI: Patient seen and examined at bedside. Pt mentions R-hand decreased sensation has improved, now only present on thenar/radial distribution. Motor weakness has also improved of RUE with improved . Otherwise, pt denies any HA, dizziness, changes in vision, dysarthria, new motor or sensory changes. Pt with productive cough mentions it has been present the last couple of days, denies any fever, chills, or SOB.     VITAL SIGNS:  Vital Signs Last 24 Hrs  T(C): 36.9 (10 Sep 2018 10:25), Max: 37 (09 Sep 2018 17:20)  T(F): 98.4 (10 Sep 2018 10:25), Max: 98.6 (09 Sep 2018 17:20)  HR: 97 (10 Sep 2018 12:28) (83 - 106)  BP: 145/89 (10 Sep 2018 12:28) (121/66 - 149/82)  BP(mean): --  RR: 20 (10 Sep 2018 10:25) (16 - 20)  SpO2: 97% (10 Sep 2018 12:28) (95% - 98%)    PHYSICAL EXAM:    General: young  male with VEEG in place in NAD   HEENT: NC/AT; PERRL, anicteric sclera; MMM  Neck: supple  Cardiovascular: +S1/S2, RRR  Respiratory: CTA B/L; scattered ronchi on right   Gastrointestinal: soft, NT/ND; +BSx4  Extremities: WWP; no edema; mild abrasions on LE b/l (mentions s/p fall)   Vascular: 2+ radial, DP/PT pulses B/L  Neurological: AAOx3; CN2-12 intact; pt with decreased sensation on radial distribution of R-hand; pt with motor R- hand  weakness along with dorsiflexion at wrist. The rest of motor exam 5/5.     MEDICATIONS:  MEDICATIONS  (STANDING):  buDESOnide  80 MICROgram(s)/formoterol 4.5 MICROgram(s) Inhaler 2 Puff(s) Inhalation two times a day  dolutegravir 50 milliGRAM(s) Oral daily  emtricitabine 200 mG/tenofovir alafenamide 25 mG (DESCOVY) Tablet 1 Tablet(s) Oral daily  potassium chloride    Tablet ER 40 milliEquivalent(s) Oral every 4 hours  sertraline 50 milliGRAM(s) Oral daily    MEDICATIONS  (PRN):  ALBUTerol    90 MICROgram(s) HFA Inhaler 2 Puff(s) Inhalation every 6 hours PRN Shortness of Breath and/or Wheezing  ALBUTerol/ipratropium for Nebulization 3 milliLiter(s) Nebulizer every 6 hours PRN Shortness of Breath and/or Wheezing      ALLERGIES:  Allergies    No Known Allergies    Intolerances        LABS:                        12.2   7.3   )-----------( 229      ( 10 Sep 2018 06:00 )             36.3     09-10    140  |  102  |  10  ----------------------------<  131<H>  2.9<LL>   |  24  |  0.73    Ca    8.7      10 Sep 2018 06:00  Mg     1.7     09-10    TPro  7.7  /  Alb  4.0  /  TBili  0.2  /  DBili  x   /  AST  20  /  ALT  15  /  AlkPhos  94  09-09    PT/INR - ( 09 Sep 2018 17:54 )   PT: 11.2 sec;   INR: 1.01          PTT - ( 09 Sep 2018 17:54 )  PTT:31.2 sec    CAPILLARY BLOOD GLUCOSE          RADIOLOGY & ADDITIONAL TESTS: Reviewed.

## 2018-09-10 NOTE — CONSULT NOTE ADULT - ATTENDING COMMENTS
Patient seen and examined by me. He apparently had a left basal ganglia ICH over the weekend that is no longer apparent on CT head. He has residual right arm weakness which may be related to this history versus new stroke. He feels that he is improving. No neurosurgical intervention warranted based on current imaging findings. May consider brain MRI to workup stroke/ICH. Will defer to neurology for further management.    Lenny Ibarra M.D.  Neurosurgery

## 2018-09-10 NOTE — PROGRESS NOTE ADULT - PROBLEM SELECTOR PLAN 8
1) PCP Contacted on Admission: (Y/N) --> Name & Phone #:  2) Date of Contact with PCP:  3) PCP Contacted at Discharge: (Y/N, N/A)  4) Summary of Handoff Given to PCP:   5) Post-Discharge Appointment Date and Location:
1) PCP Contacted on Admission: NO. Dr. Tammi Pacheco: PCP: [phone]  2) Date of Contact with PCP: N/A  3) PCP Contacted at Discharge: N/A  4) Summary of Handoff Given to PCP: N/A   5) Post-Discharge Appointment Date and Location: N/A

## 2018-09-10 NOTE — PROGRESS NOTE ADULT - PROBLEM SELECTOR PLAN 2
Pt w/ L basal ganglia ICH with resolution after 6 hrs w/ subsequent CTH at [facility]. CT-head here showing no acute ICH.  - MRI head- pending read

## 2018-09-10 NOTE — PHYSICAL THERAPY INITIAL EVALUATION ADULT - LIGHT TOUCH SENSATION, RUE, REHAB EVAL
decreased in right digits I,II, and III on both dorsal and palmar sides as well as some dosral forearm.

## 2018-09-10 NOTE — PHYSICAL THERAPY INITIAL EVALUATION ADULT - DIAGNOSIS, PT EVAL
5F: Impaired Peripheral Nerve Integrity and Muscle Performance Associated with Peripheral Nerve Injury

## 2018-09-11 ENCOUNTER — APPOINTMENT (OUTPATIENT)
Dept: INFECTIOUS DISEASE | Facility: CLINIC | Age: 48
End: 2018-09-11

## 2018-09-11 ENCOUNTER — TRANSCRIPTION ENCOUNTER (OUTPATIENT)
Age: 48
End: 2018-09-11

## 2018-09-11 VITALS
RESPIRATION RATE: 20 BRPM | SYSTOLIC BLOOD PRESSURE: 128 MMHG | DIASTOLIC BLOOD PRESSURE: 70 MMHG | HEART RATE: 94 BPM | TEMPERATURE: 99 F | OXYGEN SATURATION: 98 %

## 2018-09-11 LAB
4/8 RATIO: 0.65 RATIO — LOW (ref 0.9–3.6)
ABS CD8: 813 /UL — HIGH (ref 142–740)
ANION GAP SERPL CALC-SCNC: 16 MMOL/L — SIGNIFICANT CHANGE UP (ref 5–17)
BUN SERPL-MCNC: 7 MG/DL — SIGNIFICANT CHANGE UP (ref 7–23)
CALCIUM SERPL-MCNC: 9.1 MG/DL — SIGNIFICANT CHANGE UP (ref 8.4–10.5)
CD3 BLASTS SPEC-ACNC: 1352 /UL — SIGNIFICANT CHANGE UP (ref 672–1870)
CD3 BLASTS SPEC-ACNC: 83 % — SIGNIFICANT CHANGE UP (ref 59–83)
CD4 %: 32 % — SIGNIFICANT CHANGE UP (ref 30–62)
CD8 %: 50 % — HIGH (ref 12–36)
CHLORIDE SERPL-SCNC: 98 MMOL/L — SIGNIFICANT CHANGE UP (ref 96–108)
CO2 SERPL-SCNC: 22 MMOL/L — SIGNIFICANT CHANGE UP (ref 22–31)
CREAT SERPL-MCNC: 0.73 MG/DL — SIGNIFICANT CHANGE UP (ref 0.5–1.3)
GLUCOSE SERPL-MCNC: 169 MG/DL — HIGH (ref 70–99)
POTASSIUM SERPL-MCNC: 3.7 MMOL/L — SIGNIFICANT CHANGE UP (ref 3.5–5.3)
POTASSIUM SERPL-SCNC: 3.7 MMOL/L — SIGNIFICANT CHANGE UP (ref 3.5–5.3)
SODIUM SERPL-SCNC: 136 MMOL/L — SIGNIFICANT CHANGE UP (ref 135–145)
T-CELL CD4 SUBSET PNL BLD: 531 /UL — SIGNIFICANT CHANGE UP (ref 489–1457)

## 2018-09-11 PROCEDURE — 93306 TTE W/DOPPLER COMPLETE: CPT

## 2018-09-11 PROCEDURE — 86359 T CELLS TOTAL COUNT: CPT

## 2018-09-11 PROCEDURE — 95951: CPT | Mod: 26

## 2018-09-11 PROCEDURE — 83735 ASSAY OF MAGNESIUM: CPT

## 2018-09-11 PROCEDURE — 80053 COMPREHEN METABOLIC PANEL: CPT

## 2018-09-11 PROCEDURE — A9585: CPT

## 2018-09-11 PROCEDURE — 80048 BASIC METABOLIC PNL TOTAL CA: CPT

## 2018-09-11 PROCEDURE — 87536 HIV-1 QUANT&REVRSE TRNSCRPJ: CPT

## 2018-09-11 PROCEDURE — 86850 RBC ANTIBODY SCREEN: CPT

## 2018-09-11 PROCEDURE — 36415 COLL VENOUS BLD VENIPUNCTURE: CPT

## 2018-09-11 PROCEDURE — 80307 DRUG TEST PRSMV CHEM ANLYZR: CPT

## 2018-09-11 PROCEDURE — 97161 PT EVAL LOW COMPLEX 20 MIN: CPT

## 2018-09-11 PROCEDURE — 99238 HOSP IP/OBS DSCHRG MGMT 30/<: CPT

## 2018-09-11 PROCEDURE — 86901 BLOOD TYPING SEROLOGIC RH(D): CPT

## 2018-09-11 PROCEDURE — 70450 CT HEAD/BRAIN W/O DYE: CPT

## 2018-09-11 PROCEDURE — 71045 X-RAY EXAM CHEST 1 VIEW: CPT

## 2018-09-11 PROCEDURE — 86900 BLOOD TYPING SEROLOGIC ABO: CPT

## 2018-09-11 PROCEDURE — 70553 MRI BRAIN STEM W/O & W/DYE: CPT

## 2018-09-11 PROCEDURE — 95951: CPT

## 2018-09-11 PROCEDURE — 93005 ELECTROCARDIOGRAM TRACING: CPT

## 2018-09-11 PROCEDURE — 84484 ASSAY OF TROPONIN QUANT: CPT

## 2018-09-11 PROCEDURE — 72141 MRI NECK SPINE W/O DYE: CPT

## 2018-09-11 PROCEDURE — 72125 CT NECK SPINE W/O DYE: CPT

## 2018-09-11 PROCEDURE — 85025 COMPLETE CBC W/AUTO DIFF WBC: CPT

## 2018-09-11 PROCEDURE — 85730 THROMBOPLASTIN TIME PARTIAL: CPT

## 2018-09-11 PROCEDURE — 85610 PROTHROMBIN TIME: CPT

## 2018-09-11 PROCEDURE — 99285 EMERGENCY DEPT VISIT HI MDM: CPT | Mod: 25

## 2018-09-11 PROCEDURE — 94640 AIRWAY INHALATION TREATMENT: CPT

## 2018-09-11 RX ORDER — POTASSIUM CHLORIDE 20 MEQ
40 PACKET (EA) ORAL ONCE
Qty: 0 | Refills: 0 | Status: COMPLETED | OUTPATIENT
Start: 2018-09-11 | End: 2018-09-11

## 2018-09-11 RX ADMIN — SERTRALINE 50 MILLIGRAM(S): 25 TABLET, FILM COATED ORAL at 11:28

## 2018-09-11 RX ADMIN — DOLUTEGRAVIR SODIUM 50 MILLIGRAM(S): 25 TABLET, FILM COATED ORAL at 11:28

## 2018-09-11 RX ADMIN — BUDESONIDE AND FORMOTEROL FUMARATE DIHYDRATE 2 PUFF(S): 160; 4.5 AEROSOL RESPIRATORY (INHALATION) at 06:44

## 2018-09-11 RX ADMIN — Medication 40 MILLIEQUIVALENT(S): at 11:28

## 2018-09-11 RX ADMIN — EMTRICITABINE AND TENOFOVIR DISOPROXIL FUMARATE 1 TABLET(S): 200; 300 TABLET, FILM COATED ORAL at 11:28

## 2018-09-11 NOTE — DISCHARGE NOTE ADULT - CARE PLAN
Principal Discharge DX:	Intracerebral hemorrhage  Goal:	to prevent future episodes  Assessment and plan of treatment:	You presented after being seen at [facility] where you were found to have an intracerebral hemorrhage on CT-head at which point follow up CT 6 hours later showed resolution. CT angio neck/head were negative. At [facility], MRI was negative for any hemorrhage or infarct. This area hemorrhage is common in patients with hypertension. You blood pressure has been on the higher end 148/80, considered in the Hypertension range (greater than 130/80). Please follow up with your PCP to further evaluate your blood pressure and see if starting you on anti-hypertension medications is warranted.  Secondary Diagnosis:	RUE weakness  Goal:	to improve symptoms  Assessment and plan of treatment:	After the stroke, you had residual right hand weakness along with decreased sensation. It was thought at first to have a central(brain) cause, but MRI spine illustrated disc herniation with C7 nerve compression. This was not deemed a surgical intervention. To improve your symptoms, please follow up with OT for which you will receive a script.  Secondary Diagnosis:	Syncope and collapse  Goal:	to prevent episodes  Assessment and plan of treatment:	You had an episode of loss of consciousness without any clear evidence of cause. We evaluated your neck to check for any plaques that could have dislodged, but found no source. Echocardiogram of the heart was also normal with normal ejection fraction, not showing any valve dysfunctions or wall abnormalities. Please follow up with Cardiology appointment made to evaluate if any arrythmia of the heart could have been a possible source  Secondary Diagnosis:	HIV (human immunodeficiency virus infection) Principal Discharge DX:	Intracerebral hemorrhage  Goal:	to prevent future episodes  Assessment and plan of treatment:	You presented after being seen at [facility] where you were found to have an intracerebral hemorrhage on CT-head at which point follow up CT 6 hours later showed resolution. CT angio neck/head were negative. At [facility], MRI was negative for any hemorrhage or infarct. This area hemorrhage is common in patients with hypertension. You blood pressure has been on the higher end 148/80, considered in the Hypertension range (greater than 130/80). Please follow up with your PCP to further evaluate your blood pressure and see if starting you on anti-hypertension medications is warranted.  Secondary Diagnosis:	RUE weakness  Goal:	to improve symptoms  Assessment and plan of treatment:	After the stroke, you had residual right hand weakness along with decreased sensation. It was thought at first to have a central(brain) cause, but MRI spine illustrated disc herniation with C7 nerve compression. This was not deemed a surgical intervention. To improve your symptoms, please follow up with OT for which you will receive a script.  Secondary Diagnosis:	Syncope and collapse  Goal:	to prevent episodes  Assessment and plan of treatment:	You had an episode of loss of consciousness without any clear evidence of cause. We evaluated your neck to check for any plaques that could have dislodged, but found no source. Echocardiogram of the heart was also normal with normal ejection fraction, not showing any valve dysfunctions or wall abnormalities. Please follow up with Cardiology appointment made to evaluate if any arrythmia of the heart could have been a possible source of the syncope.   Appointment: Dr. Jones at [address] [place] [address] [phone] 9/18 at 12:30pm  Secondary Diagnosis:	HIV (human immunodeficiency virus infection)  Assessment and plan of treatment:	Please continue to take medications compliantly. Please follow up with Dr. Pacheco.

## 2018-09-11 NOTE — DISCHARGE NOTE ADULT - CONDITION (STATED IN TERMS THAT PERMIT A SPECIFIC MEASURABLE COMPARISON WITH CONDITION ON ADMISSION):
stable with improving Right hand decreased sensation along with motor weakness in radial distribution

## 2018-09-11 NOTE — DISCHARGE NOTE ADULT - PLAN OF CARE
to prevent future episodes You presented after being seen at [facility] where you were found to have an intracerebral hemorrhage on CT-head at which point follow up CT 6 hours later showed resolution. CT angio neck/head were negative. At [facility], MRI was negative for any hemorrhage or infarct. This area hemorrhage is common in patients with hypertension. You blood pressure has been on the higher end 148/80, considered in the Hypertension range (greater than 130/80). Please follow up with your PCP to further evaluate your blood pressure and see if starting you on anti-hypertension medications is warranted. to improve symptoms After the stroke, you had residual right hand weakness along with decreased sensation. It was thought at first to have a central(brain) cause, but MRI spine illustrated disc herniation with C7 nerve compression. This was not deemed a surgical intervention. To improve your symptoms, please follow up with OT for which you will receive a script. to prevent episodes You had an episode of loss of consciousness without any clear evidence of cause. We evaluated your neck to check for any plaques that could have dislodged, but found no source. Echocardiogram of the heart was also normal with normal ejection fraction, not showing any valve dysfunctions or wall abnormalities. Please follow up with Cardiology appointment made to evaluate if any arrythmia of the heart could have been a possible source You had an episode of loss of consciousness without any clear evidence of cause. We evaluated your neck to check for any plaques that could have dislodged, but found no source. Echocardiogram of the heart was also normal with normal ejection fraction, not showing any valve dysfunctions or wall abnormalities. Please follow up with Cardiology appointment made to evaluate if any arrythmia of the heart could have been a possible source of the syncope.   Appointment: Dr. Jones at [address] [place] [address] [phone] 9/18 at 12:30pm Please continue to take medications compliantly. Please follow up with Dr. Pacheco.

## 2018-09-11 NOTE — OCCUPATIONAL THERAPY INITIAL EVALUATION ADULT - PERTINENT HX OF CURRENT PROBLEM, REHAB EVAL
Came to in [facility] ED; last thing he remembers is sitting on a park bench near [facility]. No recollection of events; no clue as to what could have happened. Has erosions over limbs on both sides and small bump on L forehead and occiput. In ED noted some trouble with R hand, with poor  and hand movements at the wrist.

## 2018-09-11 NOTE — OCCUPATIONAL THERAPY INITIAL EVALUATION ADULT - LIGHT TOUCH SENSATION, RUE, REHAB EVAL
reports thumb numbness from wrist to fingertip, palmar/dorsal aspect; also reports tingling sensation lateral aspect dorsum of hand/moderate impairment

## 2018-09-11 NOTE — DISCHARGE NOTE ADULT - MEDICATION SUMMARY - MEDICATIONS TO TAKE
I will START or STAY ON the medications listed below when I get home from the hospital:    sertraline 50 mg oral tablet  -- 1 tab(s) by mouth once a day  -- Indication: For Anxiety    Descovy 200 mg-25 mg oral tablet  -- 1 tab(s) by mouth once a day  -- Indication: For HIV (human immunodeficiency virus infection)    Tivicay 50 mg oral tablet  -- 1 tab(s) by mouth once a day  -- Indication: For HIV (human immunodeficiency virus infection)    Ambien 10 mg oral tablet  -- 1 tab(s) by mouth once a day (at bedtime)  -- Indication: For Anxiety    Symbicort 80 mcg-4.5 mcg/inh inhalation aerosol  -- 2 puff(s) inhaled 2 times a day  -- Indication: For Asthma    ProAir HFA 90 mcg/inh inhalation aerosol  -- 2 puff(s) inhaled 4 times a day, As Needed  -- Indication: For Asthma

## 2018-09-11 NOTE — DISCHARGE NOTE ADULT - ADDITIONAL INSTRUCTIONS
Cardiology appointment: Dr. Jones at [address] [place] [address]  9/18 at 12:30pm   PCP appointment to evaluate hypertension

## 2018-09-11 NOTE — OCCUPATIONAL THERAPY INITIAL EVALUATION ADULT - MANUAL MUSCLE TESTING RESULTS, REHAB EVAL
Smile symmetrical, tongue protrusion in midline, cheeks puff and eyebrows elevation grossly intact; left upper extremity 5/5 throughout, right upper extremity shoulder/elbow flex/ext 5/5, wrist flexion 4+/5, wrist extension 3-/5, hand  4-/5, thumb opposition 4+/5, thumb abduction 4-/5

## 2018-09-11 NOTE — DISCHARGE NOTE ADULT - MEDICATION SUMMARY - MEDICATIONS TO STOP TAKING
I will STOP taking the medications listed below when I get home from the hospital:    Clobex 0.05% topical spray  -- Apply on skin to affected area once a day

## 2018-09-11 NOTE — EEG REPORT - NS EEG TEXT BOX
[facility] Department of Neurology  Inpatient Continuous Video Electroencephalography Report    Patient Name:	DEJA CUNNINGHAM    :	1970  MRN:	[id number]    Study Start Date/Time:  9/10/2018, 10:38:11 AM (interruptions as described below)  Study End Date/Time:	    Referred by: Dr. Yudy Nunez    Brief Clinical History:  DEJA CUNNINGHAM is a 47 yo M with pmhx of HIV, anxiety, asthma presenting with right hand numbness, weakness and decrease ROM, recent CT with small left basal ganglia ICH, CT on admission with no acute intracranial findings.      Diagnosis Code:   R56.9 convulsions/seizure  CPT: 70974 vEEG 12-24 hours    Acquisition Details:  Electroencephalography was acquired using a minimum of 21 channels on an [employer] system v 8.5.1 with electrode placement according to the standard International 10-20 system following ACNS (American Clinical Neurophysiology Society) guidelines for Long-Term Video EEG monitoring.  Anterior temporal T1 and T2 electrodes were utilized whenever possible. The [place] [employer] automated spike & seizure detections were all reviewed in detail, in addition to extensive portions of raw EEG.    Day 1: 9/10/2018 @ 10:38:11 AM to next morning @ 0700 (disconnected 4:25PM to 7:44:41 PM)  Pertinent medications: none  Background   Symmetry: Symmetric.  Frequencies: Predominantly alpha.  Anterior-posterior (AP) gradient: Present.  Posterior Dominant Rhythm: 9 Hz symmetric, well-organized, and well-modulated.  Voltage:  Normal (most activity >20 uV).  Continuity: continuous,   Variability: Yes. 						  Reactivity: Yes.  Breach: No.  N2 sleep: Symmetric spindles and K complexes.  Epileptiform discharges:  No epileptiform discharges.  Abnormal periodic/rhythmic activity: No .  Events:  No electrographic seizures or paroxysmal clinical events.  Provocations: Hyperventilation and photic were not performed  Other findings: None.  ECG: Normal sinus rhythm    Daily summary and impression:  The EEG remained normal during this day of monitoring. There were no electrographic seizures, epileptiform discharges, or paroxysmal clinical events.      Read by:  Kalee Lynch MD

## 2018-09-11 NOTE — OCCUPATIONAL THERAPY INITIAL EVALUATION ADULT - COORDINATION ASSESSED, REHAB EVAL
finger to nose/grossly intact left upper extremity, decreased speed right upper extremity from distal weakness no dysmetria noted grossly intact left upper extremity, decreased speed right upper extremity from distal weakness no dysmetria noted/finger to nose

## 2018-09-11 NOTE — DISCHARGE NOTE ADULT - CARE PROVIDERS DIRECT ADDRESSES
[email or web address]

## 2018-09-11 NOTE — DISCHARGE NOTE ADULT - HOSPITAL COURSE
48yoM with PMH of HIV ( on Descovoy/Tricay), HTN(no meds), and Asthma(Proair/Symbicort) presenting after being found to have L-basal ganglia ICH on CTH with resolution on CT-head 6h afterward at [facility] (along with negative CT-angio-neck/head), at which point pt left AMA and came to [facility] ED, admitted for workup of cause of LOC with Syncope workup and VEEG monitoring. Pt's VSS on admission( /96 hypertensive but hours after being seen at previous hospital, therefore unknown what BP was there or at time of episode). Pt was placed on VEEG monitoring which was negative through hospital course. CT head negative for hemorrhage, MRI head negative for hemorrhage or infarct. Pt had progressive improvement in Right hand decreased sensation (in the radial distribution) along with improved motor weakness in hand  and finger spread maneuvers. MRI spine showed C7 compression by cervical disc impingement, therefore OT evaluated patient and recommended he followed up outpatient. Pt was discharged with VSS with plan to follow up with PCP for hypertension/possible need for antihypertensive medications, along with cardiology appointment with  on 9/18 12:30pm. 48yoM with PMH of HIV ( on Descovoy/Tricay), HTN(no meds), and Asthma(Proair/Symbicort) presenting after being found to have L-basal ganglia ICH on CTH with resolution on CT-head 6h afterward at [facility] (along with negative CT-angio-neck/head), at which point pt left AMA and came to [facility] ED, admitted for workup of cause of LOC with Syncope workup and VEEG monitoring. Pt's VSS on admission( /96 hypertensive but hours after being seen at previous hospital, therefore unknown what BP was there or at time of episode). Pt was placed on VEEG monitoring which was negative through hospital course. CT head negative for hemorrhage, MRI head negative for hemorrhage or infarct. Pt had some improvement in Right hand decreased sensation (in the radial distribution) along with improved motor weakness in hand  and finger spread maneuvers. Although exam is suggestive of a radial nerve palsy above the spiral groove/at the axilla, etiology of finger spread weakness is not clear.  MRI spine showed C7 compression by cervical disc impingement.  Although ADM and dorsal interossei are C8-T1, in the absence of other findings may be due to a C7 radiculopathy. OT evaluated patient and recommended he followed up outpatient. Pt was discharged with VSS with plan to follow up with PCP for hypertension/possible need for antihypertensive medications, along with cardiology appointment with  on 9/18 12:30pm. Also advised to follow up with Neurology in 1 month. 48yoM with PMH of HIV ( on Descovoy/Tricay), HTN(no meds), and Asthma(Proair/Symbicort) presenting after initial imaging suggested L-basal ganglia ICH on CTH, although this was not seen on subsequent imaging. Also had negative CT-angio-neck/head), at which point pt left AMA and came to [facility] ED, admitted for workup of cause of LOC with Syncope workup and VEEG monitoring. Pt's VSS on admission( /96 hypertensive but hours after being seen at previous hospital, therefore unknown what BP was there or at time of episode). Pt was placed on VEEG monitoring which was negative through hospital course. CT head negative for hemorrhage, MRI head negative for hemorrhage or infarct. Pt had some improvement in Right hand decreased sensation (in the radial distribution) along with improved motor weakness in hand  and finger spread maneuvers. Although exam is suggestive of a radial nerve palsy above the spiral groove/at the axilla, etiology of finger spread weakness is not clear.  MRI spine showed C7 compression by cervical disc impingement.  Although ADM and dorsal interossei are C8-T1, in the absence of other findings may be due to a C7 radiculopathy. OT evaluated patient and recommended he followed up outpatient. Pt was discharged with VSS with plan to follow up with PCP for hypertension/possible need for antihypertensive medications, along with cardiology appointment with  on 9/18 12:30pm. Also advised to follow up with Neurology in 1 month.    Exam on discharge:  On exam, pt is AAOx3, speech fluent and nondysarthric, follows commands  CN- PERRL, EOMI, face symmetrcial, tongue midline  M- R/L (5,5)  Deltoid (5,5), biceps (5,5), triceps (5-,5), BR (4+,5), protonation (5,5), supination (4+/5), wrist ext (3,5), wrist flex (5,5), finger ext (3+,5), finger flexors (5,5), fingers spread (4+/5), APB (4+,5 however previously had tendon repair at palmar side of R thumb), FPB and FPL (5,5)  Hip flexion (5,5), knee flex (5,5), knee ext (5,5), dorsiflex (5,5), plantarflex (5,5)  S- patchy sensory loss to PP but most notable around dorsal surface of the thumb and dorsal/lateral forearm  R- 1+ biceps, triceps, BR bilat, 1+ patellars and AJ's , toes down  C-FTN bilat

## 2018-09-11 NOTE — DISCHARGE NOTE ADULT - CARE PROVIDER_API CALL
Tammi Pacheco (MD), Internal Medicine  [address] [address]  Phone: [phone]  Fax: [phone]

## 2018-09-11 NOTE — OCCUPATIONAL THERAPY INITIAL EVALUATION ADULT - RANGE OF MOTION EXAMINATION, UPPER EXTREMITY
Right UE Passive ROM was WNL (within normal limits)/Left UE Passive ROM was WNL (within normal limits)/right shoulder/elbow flex/ext AROM WFL, decreased wrist extension (50% antigravity), digits flex/ext and thumb opposition AROM WFL/Left UE Active ROM was WNL (within normal limits)

## 2018-09-11 NOTE — OCCUPATIONAL THERAPY INITIAL EVALUATION ADULT - GENERAL OBSERVATIONS, REHAB EVAL
Right hand dominant. Patient cleared for Occupational Therapy by MATTHEW Bergeron, patient received supine in non-acute distress. +IV heplock, +EEG. Patient denies pain, no complaint.

## 2018-09-12 LAB
HIV-1 VIRAL LOAD RESULT: ABNORMAL
HIV1 RNA # SERPL NAA+PROBE: SIGNIFICANT CHANGE UP
HIV1 RNA SER-IMP: SIGNIFICANT CHANGE UP
HIV1 RNA SERPL NAA+PROBE-ACNC: ABNORMAL
HIV1 RNA SERPL NAA+PROBE-LOG#: 3.26 — SIGNIFICANT CHANGE UP

## 2018-09-14 DIAGNOSIS — I10 ESSENTIAL (PRIMARY) HYPERTENSION: ICD-10-CM

## 2018-09-14 DIAGNOSIS — J45.909 UNSPECIFIED ASTHMA, UNCOMPLICATED: ICD-10-CM

## 2018-09-14 DIAGNOSIS — M50.223 OTHER CERVICAL DISC DISPLACEMENT AT C6-C7 LEVEL: ICD-10-CM

## 2018-09-14 DIAGNOSIS — J44.9 CHRONIC OBSTRUCTIVE PULMONARY DISEASE, UNSPECIFIED: ICD-10-CM

## 2018-09-14 DIAGNOSIS — G58.8 OTHER SPECIFIED MONONEUROPATHIES: ICD-10-CM

## 2018-09-14 DIAGNOSIS — R29.898 OTHER SYMPTOMS AND SIGNS INVOLVING THE MUSCULOSKELETAL SYSTEM: ICD-10-CM

## 2018-09-14 DIAGNOSIS — G56.31 LESION OF RADIAL NERVE, RIGHT UPPER LIMB: ICD-10-CM

## 2018-09-14 DIAGNOSIS — S00.03XA CONTUSION OF SCALP, INITIAL ENCOUNTER: ICD-10-CM

## 2018-09-14 DIAGNOSIS — Z21 ASYMPTOMATIC HUMAN IMMUNODEFICIENCY VIRUS [HIV] INFECTION STATUS: ICD-10-CM

## 2018-09-14 DIAGNOSIS — F17.200 NICOTINE DEPENDENCE, UNSPECIFIED, UNCOMPLICATED: ICD-10-CM
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
## 2018-09-14 DIAGNOSIS — F41.9 ANXIETY DISORDER, UNSPECIFIED: ICD-10-CM

## 2018-09-14 DIAGNOSIS — R55 SYNCOPE AND COLLAPSE: ICD-10-CM

## 2018-09-14 DIAGNOSIS — E87.6 HYPOKALEMIA: ICD-10-CM
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
## 2018-09-18 ENCOUNTER — APPOINTMENT (OUTPATIENT)
Dept: HEART AND VASCULAR | Facility: CLINIC | Age: 48
End: 2018-09-18

## 2018-09-20 ENCOUNTER — OUTPATIENT (OUTPATIENT)
Dept: OUTPATIENT SERVICES | Facility: HOSPITAL | Age: 48
LOS: 1 days | End: 2018-09-20

## 2018-09-20 ENCOUNTER — APPOINTMENT (OUTPATIENT)
Dept: INFECTIOUS DISEASE | Facility: CLINIC | Age: 48
End: 2018-09-20

## 2018-09-20 DIAGNOSIS — G62.9 POLYNEUROPATHY, UNSPECIFIED: ICD-10-CM

## 2018-09-20 RX ORDER — SERTRALINE HYDROCHLORIDE 50 MG/1
50 TABLET, FILM COATED ORAL DAILY
Qty: 30 | Refills: 0 | Status: DISCONTINUED | COMMUNITY
Start: 2018-05-01 | End: 2018-09-20

## 2018-09-28 DIAGNOSIS — B20 HUMAN IMMUNODEFICIENCY VIRUS [HIV] DISEASE: ICD-10-CM

## 2018-09-28 DIAGNOSIS — G62.9 POLYNEUROPATHY, UNSPECIFIED: ICD-10-CM

## 2018-09-28 DIAGNOSIS — F41.8 OTHER SPECIFIED ANXIETY DISORDERS: ICD-10-CM

## 2018-10-15 ENCOUNTER — TRANSCRIPTION ENCOUNTER (OUTPATIENT)
Age: 48
End: 2018-10-15

## 2018-10-18 ENCOUNTER — APPOINTMENT (OUTPATIENT)
Dept: INFECTIOUS DISEASE | Facility: CLINIC | Age: 48
End: 2018-10-18

## 2018-10-23 ENCOUNTER — APPOINTMENT (OUTPATIENT)
Dept: INFECTIOUS DISEASE | Facility: CLINIC | Age: 48
End: 2018-10-23
Payer: MEDICAID

## 2018-10-23 ENCOUNTER — OUTPATIENT (OUTPATIENT)
Dept: OUTPATIENT SERVICES | Facility: HOSPITAL | Age: 48
LOS: 1 days | End: 2018-10-23

## 2018-10-23 VITALS
BODY MASS INDEX: 30.09 KG/M2 | RESPIRATION RATE: 14 BRPM | HEART RATE: 108 BPM | HEIGHT: 73 IN | DIASTOLIC BLOOD PRESSURE: 80 MMHG | WEIGHT: 227 LBS | OXYGEN SATURATION: 98 % | SYSTOLIC BLOOD PRESSURE: 124 MMHG | TEMPERATURE: 97.8 F

## 2018-10-23 DIAGNOSIS — E09.3399 DRUG OR CHEMICAL INDUCED DIABETES MELLITUS WITH MODERATE NONPROLIFERATIVE DIABETIC RETINOPATHY WITHOUT MACULAR EDEMA, UNSPECIFIED EYE: ICD-10-CM

## 2018-10-23 DIAGNOSIS — G89.29 PAIN IN RIGHT FOOT: ICD-10-CM

## 2018-10-23 DIAGNOSIS — G89.29 PAIN IN LEFT FOOT: ICD-10-CM

## 2018-10-23 DIAGNOSIS — E78.5 HYPERLIPIDEMIA, UNSPECIFIED: ICD-10-CM

## 2018-10-23 DIAGNOSIS — M79.671 PAIN IN RIGHT FOOT: ICD-10-CM

## 2018-10-23 DIAGNOSIS — Z78.9 OTHER SPECIFIED HEALTH STATUS: ICD-10-CM

## 2018-10-23 DIAGNOSIS — F17.200 NICOTINE DEPENDENCE, UNSPECIFIED, UNCOMPLICATED: ICD-10-CM

## 2018-10-23 DIAGNOSIS — M79.672 PAIN IN LEFT FOOT: ICD-10-CM

## 2018-10-23 LAB
BASOPHILS NFR BLD AUTO: 0.6 % — SIGNIFICANT CHANGE UP (ref 0–2)
EOSINOPHIL NFR BLD AUTO: 3.2 % — SIGNIFICANT CHANGE UP (ref 0–6)
FERRITIN SERPL-MCNC: 244 NG/ML — SIGNIFICANT CHANGE UP (ref 30–400)
HCT VFR BLD CALC: 41 % — SIGNIFICANT CHANGE UP (ref 39–50)
HGB BLD-MCNC: 13.5 G/DL — SIGNIFICANT CHANGE UP (ref 13–17)
IRON SATN MFR SERPL: 20 % — SIGNIFICANT CHANGE UP (ref 16–55)
IRON SATN MFR SERPL: 76 UG/DL — SIGNIFICANT CHANGE UP (ref 45–165)
LYMPHOCYTES # BLD AUTO: 40.5 % — SIGNIFICANT CHANGE UP (ref 13–44)
MCHC RBC-ENTMCNC: 28 PG — SIGNIFICANT CHANGE UP (ref 27–34)
MCHC RBC-ENTMCNC: 32.9 G/DL — SIGNIFICANT CHANGE UP (ref 32–36)
MCV RBC AUTO: 85.1 FL — SIGNIFICANT CHANGE UP (ref 80–100)
MONOCYTES NFR BLD AUTO: 9.6 % — SIGNIFICANT CHANGE UP (ref 2–14)
NEUTROPHILS NFR BLD AUTO: 46.1 % — SIGNIFICANT CHANGE UP (ref 43–77)
PLATELET # BLD AUTO: 251 K/UL — SIGNIFICANT CHANGE UP (ref 150–400)
RBC # BLD: 4.82 M/UL — SIGNIFICANT CHANGE UP (ref 4.2–5.8)
RBC # BLD: 4.82 M/UL — SIGNIFICANT CHANGE UP (ref 4.2–5.8)
RBC # FLD: 15.3 % — SIGNIFICANT CHANGE UP (ref 10.3–16.9)
RETICS/RBC NFR: 1.6 % — SIGNIFICANT CHANGE UP (ref 0.5–2.5)
TIBC SERPL-MCNC: 382 UG/DL — SIGNIFICANT CHANGE UP (ref 220–430)
TRANSFERRIN SERPL-MCNC: 304 MG/DL — SIGNIFICANT CHANGE UP (ref 200–360)
UIBC SERPL-MCNC: 306 UG/DL — SIGNIFICANT CHANGE UP (ref 110–370)
WBC # BLD: 8.4 K/UL — SIGNIFICANT CHANGE UP (ref 3.8–10.5)
WBC # FLD AUTO: 8.4 K/UL — SIGNIFICANT CHANGE UP (ref 3.8–10.5)

## 2018-10-23 PROCEDURE — 99215 OFFICE O/P EST HI 40 MIN: CPT | Mod: GC

## 2018-10-24 LAB
4/8 RATIO: 0.56 RATIO — LOW (ref 0.9–3.6)
ABS CD8: 1814 /UL — HIGH (ref 142–740)
CD3 BLASTS SPEC-ACNC: 2854 /UL — HIGH (ref 672–1870)
CD3 BLASTS SPEC-ACNC: 80 % — SIGNIFICANT CHANGE UP (ref 59–83)
CD4 %: 29 % — LOW (ref 30–62)
CD8 %: 51 % — HIGH (ref 12–36)
T-CELL CD4 SUBSET PNL BLD: 1023 /UL — SIGNIFICANT CHANGE UP (ref 489–1457)

## 2018-10-25 LAB
HIV-1 VIRAL LOAD RESULT: ABNORMAL
HIV1 RNA # SERPL NAA+PROBE: SIGNIFICANT CHANGE UP
HIV1 RNA SER-IMP: SIGNIFICANT CHANGE UP
HIV1 RNA SERPL NAA+PROBE-ACNC: ABNORMAL
HIV1 RNA SERPL NAA+PROBE-LOG#: ABNORMAL LG COP/ML

## 2018-10-25 NOTE — ED ADULT NURSE NOTE - CCCP TRG CHIEF CMPLNT
shortness of breath
Normal vision: sees adequately in most situations; can see medication labels, newsprint

## 2018-10-30 DIAGNOSIS — G89.29 OTHER CHRONIC PAIN: ICD-10-CM

## 2018-10-30 DIAGNOSIS — M79.672 PAIN IN LEFT FOOT: ICD-10-CM

## 2018-10-30 DIAGNOSIS — B20 HUMAN IMMUNODEFICIENCY VIRUS [HIV] DISEASE: ICD-10-CM

## 2018-10-30 DIAGNOSIS — M79.671 PAIN IN RIGHT FOOT: ICD-10-CM

## 2018-10-30 DIAGNOSIS — F41.8 OTHER SPECIFIED ANXIETY DISORDERS: ICD-10-CM

## 2018-10-30 DIAGNOSIS — F17.200 NICOTINE DEPENDENCE, UNSPECIFIED, UNCOMPLICATED: ICD-10-CM

## 2018-10-30 DIAGNOSIS — L40.9 PSORIASIS, UNSPECIFIED: ICD-10-CM

## 2018-10-30 DIAGNOSIS — E78.5 HYPERLIPIDEMIA, UNSPECIFIED: ICD-10-CM

## 2018-11-13 ENCOUNTER — RX RENEWAL (OUTPATIENT)
Age: 48
End: 2018-11-13

## 2018-11-15 ENCOUNTER — RX RENEWAL (OUTPATIENT)
Age: 48
End: 2018-11-15

## 2018-11-19 ENCOUNTER — MEDICATION RENEWAL (OUTPATIENT)
Age: 48
End: 2018-11-19

## 2018-11-20 ENCOUNTER — RX RENEWAL (OUTPATIENT)
Age: 48
End: 2018-11-20

## 2019-02-13 ENCOUNTER — MEDICATION RENEWAL (OUTPATIENT)
Age: 49
End: 2019-02-13

## 2019-02-15 ENCOUNTER — TRANSCRIPTION ENCOUNTER (OUTPATIENT)
Age: 49
End: 2019-02-15

## 2019-02-19 ENCOUNTER — TRANSCRIPTION ENCOUNTER (OUTPATIENT)
Age: 49
End: 2019-02-19

## 2019-04-11 ENCOUNTER — MEDICATION RENEWAL (OUTPATIENT)
Age: 49
End: 2019-04-11

## 2019-04-12 ENCOUNTER — EMERGENCY (EMERGENCY)
Facility: HOSPITAL | Age: 49
LOS: 1 days | Discharge: HOME | End: 2019-04-12
Attending: EMERGENCY MEDICINE | Admitting: EMERGENCY MEDICINE
Payer: MEDICAID

## 2019-04-12 VITALS
OXYGEN SATURATION: 98 % | RESPIRATION RATE: 18 BRPM | DIASTOLIC BLOOD PRESSURE: 76 MMHG | HEART RATE: 86 BPM | SYSTOLIC BLOOD PRESSURE: 132 MMHG | TEMPERATURE: 97 F

## 2019-04-12 VITALS
TEMPERATURE: 97 F | RESPIRATION RATE: 19 BRPM | SYSTOLIC BLOOD PRESSURE: 145 MMHG | WEIGHT: 214.95 LBS | OXYGEN SATURATION: 98 % | DIASTOLIC BLOOD PRESSURE: 85 MMHG | HEART RATE: 90 BPM | HEIGHT: 73 IN

## 2019-04-12 DIAGNOSIS — S52.201A UNSPECIFIED FRACTURE OF SHAFT OF RIGHT ULNA, INITIAL ENCOUNTER FOR CLOSED FRACTURE: ICD-10-CM

## 2019-04-12 DIAGNOSIS — J44.9 CHRONIC OBSTRUCTIVE PULMONARY DISEASE, UNSPECIFIED: ICD-10-CM
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
## 2019-04-12 DIAGNOSIS — I10 ESSENTIAL (PRIMARY) HYPERTENSION: ICD-10-CM

## 2019-04-12 DIAGNOSIS — Z79.899 OTHER LONG TERM (CURRENT) DRUG THERAPY: ICD-10-CM
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
## 2019-04-12 DIAGNOSIS — T74.11XA ADULT PHYSICAL ABUSE, CONFIRMED, INITIAL ENCOUNTER: ICD-10-CM

## 2019-04-12 DIAGNOSIS — S69.91XA UNSPECIFIED INJURY OF RIGHT WRIST, HAND AND FINGER(S), INITIAL ENCOUNTER: ICD-10-CM

## 2019-04-12 PROCEDURE — 73090 X-RAY EXAM OF FOREARM: CPT | Mod: 26,RT

## 2019-04-12 PROCEDURE — 73100 X-RAY EXAM OF WRIST: CPT | Mod: 26,RT

## 2019-04-12 PROCEDURE — 99284 EMERGENCY DEPT VISIT MOD MDM: CPT

## 2019-04-12 PROCEDURE — 73080 X-RAY EXAM OF ELBOW: CPT | Mod: 26,RT

## 2019-04-12 RX ORDER — ACETAMINOPHEN 500 MG
975 TABLET ORAL ONCE
Qty: 0 | Refills: 0 | Status: COMPLETED | OUTPATIENT
Start: 2019-04-12 | End: 2019-04-12

## 2019-04-12 RX ADMIN — Medication 975 MILLIGRAM(S): at 23:05

## 2019-04-12 RX ADMIN — Medication 975 MILLIGRAM(S): at 21:27
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
## 2019-04-12 NOTE — ED ADULT NURSE NOTE - NSIMPLEMENTINTERV_GEN_ALL_ED
Implemented All Universal Safety Interventions:  [place] to call system. Call bell, personal items and telephone within reach. Instruct patient to call for assistance. Room bathroom lighting operational. Non-slip footwear when patient is off stretcher. Physically safe environment: no spills, clutter or unnecessary equipment. Stretcher in lowest position, wheels locked, appropriate side rails in place.

## 2019-04-13 PROCEDURE — 73100 X-RAY EXAM OF WRIST: CPT | Mod: 26,RT

## 2019-04-13 PROCEDURE — 73090 X-RAY EXAM OF FOREARM: CPT | Mod: 26,RT

## 2019-04-13 PROCEDURE — 73080 X-RAY EXAM OF ELBOW: CPT

## 2019-04-13 PROCEDURE — 73100 X-RAY EXAM OF WRIST: CPT

## 2019-04-13 PROCEDURE — 25535 CLTX ULNAR SHFT FX W/MNPJ: CPT | Mod: RT

## 2019-04-13 PROCEDURE — 99285 EMERGENCY DEPT VISIT HI MDM: CPT | Mod: 25

## 2019-04-13 PROCEDURE — 73090 X-RAY EXAM OF FOREARM: CPT

## 2019-04-13 RX ORDER — ERYTHROMYCIN BASE 5 MG/GRAM
1 OINTMENT (GRAM) OPHTHALMIC (EYE)
Qty: 1 | Refills: 0 | OUTPATIENT
Start: 2019-04-13 | End: 2019-04-19

## 2019-04-13 RX ORDER — ERYTHROMYCIN BASE 5 MG/GRAM
1 OINTMENT (GRAM) OPHTHALMIC (EYE)
Qty: 3.5 | Refills: 0
Start: 2019-04-13 | End: 2019-04-19
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
## 2019-04-13 NOTE — ED PROVIDER NOTE - NSFOLLOWUPINSTRUCTIONS_ED_ALL_ED_FT
Ulnar Fracture  ImageAn ulnar fracture is a break in the ulna bone, which is the forearm bone that is located on the same side as your little finger. Your forearm is the part of your arm that is between your elbow and your wrist. It is made up of two bones: the radius and ulna. The ulna forms the point of your elbow at its upper end. The lower end can be felt on the outside of your wrist.    An ulnar fracture can happen near the wrist or elbow or in the middle of your forearm. Middle forearm fractures usually break both the radius and the ulna.    What are the causes?  A heavy, direct blow to the forearm is the most common cause of an ulnar fracture. It takes a lot of force to break a bone in your forearm. This type of injury may be caused by:    An accident, such as a car or bike accident.  Falling with your arm outstretched.    What increases the risk?  You may be at greater risk for an ulnar fracture if you:    Play contact sports.  Have a condition that causes your bones to be weak or thin (osteoporosis).    What are the signs or symptoms?  An ulnar fracture causes pain immediately after the injury. You may need to support your forearm with your other hand. Other signs and symptoms include:    An abnormal bend or bump in your arm (deformity).  Swelling.  Bruising.  Numbness or weakness in your hand.  Inability to turn your hand from side to side (rotate).    How is this diagnosed?  Your health care provider may diagnose an ulnar fracture based on:    Your symptoms.  Your medical history, including any recent injury.  A physical exam. Your health care provider will look for any deformity and feel for tenderness over the break. Your health care provider will also check whether the bone is out of place.  An X-ray exam to confirm the diagnosis and learn more about the type of fracture.    How is this treated?  The goals of treatment are to get the bone in proper position for healing and to keep it from moving so it will heal over time. Your treatment will depend on many factors, especially the type of fracture that you have.    If the fractured bone:    Is in the correct position (nondisplaced), you may only need to wear a cast or a splint.  Has a slightly displaced fracture, you may need to have the bones moved back into place manually (closed reduction) before the splint or cast is put on.    You may have a temporary splint before you have a plaster cast. The splint allows room for some swelling. After a few days, a cast can replace the splint.    You may have to wear the cast for about 6 weeks or as directed by your health care provider.  The cast may be changed after about 3 weeks or as directed by your health care provider.    After your cast is taken off, you may need physical therapy to regain full movement in your wrist or elbow.  You may need emergency surgery if you have:    A fractured bone that is out of position (displaced).  A fracture with multiple fragments (comminuted fracture).  A fracture that breaks the skin (open fracture). This type of fracture may require surgical wires, plates, or screws to hold the bone in place.    You may have X-rays every couple of weeks to check on your healing.    Follow these instructions at home:  If you have a cast:     Do not stick anything inside the cast to scratch your skin. Doing that increases your risk of infection.  Do not put pressure on any part of the cast until it is fully hardened. This may take several hours.  Check the skin around the cast every day. Report any concerns to your health care provider. You may put lotion on dry skin around the edges of the cast. Do not apply lotion to the skin underneath the cast.  Do not let your cast get wet if it is not waterproof.  Keep the cast clean.  If you have a splint:     Wear it as told by your health care provider. Remove it only as told by your health care provider.  Do not put pressure on any part of the splint until it is fully hardened. This may take several hours.  Loosen the splint if your fingers become numb and tingle, or if they turn cold and blue.  Do not let your splint get wet if it is not waterproof.  Keep the splint clean.  Bathing     Do not take baths, swim, or use a hot tub until your health care provider approves. Ask your health care provider if you can take showers. You may only be allowed to take sponge baths for bathing.  If your splint is not waterproof, protect it with a watertight covering when you take a bath or a shower.  Managing pain, stiffness, and swelling     If directed, apply ice to the injured area:    Put ice in a plastic bag.  Place a towel between your skin and the bag.  Leave the ice on for 20 minutes, 2–3 times a day.    Move your fingers often to avoid stiffness and to lessen swelling.  Raise the injured area above the level of your heart while you are sitting or lying down.  Driving     Do not drive or operate heavy machinery while taking pain medicine.  Do not drive while wearing a cast or splint on a hand that you use for driving.  Activity     Return to your normal activities as directed by your health care provider. Ask your health care provider what activities are safe for you.  Do exercises as told by your health care provider.  General instructions     Do not use your injured limb to support your body weight until your health care provider says that you can.  Do not use any tobacco products, including cigarettes, chewing tobacco, or electronic cigarettes. Tobacco can delay bone healing. If you need help quitting, ask your health care provider.  Take over-the-counter and prescription medicines only as told by your health care provider.  Keep all follow-up visits as told by your health care provider. This is important.  Contact a health care provider if:  Your pain medicine is not helping.  Your cast gets damaged or it breaks.  Your cast becomes loose.  Your cast gets wet.  You have more severe pain or swelling than you did before the cast.  You have severe pain when stretching your fingers.  You continue to have pain or stiffness in your elbow or your wrist after your cast is taken off.  Get help right away if:  You cannot move your fingers.  You lose feeling in your fingers or your hand.  Your hand or your fingers turn cold and pale or blue.  You notice a bad smell coming from your cast.  You have drainage from underneath your cast.  You have new stains from blood or drainage seeping through your cast.  This information is not intended to replace advice given to you by your health care provider. Make sure you discuss any questions you have with your health care provider.
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
## 2019-04-13 NOTE — ED PROVIDER NOTE - CARE PROVIDER_API CALL
Surya Perez)  Orthopaedic Surgery Surgery  [address] [place] [address]  Phone: [phone]  Fax: [phone]  Follow Up Time:
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
## 2019-04-13 NOTE — ED PROVIDER NOTE - PHYSICAL EXAMINATION
CONSTITUTIONAL: Well appearing, well nourished, awake, alert and in no apparent distress.  HEENT: Head is atraumatic. Eyes clear bilaterally, normal EOMI. Airway patent.  CARDIAC: Normal rate, regular rhythm.  Heart sounds S1, S2.   RESPIRATORY: Breath sounds clear and equal bilaterally. no tachypnea, respiratory distress.   GASTROINTESTINAL: Abdomen soft, non-tender, no guarding, distension.  MUSCULOSKELETAL: Spine appears normal, no midline spinal tenderness, range of motion is not limited, no muscle or joint tenderness. no bony tenderness. R forearm bony etnderness, no hand pain, NVI in r/u/m distribution  NEUROLOGICAL: Alert and oriented, no focal deficits, no motor or sensory deficits.  SKIN: Skin normal color for race, warm, dry and intact. No evidence of rash.  PSYCHIATRIC: Alert and oriented to person, place, time/situation. normal mood and affect. no apparent risk to self or others.

## 2019-07-02 ENCOUNTER — INPATIENT (INPATIENT)
Facility: HOSPITAL | Age: 49
LOS: 1 days | Discharge: HOME | DRG: 872 | End: 2019-07-04
Attending: STUDENT IN AN ORGANIZED HEALTH CARE EDUCATION/TRAINING PROGRAM | Admitting: STUDENT IN AN ORGANIZED HEALTH CARE EDUCATION/TRAINING PROGRAM
Payer: MEDICAID

## 2019-07-02 VITALS
HEIGHT: 68 IN | WEIGHT: 220.46 LBS | RESPIRATION RATE: 18 BRPM | OXYGEN SATURATION: 97 % | HEART RATE: 100 BPM | TEMPERATURE: 98 F | DIASTOLIC BLOOD PRESSURE: 67 MMHG | SYSTOLIC BLOOD PRESSURE: 103 MMHG

## 2019-07-02 DIAGNOSIS — Z91.89 OTHER SPECIFIED PERSONAL RISK FACTORS, NOT ELSEWHERE CLASSIFIED: ICD-10-CM

## 2019-07-02 DIAGNOSIS — K52.9 NONINFECTIVE GASTROENTERITIS AND COLITIS, UNSPECIFIED: ICD-10-CM

## 2019-07-02 DIAGNOSIS — Z29.9 ENCOUNTER FOR PROPHYLACTIC MEASURES, UNSPECIFIED: ICD-10-CM

## 2019-07-02 DIAGNOSIS — A41.9 SEPSIS, UNSPECIFIED ORGANISM: ICD-10-CM

## 2019-07-02 DIAGNOSIS — F32.9 MAJOR DEPRESSIVE DISORDER, SINGLE EPISODE, UNSPECIFIED: ICD-10-CM

## 2019-07-02 DIAGNOSIS — B20 HUMAN IMMUNODEFICIENCY VIRUS [HIV] DISEASE: ICD-10-CM

## 2019-07-02 DIAGNOSIS — J45.909 UNSPECIFIED ASTHMA, UNCOMPLICATED: ICD-10-CM

## 2019-07-02 DIAGNOSIS — R63.8 OTHER SYMPTOMS AND SIGNS CONCERNING FOOD AND FLUID INTAKE: ICD-10-CM

## 2019-07-02 DIAGNOSIS — J44.9 CHRONIC OBSTRUCTIVE PULMONARY DISEASE, UNSPECIFIED: ICD-10-CM

## 2019-07-02 DIAGNOSIS — F17.210 NICOTINE DEPENDENCE, CIGARETTES, UNCOMPLICATED: ICD-10-CM

## 2019-07-02 LAB
4/8 RATIO: 0.9 RATIO — SIGNIFICANT CHANGE UP (ref 0.9–3.6)
ABS CD8: 601 /UL — SIGNIFICANT CHANGE UP (ref 142–740)
ALBUMIN SERPL ELPH-MCNC: 3.5 G/DL — SIGNIFICANT CHANGE UP (ref 3.3–5)
ALP SERPL-CCNC: 64 U/L — SIGNIFICANT CHANGE UP (ref 40–120)
ALT FLD-CCNC: 14 U/L — SIGNIFICANT CHANGE UP (ref 10–45)
ANION GAP SERPL CALC-SCNC: 12 MMOL/L — SIGNIFICANT CHANGE UP (ref 5–17)
AST SERPL-CCNC: 29 U/L — SIGNIFICANT CHANGE UP (ref 10–40)
BASOPHILS # BLD AUTO: 0.02 K/UL — SIGNIFICANT CHANGE UP (ref 0–0.2)
BASOPHILS NFR BLD AUTO: 0.1 % — SIGNIFICANT CHANGE UP (ref 0–2)
BILIRUB SERPL-MCNC: 0.6 MG/DL — SIGNIFICANT CHANGE UP (ref 0.2–1.2)
BUN SERPL-MCNC: 14 MG/DL — SIGNIFICANT CHANGE UP (ref 7–23)
C DIFF GDH STL QL: NEGATIVE — SIGNIFICANT CHANGE UP
C DIFF GDH STL QL: SIGNIFICANT CHANGE UP
CALCIUM SERPL-MCNC: 8.2 MG/DL — LOW (ref 8.4–10.5)
CD3 BLASTS SPEC-ACNC: 1162 /UL — SIGNIFICANT CHANGE UP (ref 672–1870)
CD3 BLASTS SPEC-ACNC: 74 % — SIGNIFICANT CHANGE UP (ref 59–83)
CD4 %: 34 % — SIGNIFICANT CHANGE UP (ref 30–62)
CD8 %: 38 % — HIGH (ref 12–36)
CHLORIDE SERPL-SCNC: 99 MMOL/L — SIGNIFICANT CHANGE UP (ref 96–108)
CO2 SERPL-SCNC: 24 MMOL/L — SIGNIFICANT CHANGE UP (ref 22–31)
CREAT SERPL-MCNC: 0.88 MG/DL — SIGNIFICANT CHANGE UP (ref 0.5–1.3)
CULTURE RESULTS: SIGNIFICANT CHANGE UP
EOSINOPHIL # BLD AUTO: 0.05 K/UL — SIGNIFICANT CHANGE UP (ref 0–0.5)
EOSINOPHIL NFR BLD AUTO: 0.3 % — SIGNIFICANT CHANGE UP (ref 0–6)
GLUCOSE SERPL-MCNC: 91 MG/DL — SIGNIFICANT CHANGE UP (ref 70–99)
HCT VFR BLD CALC: 36.3 % — LOW (ref 39–50)
HGB BLD-MCNC: 11.8 G/DL — LOW (ref 13–17)
IMM GRANULOCYTES NFR BLD AUTO: 0.5 % — SIGNIFICANT CHANGE UP (ref 0–1.5)
LACTATE SERPL-SCNC: 1.2 MMOL/L — SIGNIFICANT CHANGE UP (ref 0.5–2)
LYMPHOCYTES # BLD AUTO: 15.3 % — SIGNIFICANT CHANGE UP (ref 13–44)
LYMPHOCYTES # BLD AUTO: 2.33 K/UL — SIGNIFICANT CHANGE UP (ref 1–3.3)
MCHC RBC-ENTMCNC: 27.6 PG — SIGNIFICANT CHANGE UP (ref 27–34)
MCHC RBC-ENTMCNC: 32.5 GM/DL — SIGNIFICANT CHANGE UP (ref 32–36)
MCV RBC AUTO: 85 FL — SIGNIFICANT CHANGE UP (ref 80–100)
MONOCYTES # BLD AUTO: 1.25 K/UL — HIGH (ref 0–0.9)
MONOCYTES NFR BLD AUTO: 8.2 % — SIGNIFICANT CHANGE UP (ref 2–14)
NEUTROPHILS # BLD AUTO: 11.51 K/UL — HIGH (ref 1.8–7.4)
NEUTROPHILS NFR BLD AUTO: 75.6 % — SIGNIFICANT CHANGE UP (ref 43–77)
NRBC # BLD: 0 /100 WBCS — SIGNIFICANT CHANGE UP (ref 0–0)
PLATELET # BLD AUTO: 227 K/UL — SIGNIFICANT CHANGE UP (ref 150–400)
POTASSIUM SERPL-MCNC: 3.4 MMOL/L — LOW (ref 3.5–5.3)
POTASSIUM SERPL-SCNC: 3.4 MMOL/L — LOW (ref 3.5–5.3)
PROT SERPL-MCNC: 7.1 G/DL — SIGNIFICANT CHANGE UP (ref 6–8.3)
RBC # BLD: 4.27 M/UL — SIGNIFICANT CHANGE UP (ref 4.2–5.8)
RBC # FLD: 13.8 % — SIGNIFICANT CHANGE UP (ref 10.3–14.5)
SODIUM SERPL-SCNC: 135 MMOL/L — SIGNIFICANT CHANGE UP (ref 135–145)
SPECIMEN SOURCE: SIGNIFICANT CHANGE UP
T-CELL CD4 SUBSET PNL BLD: 539 /UL — SIGNIFICANT CHANGE UP (ref 489–1457)
WBC # BLD: 15.23 K/UL — HIGH (ref 3.8–10.5)
WBC # FLD AUTO: 15.23 K/UL — HIGH (ref 3.8–10.5)

## 2019-07-02 PROCEDURE — 74177 CT ABD & PELVIS W/CONTRAST: CPT | Mod: 26

## 2019-07-02 PROCEDURE — 99285 EMERGENCY DEPT VISIT HI MDM: CPT

## 2019-07-02 PROCEDURE — 99223 1ST HOSP IP/OBS HIGH 75: CPT | Mod: GC

## 2019-07-02 RX ORDER — CEFTRIAXONE 500 MG/1
1000 INJECTION, POWDER, FOR SOLUTION INTRAMUSCULAR; INTRAVENOUS EVERY 24 HOURS
Refills: 0 | Status: DISCONTINUED | OUTPATIENT
Start: 2019-07-02 | End: 2019-07-04

## 2019-07-02 RX ORDER — ALBUTEROL 90 UG/1
2 AEROSOL, METERED ORAL EVERY 6 HOURS
Refills: 0 | Status: DISCONTINUED | OUTPATIENT
Start: 2019-07-02 | End: 2019-07-04

## 2019-07-02 RX ORDER — IOHEXOL 300 MG/ML
30 INJECTION, SOLUTION INTRAVENOUS ONCE
Refills: 0 | Status: COMPLETED | OUTPATIENT
Start: 2019-07-02 | End: 2019-07-02

## 2019-07-02 RX ORDER — SODIUM CHLORIDE 9 MG/ML
1000 INJECTION INTRAMUSCULAR; INTRAVENOUS; SUBCUTANEOUS ONCE
Refills: 0 | Status: COMPLETED | OUTPATIENT
Start: 2019-07-02 | End: 2019-07-02

## 2019-07-02 RX ORDER — SERTRALINE 25 MG/1
50 TABLET, FILM COATED ORAL DAILY
Refills: 0 | Status: DISCONTINUED | OUTPATIENT
Start: 2019-07-02 | End: 2019-07-04

## 2019-07-02 RX ORDER — DOLUTEGRAVIR SODIUM 25 MG/1
50 TABLET, FILM COATED ORAL DAILY
Refills: 0 | Status: DISCONTINUED | OUTPATIENT
Start: 2019-07-02 | End: 2019-07-04

## 2019-07-02 RX ORDER — EMTRICITABINE AND TENOFOVIR DISOPROXIL FUMARATE 200; 300 MG/1; MG/1
1 TABLET, FILM COATED ORAL DAILY
Refills: 0 | Status: DISCONTINUED | OUTPATIENT
Start: 2019-07-02 | End: 2019-07-04

## 2019-07-02 RX ORDER — NICOTINE POLACRILEX 2 MG
1 GUM BUCCAL DAILY
Refills: 0 | Status: DISCONTINUED | OUTPATIENT
Start: 2019-07-02 | End: 2019-07-04

## 2019-07-02 RX ORDER — POTASSIUM CHLORIDE 20 MEQ
40 PACKET (EA) ORAL ONCE
Refills: 0 | Status: COMPLETED | OUTPATIENT
Start: 2019-07-02 | End: 2019-07-02

## 2019-07-02 RX ORDER — BUDESONIDE AND FORMOTEROL FUMARATE DIHYDRATE 160; 4.5 UG/1; UG/1
2 AEROSOL RESPIRATORY (INHALATION)
Refills: 0 | Status: DISCONTINUED | OUTPATIENT
Start: 2019-07-02 | End: 2019-07-04

## 2019-07-02 RX ORDER — PIPERACILLIN AND TAZOBACTAM 4; .5 G/20ML; G/20ML
3.38 INJECTION, POWDER, LYOPHILIZED, FOR SOLUTION INTRAVENOUS ONCE
Refills: 0 | Status: COMPLETED | OUTPATIENT
Start: 2019-07-02 | End: 2019-07-02

## 2019-07-02 RX ORDER — ACETAMINOPHEN 500 MG
650 TABLET ORAL EVERY 6 HOURS
Refills: 0 | Status: DISCONTINUED | OUTPATIENT
Start: 2019-07-02 | End: 2019-07-04

## 2019-07-02 RX ORDER — MORPHINE SULFATE 50 MG/1
4 CAPSULE, EXTENDED RELEASE ORAL ONCE
Refills: 0 | Status: DISCONTINUED | OUTPATIENT
Start: 2019-07-02 | End: 2019-07-02

## 2019-07-02 RX ORDER — METRONIDAZOLE 500 MG
500 TABLET ORAL EVERY 8 HOURS
Refills: 0 | Status: DISCONTINUED | OUTPATIENT
Start: 2019-07-02 | End: 2019-07-03

## 2019-07-02 RX ORDER — ONDANSETRON 8 MG/1
4 TABLET, FILM COATED ORAL ONCE
Refills: 0 | Status: COMPLETED | OUTPATIENT
Start: 2019-07-02 | End: 2019-07-02

## 2019-07-02 RX ORDER — SODIUM CHLORIDE 9 MG/ML
1000 INJECTION INTRAMUSCULAR; INTRAVENOUS; SUBCUTANEOUS
Refills: 0 | Status: DISCONTINUED | OUTPATIENT
Start: 2019-07-02 | End: 2019-07-04

## 2019-07-02 RX ADMIN — CEFTRIAXONE 100 MILLIGRAM(S): 500 INJECTION, POWDER, FOR SOLUTION INTRAMUSCULAR; INTRAVENOUS at 12:05

## 2019-07-02 RX ADMIN — Medication 1 PATCH: at 18:51

## 2019-07-02 RX ADMIN — IOHEXOL 30 MILLILITER(S): 300 INJECTION, SOLUTION INTRAVENOUS at 04:49

## 2019-07-02 RX ADMIN — Medication 500 MILLIGRAM(S): at 12:00

## 2019-07-02 RX ADMIN — SERTRALINE 50 MILLIGRAM(S): 25 TABLET, FILM COATED ORAL at 12:00

## 2019-07-02 RX ADMIN — Medication 1 PATCH: at 11:59

## 2019-07-02 RX ADMIN — Medication 650 MILLIGRAM(S): at 21:58

## 2019-07-02 RX ADMIN — DOLUTEGRAVIR SODIUM 50 MILLIGRAM(S): 25 TABLET, FILM COATED ORAL at 11:55

## 2019-07-02 RX ADMIN — EMTRICITABINE AND TENOFOVIR DISOPROXIL FUMARATE 1 TABLET(S): 200; 300 TABLET, FILM COATED ORAL at 11:55

## 2019-07-02 RX ADMIN — BUDESONIDE AND FORMOTEROL FUMARATE DIHYDRATE 2 PUFF(S): 160; 4.5 AEROSOL RESPIRATORY (INHALATION) at 21:56

## 2019-07-02 RX ADMIN — SODIUM CHLORIDE 1000 MILLILITER(S): 9 INJECTION INTRAMUSCULAR; INTRAVENOUS; SUBCUTANEOUS at 08:42

## 2019-07-02 RX ADMIN — Medication 40 MILLIEQUIVALENT(S): at 08:42

## 2019-07-02 RX ADMIN — SODIUM CHLORIDE 120 MILLILITER(S): 9 INJECTION INTRAMUSCULAR; INTRAVENOUS; SUBCUTANEOUS at 08:42

## 2019-07-02 RX ADMIN — Medication 500 MILLIGRAM(S): at 18:48

## 2019-07-02 RX ADMIN — ONDANSETRON 4 MILLIGRAM(S): 8 TABLET, FILM COATED ORAL at 04:49

## 2019-07-02 RX ADMIN — PIPERACILLIN AND TAZOBACTAM 200 GRAM(S): 4; .5 INJECTION, POWDER, LYOPHILIZED, FOR SOLUTION INTRAVENOUS at 05:58

## 2019-07-02 RX ADMIN — SODIUM CHLORIDE 1000 MILLILITER(S): 9 INJECTION INTRAMUSCULAR; INTRAVENOUS; SUBCUTANEOUS at 05:57

## 2019-07-02 RX ADMIN — Medication 650 MILLIGRAM(S): at 21:25

## 2019-07-02 RX ADMIN — MORPHINE SULFATE 4 MILLIGRAM(S): 50 CAPSULE, EXTENDED RELEASE ORAL at 04:49

## 2019-07-02 RX ADMIN — SODIUM CHLORIDE 2000 MILLILITER(S): 9 INJECTION INTRAMUSCULAR; INTRAVENOUS; SUBCUTANEOUS at 04:40

## 2019-07-02 RX ADMIN — BUDESONIDE AND FORMOTEROL FUMARATE DIHYDRATE 2 PUFF(S): 160; 4.5 AEROSOL RESPIRATORY (INHALATION) at 08:49

## 2019-07-02 NOTE — H&P ADULT - NSHPPHYSICALEXAM_GEN_ALL_CORE
.  VITAL SIGNS:  T(C): 37.2 (07-02-19 @ 07:41), Max: 37.2 (07-02-19 @ 06:14)  T(F): 98.9 (07-02-19 @ 07:41), Max: 98.9 (07-02-19 @ 06:14)  HR: 98 (07-02-19 @ 07:41) (98 - 100)  BP: 110/58 (07-02-19 @ 07:41) (103/67 - 116/72)  BP(mean): --  RR: 18 (07-02-19 @ 07:41) (18 - 18)  SpO2: 99% (07-02-19 @ 07:41) (97% - 99%)  Wt(kg): --    PHYSICAL EXAM:    Constitutional: WDWN resting comfortably in bed; NAD  Head: NC/AT  Eyes: PERRL, EOMI, anicteric sclera  ENT: no nasal discharge; uvula midline, no oropharyngeal erythema or exudates; MMM  Neck: supple; no JVD or thyromegaly  Respiratory: Upper lung fields CTA b/l, some wheezing noted on right lower lung field, poor inspiratory effort  Cardiac: +S1/S2; RRR; no M/R/G  Gastrointestinal: +BS in all four quadrants, soft, non-distended, RUQ tenderness to deep palpation, no difference to tenderness on inspiration, LLQ tenderness on light palpation,  Extremities: WWP, no clubbing or cyanosis; no peripheral edema  Musculoskeletal: NROM x4; no joint swelling, tenderness or erythema  Dermatologic: skin warm, dry and intact; no rashes, or scars, minor superficial healing wounds on legs  Lymphatic: no submandibular or cervical LAD  Neurologic: AAOx3; CNII-XII grossly intact; no focal deficits VITAL SIGNS:  T(C): 37.2 (07-02-19 @ 07:41), Max: 37.2 (07-02-19 @ 06:14)  T(F): 98.9 (07-02-19 @ 07:41), Max: 98.9 (07-02-19 @ 06:14)  HR: 98 (07-02-19 @ 07:41) (98 - 100)  BP: 110/58 (07-02-19 @ 07:41) (103/67 - 116/72)  BP(mean): --  RR: 18 (07-02-19 @ 07:41) (18 - 18)  SpO2: 99% (07-02-19 @ 07:41) (97% - 99%)  Wt(kg): --    PHYSICAL EXAM:  Constitutional: WDWN resting comfortably in bed; NAD  Head: NC/AT  Eyes: PERRL, EOMI, anicteric sclera  ENT: no nasal discharge; uvula midline, no oropharyngeal erythema or exudates; MMM  Neck: supple; no JVD or thyromegaly  Respiratory: Upper lung fields CTA b/l, some wheezing noted on right lower lung field, poor inspiratory effort  Cardiac: +S1/S2; RRR; no M/R/G  Gastrointestinal: +BS in all four quadrants, soft, non-distended, RUQ tenderness to deep palpation, no difference to tenderness on inspiration, LLQ tenderness on light palpation,  Extremities: WWP, no clubbing or cyanosis; no peripheral edema  Musculoskeletal: NROM x4; no joint swelling, tenderness or erythema  Dermatologic: skin warm, dry and intact; no rashes, or scars, minor superficial healing wounds on legs  Lymphatic: no submandibular or cervical LAD  Neurologic: AAOx3; CNII-XII grossly intact; no focal deficits VITAL SIGNS:  T(C): 37.2 (07-02-19 @ 07:41), Max: 37.2 (07-02-19 @ 06:14)  T(F): 98.9 (07-02-19 @ 07:41), Max: 98.9 (07-02-19 @ 06:14)  HR: 98 (07-02-19 @ 07:41) (98 - 100)  BP: 110/58 (07-02-19 @ 07:41) (103/67 - 116/72)  BP(mean): --  RR: 18 (07-02-19 @ 07:41) (18 - 18)  SpO2: 99% (07-02-19 @ 07:41) (97% - 99%)  Wt(kg): --    PHYSICAL EXAM:  Constitutional: WDWN resting comfortably in bed; NAD  Head: NC/AT  Eyes: PERRL, EOMI, anicteric sclera  ENT: no nasal discharge; uvula midline, no oropharyngeal erythema or exudates; MMM, no candida on oral exam  Neck: supple; no JVD or thyromegaly  Respiratory: Upper lung fields CTA b/l, some wheezing noted on right lower lung field, decreased breath sounds  Cardiac: +S1/S2; RRR; no M/R/G  Gastrointestinal: +BS in all four quadrants, soft, non-distended, RUQ tenderness to deep palpation, no difference to tenderness on inspiration, LLQ tenderness on light palpation,  Extremities: WWP, no clubbing or cyanosis; no peripheral edema  Musculoskeletal: NROM x4; no joint swelling, tenderness or erythema  Dermatologic: skin warm, dry and intact; no rashes, or scars, minor superficial healing wounds on legs  Lymphatic: no submandibular or cervical LAD  Neurologic: AAOx3; CNII-XII grossly intact; no focal deficits

## 2019-07-02 NOTE — H&P ADULT - PROBLEM SELECTOR PLAN 10
1) PCP   Contacted on Admission: No  2) Date of Contact with PCP:  3) PCP Contacted at Discharge: (Y/N)  4) Summary of Handoff Given to PCP:   5) Post-Discharge Appointment Date and Location: 1) PCP Contacted on Admission: Yes. Dr. Pacheco [phone]  2) Date of Contact with PCP: 7/2/2019  3) PCP Contacted at Discharge: (Y/N)  4) Summary of Handoff Given to PCP:   5) Post-Discharge Appointment Date and Location:

## 2019-07-02 NOTE — ED PROVIDER NOTE - ATTENDING CONTRIBUTION TO CARE
Attending Statement: I have personally performed a face to face diagnostic evaluation on this patient. I have reviewed the ACP note and agree with the history, exam and plan of care, except as noted.     Attending Contribution to Care:  49M with pmh of HIV, anxiety and depression c/o watery diarrhea x 3 days and generalized abd pain, Ct shows colitis. Pt admitted for iv abx, fluids and sx control. Stable for RMF.

## 2019-07-02 NOTE — H&P ADULT - PROBLEM SELECTOR PLAN 9
DVT: Improve score 0, no VTE prophylaxsis needed  Activity: Ambulate as tolerated DVT: Improve score 0, no VTE prophylaxsis needed  Activity: Ambulate as tolerated  Dispo: F

## 2019-07-02 NOTE — H&P ADULT - PROBLEM SELECTOR PLAN 3
# Partially compliant on ART Descovy and Tivacy (misses 1-2 times per week) follows with Dr. Palafox   # Last ABS CD4: 1000, viral load <30, 10/2018  - Continue home ART medications; Descovy 200mg-25mg qd and tivacy 50mg qd  - f/u CD4 and viral load  - f/u Dr. Palafox/HIV team recs # Partially compliant on ART Descovy and Tivacy (misses 1-2 times per week) follows with Dr. Pacheco   # Last ABS CD4: 1000, viral load <30, 10/2018  - Continue home ART medications; Descovy 200mg-25mg qd and tivacy 50mg qd  - f/u CD4 and viral load  - f/u Dr. Palafox/HIV team recs # Partially compliant on ART Descovy and Tivacy (misses 1-2 times per week) follows with Dr. Pacheco   # Last ABS CD4: 1000, viral load <30, 10/2018  - Continue home ART medications; Descovy 200mg-25mg qd and tivacy 50mg qd  - f/u CD4 and viral load  - HIV team aware, f/u Dr. Pacheco/HIV team recs # Partially compliant on HAART Descovy and Tivacy (misses 1-2 times per week) follows with Dr. Pacheco   # Last ABS CD4: 1000, viral load <30, 10/2018  - Continue home HAART medications; Descovy 200mg-25mg qd and tivacy 50mg qd  - f/u CD4 and viral load  - HIV team aware, f/u Dr. Pacheco/HIV team recs

## 2019-07-02 NOTE — H&P ADULT - NSICDXPASTMEDICALHX_GEN_ALL_CORE_FT
PAST MEDICAL HISTORY:  Asthma     COPD (chronic obstructive pulmonary disease)     HIV (human immunodeficiency virus infection) PAST MEDICAL HISTORY:  Asthma     COPD (chronic obstructive pulmonary disease)     Depression     HIV (human immunodeficiency virus infection)     Intracerebral hemorrhage     Psoriasis

## 2019-07-02 NOTE — H&P ADULT - HISTORY OF PRESENT ILLNESS
48 M 48 yo  Male with PMHx of HIV (partially compliant on ART (Descovy/Tivacy), last ABS CD4: 1000, viral load <30, 10/2018, follows with Dr. Palafox), COPD, asthma, intracerebral hemorrhage, HTN (no medication), psoriasis who presented to [facility] ED with worsening abdominal pain and diarrhea. Patient states the abdominal pain and diarrhea started around the same time 4 days ago. The patient's  had similar symptoms starting prior to our patient's symptoms. The patient was able to eat, OTC immodium, and 4 tablets of advil (800mg) with some relief. The diarrhea was described as multiple (>5) episodes of watery diarrhea per day with some small blood noticeable in the stool. Patient's last normal bowel movement was 5 days ago, no blood, diarrhea, or tarry stool noted. The patient's abdominal pain is localized to the left lower quadrant, slowly worsened over the 4 days, described as sharp, constant, 6/10 pain severity, non-radiating. Patient admits to sick contact (, similar symptoms), fever (103 at home), nausea, chills, headache, abdominal pain, multiple episodes of diarrhea. Patient denies vomiting, chest pain, SOB, difficulty urinating, rashes or swelling, no recent travel, no recent changes to diet.    admitted previously same 50 yo  Male with PMHx of HIV (partially compliant on ART (Descovy/Tivacy), last ABS CD4: 1000, viral load <30, 10/2018, follows with Dr. Palafox), COPD, asthma, intracerebral hemorrhage, HTN (no medication), psoriasis who presented to [facility] ED with worsening abdominal pain and diarrhea. Patient states the abdominal pain and diarrhea started around the same time 4 days ago. The patient's  had similar symptoms starting prior to our patient's symptoms. The patient was able to eat, OTC immodium, and 4 tablets of advil (800mg) with some relief. The diarrhea was described as multiple (>5) episodes of watery diarrhea per day with some small red blood noticeable in the stool. Patient's last normal bowel movement was 5 days ago, no blood, diarrhea, or tarry stool noted at that time. The patient's abdominal pain is localized to the left lower quadrant, slowly worsened over the 4 days, described as sharp, constant, 6/10 pain severity, non-radiating. Patient was admitted to [facility] July 2018 with similar symptoms and presentation. Patient currently admits to sick contact (, similar symptoms), fever (103 at home), nausea, chills, headache, abdominal pain, multiple episodes of diarrhea. Patient denies vomiting, chest pain, SOB, difficulty urinating, rashes or swelling, no recent travel, no recent changes to diet.    ED Vitals: T 98 P 100 /67 RR 18 O2 97% on RA  ED Course: CT Abdomen findings consistent with colitis of the ascending colon, infectious versus inflammatory. Elevated WBC 15.23. Received 3.375g Zosyn, 2L NS, 4mg morphine, 4mg zofran. Admitted to [facility] for treatment of colitis. 48 yo  Male with PMHx of HIV (partially compliant on ART (Descovy/Tivacy), last ABS CD4: 1000, viral load <30, 10/2018, follows with Dr. Pacheco), COPD, asthma, intracerebral hemorrhage, HTN (no medication), psoriasis who presented to [facility] ED with worsening abdominal pain and diarrhea. Patient states the abdominal pain and diarrhea started around the same time 4 days ago. The patient's  had similar symptoms starting prior to our patient's symptoms. The patient was able to eat, OTC immodium, and 4 tablets of advil (800mg) with some relief. The diarrhea was described as multiple (>5) episodes of watery diarrhea per day with some small red blood noticeable in the stool. Patient's last normal bowel movement was 5 days ago, no blood, diarrhea, or tarry stool noted at that time. The patient's abdominal pain is localized to the left lower quadrant, slowly worsened over the 4 days, described as sharp, constant, 6/10 pain severity, non-radiating. Patient was admitted to [facility] July 2018 with similar symptoms and presentation. Patient currently admits to sick contact (, similar symptoms), fever (103 at home), nausea, chills, headache, abdominal pain, multiple episodes of diarrhea. Patient denies vomiting, chest pain, SOB, difficulty urinating, rashes or swelling, no recent travel, no recent changes to diet.    ED Vitals: T 98 P 100 /67 RR 18 O2 97% on RA  ED Course: CT Abdomen findings consistent with colitis of the ascending colon, infectious versus inflammatory. Elevated WBC 15.23. Received 3.375g Zosyn, 2L NS, 4mg morphine, 4mg zofran. Admitted to [facility] for treatment of colitis. 48 yo  Male with PMHx of HIV (partially compliant on ART (Descovy/Tivacy), last ABS CD4: 1000, viral load <30, 10/2018, follows with Dr. Pacheco), COPD, asthma, intracerebral hemorrhage, HTN (no medication), psoriasis who presented to [facility] ED with worsening abdominal pain and diarrhea. Patient states the abdominal pain and diarrhea started around the same time 4 days ago. The patient's  had similar symptoms starting prior to our patient's symptoms. The patient was able to eat, OTC immodium, and 4 tablets of advil (800mg) with some relief. The diarrhea was described as multiple (>5) episodes of watery diarrhea per day with some small red blood noticeable in the stool. Patient's last normal bowel movement was 5 days ago, no blood, diarrhea, or tarry stool noted at that time. The patient's abdominal pain is localized to the left lower quadrant, slowly worsened over the 4 days, described as sharp, constant, 6/10 pain severity, non-radiating. Patient was admitted to [facility] July 2018 with similar symptoms and presentation and was admitted for colitis 2/2 shigella/enteroinvasive E. coli . Patient currently admits to sick contact (, similar symptoms), fever (103 at home), nausea, chills, headache, abdominal pain, multiple episodes of diarrhea. Patient denies vomiting, chest pain, SOB, difficulty urinating, rashes or swelling, no recent travel, no recent changes to diet.    ED Vitals: T 98 P 100 /67 RR 18 O2 97% on RA  ED Course: CT Abdomen findings consistent with colitis of the ascending colon, infectious versus inflammatory. Elevated WBC 15.23. Received 3.375g Zosyn, 2L NS, 4mg morphine, 4mg zofran. Admitted to [facility] for treatment of colitis. 50 yo  Male with PMHx of HIV (partially compliant on HAART (Descovy/Tivacy), last ABS CD4: 1000, viral load <30, 10/2018, follows with Dr. Pacheco), COPD, asthma, intracerebral hemorrhage, HTN (no medication), psoriasis who presented to [facility] ED with worsening abdominal pain and diarrhea. Patient states the abdominal pain and diarrhea started around the same time 4 days ago. The patient's  had similar symptoms starting prior to our patient's symptoms. The patient was able to eat, OTC immodium, and 4 tablets of advil (800mg) with some relief. The diarrhea was described as multiple (>5) episodes of watery diarrhea per day with some small red blood noticeable in the stool. Patient's last normal bowel movement was 5 days ago, no blood, diarrhea, or tarry stool noted at that time. The patient's abdominal pain is localized to the left lower quadrant, slowly worsened over the 4 days, described as sharp, constant, 6/10 pain severity, non-radiating. Patient was admitted to [facility] July 2018 with similar symptoms and presentation and was admitted for colitis 2/2 shigella/enteroinvasive E. coli . Patient currently admits to sick contact (, similar symptoms), fever (103 at home), nausea, chills, headache, abdominal pain, multiple episodes of diarrhea. Patient denies vomiting, chest pain, SOB, difficulty urinating, rashes or swelling, no recent travel, no recent changes to diet. ROS otherwise negative.     ED Vitals: T 98 P 100 /67 RR 18 O2 97% on RA  ED Course: CT Abdomen findings consistent with colitis of the ascending colon, infectious versus inflammatory. Elevated WBC 15.23. Received 3.375g Zosyn, 2L NS, 4mg morphine, 4mg zofran. Admitted to [facility] for treatment of colitis. 48 yo  Male with PMHx of HIV (partially compliant on HAART (Descovy/Tivacy), last ABS CD4: 1000, viral load <30, 10/2018, follows with Dr. Pacheco), COPD, asthma, intracerebral hemorrhage, HTN (no medication), psoriasis who presented to [facility] ED with worsening abdominal pain and diarrhea. Patient states the abdominal pain and diarrhea started around the same time 4 days ago. The patient's  had similar symptoms starting prior to our patient's symptoms. The patient was able to eat, OTC immodium, and 4 tablets of advil (800mg) with some relief. The diarrhea was described as multiple (>5) episodes of watery diarrhea per day with some small red blood noticeable in the stool. Patient's last normal bowel movement was 5 days ago, no blood, diarrhea, or tarry stool noted at that time. The patient's abdominal pain is localized to the left lower quadrant, slowly worsened over the 4 days, described as sharp, constant, 6/10 pain severity, non-radiating. Patient was admitted to [facility] July 2018 with similar symptoms and presentation and was admitted for colitis 2/2 shigella/enteroinvasive E. coli . Patient currently admits to sick contact (, similar symptoms, diagnosed with "shigellosis"), fever (103 at home), nausea, chills, headache, abdominal pain, multiple episodes of diarrhea. Patient denies vomiting, chest pain, SOB, difficulty urinating, rashes or swelling, no recent travel, no recent changes to diet. ROS otherwise negative.     ED Vitals: T 98 P 100 /67 RR 18 O2 97% on RA  ED Course: CT Abdomen findings consistent with colitis of the ascending colon, infectious versus inflammatory. Elevated WBC 15.23. Received 3.375g Zosyn, 2L NS, 4mg morphine, 4mg zofran. Admitted to [facility] for treatment of colitis.

## 2019-07-02 NOTE — H&P ADULT - NSHPLABSRESULTS_GEN_ALL_CORE
.  LABS:                         11.8   15.23 )-----------( 227      ( 02 Jul 2019 04:42 )             36.3     07-02    135  |  99  |  14  ----------------------------<  91  3.4<L>   |  24  |  0.88    Ca    8.2<L>      02 Jul 2019 04:42    TPro  7.1  /  Alb  3.5  /  TBili  0.6  /  DBili  x   /  AST  29  /  ALT  14  /  AlkPhos  64  07-02    Lactate, Blood: 1.2 mmoL/L (07-02 @ 05:52)      RADIOLOGY, EKG & ADDITIONAL TESTS:     CT Abdomen and Pelvis w/ Oral Cont and w/ IV Cont (07.02.19 @ 06:31)   - Findings consistent with colitis of the ascending colon, infectious versus inflammatory. LABS:                         11.8   15.23 )-----------( 227      ( 02 Jul 2019 04:42 )             36.3     07-02    135  |  99  |  14  ----------------------------<  91  3.4<L>   |  24  |  0.88    Ca    8.2<L>      02 Jul 2019 04:42    TPro  7.1  /  Alb  3.5  /  TBili  0.6  /  DBili  x   /  AST  29  /  ALT  14  /  AlkPhos  64  07-02    Lactate, Blood: 1.2 mmoL/L (07-02 @ 05:52)    CT Abdomen and Pelvis w/ Oral Cont and w/ IV Cont (07.02.19 @ 06:31)   - Findings consistent with colitis of the ascending colon, infectious versus inflammatory  RADIOLOGY, EKG & ADDITIONAL TESTS:

## 2019-07-02 NOTE — ED PROVIDER NOTE - OBJECTIVE STATEMENT
50 yo M with pmh of HIV (unknown CD4 or VL not taking meds x 3 months), anxiety and depression c/o watery diarrhea x 3 days. Pt states he noticed bloody stools for the first 2 days and now it is just watery. Associated with generalized abd pain, nausea but no vomiting. Reports fever earlier today. Pts  was sick with same symptoms first. Pt seen at [facility] earlier today and was admitted but he signed out AMA because he prefers [facility]. Denies dysuria, hematuria, cp, sob, dizziness, recent travel or abx use. Pt admitted back in Sept for colitis (cdiff neg at that time). Pt is hungry but everything he eats goes right through him. 48 yo M with pmh of HIV (unknown CD4 or VL not taking meds x 3 months), COPD, asthma, anxiety and depression c/o watery diarrhea x 3 days. Pt states he noticed bloody stools for the first 2 days and now it is just watery. Associated with generalized abd pain, nausea but no vomiting. Reports fever earlier today. Pts  was sick with same symptoms first. Pt seen at [facility] earlier today and was admitted but he signed out AMA because he prefers [facility]. Denies dysuria, hematuria, cp, sob, dizziness, recent travel or abx use. Pt admitted back in Sept for colitis (cdiff neg at that time). Pt is hungry but everything he eats goes right through him.

## 2019-07-02 NOTE — H&P ADULT - PROBLEM SELECTOR PLAN 4
# No home oxygen, O2 97% on RA  - Continue home medications Symbicort 80mcg-4.5mg 2 puffs twice a day # No home oxygen, O2 97% on RA  - Continue home medications Symbicort 80mcg-4.5mg 2 puffs twice a day  - Proventil prn

## 2019-07-02 NOTE — H&P ADULT - NSICDXFAMILYHX_GEN_ALL_CORE_FT
FAMILY HISTORY:  Father  Still living? Unknown  Family history of diabetes mellitus, Age at diagnosis: Age Unknown  Family history of glaucoma, Age at diagnosis: Age Unknown  Family history of other heart disease, Age at diagnosis: Age Unknown FAMILY HISTORY:  Family history of cardiac disorder in mother    Father  Still living? Unknown  Family history of diabetes mellitus, Age at diagnosis: Age Unknown  Family history of glaucoma, Age at diagnosis: Age Unknown  Family history of other heart disease, Age at diagnosis: Age Unknown

## 2019-07-02 NOTE — H&P ADULT - PROBLEM SELECTOR PLAN 1
# Meets 3/4 SIRS criteria: WBC 15.23K, P 100, T 103 (at home). No evidence of end organ damage, lactate wnl so does not meet severe sepsis criteria  # S/p 2L NS and 3.375g Zosysn in ED, will give another 1 L NS to complete sepsis protocol (30cc/kg fluid resuscitation)  # Focused reperfusion exam complete, mentating well, extremities WWP   # CT abdomen findings consistent with colitis  - start ceftriaxone 1g q24 and flagyl 500mg TID and continue with maintenance NS at 120cc/hr  - f/u GI PCR and C diff  - f/u stool ova and parasites  - f/u blood cultures  - f/u stool cultures  - On C diff precautions, r/o C diff # Meets 3/4 SIRS criteria: WBC 15.23K, P 100, T 103 (at home). No evidence of end organ damage, lactate wnl so does not meet severe sepsis criteria  # S/p 2L NS and 3.375g Zosysn in ED, will give another 1 L NS to complete sepsis protocol (30cc/kg fluid resuscitation)  # Focused reperfusion exam complete, mentating well, extremities WWP   # CT abdomen findings consistent with colitis  - start ceftriaxone 1g q24 and flagyl 500mg TID and continue with maintenance NS at 120cc/hr  - C diff negative  - f/u GI PCR  - f/u stool ova and parasites  - f/u blood cultures  - f/u stool cultures

## 2019-07-02 NOTE — ED PROVIDER NOTE - CLINICAL SUMMARY MEDICAL DECISION MAKING FREE TEXT BOX
48 yo M with pmh of HIV (unknown CD4 or VL not taking meds x 3 months), anxiety and depression c/o watery diarrhea x 3 days. Pt states he noticed bloody stools for the first 2 days and now it is just watery. Associated with generalized abd pain, nausea but no vomiting. Afebrile. Abd soft, +LLQ ttp. r/o diverticulitis vs colitis

## 2019-07-02 NOTE — H&P ADULT - ASSESSMENT
48 yo  Male with PMHx of HIV (partially compliant on ART (Descovy/Tivacy), last ABS CD4: 1000, viral load <30, 10/2018, follows with Dr. Palafox), COPD, asthma, intracerebral hemorrhage, HTN (no medication), psoriasis who presented to [facility] ED with worsening abdominal pain and diarrhea. Patient states the abdominal pain and diarrhea started around the same time 4 days ago. Admitted to [facility] for sepsis 2/2 colitis. 48 yo  Male with PMHx of HIV (partially compliant on ART (Descovy/Tivacy), last ABS CD4: 1000, viral load <30, 10/2018, follows with Dr. Palafox), COPD, asthma, intracerebral hemorrhage, HTN (no medication), psoriasis who presented to [facility] ED with worsening abdominal pain and diarrhea. Admitted to [facility] for sepsis 2/2 colitis.

## 2019-07-02 NOTE — ED ADULT NURSE NOTE - OBJECTIVE STATEMENT
Patient presents to the ED with c/o abdominal pain with diarrhea x 4 days. States nausea, denies vomiting. NAD Noted

## 2019-07-02 NOTE — H&P ADULT - PROBLEM SELECTOR PLAN 7
# 10 cigarettes per day/30 years; 15 year pack history  - Educated on smoking cessation  - Nicotine patch 14mg

## 2019-07-02 NOTE — ED PROVIDER NOTE - PHYSICAL EXAMINATION
CONSTITUTIONAL: Well-appearing; well-nourished; in no apparent distress.   HEAD: Normocephalic; atraumatic.   EYES: PERRL; EOM intact; conjunctiva and sclera clear  ENT: normal nose; no rhinorrhea; normal pharynx with no erythema or lesions.   NECK: Supple; non-tender; no LAD  CARDIOVASCULAR: Normal S1, S2; no murmurs, rubs, or gallops. Regular rate and rhythm.   RESPIRATORY: Breathing easily; breath sounds clear and equal bilaterally; no wheezes, rhonchi, or rales.  GI: Soft; non-distended; +LLQ ttp   MSK: FROM at all extremities, normal tone   EXT: No cyanosis or edema; N/V intact  SKIN: Normal for age and race; warm; dry; good turgor; no apparent lesions or rash.   NEURO: A & O x 3; face symmetric; grossly unremarkable.   PSYCHOLOGICAL: The patient’s mood and manner are appropriate.

## 2019-07-03 ENCOUNTER — TRANSCRIPTION ENCOUNTER (OUTPATIENT)
Age: 49
End: 2019-07-03

## 2019-07-03 LAB
ALBUMIN SERPL ELPH-MCNC: 2.9 G/DL — LOW (ref 3.3–5)
ALP SERPL-CCNC: 67 U/L — SIGNIFICANT CHANGE UP (ref 40–120)
ALT FLD-CCNC: 14 U/L — SIGNIFICANT CHANGE UP (ref 10–45)
ANION GAP SERPL CALC-SCNC: 8 MMOL/L — SIGNIFICANT CHANGE UP (ref 5–17)
AST SERPL-CCNC: 23 U/L — SIGNIFICANT CHANGE UP (ref 10–40)
BASOPHILS # BLD AUTO: 0.03 K/UL — SIGNIFICANT CHANGE UP (ref 0–0.2)
BASOPHILS NFR BLD AUTO: 0.4 % — SIGNIFICANT CHANGE UP (ref 0–2)
BILIRUB SERPL-MCNC: 0.2 MG/DL — SIGNIFICANT CHANGE UP (ref 0.2–1.2)
BUN SERPL-MCNC: 9 MG/DL — SIGNIFICANT CHANGE UP (ref 7–23)
CALCIUM SERPL-MCNC: 8.4 MG/DL — SIGNIFICANT CHANGE UP (ref 8.4–10.5)
CHLORIDE SERPL-SCNC: 107 MMOL/L — SIGNIFICANT CHANGE UP (ref 96–108)
CO2 SERPL-SCNC: 24 MMOL/L — SIGNIFICANT CHANGE UP (ref 22–31)
CREAT SERPL-MCNC: 0.82 MG/DL — SIGNIFICANT CHANGE UP (ref 0.5–1.3)
EOSINOPHIL # BLD AUTO: 0.19 K/UL — SIGNIFICANT CHANGE UP (ref 0–0.5)
EOSINOPHIL NFR BLD AUTO: 2.6 % — SIGNIFICANT CHANGE UP (ref 0–6)
GLUCOSE SERPL-MCNC: 97 MG/DL — SIGNIFICANT CHANGE UP (ref 70–99)
HCT VFR BLD CALC: 32.3 % — LOW (ref 39–50)
HGB BLD-MCNC: 10.5 G/DL — LOW (ref 13–17)
HIV-1 VIRAL LOAD RESULT: ABNORMAL
HIV1 RNA # SERPL NAA+PROBE: SIGNIFICANT CHANGE UP
HIV1 RNA SER-IMP: SIGNIFICANT CHANGE UP
HIV1 RNA SERPL NAA+PROBE-ACNC: ABNORMAL
HIV1 RNA SERPL NAA+PROBE-LOG#: 4.66 — SIGNIFICANT CHANGE UP
IMM GRANULOCYTES NFR BLD AUTO: 0.4 % — SIGNIFICANT CHANGE UP (ref 0–1.5)
LYMPHOCYTES # BLD AUTO: 1.42 K/UL — SIGNIFICANT CHANGE UP (ref 1–3.3)
LYMPHOCYTES # BLD AUTO: 19.3 % — SIGNIFICANT CHANGE UP (ref 13–44)
MAGNESIUM SERPL-MCNC: 1.9 MG/DL — SIGNIFICANT CHANGE UP (ref 1.6–2.6)
MCHC RBC-ENTMCNC: 27.7 PG — SIGNIFICANT CHANGE UP (ref 27–34)
MCHC RBC-ENTMCNC: 32.5 GM/DL — SIGNIFICANT CHANGE UP (ref 32–36)
MCV RBC AUTO: 85.2 FL — SIGNIFICANT CHANGE UP (ref 80–100)
MONOCYTES # BLD AUTO: 0.58 K/UL — SIGNIFICANT CHANGE UP (ref 0–0.9)
MONOCYTES NFR BLD AUTO: 7.9 % — SIGNIFICANT CHANGE UP (ref 2–14)
NEUTROPHILS # BLD AUTO: 5.1 K/UL — SIGNIFICANT CHANGE UP (ref 1.8–7.4)
NEUTROPHILS NFR BLD AUTO: 69.4 % — SIGNIFICANT CHANGE UP (ref 43–77)
NRBC # BLD: 0 /100 WBCS — SIGNIFICANT CHANGE UP (ref 0–0)
PLATELET # BLD AUTO: 177 K/UL — SIGNIFICANT CHANGE UP (ref 150–400)
POTASSIUM SERPL-MCNC: 4 MMOL/L — SIGNIFICANT CHANGE UP (ref 3.5–5.3)
POTASSIUM SERPL-SCNC: 4 MMOL/L — SIGNIFICANT CHANGE UP (ref 3.5–5.3)
PROT SERPL-MCNC: 6.9 G/DL — SIGNIFICANT CHANGE UP (ref 6–8.3)
RBC # BLD: 3.79 M/UL — LOW (ref 4.2–5.8)
RBC # FLD: 14.1 % — SIGNIFICANT CHANGE UP (ref 10.3–14.5)
SODIUM SERPL-SCNC: 139 MMOL/L — SIGNIFICANT CHANGE UP (ref 135–145)
WBC # BLD: 7.35 K/UL — SIGNIFICANT CHANGE UP (ref 3.8–10.5)
WBC # FLD AUTO: 7.35 K/UL — SIGNIFICANT CHANGE UP (ref 3.8–10.5)

## 2019-07-03 PROCEDURE — 99233 SBSQ HOSP IP/OBS HIGH 50: CPT | Mod: GC

## 2019-07-03 RX ADMIN — Medication 1 PATCH: at 11:54

## 2019-07-03 RX ADMIN — BUDESONIDE AND FORMOTEROL FUMARATE DIHYDRATE 2 PUFF(S): 160; 4.5 AEROSOL RESPIRATORY (INHALATION) at 09:03

## 2019-07-03 RX ADMIN — Medication 500 MILLIGRAM(S): at 11:54

## 2019-07-03 RX ADMIN — SERTRALINE 50 MILLIGRAM(S): 25 TABLET, FILM COATED ORAL at 11:54

## 2019-07-03 RX ADMIN — Medication 1 PATCH: at 06:13

## 2019-07-03 RX ADMIN — Medication 1 PATCH: at 11:15

## 2019-07-03 RX ADMIN — Medication 650 MILLIGRAM(S): at 15:15

## 2019-07-03 RX ADMIN — Medication 650 MILLIGRAM(S): at 16:15

## 2019-07-03 RX ADMIN — Medication 650 MILLIGRAM(S): at 06:00

## 2019-07-03 RX ADMIN — BUDESONIDE AND FORMOTEROL FUMARATE DIHYDRATE 2 PUFF(S): 160; 4.5 AEROSOL RESPIRATORY (INHALATION) at 22:55

## 2019-07-03 RX ADMIN — CEFTRIAXONE 100 MILLIGRAM(S): 500 INJECTION, POWDER, FOR SOLUTION INTRAMUSCULAR; INTRAVENOUS at 11:53

## 2019-07-03 RX ADMIN — DOLUTEGRAVIR SODIUM 50 MILLIGRAM(S): 25 TABLET, FILM COATED ORAL at 11:55

## 2019-07-03 RX ADMIN — SODIUM CHLORIDE 120 MILLILITER(S): 9 INJECTION INTRAMUSCULAR; INTRAVENOUS; SUBCUTANEOUS at 11:55

## 2019-07-03 RX ADMIN — EMTRICITABINE AND TENOFOVIR DISOPROXIL FUMARATE 1 TABLET(S): 200; 300 TABLET, FILM COATED ORAL at 11:54

## 2019-07-03 RX ADMIN — Medication 650 MILLIGRAM(S): at 06:39

## 2019-07-03 RX ADMIN — Medication 1 PATCH: at 20:07

## 2019-07-03 RX ADMIN — Medication 500 MILLIGRAM(S): at 05:59

## 2019-07-03 NOTE — DISCHARGE NOTE PROVIDER - NSDCCPCAREPLAN_GEN_ALL_CORE_FT
PRINCIPAL DISCHARGE DIAGNOSIS  Diagnosis: Colitis  Assessment and Plan of Treatment: You had a bacterial infection of your colon. This led to symptoms of diarrhea, fever, and abdominal pain. We are going to send you home on Antibiotics. Please stay hydrated and maintain adequate nutrition status. Make sure to wash your hands regularly.      SECONDARY DISCHARGE DIAGNOSES  Diagnosis: HIV disease  Assessment and Plan of Treatment: please resume taking your HIV medications. Follow up with Dr. Henao at the [facility] for management. PRINCIPAL DISCHARGE DIAGNOSIS  Diagnosis: Colitis  Assessment and Plan of Treatment: You had a bacterial infection of your colon. This led to symptoms of diarrhea, fever, and abdominal pain. Please take your cefpodaxime 200 mg twice per day starting 7/5. Please stay hydrated and maintain adequate nutrition status. Make sure to wash your hands regularly.      SECONDARY DISCHARGE DIAGNOSES  Diagnosis: HIV disease  Assessment and Plan of Treatment: please resume taking your HIV medications. Follow up with Dr. Henao at the [facility] for management.

## 2019-07-03 NOTE — PROGRESS NOTE ADULT - PROBLEM SELECTOR PLAN 3
# noncompliant HAART Descovy and Tivacy (hasn't taken in 2 months) follows with Dr. Pacheco   # Abs CD4 539, RNA viral load 45, 375.          -Last ABS CD4: 1000, viral load <30, (10/2018)  - Continue home HAART medications; Descovy 200mg-25mg qd and tivacy 50mg   - HIV team aware, f/u Dr. Pacheco/HIV team recs

## 2019-07-03 NOTE — PROGRESS NOTE ADULT - PROBLEM SELECTOR PLAN 10
1) PCP Contacted on Admission: Yes. Dr. Pacheco [phone]  2) Date of Contact with PCP: 7/2/2019  3) PCP Contacted at Discharge: (Y/N)  4) Summary of Handoff Given to PCP:   5) Post-Discharge Appointment Date and Location:

## 2019-07-03 NOTE — DISCHARGE NOTE PROVIDER - HOSPITAL COURSE
Patient  PMH of HIV on HAART (Tivacy/ Descovy) with ABS CD4 count of 539 and RNA viral load of 45,375 presented to [facility] with abdominal pain and profuse nonbloody watery diarrhea. Patient noted his partner had similar symptoms a few days prior and was determined to have shigellosis. Patient met SIRS criteria due to WBC and tachycardia. CT scan in the ED showed colitis, and patient was admitted. Patient was started on ceftriaxone 1g qd and flagyl 500mg qd. C diff, stool cultures, blood cultures were all negative. GI PCR grew Shigella and E. Coli (EPEC, EIEC, EAEC). Patient's flagyl discontinued based on results. Patient admits to being noncompliant with HAART medications for the past two months. Patient is followed by Dr. Pacheco at the [facility], and followup appointment has been scheduled. Patient's white count and diarrhea have resolved. Patient will be sent with a 5 day total course of cefpodaxime 200 mg BID. Patient is now stable and ready for discharge to home Patient is a 48 yo  male with PMH of HIV on HAART (Tivacy/ Descovy) with ABS CD4 count of 539 and RNA viral load of 45,375 presented to [facility] with abdominal pain and profuse nonbloody watery diarrhea. Patient noted his partner had similar symptoms a few days prior and was determined to have shigellosis. Patient met SIRS criteria due to WBC and tachycardia. CT scan in the ED showed colitis, and patient was admitted. Patient was started on ceftriaxone 1g qd and flagyl 500mg qd. C diff, stool cultures, blood cultures were all negative. GI PCR grew Shigella and E. Coli (EPEC, EIEC, EAEC). Patient's flagyl discontinued based on results. Patient admits to being noncompliant with HAART medications for the past two months. Patient is followed by Dr. Pacheco at the [facility], and followup appointment has been scheduled. Patient's white count and diarrhea have resolved. Patient will be sent with a 5 day total course of cefpodaxime 200 mg BID. Patient is now stable and ready for discharge to home

## 2019-07-03 NOTE — PROGRESS NOTE ADULT - SUBJECTIVE AND OBJECTIVE BOX
Patient seen at the bedside with some abdominal discomfort. Patient states that he had 14 loose nonbloody bowel movements yesterday. So far today he has had 10 loose nonbloody bowel movements. Patient states that he is still have LLQ abdominal discomfort, and described it as a dull ache. Rated pain 7/10, without pain medication. Patient denies nausea, fever, chills, CP, SOB, HA, light headedness. Patient states he has not taken HIV meds for the last two months, but will followup with Dr. Pacheco. Patient mentioned that once a month he notices b/l pitting leg swelling.     T(C): 36.9 (07-03-19 @ 09:03), Max: 37.2 (07-02-19 @ 20:36)  HR: 88 (07-03-19 @ 09:03) (69 - 97)  BP: 128/89 (07-03-19 @ 09:03) (109/72 - 141/73)  RR: 18 (07-03-19 @ 09:03) (16 - 18)  SpO2: 95% (07-03-19 @ 09:03) (94% - 97%)  Wt(kg): --Vital Signs Last 24 Hrs  T(C): 36.9 (03 Jul 2019 09:03), Max: 37.2 (02 Jul 2019 20:36)  T(F): 98.4 (03 Jul 2019 09:03), Max: 98.9 (02 Jul 2019 20:36)  HR: 88 (03 Jul 2019 09:03) (69 - 97)  BP: 128/89 (03 Jul 2019 09:03) (109/72 - 141/73)  BP(mean): --  RR: 18 (03 Jul 2019 09:03) (16 - 18)  SpO2: 95% (03 Jul 2019 09:03) (94% - 97%)    PHYSICAL EXAM:  GENERAL: NAD, well-groomed, well-developed  HEAD:  Atraumatic, Normocephalic,  EYES: EOMI, PERRLA, conjunctiva and sclera clear  ENMT: Good dentition, No lesions  NECK: Supple, No JVD, Normal thyroid  NERVOUS SYSTEM:  Alert & Oriented X3, Good concentration;   CHEST/LUNG: Clear to percussion bilaterally; No rales, rhonchi, wheezing, or rubs  HEART: Regular rate and rhythm; No murmurs, rubs, or gallops  ABDOMEN: nondistended, pain on palpation to upper quadrants and LLQ (most tender), Bowel sounds present  EXTREMITIES:  2+ Peripheral Pulses, No clubbing, cyanosis, or edema  LYMPH: No lymphadenopathy noted  SKIN:  psoriasis noted on scalp    LABS:                        10.5   7.35  )-----------( 177      ( 03 Jul 2019 06:17 )             32.3     07-03    139  |  107  |  9   ----------------------------<  97  4.0   |  24  |  0.82    Ca    8.4      03 Jul 2019 06:17  Mg     1.9     07-03    TPro  6.9  /  Alb  2.9<L>  /  TBili  0.2  /  DBili  x   /  AST  23  /  ALT  14  /  AlkPhos  67  07-03

## 2019-07-03 NOTE — PROGRESS NOTE ADULT - ASSESSMENT
50 yo  Male with PMHx of HIV (2 month noncompliance (Descovy/Bianka follows with Dr. Palafox), COPD, asthma, intracerebral hemorrhage, HTN (no medication), psoriasis who presented to [facility] ED with worsening abdominal pain and diarrhea. Admitted to [facility] for sepsis 2/2 colitis.

## 2019-07-03 NOTE — PROGRESS NOTE ADULT - PROBLEM SELECTOR PLAN 4
# No home oxygen, O2 97% on RA  - Continue home medications Symbicort 80mcg-4.5mg 2 puffs twice a day  - Proventil prn

## 2019-07-03 NOTE — DISCHARGE NOTE PROVIDER - CARE PROVIDER_API CALL
Tammi Pacheco)  Internal Medicine  [address] [address]  Phone: [phone]  Fax: [phone]  Follow Up Time:

## 2019-07-03 NOTE — PROGRESS NOTE ADULT - PROBLEM SELECTOR PLAN 1
# Met 2/4 SIRS criteria: WBC 15.23K, P 100,   # S/p 2L NS and 3.375g Zosysn in ED, will give another 1 L NS to complete sepsis protocol (30cc/kg fluid resuscitation)  # CT abdomen findings consistent with colitis  - start ceftriaxone 1g q24 and flagyl 500mg TID and continue with maintenance NS at 120cc/hr  - C diff negative  - Stool PCR positive for shigella and E. Coli (EIEC, EPEC, EAEC)  -discontinued Flagyl   - f/u stool ova and parasites  - f/u blood cultures-negative to date

## 2019-07-04 ENCOUNTER — TRANSCRIPTION ENCOUNTER (OUTPATIENT)
Age: 49
End: 2019-07-04

## 2019-07-04 VITALS
DIASTOLIC BLOOD PRESSURE: 85 MMHG | SYSTOLIC BLOOD PRESSURE: 120 MMHG | TEMPERATURE: 98 F | RESPIRATION RATE: 18 BRPM | HEART RATE: 88 BPM | OXYGEN SATURATION: 96 %

## 2019-07-04 DIAGNOSIS — F32.9 MAJOR DEPRESSIVE DISORDER, SINGLE EPISODE, UNSPECIFIED: ICD-10-CM

## 2019-07-04 DIAGNOSIS — J44.9 CHRONIC OBSTRUCTIVE PULMONARY DISEASE, UNSPECIFIED: ICD-10-CM

## 2019-07-04 DIAGNOSIS — B20 HUMAN IMMUNODEFICIENCY VIRUS [HIV] DISEASE: ICD-10-CM

## 2019-07-04 DIAGNOSIS — A41.9 SEPSIS, UNSPECIFIED ORGANISM: ICD-10-CM

## 2019-07-04 DIAGNOSIS — E87.6 HYPOKALEMIA: ICD-10-CM

## 2019-07-04 LAB
ANION GAP SERPL CALC-SCNC: 12 MMOL/L — SIGNIFICANT CHANGE UP (ref 5–17)
BUN SERPL-MCNC: 9 MG/DL — SIGNIFICANT CHANGE UP (ref 7–23)
CALCIUM SERPL-MCNC: 8.4 MG/DL — SIGNIFICANT CHANGE UP (ref 8.4–10.5)
CHLORIDE SERPL-SCNC: 107 MMOL/L — SIGNIFICANT CHANGE UP (ref 96–108)
CO2 SERPL-SCNC: 23 MMOL/L — SIGNIFICANT CHANGE UP (ref 22–31)
CREAT SERPL-MCNC: 0.91 MG/DL — SIGNIFICANT CHANGE UP (ref 0.5–1.3)
GLUCOSE SERPL-MCNC: 84 MG/DL — SIGNIFICANT CHANGE UP (ref 70–99)
HCT VFR BLD CALC: 35.1 % — LOW (ref 39–50)
HGB BLD-MCNC: 11 G/DL — LOW (ref 13–17)
MCHC RBC-ENTMCNC: 27.2 PG — SIGNIFICANT CHANGE UP (ref 27–34)
MCHC RBC-ENTMCNC: 31.3 GM/DL — LOW (ref 32–36)
MCV RBC AUTO: 86.9 FL — SIGNIFICANT CHANGE UP (ref 80–100)
NRBC # BLD: 0 /100 WBCS — SIGNIFICANT CHANGE UP (ref 0–0)
PLATELET # BLD AUTO: 226 K/UL — SIGNIFICANT CHANGE UP (ref 150–400)
POTASSIUM SERPL-MCNC: 4.2 MMOL/L — SIGNIFICANT CHANGE UP (ref 3.5–5.3)
POTASSIUM SERPL-SCNC: 4.2 MMOL/L — SIGNIFICANT CHANGE UP (ref 3.5–5.3)
RBC # BLD: 4.04 M/UL — LOW (ref 4.2–5.8)
RBC # FLD: 13.8 % — SIGNIFICANT CHANGE UP (ref 10.3–14.5)
SODIUM SERPL-SCNC: 142 MMOL/L — SIGNIFICANT CHANGE UP (ref 135–145)
WBC # BLD: 5.81 K/UL — SIGNIFICANT CHANGE UP (ref 3.8–10.5)
WBC # FLD AUTO: 5.81 K/UL — SIGNIFICANT CHANGE UP (ref 3.8–10.5)

## 2019-07-04 PROCEDURE — 99239 HOSP IP/OBS DSCHRG MGMT >30: CPT

## 2019-07-04 RX ADMIN — Medication 1 PATCH: at 06:59

## 2019-07-04 RX ADMIN — EMTRICITABINE AND TENOFOVIR DISOPROXIL FUMARATE 1 TABLET(S): 200; 300 TABLET, FILM COATED ORAL at 11:37

## 2019-07-04 RX ADMIN — CEFTRIAXONE 100 MILLIGRAM(S): 500 INJECTION, POWDER, FOR SOLUTION INTRAMUSCULAR; INTRAVENOUS at 11:36

## 2019-07-04 RX ADMIN — Medication 1 PATCH: at 11:37

## 2019-07-04 RX ADMIN — BUDESONIDE AND FORMOTEROL FUMARATE DIHYDRATE 2 PUFF(S): 160; 4.5 AEROSOL RESPIRATORY (INHALATION) at 10:00

## 2019-07-04 RX ADMIN — Medication 1 PATCH: at 14:19

## 2019-07-04 RX ADMIN — DOLUTEGRAVIR SODIUM 50 MILLIGRAM(S): 25 TABLET, FILM COATED ORAL at 11:37

## 2019-07-04 RX ADMIN — SERTRALINE 50 MILLIGRAM(S): 25 TABLET, FILM COATED ORAL at 11:37

## 2019-07-04 NOTE — DISCHARGE NOTE NURSING/CASE MANAGEMENT/SOCIAL WORK - NSDCDPATPORTLINK_GEN_ALL_CORE
You can access the [facility] Patient Portal, offered by [facility], by registering with the following website: [email or web address]

## 2019-07-04 NOTE — PROGRESS NOTE ADULT - SUBJECTIVE AND OBJECTIVE BOX
Patient is a 49y old  Male who presents with a chief complaint of Sepsis 2/2 Colitis (03 Jul 2019 15:09)      INTERVAL HPI/OVERNIGHT EVENTS:    Pt. seen and examined at 11:30AM  Pt. feels better, reports diarrhea resolving (less frequent, more formed)  Denies F/C, N/V, abdominal pain  Tolerating PO    Review of Systems: 12 point review of systems otherwise negative    MEDICATIONS  (STANDING):  buDESOnide  80 MICROgram(s)/formoterol 4.5 MICROgram(s) Inhaler 2 Puff(s) Inhalation two times a day  cefTRIAXone   IVPB 1000 milliGRAM(s) IV Intermittent every 24 hours  dolutegravir 50 milliGRAM(s) Oral daily  emtricitabine 200 mG/tenofovir alafenamide 25 mG (DESCOVY) Tablet 1 Tablet(s) Oral daily  nicotine -  14 mG/24Hr(s) Patch 1 patch Transdermal daily  sertraline 50 milliGRAM(s) Oral daily  sodium chloride 0.9%. 1000 milliLiter(s) (120 mL/Hr) IV Continuous <Continuous>    MEDICATIONS  (PRN):  acetaminophen    Suspension .. 650 milliGRAM(s) Oral every 6 hours PRN Temp greater or equal to 38C (100.4F), Moderate Pain (4 - 6)  ALBUTerol    90 MICROgram(s) HFA Inhaler 2 Puff(s) Inhalation every 6 hours PRN Shortness of Breath      Allergies    No Known Allergies    Intolerances          Vital Signs Last 24 Hrs  T(C): 36.6 (04 Jul 2019 08:38), Max: 37.1 (03 Jul 2019 20:54)  T(F): 97.8 (04 Jul 2019 08:38), Max: 98.7 (03 Jul 2019 20:54)  HR: 88 (04 Jul 2019 08:38) (76 - 94)  BP: 120/85 (04 Jul 2019 08:38) (118/75 - 138/90)  BP(mean): --  RR: 18 (04 Jul 2019 08:38) (18 - 18)  SpO2: 96% (04 Jul 2019 08:38) (96% - 97%)  CAPILLARY BLOOD GLUCOSE            Physical Exam:  (at 11:30AM)  Daily     Daily   General:  well-appearing in NAD  HEENT:  MMM  Abdomen: soft NT ND  Skin:  WWP  Neuro:  AAOx3    LABS:                        11.0   5.81  )-----------( 226      ( 04 Jul 2019 06:44 )             35.1     07-04    142  |  107  |  9   ----------------------------<  84  4.2   |  23  |  0.91    Ca    8.4      04 Jul 2019 06:44  Mg     1.9     07-03    TPro  6.9  /  Alb  2.9<L>  /  TBili  0.2  /  DBili  x   /  AST  23  /  ALT  14  /  AlkPhos  67  07-03            RADIOLOGY & ADDITIONAL TESTS:    ---------------------------------------------------------------------------  I personally reviewed: [  ]EKG   [  ]CXR    [  ] CT    [  ]Other  ---------------------------------------------------------------------------  PLEASE CHECK WHEN PRESENT:     [  ]Heart Failure     [  ] Acute     [  ] Acute on Chronic     [  ] Chronic  -------------------------------------------------------------------     [  ]Diastolic [HFpEF]     [  ]Systolic [HFrEF]     [  ]Combined [HFpEF & HFrEF]     [  ]Other:  -------------------------------------------------------------------  [  ]SAADIA     [  ]ATN     [  ]Reneal Medullary Necrosis     [  ]Renal Cortical Necrosis     [  ]Other Pathological Lesions:    [  ]CKD 1  [  ]CKD 2  [  ]CKD 3  [  ]CKD 4  [  ]CKD 5  [  ]Other  -------------------------------------------------------------------  [  ]Other/Unspecified:    --------------------------------------------------------------------    Abdominal Nutritional Status  [  ]Malnutrition: See Nutrition Note  [  ]Cachexia  [  ]Other:   [  ]Supplement Ordered:  [  ]Morbid Obesity (BMI >=40]

## 2019-07-04 NOTE — DISCHARGE NOTE NURSING/CASE MANAGEMENT/SOCIAL WORK - NSDCPEWEB_GEN_ALL_CORE
[facility] for Tobacco Control website --- [email or web address] website --- [email or web address]

## 2019-07-04 NOTE — DISCHARGE NOTE NURSING/CASE MANAGEMENT/SOCIAL WORK - NSDCPEPTSTRK_GEN_ALL_CORE
Stroke support groups for patients, families, and friends/Stroke warning signs and symptoms/Risk factors for stroke/Prescribed medications/Need for follow up after discharge/Stroke education booklet/Call 911 for stroke/Signs and symptoms of stroke

## 2019-07-04 NOTE — DISCHARGE NOTE NURSING/CASE MANAGEMENT/SOCIAL WORK - NSDCPEEMAIL_GEN_ALL_CORE
[facility] for Tobacco Control email [email or web address]

## 2019-07-04 NOTE — PROGRESS NOTE ADULT - PROBLEM SELECTOR PLAN 1
POA, d/t infectious diarrhea, d/t Shigella/E. coli; clinically-improved, cont. supportive care, bland diet as tolerated, abx -- can change to PO cefpodoxime x2 more days

## 2019-07-05 RX ORDER — CEFPODOXIME PROXETIL 100 MG
1 TABLET ORAL
Qty: 4 | Refills: 0
Start: 2019-07-05 | End: 2019-07-06

## 2019-07-06 LAB
-  AMPICILLIN: SIGNIFICANT CHANGE UP
-  CIPROFLOXACIN: SIGNIFICANT CHANGE UP
-  TRIMETHOPRIM/SULFAMETHOXAZOLE: SIGNIFICANT CHANGE UP
METHOD TYPE: SIGNIFICANT CHANGE UP
METHOD TYPE: SIGNIFICANT CHANGE UP

## 2019-07-08 DIAGNOSIS — A04.4 OTHER INTESTINAL ESCHERICHIA COLI INFECTIONS: ICD-10-CM

## 2019-07-08 DIAGNOSIS — Z91.14 PATIENT'S OTHER NONCOMPLIANCE WITH MEDICATION REGIMEN: ICD-10-CM

## 2019-07-08 DIAGNOSIS — F17.210 NICOTINE DEPENDENCE, CIGARETTES, UNCOMPLICATED: ICD-10-CM

## 2019-07-08 DIAGNOSIS — A03.9 SHIGELLOSIS, UNSPECIFIED: ICD-10-CM

## 2019-07-08 DIAGNOSIS — E87.6 HYPOKALEMIA: ICD-10-CM

## 2019-07-08 DIAGNOSIS — L40.9 PSORIASIS, UNSPECIFIED: ICD-10-CM

## 2019-07-08 DIAGNOSIS — Z21 ASYMPTOMATIC HUMAN IMMUNODEFICIENCY VIRUS [HIV] INFECTION STATUS: ICD-10-CM

## 2019-07-08 DIAGNOSIS — F32.9 MAJOR DEPRESSIVE DISORDER, SINGLE EPISODE, UNSPECIFIED: ICD-10-CM

## 2019-07-08 DIAGNOSIS — I10 ESSENTIAL (PRIMARY) HYPERTENSION: ICD-10-CM

## 2019-07-08 DIAGNOSIS — A41.9 SEPSIS, UNSPECIFIED ORGANISM: ICD-10-CM

## 2019-07-08 DIAGNOSIS — J44.9 CHRONIC OBSTRUCTIVE PULMONARY DISEASE, UNSPECIFIED: ICD-10-CM

## 2019-07-10 PROCEDURE — 87324 CLOSTRIDIUM AG IA: CPT

## 2019-07-10 PROCEDURE — 87449 NOS EACH ORGANISM AG IA: CPT

## 2019-07-10 PROCEDURE — 96374 THER/PROPH/DIAG INJ IV PUSH: CPT | Mod: XU

## 2019-07-10 PROCEDURE — 87507 IADNA-DNA/RNA PROBE TQ 12-25: CPT

## 2019-07-10 PROCEDURE — 96375 TX/PRO/DX INJ NEW DRUG ADDON: CPT

## 2019-07-10 PROCEDURE — 83735 ASSAY OF MAGNESIUM: CPT

## 2019-07-10 PROCEDURE — 36415 COLL VENOUS BLD VENIPUNCTURE: CPT

## 2019-07-10 PROCEDURE — 80048 BASIC METABOLIC PNL TOTAL CA: CPT

## 2019-07-10 PROCEDURE — 87040 BLOOD CULTURE FOR BACTERIA: CPT

## 2019-07-10 PROCEDURE — 85025 COMPLETE CBC W/AUTO DIFF WBC: CPT

## 2019-07-10 PROCEDURE — 80053 COMPREHEN METABOLIC PANEL: CPT

## 2019-07-10 PROCEDURE — 87046 STOOL CULTR AEROBIC BACT EA: CPT

## 2019-07-10 PROCEDURE — 94640 AIRWAY INHALATION TREATMENT: CPT

## 2019-07-10 PROCEDURE — 99285 EMERGENCY DEPT VISIT HI MDM: CPT | Mod: 25

## 2019-07-10 PROCEDURE — 87045 FECES CULTURE AEROBIC BACT: CPT

## 2019-07-10 PROCEDURE — 86359 T CELLS TOTAL COUNT: CPT

## 2019-07-10 PROCEDURE — 83605 ASSAY OF LACTIC ACID: CPT

## 2019-07-10 PROCEDURE — 85027 COMPLETE CBC AUTOMATED: CPT

## 2019-07-10 PROCEDURE — 74177 CT ABD & PELVIS W/CONTRAST: CPT

## 2019-07-10 PROCEDURE — 87186 SC STD MICRODIL/AGAR DIL: CPT

## 2019-07-10 PROCEDURE — 87536 HIV-1 QUANT&REVRSE TRNSCRPJ: CPT

## 2019-07-11 LAB
CULTURE RESULTS: SIGNIFICANT CHANGE UP
ORGANISM # SPEC MICROSCOPIC CNT: SIGNIFICANT CHANGE UP
SPECIMEN SOURCE: SIGNIFICANT CHANGE UP

## 2019-07-16 PROBLEM — I61.9 NONTRAUMATIC INTRACEREBRAL HEMORRHAGE, UNSPECIFIED: Chronic | Status: ACTIVE | Noted: 2019-07-02

## 2019-07-16 PROBLEM — F32.9 MAJOR DEPRESSIVE DISORDER, SINGLE EPISODE, UNSPECIFIED: Chronic | Status: ACTIVE | Noted: 2019-07-02

## 2019-07-16 PROBLEM — L40.9 PSORIASIS, UNSPECIFIED: Chronic | Status: ACTIVE | Noted: 2019-07-02

## 2019-07-17 ENCOUNTER — APPOINTMENT (OUTPATIENT)
Dept: INFECTIOUS DISEASE | Facility: CLINIC | Age: 49
End: 2019-07-17

## 2019-08-06 ENCOUNTER — TRANSCRIPTION ENCOUNTER (OUTPATIENT)
Age: 49
End: 2019-08-06

## 2019-08-16 VITALS
TEMPERATURE: 99 F | SYSTOLIC BLOOD PRESSURE: 128 MMHG | OXYGEN SATURATION: 97 % | WEIGHT: 214.95 LBS | RESPIRATION RATE: 18 BRPM | DIASTOLIC BLOOD PRESSURE: 80 MMHG | HEART RATE: 100 BPM | HEIGHT: 73 IN

## 2019-08-16 LAB
ALBUMIN SERPL ELPH-MCNC: 4.2 G/DL — SIGNIFICANT CHANGE UP (ref 3.3–5)
ALP SERPL-CCNC: 75 U/L — SIGNIFICANT CHANGE UP (ref 40–120)
ALT FLD-CCNC: 164 U/L — HIGH (ref 10–45)
ANION GAP SERPL CALC-SCNC: 15 MMOL/L — SIGNIFICANT CHANGE UP (ref 5–17)
AST SERPL-CCNC: 99 U/L — HIGH (ref 10–40)
BASE EXCESS BLDV CALC-SCNC: -0.4 MMOL/L — SIGNIFICANT CHANGE UP
BASOPHILS # BLD AUTO: 0.04 K/UL — SIGNIFICANT CHANGE UP (ref 0–0.2)
BASOPHILS NFR BLD AUTO: 0.3 % — SIGNIFICANT CHANGE UP (ref 0–2)
BILIRUB SERPL-MCNC: 0.7 MG/DL — SIGNIFICANT CHANGE UP (ref 0.2–1.2)
BUN SERPL-MCNC: 11 MG/DL — SIGNIFICANT CHANGE UP (ref 7–23)
CALCIUM SERPL-MCNC: 9 MG/DL — SIGNIFICANT CHANGE UP (ref 8.4–10.5)
CHLORIDE SERPL-SCNC: 94 MMOL/L — LOW (ref 96–108)
CO2 SERPL-SCNC: 24 MMOL/L — SIGNIFICANT CHANGE UP (ref 22–31)
CREAT SERPL-MCNC: 0.89 MG/DL — SIGNIFICANT CHANGE UP (ref 0.5–1.3)
EOSINOPHIL # BLD AUTO: 0.03 K/UL — SIGNIFICANT CHANGE UP (ref 0–0.5)
EOSINOPHIL NFR BLD AUTO: 0.2 % — SIGNIFICANT CHANGE UP (ref 0–6)
GAS PNL BLDV: SIGNIFICANT CHANGE UP
GLUCOSE SERPL-MCNC: 131 MG/DL — HIGH (ref 70–99)
HCO3 BLDV-SCNC: 24 MMOL/L — SIGNIFICANT CHANGE UP (ref 20–27)
HCT VFR BLD CALC: 40.3 % — SIGNIFICANT CHANGE UP (ref 39–50)
HGB BLD-MCNC: 13.4 G/DL — SIGNIFICANT CHANGE UP (ref 13–17)
IMM GRANULOCYTES NFR BLD AUTO: 0.4 % — SIGNIFICANT CHANGE UP (ref 0–1.5)
LACTATE SERPL-SCNC: 1.5 MMOL/L — SIGNIFICANT CHANGE UP (ref 0.5–2)
LIDOCAIN IGE QN: 14 U/L — SIGNIFICANT CHANGE UP (ref 7–60)
LYMPHOCYTES # BLD AUTO: 17 % — SIGNIFICANT CHANGE UP (ref 13–44)
LYMPHOCYTES # BLD AUTO: 2.43 K/UL — SIGNIFICANT CHANGE UP (ref 1–3.3)
MCHC RBC-ENTMCNC: 28 PG — SIGNIFICANT CHANGE UP (ref 27–34)
MCHC RBC-ENTMCNC: 33.3 GM/DL — SIGNIFICANT CHANGE UP (ref 32–36)
MCV RBC AUTO: 84.1 FL — SIGNIFICANT CHANGE UP (ref 80–100)
MONOCYTES # BLD AUTO: 1.38 K/UL — HIGH (ref 0–0.9)
MONOCYTES NFR BLD AUTO: 9.7 % — SIGNIFICANT CHANGE UP (ref 2–14)
NEUTROPHILS # BLD AUTO: 10.36 K/UL — HIGH (ref 1.8–7.4)
NEUTROPHILS NFR BLD AUTO: 72.4 % — SIGNIFICANT CHANGE UP (ref 43–77)
NRBC # BLD: 0 /100 WBCS — SIGNIFICANT CHANGE UP (ref 0–0)
PCO2 BLDV: 41 MMHG — SIGNIFICANT CHANGE UP (ref 41–51)
PH BLDV: 7.4 — SIGNIFICANT CHANGE UP (ref 7.32–7.43)
PLATELET # BLD AUTO: 268 K/UL — SIGNIFICANT CHANGE UP (ref 150–400)
PO2 BLDV: 23 MMHG — SIGNIFICANT CHANGE UP
POTASSIUM SERPL-MCNC: 3.3 MMOL/L — LOW (ref 3.5–5.3)
POTASSIUM SERPL-SCNC: 3.3 MMOL/L — LOW (ref 3.5–5.3)
PROT SERPL-MCNC: 8.1 G/DL — SIGNIFICANT CHANGE UP (ref 6–8.3)
RBC # BLD: 4.79 M/UL — SIGNIFICANT CHANGE UP (ref 4.2–5.8)
RBC # FLD: 14.5 % — SIGNIFICANT CHANGE UP (ref 10.3–14.5)
SAO2 % BLDV: 37 % — SIGNIFICANT CHANGE UP
SODIUM SERPL-SCNC: 133 MMOL/L — LOW (ref 135–145)
WBC # BLD: 14.3 K/UL — HIGH (ref 3.8–10.5)
WBC # FLD AUTO: 14.3 K/UL — HIGH (ref 3.8–10.5)

## 2019-08-16 RX ORDER — SODIUM CHLORIDE 9 MG/ML
1000 INJECTION INTRAMUSCULAR; INTRAVENOUS; SUBCUTANEOUS ONCE
Refills: 0 | Status: COMPLETED | OUTPATIENT
Start: 2019-08-16 | End: 2019-08-16

## 2019-08-16 RX ORDER — CEFTRIAXONE 500 MG/1
1000 INJECTION, POWDER, FOR SOLUTION INTRAMUSCULAR; INTRAVENOUS ONCE
Refills: 0 | Status: COMPLETED | OUTPATIENT
Start: 2019-08-16 | End: 2019-08-16

## 2019-08-16 RX ORDER — IOHEXOL 300 MG/ML
30 INJECTION, SOLUTION INTRAVENOUS ONCE
Refills: 0 | Status: COMPLETED | OUTPATIENT
Start: 2019-08-16 | End: 2019-08-16

## 2019-08-16 RX ORDER — MORPHINE SULFATE 50 MG/1
4 CAPSULE, EXTENDED RELEASE ORAL ONCE
Refills: 0 | Status: DISCONTINUED | OUTPATIENT
Start: 2019-08-16 | End: 2019-08-16

## 2019-08-16 RX ORDER — ACETAMINOPHEN 500 MG
975 TABLET ORAL ONCE
Refills: 0 | Status: DISCONTINUED | OUTPATIENT
Start: 2019-08-16 | End: 2019-08-16

## 2019-08-16 RX ORDER — ACETAMINOPHEN 500 MG
975 TABLET ORAL ONCE
Refills: 0 | Status: COMPLETED | OUTPATIENT
Start: 2019-08-16 | End: 2019-08-16

## 2019-08-16 RX ORDER — METRONIDAZOLE 500 MG
500 TABLET ORAL ONCE
Refills: 0 | Status: COMPLETED | OUTPATIENT
Start: 2019-08-16 | End: 2019-08-16

## 2019-08-16 RX ADMIN — Medication 100 MILLIGRAM(S): at 23:34

## 2019-08-16 RX ADMIN — SODIUM CHLORIDE 1000 MILLILITER(S): 9 INJECTION INTRAMUSCULAR; INTRAVENOUS; SUBCUTANEOUS at 22:50

## 2019-08-16 RX ADMIN — MORPHINE SULFATE 4 MILLIGRAM(S): 50 CAPSULE, EXTENDED RELEASE ORAL at 23:16

## 2019-08-16 RX ADMIN — SODIUM CHLORIDE 1000 MILLILITER(S): 9 INJECTION INTRAMUSCULAR; INTRAVENOUS; SUBCUTANEOUS at 23:02

## 2019-08-16 RX ADMIN — CEFTRIAXONE 1000 MILLIGRAM(S): 500 INJECTION, POWDER, FOR SOLUTION INTRAMUSCULAR; INTRAVENOUS at 23:50

## 2019-08-16 RX ADMIN — IOHEXOL 30 MILLILITER(S): 300 INJECTION, SOLUTION INTRAVENOUS at 23:02

## 2019-08-16 RX ADMIN — SODIUM CHLORIDE 1000 MILLILITER(S): 9 INJECTION INTRAMUSCULAR; INTRAVENOUS; SUBCUTANEOUS at 23:50

## 2019-08-16 RX ADMIN — Medication 975 MILLIGRAM(S): at 23:33

## 2019-08-16 RX ADMIN — CEFTRIAXONE 100 MILLIGRAM(S): 500 INJECTION, POWDER, FOR SOLUTION INTRAMUSCULAR; INTRAVENOUS at 23:20

## 2019-08-16 RX ADMIN — MORPHINE SULFATE 4 MILLIGRAM(S): 50 CAPSULE, EXTENDED RELEASE ORAL at 23:01

## 2019-08-16 NOTE — ED ADULT NURSE REASSESSMENT NOTE - NS ED NURSE REASSESS COMMENT FT1
repeat VS as noted, additional labs were obtained and sent as noted, along with additional liter of IVF's, d/w provider, awaiting further orders, assessment on-going

## 2019-08-16 NOTE — ED ADULT NURSE NOTE - OBJECTIVE STATEMENT
abdominal pain with diarrhea for 3 days  states diarrhea has become more sever today, has been producing a lot of mucus, with some blood noted today, reports hx of same symptoms in past w/ dx of food borne illness  reports that he was evaluated at another ER a few days ago for psychiatric complaint (Anxiety), but doesn't believe to be related

## 2019-08-16 NOTE — ED ADULT NURSE NOTE - PMH
Asthma    COPD (chronic obstructive pulmonary disease)    Depression    HIV (human immunodeficiency virus infection)    Intracerebral hemorrhage    Psoriasis

## 2019-08-17 ENCOUNTER — INPATIENT (INPATIENT)
Facility: HOSPITAL | Age: 49
LOS: 0 days | Discharge: HOME | DRG: 975 | End: 2019-08-18
Admitting: INTERNAL MEDICINE
Payer: MEDICAID

## 2019-08-17 DIAGNOSIS — Z98.890 OTHER SPECIFIED POSTPROCEDURAL STATES: Chronic | ICD-10-CM

## 2019-08-17 DIAGNOSIS — A41.9 SEPSIS, UNSPECIFIED ORGANISM: ICD-10-CM

## 2019-08-17 DIAGNOSIS — K52.9 NONINFECTIVE GASTROENTERITIS AND COLITIS, UNSPECIFIED: ICD-10-CM

## 2019-08-17 DIAGNOSIS — J44.9 CHRONIC OBSTRUCTIVE PULMONARY DISEASE, UNSPECIFIED: ICD-10-CM

## 2019-08-17 DIAGNOSIS — F17.210 NICOTINE DEPENDENCE, CIGARETTES, UNCOMPLICATED: ICD-10-CM

## 2019-08-17 DIAGNOSIS — B20 HUMAN IMMUNODEFICIENCY VIRUS [HIV] DISEASE: ICD-10-CM

## 2019-08-17 DIAGNOSIS — F32.9 MAJOR DEPRESSIVE DISORDER, SINGLE EPISODE, UNSPECIFIED: ICD-10-CM

## 2019-08-17 DIAGNOSIS — R63.8 OTHER SYMPTOMS AND SIGNS CONCERNING FOOD AND FLUID INTAKE: ICD-10-CM

## 2019-08-17 DIAGNOSIS — R74.0 NONSPECIFIC ELEVATION OF LEVELS OF TRANSAMINASE AND LACTIC ACID DEHYDROGENASE [LDH]: ICD-10-CM

## 2019-08-17 DIAGNOSIS — Z91.89 OTHER SPECIFIED PERSONAL RISK FACTORS, NOT ELSEWHERE CLASSIFIED: ICD-10-CM

## 2019-08-17 LAB
ALBUMIN SERPL ELPH-MCNC: 3.4 G/DL — SIGNIFICANT CHANGE UP (ref 3.3–5)
ALP SERPL-CCNC: 67 U/L — SIGNIFICANT CHANGE UP (ref 40–120)
ALT FLD-CCNC: 132 U/L — HIGH (ref 10–45)
ANION GAP SERPL CALC-SCNC: 11 MMOL/L — SIGNIFICANT CHANGE UP (ref 5–17)
APPEARANCE UR: CLEAR — SIGNIFICANT CHANGE UP
AST SERPL-CCNC: 84 U/L — HIGH (ref 10–40)
BASOPHILS # BLD AUTO: 0.03 K/UL — SIGNIFICANT CHANGE UP (ref 0–0.2)
BASOPHILS NFR BLD AUTO: 0.3 % — SIGNIFICANT CHANGE UP (ref 0–2)
BILIRUB SERPL-MCNC: 0.5 MG/DL — SIGNIFICANT CHANGE UP (ref 0.2–1.2)
BILIRUB UR-MCNC: NEGATIVE — SIGNIFICANT CHANGE UP
BUN SERPL-MCNC: 8 MG/DL — SIGNIFICANT CHANGE UP (ref 7–23)
CALCIUM SERPL-MCNC: 8.2 MG/DL — LOW (ref 8.4–10.5)
CHLORIDE SERPL-SCNC: 104 MMOL/L — SIGNIFICANT CHANGE UP (ref 96–108)
CO2 SERPL-SCNC: 22 MMOL/L — SIGNIFICANT CHANGE UP (ref 22–31)
COLOR SPEC: YELLOW — SIGNIFICANT CHANGE UP
CREAT SERPL-MCNC: 0.71 MG/DL — SIGNIFICANT CHANGE UP (ref 0.5–1.3)
CULTURE RESULTS: SIGNIFICANT CHANGE UP
DIFF PNL FLD: NEGATIVE — SIGNIFICANT CHANGE UP
EOSINOPHIL # BLD AUTO: 0.08 K/UL — SIGNIFICANT CHANGE UP (ref 0–0.5)
EOSINOPHIL NFR BLD AUTO: 0.9 % — SIGNIFICANT CHANGE UP (ref 0–6)
EXTRA URINE TUBE: SIGNIFICANT CHANGE UP
GLUCOSE SERPL-MCNC: 101 MG/DL — HIGH (ref 70–99)
GLUCOSE UR QL: NEGATIVE — SIGNIFICANT CHANGE UP
HAV IGM SER-ACNC: SIGNIFICANT CHANGE UP
HBV CORE IGM SER-ACNC: SIGNIFICANT CHANGE UP
HBV SURFACE AG SER-ACNC: SIGNIFICANT CHANGE UP
HCT VFR BLD CALC: 36.8 % — LOW (ref 39–50)
HCV AB S/CO SERPL IA: 0.26 S/CO — SIGNIFICANT CHANGE UP
HCV AB SERPL-IMP: SIGNIFICANT CHANGE UP
HGB BLD-MCNC: 12.2 G/DL — LOW (ref 13–17)
IMM GRANULOCYTES NFR BLD AUTO: 0.3 % — SIGNIFICANT CHANGE UP (ref 0–1.5)
KETONES UR-MCNC: NEGATIVE — SIGNIFICANT CHANGE UP
LEUKOCYTE ESTERASE UR-ACNC: ABNORMAL
LYMPHOCYTES # BLD AUTO: 1.56 K/UL — SIGNIFICANT CHANGE UP (ref 1–3.3)
LYMPHOCYTES # BLD AUTO: 17 % — SIGNIFICANT CHANGE UP (ref 13–44)
MAGNESIUM SERPL-MCNC: 1.6 MG/DL — SIGNIFICANT CHANGE UP (ref 1.6–2.6)
MCHC RBC-ENTMCNC: 28.1 PG — SIGNIFICANT CHANGE UP (ref 27–34)
MCHC RBC-ENTMCNC: 33.2 GM/DL — SIGNIFICANT CHANGE UP (ref 32–36)
MCV RBC AUTO: 84.8 FL — SIGNIFICANT CHANGE UP (ref 80–100)
MONOCYTES # BLD AUTO: 1.21 K/UL — HIGH (ref 0–0.9)
MONOCYTES NFR BLD AUTO: 13.2 % — SIGNIFICANT CHANGE UP (ref 2–14)
NEUTROPHILS # BLD AUTO: 6.29 K/UL — SIGNIFICANT CHANGE UP (ref 1.8–7.4)
NEUTROPHILS NFR BLD AUTO: 68.3 % — SIGNIFICANT CHANGE UP (ref 43–77)
NITRITE UR-MCNC: NEGATIVE — SIGNIFICANT CHANGE UP
NRBC # BLD: 0 /100 WBCS — SIGNIFICANT CHANGE UP (ref 0–0)
PH UR: 6.5 — SIGNIFICANT CHANGE UP (ref 5–8)
PHOSPHATE SERPL-MCNC: 3.5 MG/DL — SIGNIFICANT CHANGE UP (ref 2.5–4.5)
PLATELET # BLD AUTO: 212 K/UL — SIGNIFICANT CHANGE UP (ref 150–400)
POTASSIUM SERPL-MCNC: 3.3 MMOL/L — LOW (ref 3.5–5.3)
POTASSIUM SERPL-SCNC: 3.3 MMOL/L — LOW (ref 3.5–5.3)
PROT SERPL-MCNC: 6.9 G/DL — SIGNIFICANT CHANGE UP (ref 6–8.3)
PROT UR-MCNC: NEGATIVE MG/DL — SIGNIFICANT CHANGE UP
RBC # BLD: 4.34 M/UL — SIGNIFICANT CHANGE UP (ref 4.2–5.8)
RBC # FLD: 14.4 % — SIGNIFICANT CHANGE UP (ref 10.3–14.5)
SODIUM SERPL-SCNC: 137 MMOL/L — SIGNIFICANT CHANGE UP (ref 135–145)
SP GR SPEC: <=1.005 — SIGNIFICANT CHANGE UP (ref 1–1.03)
SPECIMEN SOURCE: SIGNIFICANT CHANGE UP
UROBILINOGEN FLD QL: 0.2 E.U./DL — SIGNIFICANT CHANGE UP
WBC # BLD: 9.2 K/UL — SIGNIFICANT CHANGE UP (ref 3.8–10.5)
WBC # FLD AUTO: 9.2 K/UL — SIGNIFICANT CHANGE UP (ref 3.8–10.5)

## 2019-08-17 PROCEDURE — 99223 1ST HOSP IP/OBS HIGH 75: CPT | Mod: GC

## 2019-08-17 PROCEDURE — 74177 CT ABD & PELVIS W/CONTRAST: CPT | Mod: 26

## 2019-08-17 PROCEDURE — 99285 EMERGENCY DEPT VISIT HI MDM: CPT

## 2019-08-17 RX ORDER — SERTRALINE 25 MG/1
1 TABLET, FILM COATED ORAL
Qty: 0 | Refills: 0 | DISCHARGE

## 2019-08-17 RX ORDER — DOLUTEGRAVIR SODIUM 25 MG/1
50 TABLET, FILM COATED ORAL DAILY
Refills: 0 | Status: DISCONTINUED | OUTPATIENT
Start: 2019-08-17 | End: 2019-08-18

## 2019-08-17 RX ORDER — POTASSIUM CHLORIDE 20 MEQ
10 PACKET (EA) ORAL
Refills: 0 | Status: COMPLETED | OUTPATIENT
Start: 2019-08-17 | End: 2019-08-17

## 2019-08-17 RX ORDER — CEFTRIAXONE 500 MG/1
1000 INJECTION, POWDER, FOR SOLUTION INTRAMUSCULAR; INTRAVENOUS EVERY 24 HOURS
Refills: 0 | Status: DISCONTINUED | OUTPATIENT
Start: 2019-08-17 | End: 2019-08-18

## 2019-08-17 RX ORDER — DIPHENHYDRAMINE HYDROCHLORIDE AND LIDOCAINE HYDROCHLORIDE AND ALUMINUM HYDROXIDE AND MAGNESIUM HYDRO
10 KIT
Refills: 0 | Status: DISCONTINUED | OUTPATIENT
Start: 2019-08-17 | End: 2019-08-17

## 2019-08-17 RX ORDER — DIPHENHYDRAMINE HYDROCHLORIDE AND LIDOCAINE HYDROCHLORIDE AND ALUMINUM HYDROXIDE AND MAGNESIUM HYDRO
15 KIT THREE TIMES A DAY
Refills: 0 | Status: DISCONTINUED | OUTPATIENT
Start: 2019-08-17 | End: 2019-08-18

## 2019-08-17 RX ORDER — ENOXAPARIN SODIUM 100 MG/ML
40 INJECTION SUBCUTANEOUS DAILY
Refills: 0 | Status: DISCONTINUED | OUTPATIENT
Start: 2019-08-17 | End: 2019-08-17

## 2019-08-17 RX ORDER — METRONIDAZOLE 500 MG
500 TABLET ORAL EVERY 8 HOURS
Refills: 0 | Status: DISCONTINUED | OUTPATIENT
Start: 2019-08-17 | End: 2019-08-18

## 2019-08-17 RX ORDER — ACETAMINOPHEN 500 MG
650 TABLET ORAL EVERY 6 HOURS
Refills: 0 | Status: DISCONTINUED | OUTPATIENT
Start: 2019-08-17 | End: 2019-08-18

## 2019-08-17 RX ORDER — SODIUM CHLORIDE 9 MG/ML
1000 INJECTION INTRAMUSCULAR; INTRAVENOUS; SUBCUTANEOUS
Refills: 0 | Status: DISCONTINUED | OUTPATIENT
Start: 2019-08-17 | End: 2019-08-18

## 2019-08-17 RX ORDER — MAGNESIUM SULFATE 500 MG/ML
2 VIAL (ML) INJECTION ONCE
Refills: 0 | Status: COMPLETED | OUTPATIENT
Start: 2019-08-17 | End: 2019-08-17

## 2019-08-17 RX ORDER — EMTRICITABINE AND TENOFOVIR DISOPROXIL FUMARATE 200; 300 MG/1; MG/1
1 TABLET, FILM COATED ORAL DAILY
Refills: 0 | Status: DISCONTINUED | OUTPATIENT
Start: 2019-08-17 | End: 2019-08-18

## 2019-08-17 RX ORDER — ESCITALOPRAM OXALATE 10 MG/1
10 TABLET, FILM COATED ORAL DAILY
Refills: 0 | Status: DISCONTINUED | OUTPATIENT
Start: 2019-08-17 | End: 2019-08-18

## 2019-08-17 RX ORDER — NICOTINE POLACRILEX 2 MG
1 GUM BUCCAL DAILY
Refills: 0 | Status: DISCONTINUED | OUTPATIENT
Start: 2019-08-17 | End: 2019-08-18

## 2019-08-17 RX ORDER — POTASSIUM CHLORIDE 20 MEQ
40 PACKET (EA) ORAL ONCE
Refills: 0 | Status: COMPLETED | OUTPATIENT
Start: 2019-08-17 | End: 2019-08-17

## 2019-08-17 RX ORDER — ALBUTEROL 90 UG/1
2 AEROSOL, METERED ORAL EVERY 6 HOURS
Refills: 0 | Status: DISCONTINUED | OUTPATIENT
Start: 2019-08-17 | End: 2019-08-18

## 2019-08-17 RX ORDER — BUDESONIDE AND FORMOTEROL FUMARATE DIHYDRATE 160; 4.5 UG/1; UG/1
2 AEROSOL RESPIRATORY (INHALATION)
Refills: 0 | Status: DISCONTINUED | OUTPATIENT
Start: 2019-08-17 | End: 2019-08-18

## 2019-08-17 RX ORDER — POTASSIUM CHLORIDE 20 MEQ
20 PACKET (EA) ORAL ONCE
Refills: 0 | Status: COMPLETED | OUTPATIENT
Start: 2019-08-17 | End: 2019-08-17

## 2019-08-17 RX ORDER — ZOLPIDEM TARTRATE 10 MG/1
1 TABLET ORAL
Qty: 0 | Refills: 0 | DISCHARGE

## 2019-08-17 RX ADMIN — DIPHENHYDRAMINE HYDROCHLORIDE AND LIDOCAINE HYDROCHLORIDE AND ALUMINUM HYDROXIDE AND MAGNESIUM HYDRO 15 MILLILITER(S): KIT at 22:27

## 2019-08-17 RX ADMIN — Medication 100 MILLIGRAM(S): at 15:18

## 2019-08-17 RX ADMIN — Medication 1 PATCH: at 21:50

## 2019-08-17 RX ADMIN — BUDESONIDE AND FORMOTEROL FUMARATE DIHYDRATE 2 PUFF(S): 160; 4.5 AEROSOL RESPIRATORY (INHALATION) at 06:38

## 2019-08-17 RX ADMIN — SODIUM CHLORIDE 120 MILLILITER(S): 9 INJECTION INTRAMUSCULAR; INTRAVENOUS; SUBCUTANEOUS at 22:28

## 2019-08-17 RX ADMIN — Medication 1 PATCH: at 06:38

## 2019-08-17 RX ADMIN — Medication 650 MILLIGRAM(S): at 04:13

## 2019-08-17 RX ADMIN — BUDESONIDE AND FORMOTEROL FUMARATE DIHYDRATE 2 PUFF(S): 160; 4.5 AEROSOL RESPIRATORY (INHALATION) at 18:54

## 2019-08-17 RX ADMIN — SODIUM CHLORIDE 120 MILLILITER(S): 9 INJECTION INTRAMUSCULAR; INTRAVENOUS; SUBCUTANEOUS at 04:12

## 2019-08-17 RX ADMIN — DOLUTEGRAVIR SODIUM 50 MILLIGRAM(S): 25 TABLET, FILM COATED ORAL at 12:21

## 2019-08-17 RX ADMIN — Medication 100 MILLIEQUIVALENT(S): at 12:21

## 2019-08-17 RX ADMIN — EMTRICITABINE AND TENOFOVIR DISOPROXIL FUMARATE 1 TABLET(S): 200; 300 TABLET, FILM COATED ORAL at 12:21

## 2019-08-17 RX ADMIN — Medication 100 MILLIEQUIVALENT(S): at 11:00

## 2019-08-17 RX ADMIN — ESCITALOPRAM OXALATE 10 MILLIGRAM(S): 10 TABLET, FILM COATED ORAL at 12:21

## 2019-08-17 RX ADMIN — Medication 650 MILLIGRAM(S): at 12:25

## 2019-08-17 RX ADMIN — Medication 20 MILLIEQUIVALENT(S): at 11:00

## 2019-08-17 RX ADMIN — SODIUM CHLORIDE 1000 MILLILITER(S): 9 INJECTION INTRAMUSCULAR; INTRAVENOUS; SUBCUTANEOUS at 00:02

## 2019-08-17 RX ADMIN — DIPHENHYDRAMINE HYDROCHLORIDE AND LIDOCAINE HYDROCHLORIDE AND ALUMINUM HYDROXIDE AND MAGNESIUM HYDRO 15 MILLILITER(S): KIT at 15:18

## 2019-08-17 RX ADMIN — Medication 40 MILLIEQUIVALENT(S): at 04:11

## 2019-08-17 RX ADMIN — Medication 100 MILLIEQUIVALENT(S): at 13:18

## 2019-08-17 RX ADMIN — Medication 100 MILLIGRAM(S): at 23:15

## 2019-08-17 RX ADMIN — Medication 50 GRAM(S): at 11:00

## 2019-08-17 RX ADMIN — Medication 100 MILLIGRAM(S): at 06:37

## 2019-08-17 RX ADMIN — Medication 1 PATCH: at 04:12

## 2019-08-17 RX ADMIN — CEFTRIAXONE 100 MILLIGRAM(S): 500 INJECTION, POWDER, FOR SOLUTION INTRAMUSCULAR; INTRAVENOUS at 22:28

## 2019-08-17 RX ADMIN — Medication 500 MILLIGRAM(S): at 00:30

## 2019-08-17 RX ADMIN — Medication 40 MILLIEQUIVALENT(S): at 01:45

## 2019-08-17 RX ADMIN — Medication 975 MILLIGRAM(S): at 01:30

## 2019-08-17 RX ADMIN — DIPHENHYDRAMINE HYDROCHLORIDE AND LIDOCAINE HYDROCHLORIDE AND ALUMINUM HYDROXIDE AND MAGNESIUM HYDRO 15 MILLILITER(S): KIT at 06:52

## 2019-08-17 RX ADMIN — Medication 650 MILLIGRAM(S): at 11:03

## 2019-08-17 RX ADMIN — Medication 650 MILLIGRAM(S): at 05:13

## 2019-08-17 NOTE — H&P ADULT - PROBLEM SELECTOR PLAN 9
1) PCP Contacted on Admission: (Y/N) --> Name & Phone #: Dr Pacheco   2) Date of Contact with PCP:  3) PCP Contacted at Discharge: (Y/N, N/A)  4) Summary of Handoff Given to PCP:   5) Post-Discharge Appointment Date and Location:

## 2019-08-17 NOTE — H&P ADULT - NSICDXFAMILYHX_GEN_ALL_CORE_FT
FAMILY HISTORY:  Family history of cardiac disorder in mother    Father  Still living? Unknown  Family history of diabetes mellitus, Age at diagnosis: Age Unknown  Family history of glaucoma, Age at diagnosis: Age Unknown  Family history of other heart disease, Age at diagnosis: Age Unknown

## 2019-08-17 NOTE — ED PROVIDER NOTE - ATTENDING CONTRIBUTION TO CARE
Addendum to attending statement: I have reviewed the ACP note and agree with the history, exam, and plan of care. I  was available to JOY Scott  as a supervising provider if needed.   Pt w/ PMHx of HIV, noncompliant with antiretrovirals, and colitis earlier this year for which he was hospitalized presents to ED complaining of LLQ abdominal pain x 2 days. Also reports a subjective fever and diarrhea, with around 5 loose stools a day. Did not take temperature at home. Patient reports that he feels similar to the episode of colitis. Denies vomiting, recent travel, and any sick contacts.  Initial plan for CT. Pt spikes fever in the ED. Cultures, lactate, IVF, and abx for intra-abd pathogens added. + proctocolitis on CT. Admitted to medicine for further treatment.

## 2019-08-17 NOTE — H&P ADULT - PROBLEM SELECTOR PLAN 3
Patient with AST 99,  on ED labs. AST and ALT WNL on prior admission.   -Acute hepatitis panel.   -Trend LFTs.

## 2019-08-17 NOTE — H&P ADULT - HISTORY OF PRESENT ILLNESS
49 year  HIV (on Descovy and Tivicay, CD4 534, VL 45,375, follows with Dr. Pacheco),  COPD/asthma, intracerebral hemorrhage, HTN (on no medications), psoriasis presenting with 2 days of of LLQ abdominal pain. Patient with recent [facility] admission for sepsis 2/2 colitis from 7/2-7/4/19, during which he was trated with Zosyn and started on ceftriaxone and metronidazole. Stool studies were all negative, and metronidazole discontinued based on culture results. Patient was discharged with 5-day course of cefpodoxime.     Also reports a subjective fever and diarrhea, with around 5 loose stools a day. Did not take temperature at home. Patient reports that he feels similar to the episode of colitis. Denies vomiting, recent travel, and any sick contacts. 49 year  HIV (on Descovy and Tivicay, CD4 534, VL 45,375 in July 2019, follows with Dr. Pacheco),  COPD/asthma, intracerebral hemorrhage, HTN (on no medications), psoriasis, depression presenting with 2 days of of LLQ abdominal pain. Patient with recent [facility] admission for sepsis 2/2 colitis from 7/2-7/4/19, during which he was trated with Zosyn and started on ceftriaxone and metronidazole. Stool studies were all negative, and metronidazole discontinued based on culture results. Patient was discharged with 5-day course of cefpodoxime.     Also reports a subjective fever and diarrhea, with around 5 loose stools a day. Did not take temperature at home. Patient reports that he feels similar to the episode of colitis. Denies vomiting, recent travel, and any sick contacts.    In the ED:  Initial vital signs: T: 101.9F F, HR: , BP: 123//83, R: 17-18, SpO2: 95-7% on RA  Labs: significant for WBC 14.30, Na 133, K 3.3; AST 99, ; UA with 5-10 WBCs and trace LEs; lactate WNL  Imaging:  CT abomden/pelvis showed circumferential wall thickening   extending from the proximal-mid sigmoid colon to the rectum, findings are consistent with a proctocolitis.   Patient received acetaminophen 975 mg, ceftriaxone 1000 mg, metronidazole 500 mg, morphine 4 mg, KCl 40 mEq, 2L NS bolus. Blood cultures sent. 49 year  HIV (on Descovy and Tivicay, CD4 534, VL 45,375 in July 2019, follows with Dr. Pacheco),  COPD/asthma, intracerebral hemorrhage, HTN (on no medications), psoriasis, depression presenting with 2 days of of LLQ abdominal pain. Patient with recent [facility] admission for sepsis 2/2 colitis from 7/2-7/4/19, during which he was trated with Zosyn and started on ceftriaxone and metronidazole. Stool studies were all negative, and metronidazole discontinued based on culture results. Patient was discharged with 5-day course of cefpodoxime.     Also reports a subjective fever and diarrhea, with around 5 loose stools a day. Did not take temperature at home. Patient reports that he feels similar to the episode of colitis. Denies vomiting, recent travel, and any sick contacts.    In the ED, vital signs were Tmax: 101.9F F, HR: , BP: 123//83, R: 17-18, SpO2: 95-7% on RA  Labs: significant for WBC 14.30, Na 133, K 3.3; AST 99, ; UA with 5-10 WBCs and trace LEs; lactate WNL  Imaging:  CT abomden/pelvis showed circumferential wall thickening   extending from the proximal-mid sigmoid colon to the rectum, findings are consistent with a proctocolitis.   Patient received acetaminophen 975 mg, ceftriaxone 1000 mg, metronidazole 500 mg, morphine 4 mg, KCl 40 mEq, 2L NS bolus. Blood cultures, O&P, and stool cultures sent 49 year  HIV (on Descovy and Tivicay, CD4 534, VL 45,375 in July 2019, follows with Dr. Pacheco),  COPD/asthma, intracerebral hemorrhage, HTN (on no medications), psoriasis, depression presenting with 2 days of of LLQ abdominal pain. Patient with recent [facility] admission for sepsis 2/2 colitis from 7/2-7/4/19, during which he was trated with Zosyn and started on ceftriaxone and metronidazole. Stool studies were all negative, and metronidazole discontinued based on culture results. Patient was discharged with 5-day course of cefpodoxime, which he reports taking, and his symptoms soon resolved. However, beginning 2 days ago, patient with subjective fevers and chills, cramping abdominal pain of 7-8 out of 10 in severity, and mainly localized to LLQ with no radiation. Pain is intermittent patient unsure of any alleviating or exacerbating factors. Reports associated foul-smelling diarrhea of at least 5-10x per day, sometimes with pink-colored but no jorge alberto blood. Denies any recent travel outside local area, sick contacts, or dietary changes. He says that in his past episodes of colitis, usually his male partner gets similar symptoms first but this is not the  case this time. Denies any associated nausea, vomiting, and reports taking PO as normal, through feels dehydrated from diarrhea. Also denies any SOB, new cough, CP, dysuria, or hematuria. Reports chronic cough due to COPD. Denies any recent antibiotic use since taking  cefpodoxime. Continues to smoke cigarettes, currently about 1 PPD.     Of note patient with history of depression and anxiety, and says due to due to depression, he has stopped taking all of his home meds including HIV meds. Last time he took home meds consistently was about 3-4 months ago. Went to [facility] about 2 days ago and admitted for observation for depression and started on Lexapro but has not taken it since discharge. Denies any SI or HI at this time.     In the ED, vital signs were Tmax: 101.9F F, HR: , BP: 123//83, R: 17-18, SpO2: 95-7% on RA  Labs: significant for WBC 14.30, Na 133, K 3.3; AST 99, ; UA with 5-10 WBCs and trace LEs; lactate WNL  Imaging:  CT abdomen/pelvis showed circumferential wall thickening extending from the proximal-mid sigmoid colon to the rectum, findings are consistent with a proctocolitis.   Patient received acetaminophen 975 mg, ceftriaxone 1000 mg, metronidazole 500 mg, morphine 4 mg, KCl 40 mEq, 2L NS bolus. Blood cultures, O&P, and stool cultures sent 49 year  HIV (on Descovy and Tivicay, CD4 534, VL 45,375 in July 2019, follows with Dr. Pacheco),  COPD/asthma, intracerebral hemorrhage, HTN (on no medications), psoriasis, depression presenting with 2 days of of LLQ abdominal pain. Patient with recent [facility] admission for sepsis 2/2 colitis from 7/2-7/4/19, during which he was trated with Zosyn and started on ceftriaxone and metronidazole. Stool with GI PCR showing EIEC EPEC, and EAEC, , and metronidazole discontinued based on stool results. Patient was discharged with 5-day course of cefpodoxime, which he reports taking, and his symptoms soon resolved. However, beginning 2 days ago, patient with subjective fevers and chills, cramping abdominal pain of 7-8 out of 10 in severity, and mainly localized to LLQ with no radiation. Pain is intermittent patient unsure of any alleviating or exacerbating factors. Reports associated foul-smelling diarrhea of at least 5-10x per day, sometimes with pink-colored but no jorge alberto blood. Denies any recent travel outside local area, sick contacts, or dietary changes. He says that in his past episodes of colitis, usually his male partner gets similar symptoms first but this is not the  case this time. Denies any associated nausea, vomiting, and reports taking PO as normal, through feels dehydrated from diarrhea. Also denies any SOB, new cough, CP, dysuria, or hematuria. Reports chronic cough due to COPD. Denies any recent antibiotic use since taking  cefpodoxime. Continues to smoke cigarettes, currently about 1 PPD.     Of note patient with history of depression and anxiety, and says due to due to depression, he has stopped taking all of his home meds including HIV meds. Last time he took home meds consistently was about 3-4 months ago. Went to [facility] about 2 days ago and admitted for observation for depression and started on Lexapro but has not taken it since discharge. Denies any SI or HI at this time.     In the ED, vital signs were Tmax: 101.9F F, HR: , BP: 123//83, R: 17-18, SpO2: 95-7% on RA  Labs: significant for WBC 14.30, Na 133, K 3.3; AST 99, ; UA with 5-10 WBCs and trace LEs; lactate WNL  Imaging:  CT abdomen/pelvis showed circumferential wall thickening extending from the proximal-mid sigmoid colon to the rectum, findings are consistent with a proctocolitis.   Patient received acetaminophen 975 mg, ceftriaxone 1000 mg, metronidazole 500 mg, morphine 4 mg, KCl 40 mEq, 2L NS bolus. Blood cultures, O&P, and stool cultures sent

## 2019-08-17 NOTE — H&P ADULT - ATTENDING COMMENTS
Patient was seen and examined with the resident team today.  I agree with Dr. Pa's assessment and plan with the following exceptions/additions:     Briefly, this is a 50yo gentleman with a PMH of HIV (on Descovy and Tivicay, CD4 534/VL 45,375 , Dr. Pacheco), multiple prior episodes of infectious diarrhea/dysentery and active MDD who p/w acute onset LLQ abdominal pain and bloody diarrhea x 2 days, subsequently found to have sepsis 2/2 proctocolitis.  Recently treated for E. coli but has had Campylobacter and Shigella in the past associated with bloody diarrhea (mostly clots mixed into the stool).      -- f/u GI PCR  --     Natty Kang  [phone] Patient was seen and examined with the resident team today.  I agree with Dr. Pa's assessment and plan with the following exceptions/additions:     Briefly, this is a 48yo gentleman with a PMH of HIV (on Descovy and Tivicay, CD4 534/VL 45,375 , Dr. Pacheco), multiple prior episodes of infectious diarrhea/dysentery and active MDD who p/w acute onset LLQ abdominal pain and bloody diarrhea x 2 days, subsequently found to have sepsis 2/2 proctocolitis.  Recently treated for E. coli but has had Campylobacter and Shigella in the past associated with bloody diarrhea (mostly clots mixed into the stool).      -- f/u GI PCR, O&P  -- would obtain GC/CT rectal swab  -- c/w CTX and Flagyl for now   -- conservative analgesics   -- maintenance IVFs; however, pt is tolerating PO intake   -- re-check HIV VL and CD4 count as pt has been off ARVs 2/2 active MDD  -- c/w SSRI  -- c/w home COPD/asthma meds and can offer Nicotine   -- would have HIV consult come see him if still here on Monday  -- should at least have f/u with Dr. Pacheco as pt is ready to re-engage in his care  -- DVT PPx - pt is ambulatory  -- Dispo - TBD    Natty Kang  [phone]

## 2019-08-17 NOTE — ED PROVIDER NOTE - OBJECTIVE STATEMENT
48 y/o M with PMHx of HIV, noncompliant with antiretrovirals, and colitis earlier this year for which he was hospitalized presents to ED complaining of LLQ abdominal pain x 2 days. Also reports a subjective fever and diarrhea, with around 5 loose stools a day. Did not take temperature at home. Patient reports that he feels similar to the episode of colitis. Denies vomiting, recent travel, and any sick contacts.

## 2019-08-17 NOTE — ED PROVIDER NOTE - CLINICAL SUMMARY MEDICAL DECISION MAKING FREE TEXT BOX
48 y/o M with PMHx of HIV, noncompliant with medications, presents to ED with LLQ pain, fever, and diarrhea x 3 days. Temperature of 101.9 in ED, mildly tachycardic with tenderness to the LLQ on exam. Labs show WBC of 14.3, normal lactate, blood cultures sent. Given Flagyl and Ceftriaxone, IV hydration, plan CT of abdomen and pelvis, followup pending results.

## 2019-08-17 NOTE — H&P ADULT - ASSESSMENT
49 year  HIV (on Descovy and Tivicay, CD4 534, VL 45,375 in July 2019, follows with Dr. Pacheco),  COPD/asthma, intracerebral hemorrhage, HTN (on no medications), psoriasis, depression presenting with 2 days of of LLQ abdominal pain. Admitted for treatment of sepsis 2/2 protocolitis.

## 2019-08-17 NOTE — H&P ADULT - PROBLEM SELECTOR PLAN 4
on Descovy and Tivicay but non-compliant. Reports last use was 3-4 months ago.  CD4 534, VL 45,375 in July 2019  follows with Dr. Pacheco  -c/w Tivicay and Descovy  -Re-check CD4 and VL

## 2019-08-17 NOTE — H&P ADULT - PROBLEM SELECTOR PLAN 5
No SI or HI at this time. Patient recently went to [place] and was started on Lexapro.  -c/w Lexapro

## 2019-08-17 NOTE — H&P ADULT - PROBLEM SELECTOR PLAN 6
Stable no signs of COPD exacerbation.  -c/w home inhalers: Symbicort and Albuterol PRN Patient with 30 year smoking history. Currently at 1 PPD.  -nicotine patch 21 mg QD

## 2019-08-17 NOTE — H&P ADULT - NSICDXPASTMEDICALHX_GEN_ALL_CORE_FT
PAST MEDICAL HISTORY:  Asthma     COPD (chronic obstructive pulmonary disease)     Depression     HIV (human immunodeficiency virus infection)     Intracerebral hemorrhage     Psoriasis

## 2019-08-17 NOTE — H&P ADULT - PROBLEM SELECTOR PLAN 2
Patient with sepsis 2/2 colitis on prior admission and GI PCR showing EIEC EPEC, and EAEC, and completed cefpodoxime with initial clinical improvement. Similar presentation on this admission, but CT abdomen now showing extension of infection into rectum (protocolitis).   -Treatment as above

## 2019-08-17 NOTE — ED ADULT NURSE REASSESSMENT NOTE - NS ED NURSE REASSESS COMMENT FT1
additional stool specimen was obtained and sent, as requested per lab, pt. states still has to collect urine sample, has urinal

## 2019-08-17 NOTE — H&P ADULT - NSICDXPASTSURGICALHX_GEN_ALL_CORE_FT
PAST SURGICAL HISTORY:  No significant past surgical history PAST SURGICAL HISTORY:  S/P tendon repair R hand tendon repair

## 2019-08-17 NOTE — H&P ADULT - PROBLEM SELECTOR PLAN 7
Fluids: IV NS @ 120 cc/hr  Electrolytes: monitor and correct as needed  Nutrition: DASH/TLC diet     Code: Full code  DVT prophylaxis: pharmacologic ppx not needed (Improve = 0)  GI prophylaxis: not needed  Dispo: admit to Medicine Stable no signs of COPD exacerbation.  -c/w home inhalers: Symbicort and Albuterol PRN

## 2019-08-17 NOTE — H&P ADULT - NSHPPHYSICALEXAM_GEN_ALL_CORE
GENERAL: No acute distress. Appears stated age.  HEENT:  Atraumatic, Normocephalic, conjunctiva and sclera clear, oropharynx clear, moist mucous membranes   NECK: Supple, No JVD  CHEST: no gross deformities   LUNG: Clear to auscultation bilaterally; No wheezing, rales, rhonchi, or stridor  HEART: Regular rate and rhythm; S1 and S2 audible; No murmurs, rubs, or gallops  ABDOMEN: Soft, Nontender, Nondistended; Bowel sounds present in all four quadrants  EXTREMITIES:  2+ Peripheral Pulses bilaterally, No clubbing, cyanosis, or edema  NEUROLOGY: awake, alert, oriented x3; grossly intact with no focal deficits   SKIN: No rashes or lesions GENERAL: Middle-aged male. No acute distress. Appears stated age.  HEENT:  Atraumatic, Normocephalic, conjunctiva and sclera clear, oropharynx with +trush, moist mucous membranes  NECK: Supple  CHEST: no gross deformities   LUNG: Clear to auscultation bilaterally; No wheezing, rales, rhonchi, or stridor  HEART: Regular rate and rhythm; S1 and S2 audible; No murmurs, rubs, or gallops  ABDOMEN: Soft, Nondistended; Increased Bowel sounds present in all four quadrants; LLQ with moderate TTP; no hepatosplenomegaly; no rebound tenderness   EXTREMITIES:  2+ Peripheral Pulses bilaterally, No clubbing, cyanosis, or edema  NEUROLOGY: awake, alert, oriented x3; grossly intact with no focal deficits   SKIN: No rashes or lesions

## 2019-08-17 NOTE — H&P ADULT - PROBLEM SELECTOR PLAN 8
1) PCP Contacted on Admission: (Y/N) --> Name & Phone #: Dr Pacheco   2) Date of Contact with PCP:  3) PCP Contacted at Discharge: (Y/N, N/A)  4) Summary of Handoff Given to PCP:   5) Post-Discharge Appointment Date and Location: Fluids: IV NS @ 120 cc/hr  Electrolytes: monitor and correct as needed  Nutrition: DASH/TLC diet     Code: Full code  DVT prophylaxis: pharmacologic ppx not needed (Improve = 0)  GI prophylaxis: not needed  Dispo: admit to Medicine

## 2019-08-17 NOTE — H&P ADULT - NSHPSOCIALHISTORY_GEN_ALL_CORE
current smoker, smokes 1 PPD; denies alcohol or illicit drug use.   Lives at home with . Current smoker, smokes 1 PPD; denies alcohol or illicit drug use.   Lives at home with .

## 2019-08-17 NOTE — H&P ADULT - PROBLEM SELECTOR PLAN 1
Patient meeting 3/4 SIRS criteria: T > 100.4, HR > 90, WBC > 12 on admission.  Patient with sepsis 2/2 colitis on prior admission and GI PCR showing EIEC EPEC, and EAEC, and completed cefpodoxime with initial clinical improvement. Similar presentation on this admission, but CT abdomen now showing extension of infection into rectum (protocolitis).   Very low suspicion for C diff given not recent antibiotic use after cefpodoxime on last discharge  Lactate 1.5 in ED. s/p 2L NS and ceftriaxone and Flagyl  -c/w ceftriaxone and Flagyl pending stool studies  -f/u BCx  -f/u stool  cx, GI PCR, O&P  -IV NS hydration  -Pain control: Tylenol PRN Patient meeting 3/4 SIRS criteria: T 101.9, , WBC 14.30 on admission. Source of infection protocolitis.   Patient with sepsis 2/2 colitis on prior admission and GI PCR showing EIEC EPEC, and EAEC, and completed cefpodoxime with initial clinical improvement. Similar presentation on this admission, but CT abdomen now showing extension of infection into rectum (protocolitis) and says this time his partner did not have symptoms before him, while this was the case in the past.  Very low suspicion for C diff given not recent antibiotic use after cefpodoxime on last discharge.  Lactate 1.5 in ED. s/p 2L NS and ceftriaxone and Flagyl  -c/w ceftriaxone and Flagyl pending stool studies  -f/u BCx  -f/u stool  cx, GI PCR, O&P  -IV NS hydration  -Pain control: Tylenol PRN

## 2019-08-17 NOTE — H&P ADULT - NSHPLABSRESULTS_GEN_ALL_CORE
(08-16 @ 21:47)                      13.4  14.30 )-----------( 268                 40.3    Neutrophils = 10.36 (72.4%)  Lymphocytes = 2.43 (17.0%)  Eosinophils = 0.03 (0.2%)  Basophils = 0.04 (0.3%)  Monocytes = 1.38 (9.7%)  Bands = --%    08-16    133<L>  |  94<L>  |  11  ----------------------------<  131<H>  3.3<L>   |  24  |  0.89    Ca    9.0      16 Aug 2019 21:47    TPro  8.1  /  Alb  4.2  /  TBili  0.7  /  DBili  x   /  AST  99<H>  /  ALT  164<H>  /  AlkPhos  75  08-16          RVP:    Venous Blood Gas:  08-16 @ 23:08  7.40/41/23/24/37  VBG Lactate: --        Tox:         Urinalysis Basic - ( 17 Aug 2019 01:50 )    Color: Yellow / Appearance: Clear / SG: <=1.005 / pH: x  Gluc: x / Ketone: NEGATIVE  / Bili: Negative / Urobili: 0.2 E.U./dL   Blood: x / Protein: NEGATIVE mg/dL / Nitrite: NEGATIVE   Leuk Esterase: Trace / RBC: < 5 /HPF / WBC 5-10 /HPF   Sq Epi: x / Non Sq Epi: 0-5 /HPF / Bacteria: Present /HPF    CT abdomen/pelvis with PO and IV contrast:     No cardiomegaly or pericardial effusion. Patchy atelectasis at the right   lung base. Thin-walled 13 mm cyst at the left lower lobe. Smaller 10 mm   thin-walled cyst abutting the left fissure at the superior left lower lobe.   Liver, gallbladder, pancreas, spleen, bilateral adrenal glands and bilateral   kidneys are grossly unremarkable. No hydronephrosis obstructing calculi.   Urinary bladder appears grossly unremarkable. No bulky retroperitoneal   pelvic sidewall lymphadenopathy. There is circumferential wall thickening   extending from the proximal-mid sigmoid colon to the rectum. These findings   are consistent with a proctocolitis.     On the prior study the patient also was noted to have colitis however it is   predominantly involving the right colon and proximal transverse colon.     The appendix is not visualized. No obvious inflammatory changes at the right   lower quadrant to suggest an acute appendicitis. Osseous structures are   grossly unremarkable.     IMPRESSION:   Findings are suggestive for a proctocolitis.

## 2019-08-18 ENCOUNTER — TRANSCRIPTION ENCOUNTER (OUTPATIENT)
Age: 49
End: 2019-08-18

## 2019-08-18 VITALS
HEART RATE: 88 BPM | OXYGEN SATURATION: 96 % | SYSTOLIC BLOOD PRESSURE: 125 MMHG | TEMPERATURE: 98 F | DIASTOLIC BLOOD PRESSURE: 79 MMHG | RESPIRATION RATE: 16 BRPM

## 2019-08-18 LAB
-  AMPICILLIN: SIGNIFICANT CHANGE UP
-  TRIMETHOPRIM/SULFAMETHOXAZOLE: SIGNIFICANT CHANGE UP
ALBUMIN SERPL ELPH-MCNC: 3.1 G/DL — LOW (ref 3.3–5)
ALP SERPL-CCNC: 105 U/L — SIGNIFICANT CHANGE UP (ref 40–120)
ALT FLD-CCNC: 122 U/L — HIGH (ref 10–45)
ANION GAP SERPL CALC-SCNC: 12 MMOL/L — SIGNIFICANT CHANGE UP (ref 5–17)
AST SERPL-CCNC: 86 U/L — HIGH (ref 10–40)
BASOPHILS # BLD AUTO: 0.03 K/UL — SIGNIFICANT CHANGE UP (ref 0–0.2)
BASOPHILS NFR BLD AUTO: 0.5 % — SIGNIFICANT CHANGE UP (ref 0–2)
BILIRUB SERPL-MCNC: 0.2 MG/DL — SIGNIFICANT CHANGE UP (ref 0.2–1.2)
BUN SERPL-MCNC: 6 MG/DL — LOW (ref 7–23)
CALCIUM SERPL-MCNC: 8 MG/DL — LOW (ref 8.4–10.5)
CHLORIDE SERPL-SCNC: 110 MMOL/L — HIGH (ref 96–108)
CO2 SERPL-SCNC: 20 MMOL/L — LOW (ref 22–31)
CREAT SERPL-MCNC: 0.78 MG/DL — SIGNIFICANT CHANGE UP (ref 0.5–1.3)
EOSINOPHIL # BLD AUTO: 0.19 K/UL — SIGNIFICANT CHANGE UP (ref 0–0.5)
EOSINOPHIL NFR BLD AUTO: 3.1 % — SIGNIFICANT CHANGE UP (ref 0–6)
GLUCOSE SERPL-MCNC: 108 MG/DL — HIGH (ref 70–99)
HCT VFR BLD CALC: 34.7 % — LOW (ref 39–50)
HGB BLD-MCNC: 11.3 G/DL — LOW (ref 13–17)
IMM GRANULOCYTES NFR BLD AUTO: 0.5 % — SIGNIFICANT CHANGE UP (ref 0–1.5)
LYMPHOCYTES # BLD AUTO: 1.33 K/UL — SIGNIFICANT CHANGE UP (ref 1–3.3)
LYMPHOCYTES # BLD AUTO: 22 % — SIGNIFICANT CHANGE UP (ref 13–44)
MAGNESIUM SERPL-MCNC: 1.8 MG/DL — SIGNIFICANT CHANGE UP (ref 1.6–2.6)
MCHC RBC-ENTMCNC: 28.4 PG — SIGNIFICANT CHANGE UP (ref 27–34)
MCHC RBC-ENTMCNC: 32.6 GM/DL — SIGNIFICANT CHANGE UP (ref 32–36)
MCV RBC AUTO: 87.2 FL — SIGNIFICANT CHANGE UP (ref 80–100)
METHOD TYPE: SIGNIFICANT CHANGE UP
MONOCYTES # BLD AUTO: 0.78 K/UL — SIGNIFICANT CHANGE UP (ref 0–0.9)
MONOCYTES NFR BLD AUTO: 12.9 % — SIGNIFICANT CHANGE UP (ref 2–14)
NEUTROPHILS # BLD AUTO: 3.69 K/UL — SIGNIFICANT CHANGE UP (ref 1.8–7.4)
NEUTROPHILS NFR BLD AUTO: 61 % — SIGNIFICANT CHANGE UP (ref 43–77)
NRBC # BLD: 0 /100 WBCS — SIGNIFICANT CHANGE UP (ref 0–0)
PHOSPHATE SERPL-MCNC: 3.6 MG/DL — SIGNIFICANT CHANGE UP (ref 2.5–4.5)
PLATELET # BLD AUTO: 232 K/UL — SIGNIFICANT CHANGE UP (ref 150–400)
POTASSIUM SERPL-MCNC: 3.2 MMOL/L — LOW (ref 3.5–5.3)
POTASSIUM SERPL-SCNC: 3.2 MMOL/L — LOW (ref 3.5–5.3)
PROT SERPL-MCNC: 6.4 G/DL — SIGNIFICANT CHANGE UP (ref 6–8.3)
RBC # BLD: 3.98 M/UL — LOW (ref 4.2–5.8)
RBC # FLD: 14.6 % — HIGH (ref 10.3–14.5)
SODIUM SERPL-SCNC: 142 MMOL/L — SIGNIFICANT CHANGE UP (ref 135–145)
WBC # BLD: 6.05 K/UL — SIGNIFICANT CHANGE UP (ref 3.8–10.5)
WBC # FLD AUTO: 6.05 K/UL — SIGNIFICANT CHANGE UP (ref 3.8–10.5)

## 2019-08-18 PROCEDURE — 36415 COLL VENOUS BLD VENIPUNCTURE: CPT

## 2019-08-18 PROCEDURE — 82803 BLOOD GASES ANY COMBINATION: CPT

## 2019-08-18 PROCEDURE — 99239 HOSP IP/OBS DSCHRG MGMT >30: CPT | Mod: GC

## 2019-08-18 PROCEDURE — 87536 HIV-1 QUANT&REVRSE TRNSCRPJ: CPT

## 2019-08-18 PROCEDURE — 84100 ASSAY OF PHOSPHORUS: CPT

## 2019-08-18 PROCEDURE — 87491 CHLMYD TRACH DNA AMP PROBE: CPT

## 2019-08-18 PROCEDURE — 87045 FECES CULTURE AEROBIC BACT: CPT

## 2019-08-18 PROCEDURE — 94640 AIRWAY INHALATION TREATMENT: CPT

## 2019-08-18 PROCEDURE — 87507 IADNA-DNA/RNA PROBE TQ 12-25: CPT

## 2019-08-18 PROCEDURE — 74177 CT ABD & PELVIS W/CONTRAST: CPT

## 2019-08-18 PROCEDURE — 87040 BLOOD CULTURE FOR BACTERIA: CPT

## 2019-08-18 PROCEDURE — 83690 ASSAY OF LIPASE: CPT

## 2019-08-18 PROCEDURE — 85025 COMPLETE CBC W/AUTO DIFF WBC: CPT

## 2019-08-18 PROCEDURE — 87046 STOOL CULTR AEROBIC BACT EA: CPT

## 2019-08-18 PROCEDURE — 96365 THER/PROPH/DIAG IV INF INIT: CPT | Mod: XU

## 2019-08-18 PROCEDURE — 99285 EMERGENCY DEPT VISIT HI MDM: CPT | Mod: 25

## 2019-08-18 PROCEDURE — 87186 SC STD MICRODIL/AGAR DIL: CPT

## 2019-08-18 PROCEDURE — 83735 ASSAY OF MAGNESIUM: CPT

## 2019-08-18 PROCEDURE — 80074 ACUTE HEPATITIS PANEL: CPT

## 2019-08-18 PROCEDURE — 87177 OVA AND PARASITES SMEARS: CPT

## 2019-08-18 PROCEDURE — 81001 URINALYSIS AUTO W/SCOPE: CPT

## 2019-08-18 PROCEDURE — 83605 ASSAY OF LACTIC ACID: CPT

## 2019-08-18 PROCEDURE — 96368 THER/DIAG CONCURRENT INF: CPT

## 2019-08-18 PROCEDURE — 80053 COMPREHEN METABOLIC PANEL: CPT

## 2019-08-18 RX ORDER — DOLUTEGRAVIR SODIUM 25 MG/1
1 TABLET, FILM COATED ORAL
Qty: 0 | Refills: 0 | DISCHARGE

## 2019-08-18 RX ORDER — NICOTINE POLACRILEX 2 MG
1 GUM BUCCAL
Qty: 30 | Refills: 0
Start: 2019-08-18 | End: 2019-09-16

## 2019-08-18 RX ORDER — ESCITALOPRAM OXALATE 10 MG/1
1 TABLET, FILM COATED ORAL
Qty: 0 | Refills: 0 | DISCHARGE

## 2019-08-18 RX ORDER — EMTRICITABINE AND TENOFOVIR DISOPROXIL FUMARATE 200; 300 MG/1; MG/1
1 TABLET, FILM COATED ORAL
Qty: 30 | Refills: 1
Start: 2019-08-18 | End: 2019-10-16

## 2019-08-18 RX ORDER — EMTRICITABINE AND TENOFOVIR DISOPROXIL FUMARATE 200; 300 MG/1; MG/1
1 TABLET, FILM COATED ORAL
Qty: 0 | Refills: 0 | DISCHARGE

## 2019-08-18 RX ORDER — DOLUTEGRAVIR SODIUM 25 MG/1
1 TABLET, FILM COATED ORAL
Qty: 30 | Refills: 1
Start: 2019-08-18 | End: 2019-10-16

## 2019-08-18 RX ORDER — POTASSIUM CHLORIDE 20 MEQ
40 PACKET (EA) ORAL EVERY 4 HOURS
Refills: 0 | Status: DISCONTINUED | OUTPATIENT
Start: 2019-08-18 | End: 2019-08-18

## 2019-08-18 RX ORDER — ESCITALOPRAM OXALATE 10 MG/1
1 TABLET, FILM COATED ORAL
Qty: 30 | Refills: 1
Start: 2019-08-18 | End: 2024-04-24

## 2019-08-18 RX ORDER — ESCITALOPRAM OXALATE 10 MG/1
1 TABLET, FILM COATED ORAL
Qty: 30 | Refills: 0
Start: 2019-08-18 | End: 2019-09-16

## 2019-08-18 RX ORDER — ESCITALOPRAM OXALATE 10 MG/1
1 TABLET, FILM COATED ORAL
Qty: 30 | Refills: 3
Start: 2019-08-18 | End: 2024-03-13

## 2019-08-18 RX ORDER — AZITHROMYCIN 500 MG/1
500 TABLET, FILM COATED ORAL EVERY 24 HOURS
Refills: 0 | Status: DISCONTINUED | OUTPATIENT
Start: 2019-08-18 | End: 2019-08-18

## 2019-08-18 RX ADMIN — AZITHROMYCIN 500 MILLIGRAM(S): 500 TABLET, FILM COATED ORAL at 10:21

## 2019-08-18 RX ADMIN — DIPHENHYDRAMINE HYDROCHLORIDE AND LIDOCAINE HYDROCHLORIDE AND ALUMINUM HYDROXIDE AND MAGNESIUM HYDRO 15 MILLILITER(S): KIT at 06:34

## 2019-08-18 RX ADMIN — Medication 40 MILLIEQUIVALENT(S): at 10:21

## 2019-08-18 RX ADMIN — BUDESONIDE AND FORMOTEROL FUMARATE DIHYDRATE 2 PUFF(S): 160; 4.5 AEROSOL RESPIRATORY (INHALATION) at 06:34

## 2019-08-18 RX ADMIN — Medication 100 MILLIGRAM(S): at 06:33

## 2019-08-18 NOTE — DISCHARGE NOTE NURSING/CASE MANAGEMENT/SOCIAL WORK - NSDCPEEMAIL_GEN_ALL_CORE
[facility] email [email or web address]

## 2019-08-18 NOTE — DISCHARGE NOTE NURSING/CASE MANAGEMENT/SOCIAL WORK - NSDCPEWEB_GEN_ALL_CORE
[facility] website --- [email or web address] --- [email or web address]

## 2019-08-18 NOTE — DISCHARGE NOTE PROVIDER - HOSPITAL COURSE
49 year  HIV (on Descovy and Tivicay, CD4 534, VL 45,375 in July 2019, follows with Dr. Pacheco),  COPD/asthma, intracerebral hemorrhage, HTN (on no medications), psoriasis, depression presenting with 2 days of of LLQ abdominal pain. Admitted for treatment of sepsis 2/2 protocolitis.         -Proctocolitis 2/2 EIEC, EPEC    -HIV noncompliant with medications and f/u     Inpatient treatment course: Treated with Flagyl/Ceftriaxone, downttitrated to Azithro after GI PCR +EIEC, +EPEC     New medications: Azithro 500mg x3d    Labs to be followed outpatient: CD4, viral load     Exam to be followed outpatient: LLQ pain, diarrhea

## 2019-08-18 NOTE — DISCHARGE NOTE NURSING/CASE MANAGEMENT/SOCIAL WORK - NSDCDPATPORTLINK_GEN_ALL_CORE
You can access the [facility] Patient Portal, offered by [facility], by registering with the following website: [email or web address]

## 2019-08-18 NOTE — DISCHARGE NOTE PROVIDER - CARE PROVIDER_API CALL
Tammi Pacheco)  Internal Medicine  [address] [address]  Phone: [phone]  Fax: [phone]  Follow Up Time:

## 2019-08-18 NOTE — DISCHARGE NOTE PROVIDER - NSDCCPCAREPLAN_GEN_ALL_CORE_FT
PRINCIPAL DISCHARGE DIAGNOSIS  Diagnosis: Proctocolitis  Assessment and Plan of Treatment: You are having diarrhea due to E. Coli nfection. You will take Azithromycin for two more days. Your diarrhea and pain should be improving in the next several days return to the ED if you have new fevers, increasing pain or diarrhea worsens. You should see Dr. Pacheco in the next week to discuss the diarrhea and your HIV control.      SECONDARY DISCHARGE DIAGNOSES  Diagnosis: HIV disease  Assessment and Plan of Treatment: You have a history of HIV infection. We did not make any changes to your regimen. We repeated your CD4 and viral load which will be followed up with Dr. Pacheco.

## 2019-08-19 LAB
4/8 RATIO: 0.73 RATIO — LOW (ref 0.9–3.6)
ABS CD8: 643 /UL — SIGNIFICANT CHANGE UP (ref 142–740)
C TRACH RRNA SPEC QL NAA+PROBE: SIGNIFICANT CHANGE UP
C TRACH+GC RRNA ANAL QL PROBE: SIGNIFICANT CHANGE UP
CD16+CD56+ CELLS NFR BLD: 13 % — SIGNIFICANT CHANGE UP (ref 5–23)
CD16+CD56+ CELLS NFR SPEC: 182 /UL — SIGNIFICANT CHANGE UP (ref 71–410)
CD19 BLASTS SPEC-ACNC: 10 % — SIGNIFICANT CHANGE UP (ref 6–24)
CD19 BLASTS SPEC-ACNC: 143 /UL — SIGNIFICANT CHANGE UP (ref 84–469)
CD3 BLASTS SPEC-ACNC: 1067 /UL — SIGNIFICANT CHANGE UP (ref 672–1870)
CD3 BLASTS SPEC-ACNC: 71 % — SIGNIFICANT CHANGE UP (ref 59–83)
CD4 %: 29 % — LOW (ref 30–62)
CD8 %: 40 % — HIGH (ref 12–36)
CHLAMYDIA/N. GONORRHEA, ANAL/RECTAL, TMA - SOURCE ANAL: SIGNIFICANT CHANGE UP
HIV-1 VIRAL LOAD RESULT: ABNORMAL
HIV1 RNA # SERPL NAA+PROBE: SIGNIFICANT CHANGE UP
HIV1 RNA SER-IMP: SIGNIFICANT CHANGE UP
HIV1 RNA SERPL NAA+PROBE-ACNC: ABNORMAL
HIV1 RNA SERPL NAA+PROBE-LOG#: 4.62 — SIGNIFICANT CHANGE UP
METHOD TYPE: SIGNIFICANT CHANGE UP
N GONORRHOEA RRNA SPEC QL NAA+PROBE: SIGNIFICANT CHANGE UP
T-CELL CD4 SUBSET PNL BLD: 471 /UL — LOW (ref 489–1457)

## 2019-08-19 RX ORDER — AZITHROMYCIN 500 MG/1
1 TABLET, FILM COATED ORAL
Qty: 2 | Refills: 0
Start: 2019-08-19 | End: 2019-08-20

## 2019-08-19 RX ORDER — AZITHROMYCIN 500 MG/1
1 TABLET, FILM COATED ORAL
Qty: 2 | Refills: 0
Start: 2019-08-19 | End: 2019-08-19

## 2019-08-20 LAB
-  AMPICILLIN: SIGNIFICANT CHANGE UP
-  CIPROFLOXACIN: SIGNIFICANT CHANGE UP
-  CIPROFLOXACIN: SIGNIFICANT CHANGE UP
-  TRIMETHOPRIM/SULFAMETHOXAZOLE: SIGNIFICANT CHANGE UP
CULTURE RESULTS: SIGNIFICANT CHANGE UP
CULTURE RESULTS: SIGNIFICANT CHANGE UP
METHOD TYPE: SIGNIFICANT CHANGE UP
ORGANISM # SPEC MICROSCOPIC CNT: SIGNIFICANT CHANGE UP
ORGANISM # SPEC MICROSCOPIC CNT: SIGNIFICANT CHANGE UP

## 2019-08-22 LAB
-  AMPICILLIN: SIGNIFICANT CHANGE UP
-  TRIMETHOPRIM/SULFAMETHOXAZOLE: SIGNIFICANT CHANGE UP
CULTURE RESULTS: SIGNIFICANT CHANGE UP
ORGANISM # SPEC MICROSCOPIC CNT: SIGNIFICANT CHANGE UP
SPECIMEN SOURCE: SIGNIFICANT CHANGE UP

## 2019-08-23 DIAGNOSIS — A41.9 SEPSIS, UNSPECIFIED ORGANISM: ICD-10-CM

## 2019-08-23 DIAGNOSIS — J44.9 CHRONIC OBSTRUCTIVE PULMONARY DISEASE, UNSPECIFIED: ICD-10-CM

## 2019-08-23 DIAGNOSIS — B20 HUMAN IMMUNODEFICIENCY VIRUS [HIV] DISEASE: ICD-10-CM

## 2019-08-23 DIAGNOSIS — A04.2 ENTEROINVASIVE ESCHERICHIA COLI INFECTION: ICD-10-CM

## 2019-08-23 DIAGNOSIS — I10 ESSENTIAL (PRIMARY) HYPERTENSION: ICD-10-CM

## 2019-08-23 DIAGNOSIS — A04.0 ENTEROPATHOGENIC ESCHERICHIA COLI INFECTION: ICD-10-CM

## 2019-08-23 DIAGNOSIS — F17.210 NICOTINE DEPENDENCE, CIGARETTES, UNCOMPLICATED: ICD-10-CM

## 2019-08-23 DIAGNOSIS — Z86.79 PERSONAL HISTORY OF OTHER DISEASES OF THE CIRCULATORY SYSTEM: ICD-10-CM

## 2019-08-23 DIAGNOSIS — L40.9 PSORIASIS, UNSPECIFIED: ICD-10-CM

## 2019-08-23 DIAGNOSIS — Z91.14 PATIENT'S OTHER NONCOMPLIANCE WITH MEDICATION REGIMEN: ICD-10-CM

## 2019-08-23 DIAGNOSIS — F41.8 OTHER SPECIFIED ANXIETY DISORDERS: ICD-10-CM

## 2019-09-06 LAB
CULTURE RESULTS: SIGNIFICANT CHANGE UP
SPECIMEN SOURCE: SIGNIFICANT CHANGE UP

## 2019-10-10 ENCOUNTER — MEDICATION RENEWAL (OUTPATIENT)
Age: 49
End: 2019-10-10

## 2019-10-10 ENCOUNTER — TRANSCRIPTION ENCOUNTER (OUTPATIENT)
Age: 49
End: 2019-10-10

## 2020-02-11 ENCOUNTER — APPOINTMENT (OUTPATIENT)
Dept: INFECTIOUS DISEASE | Facility: CLINIC | Age: 50
End: 2020-02-11

## 2020-02-11 ENCOUNTER — OUTPATIENT (OUTPATIENT)
Dept: OUTPATIENT SERVICES | Facility: HOSPITAL | Age: 50
LOS: 1 days | End: 2020-02-11

## 2020-02-11 VITALS
TEMPERATURE: 98.8 F | RESPIRATION RATE: 12 BRPM | DIASTOLIC BLOOD PRESSURE: 72 MMHG | HEART RATE: 96 BPM | SYSTOLIC BLOOD PRESSURE: 118 MMHG | OXYGEN SATURATION: 98 % | WEIGHT: 196.6 LBS | BODY MASS INDEX: 25.94 KG/M2

## 2020-02-11 DIAGNOSIS — H60.91 UNSPECIFIED OTITIS EXTERNA, RIGHT EAR: ICD-10-CM

## 2020-02-11 DIAGNOSIS — J45.909 UNSPECIFIED ASTHMA, UNCOMPLICATED: ICD-10-CM

## 2020-02-11 DIAGNOSIS — Z86.59 PERSONAL HISTORY OF OTHER MENTAL AND BEHAVIORAL DISORDERS: ICD-10-CM

## 2020-02-11 DIAGNOSIS — R63.4 ABNORMAL WEIGHT LOSS: ICD-10-CM

## 2020-02-11 DIAGNOSIS — H92.09 OTALGIA, UNSPECIFIED EAR: ICD-10-CM

## 2020-02-11 DIAGNOSIS — J44.1 CHRONIC OBSTRUCTIVE PULMONARY DISEASE WITH (ACUTE) EXACERBATION: ICD-10-CM

## 2020-02-11 DIAGNOSIS — Z72.0 TOBACCO USE: ICD-10-CM

## 2020-02-11 DIAGNOSIS — Z98.890 OTHER SPECIFIED POSTPROCEDURAL STATES: Chronic | ICD-10-CM

## 2020-02-11 DIAGNOSIS — B35.6 TINEA CRURIS: ICD-10-CM

## 2020-02-11 LAB
ALBUMIN SERPL ELPH-MCNC: 3.4 G/DL — SIGNIFICANT CHANGE UP (ref 3.3–5)
ALP SERPL-CCNC: 133 U/L — HIGH (ref 40–120)
ALT FLD-CCNC: 30 U/L — SIGNIFICANT CHANGE UP (ref 10–45)
ANION GAP SERPL CALC-SCNC: 12 MMOL/L — SIGNIFICANT CHANGE UP (ref 5–17)
AST SERPL-CCNC: 40 U/L — SIGNIFICANT CHANGE UP (ref 10–40)
BASOPHILS # BLD AUTO: 0.02 K/UL — SIGNIFICANT CHANGE UP (ref 0–0.2)
BASOPHILS NFR BLD AUTO: 0.3 % — SIGNIFICANT CHANGE UP (ref 0–2)
BILIRUB SERPL-MCNC: 0.4 MG/DL — SIGNIFICANT CHANGE UP (ref 0.2–1.2)
BUN SERPL-MCNC: 10 MG/DL — SIGNIFICANT CHANGE UP (ref 7–23)
CALCIUM SERPL-MCNC: 8.8 MG/DL — SIGNIFICANT CHANGE UP (ref 8.4–10.5)
CHLORIDE SERPL-SCNC: 100 MMOL/L — SIGNIFICANT CHANGE UP (ref 96–108)
CHOLEST SERPL-MCNC: 109 MG/DL — SIGNIFICANT CHANGE UP (ref 10–199)
CO2 SERPL-SCNC: 24 MMOL/L — SIGNIFICANT CHANGE UP (ref 22–31)
CREAT SERPL-MCNC: 0.75 MG/DL — SIGNIFICANT CHANGE UP (ref 0.5–1.3)
EOSINOPHIL # BLD AUTO: 0.15 K/UL — SIGNIFICANT CHANGE UP (ref 0–0.5)
EOSINOPHIL NFR BLD AUTO: 2.6 % — SIGNIFICANT CHANGE UP (ref 0–6)
GLUCOSE SERPL-MCNC: 103 MG/DL — HIGH (ref 70–99)
HCT VFR BLD CALC: 38.2 % — LOW (ref 39–50)
HDLC SERPL-MCNC: 17 MG/DL — LOW
HGB BLD-MCNC: 12.5 G/DL — LOW (ref 13–17)
IMM GRANULOCYTES NFR BLD AUTO: 0.7 % — SIGNIFICANT CHANGE UP (ref 0–1.5)
LIPID PNL WITH DIRECT LDL SERPL: 69 MG/DL — SIGNIFICANT CHANGE UP
LYMPHOCYTES # BLD AUTO: 1.29 K/UL — SIGNIFICANT CHANGE UP (ref 1–3.3)
LYMPHOCYTES # BLD AUTO: 22 % — SIGNIFICANT CHANGE UP (ref 13–44)
MCHC RBC-ENTMCNC: 28.1 PG — SIGNIFICANT CHANGE UP (ref 27–34)
MCHC RBC-ENTMCNC: 32.7 GM/DL — SIGNIFICANT CHANGE UP (ref 32–36)
MCV RBC AUTO: 85.8 FL — SIGNIFICANT CHANGE UP (ref 80–100)
MONOCYTES # BLD AUTO: 0.78 K/UL — SIGNIFICANT CHANGE UP (ref 0–0.9)
MONOCYTES NFR BLD AUTO: 13.3 % — SIGNIFICANT CHANGE UP (ref 2–14)
NEUTROPHILS # BLD AUTO: 3.59 K/UL — SIGNIFICANT CHANGE UP (ref 1.8–7.4)
NEUTROPHILS NFR BLD AUTO: 61.1 % — SIGNIFICANT CHANGE UP (ref 43–77)
NRBC # BLD: 0 /100 WBCS — SIGNIFICANT CHANGE UP (ref 0–0)
PLATELET # BLD AUTO: 229 K/UL — SIGNIFICANT CHANGE UP (ref 150–400)
POTASSIUM SERPL-MCNC: 4 MMOL/L — SIGNIFICANT CHANGE UP (ref 3.5–5.3)
POTASSIUM SERPL-SCNC: 4 MMOL/L — SIGNIFICANT CHANGE UP (ref 3.5–5.3)
PROT SERPL-MCNC: 8.3 G/DL — SIGNIFICANT CHANGE UP (ref 6–8.3)
RBC # BLD: 4.45 M/UL — SIGNIFICANT CHANGE UP (ref 4.2–5.8)
RBC # FLD: 13.5 % — SIGNIFICANT CHANGE UP (ref 10.3–14.5)
SODIUM SERPL-SCNC: 136 MMOL/L — SIGNIFICANT CHANGE UP (ref 135–145)
TOTAL CHOLESTEROL/HDL RATIO MEASUREMENT: 6.4 RATIO — SIGNIFICANT CHANGE UP (ref 3.4–9.6)
TRIGL SERPL-MCNC: 116 MG/DL — SIGNIFICANT CHANGE UP (ref 10–149)
WBC # BLD: 5.87 K/UL — SIGNIFICANT CHANGE UP (ref 3.8–10.5)
WBC # FLD AUTO: 5.87 K/UL — SIGNIFICANT CHANGE UP (ref 3.8–10.5)

## 2020-02-11 RX ORDER — BUDESONIDE AND FORMOTEROL FUMARATE DIHYDRATE 80; 4.5 UG/1; UG/1
80-4.5 AEROSOL RESPIRATORY (INHALATION) TWICE DAILY
Qty: 1 | Refills: 3 | Status: DISCONTINUED | COMMUNITY
End: 2020-02-11

## 2020-02-11 RX ORDER — ZOLPIDEM TARTRATE 10 MG/1
10 TABLET ORAL
Qty: 1 | Refills: 2 | Status: DISCONTINUED | COMMUNITY
Start: 2017-09-25 | End: 2020-02-11

## 2020-02-11 RX ORDER — CIPROFLOXACIN AND DEXAMETHASONE 3; 1 MG/ML; MG/ML
0.3-0.1 SUSPENSION/ DROPS AURICULAR (OTIC) TWICE DAILY
Qty: 1 | Refills: 0 | Status: DISCONTINUED | COMMUNITY
Start: 2018-07-24 | End: 2020-02-11

## 2020-02-11 RX ORDER — DOLUTEGRAVIR SODIUM 50 MG/1
50 TABLET, FILM COATED ORAL
Qty: 30 | Refills: 0 | Status: DISCONTINUED | COMMUNITY
Start: 2017-03-30 | End: 2020-02-11

## 2020-02-11 RX ORDER — TERBINAFINE HCL 1 %
1 CREAM (GRAM) TOPICAL
Qty: 1 | Refills: 0 | Status: ACTIVE | COMMUNITY
Start: 2020-02-11 | End: 1900-01-01

## 2020-02-11 RX ORDER — EMTRICITABINE AND TENOFOVIR ALAFENAMIDE 200; 25 MG/1; MG/1
200-25 TABLET ORAL
Qty: 30 | Refills: 0 | Status: DISCONTINUED | COMMUNITY
Start: 2017-03-30 | End: 2020-02-11

## 2020-02-11 RX ORDER — SERTRALINE HYDROCHLORIDE 100 MG/1
100 TABLET, FILM COATED ORAL DAILY
Qty: 60 | Refills: 1 | Status: DISCONTINUED | COMMUNITY
Start: 2018-07-24 | End: 2020-02-11

## 2020-02-12 LAB
4/8 RATIO: 0.85 RATIO — LOW (ref 0.9–3.6)
ABS CD8: 512 /UL — SIGNIFICANT CHANGE UP (ref 142–740)
CD3 BLASTS SPEC-ACNC: 77 % — SIGNIFICANT CHANGE UP (ref 59–83)
CD3 BLASTS SPEC-ACNC: 962 /UL — SIGNIFICANT CHANGE UP (ref 672–1870)
CD4 %: 35 % — SIGNIFICANT CHANGE UP (ref 30–62)
CD8 %: 41 % — HIGH (ref 12–36)
N GONORRHOEA RRNA SPEC QL NAA+PROBE: SIGNIFICANT CHANGE UP
SPECIMEN SOURCE: SIGNIFICANT CHANGE UP
T PALLIDUM AB TITR SER: POSITIVE
T-CELL CD4 SUBSET PNL BLD: 434 /UL — LOW (ref 489–1457)

## 2020-02-12 NOTE — END OF VISIT
[] : Resident [FreeTextEntry3] : I saw and examined the patient with resident. I reviewed the laboratory and radiologic data and reviewed the notes. I discussed social barrier to his health with our CSW and the patient.\par I performed a medication reconciliation and  reviewed vaccination history and other health care maintenance and prevention topics in the EMR.\par I assessed patient's adherence to medications especially ARVs (not adherent as above).\par \par patient claims no drug use.\par Weight loss can be secondary to untreated HIV or psychiatric aspects.\par \par Will FU closely\par \par We set plans for follow up in\par  [Time Spent: ___ minutes] : I have spent [unfilled] minutes of face to face time with the patient [>50% of Time Spent on Counseling for ____] : Greater than 50% of the encounter time was spent on counseling for [unfilled]

## 2020-02-12 NOTE — HISTORY OF PRESENT ILLNESS
[FreeTextEntry1] : f/u visit [de-identified] : 50 y/o m w/ pmhx of hiv (VL 41, 616, cd4 471 Aug 19') nonadherent to descovy and tivicay 3 months, MSM (unprotected, receptive, insertive, multiple partners), psoriasis, copd/asthma, tobacco use, right hand numbness 2/2 C7 disc herniation, psoriasis, gout (last flare 2 years ago), and depression/anxiety presents for f/u visit. The pt has had worsening anxiety and depression since this past summer and eventually required voluntary admission to [facility] d/c on lexapro which he has not taken for the past 3 months. He currently denies SI/HI/auditory or visual hallucinations. He rarely leaves his apartment d/t anxiety and has stopped taking most of his COPD meds (lost nebulizer) for several months. His last episode of wheezing was a few weeks ago for which he borrowed his friend's albuterol inhaler.

## 2020-02-12 NOTE — PHYSICAL EXAM
[Normal Outer Ear/Nose] : the outer ears and nose were normal in appearance [Normal Oropharynx] : the oropharynx was normal [Supple] : supple [No JVD] : no jugular venous distention [Thyroid Normal, No Nodules] : the thyroid was normal and there were no nodules present [Declined Rectal Exam] : declined rectal exam [Normal] : no joint swelling and grossly normal strength and tone [de-identified] : Left submandibular [id number] enlarged lymph node  [de-identified] : Anxious affect avoiding eye contact [de-identified] : Declined  exam [de-identified] : Left pectoral 3x4cm erythematous circular lesion w/raised edges and central clearing, Sternal pectoral 1x3cm erythematous circular lesion w/raised edges [de-identified] : anxious affect

## 2020-02-12 NOTE — ASSESSMENT
[FreeTextEntry1] : 50 y/o m w/ pmhx of hiv (VL 41, 616, cd4 471 Aug 19') nonadherent to descovy and tivicay 3 months, MSM (unprotected, receptive, insertive, multiple partners), psoriasis, copd/asthma, tobacco use, right hand numbness 2/2 C7 disc herniation, psoriasis, gout (last flare 2 years ago), and depression/anxiety presents for f/u visit.\par \par #HIV- (VL 41, 616, cd4 471 Aug 19') non-compliant/adherent d/t lack of insurance, SW provided 3 days of Descovy/tivicay until new new health insurance begins coverage\par -c/w descovy and tivicay c/w regimen\par -f/u full set of labs including Urine GC/chlamydia drawn today including genotype testing\par -Pt refused GC/Chlamydia anal/oral testing, will consider for next visit  \par \par #Tinea Cruris- exam findings consistent tinea\par -Rx Lamisil cream BID to affected areas for one week\par \par #Depression- denies SI/HI or visual/auditory hallucinations\par -c/w lexapro 5mg qd\par -f/u psychiatry and psychology referral provided by BO\par \par #Unintentional weight loss- significant drop from 230lb to 196lb liklely 2/2 poor PO intake from worsening depression/anxiety and viremia from HIV\par -counseled pt on increasing PO caloric intake, resumed psych meds and established psych referral.\par #Bilateral lower extremity edema- likely 2/2 venous stasis d/t sedentary lifestyle\par -counseled \par \par #Psoriasis\par c/w current home clobetasol cream regimen\par dermatology referral\par \par #B/l LE edema- likely venous stasis 2/2 sedentary lifestyle\par -encouraged use of compresison stalking, elevation of LE when sleeping \par \par #COPD/asthma- no significant hx of wheezing/exacerbations, sxs stable\par -represcribed proair and symbicort\par \par #HCM\par -declined vaccines until f/u visit in 3 weeks\par -declined triple site testing or anal pap smear\par -RTC 3 weeks

## 2020-02-12 NOTE — REVIEW OF SYSTEMS
[Negative] : Heme/Lymph [Itching] : no itching [Mole Changes] : no mole changes [Hair Changes] : no hair changes [Nail Changes] : no nail changes [FreeTextEntry9] : b/ LE swelling  [de-identified] : Two circular nontender/nonitching erythematous rash in left pectoral and lower sternum

## 2020-02-13 LAB
GAMMA INTERFERON BACKGROUND BLD IA-ACNC: 0.1 IU/ML — SIGNIFICANT CHANGE UP
HIV-1 VIRAL LOAD RESULT: ABNORMAL
HIV1 RNA # SERPL NAA+PROBE: 198 — SIGNIFICANT CHANGE UP
HIV1 RNA SER-IMP: SIGNIFICANT CHANGE UP
HIV1 RNA SERPL NAA+PROBE-ACNC: ABNORMAL
HIV1 RNA SERPL NAA+PROBE-LOG#: 2.3 — SIGNIFICANT CHANGE UP
M TB IFN-G BLD-IMP: NEGATIVE — SIGNIFICANT CHANGE UP
M TB IFN-G CD4+ BCKGRND COR BLD-ACNC: 0.01 IU/ML — SIGNIFICANT CHANGE UP
M TB IFN-G CD4+CD8+ BCKGRND COR BLD-ACNC: 0.01 IU/ML — SIGNIFICANT CHANGE UP
QUANT TB PLUS MITOGEN MINUS NIL: 2.94 IU/ML — SIGNIFICANT CHANGE UP

## 2020-02-14 ENCOUNTER — APPOINTMENT (OUTPATIENT)
Dept: INFECTIOUS DISEASE | Facility: CLINIC | Age: 50
End: 2020-02-14

## 2020-02-14 ENCOUNTER — OUTPATIENT (OUTPATIENT)
Dept: OUTPATIENT SERVICES | Facility: HOSPITAL | Age: 50
LOS: 1 days | End: 2020-02-14

## 2020-02-14 DIAGNOSIS — Z98.890 OTHER SPECIFIED POSTPROCEDURAL STATES: Chronic | ICD-10-CM

## 2020-02-14 RX ORDER — PENICILLIN G BENZATHINE 2400000 [IU]/4ML
2400000 INJECTION, SUSPENSION INTRAMUSCULAR
Qty: 1 | Refills: 0 | Status: COMPLETED | OUTPATIENT
Start: 2020-02-14

## 2020-02-14 RX ADMIN — PENICILLIN G BENZATHINE 4 UNIT/4ML: 2400000 INJECTION, SUSPENSION INTRAMUSCULAR at 00:00

## 2020-02-20 DIAGNOSIS — L40.9 PSORIASIS, UNSPECIFIED: ICD-10-CM

## 2020-02-20 DIAGNOSIS — B35.6 TINEA CRURIS: ICD-10-CM

## 2020-02-20 DIAGNOSIS — J45.909 UNSPECIFIED ASTHMA, UNCOMPLICATED: ICD-10-CM

## 2020-02-20 DIAGNOSIS — B20 HUMAN IMMUNODEFICIENCY VIRUS [HIV] DISEASE: ICD-10-CM

## 2020-02-20 DIAGNOSIS — Z72.0 TOBACCO USE: ICD-10-CM

## 2020-02-20 DIAGNOSIS — F41.8 OTHER SPECIFIED ANXIETY DISORDERS: ICD-10-CM

## 2020-02-21 ENCOUNTER — APPOINTMENT (OUTPATIENT)
Dept: INFECTIOUS DISEASE | Facility: CLINIC | Age: 50
End: 2020-02-21

## 2020-02-24 ENCOUNTER — APPOINTMENT (OUTPATIENT)
Dept: INFECTIOUS DISEASE | Facility: CLINIC | Age: 50
End: 2020-02-24

## 2020-02-24 DIAGNOSIS — B20 HUMAN IMMUNODEFICIENCY VIRUS [HIV] DISEASE: ICD-10-CM

## 2020-02-28 ENCOUNTER — APPOINTMENT (OUTPATIENT)
Dept: INFECTIOUS DISEASE | Facility: CLINIC | Age: 50
End: 2020-02-28

## 2020-02-28 ENCOUNTER — OUTPATIENT (OUTPATIENT)
Dept: OUTPATIENT SERVICES | Facility: HOSPITAL | Age: 50
LOS: 1 days | End: 2020-02-28

## 2020-02-28 VITALS
HEART RATE: 108 BPM | TEMPERATURE: 96.9 F | BODY MASS INDEX: 26.37 KG/M2 | WEIGHT: 199 LBS | OXYGEN SATURATION: 98 % | SYSTOLIC BLOOD PRESSURE: 140 MMHG | HEIGHT: 73 IN | RESPIRATION RATE: 14 BRPM | DIASTOLIC BLOOD PRESSURE: 83 MMHG

## 2020-02-28 DIAGNOSIS — Z98.890 OTHER SPECIFIED POSTPROCEDURAL STATES: Chronic | ICD-10-CM

## 2020-02-28 DIAGNOSIS — Z00.00 ENCOUNTER FOR GENERAL ADULT MEDICAL EXAMINATION W/OUT ABNORMAL FINDINGS: ICD-10-CM

## 2020-02-28 DIAGNOSIS — A53.0 LATENT SYPHILIS, UNSPECIFIED AS EARLY OR LATE: ICD-10-CM

## 2020-02-28 RX ORDER — PENICILLIN G BENZATHINE 2400000 [IU]/4ML
2400000 INJECTION, SUSPENSION INTRAMUSCULAR
Qty: 1 | Refills: 0 | Status: COMPLETED | OUTPATIENT
Start: 2020-02-28

## 2020-02-28 RX ADMIN — PENICILLIN G BENZATHINE 4 UNIT/4ML: 2400000 INJECTION, SUSPENSION INTRAMUSCULAR at 00:00

## 2020-03-03 ENCOUNTER — APPOINTMENT (OUTPATIENT)
Dept: INFECTIOUS DISEASE | Facility: CLINIC | Age: 50
End: 2020-03-03

## 2020-03-04 DIAGNOSIS — B20 HUMAN IMMUNODEFICIENCY VIRUS [HIV] DISEASE: ICD-10-CM

## 2020-03-04 DIAGNOSIS — Z00.00 ENCOUNTER FOR GENERAL ADULT MEDICAL EXAMINATION WITHOUT ABNORMAL FINDINGS: ICD-10-CM

## 2020-03-04 DIAGNOSIS — A53.0 LATENT SYPHILIS, UNSPECIFIED AS EARLY OR LATE: ICD-10-CM

## 2020-03-06 ENCOUNTER — APPOINTMENT (OUTPATIENT)
Dept: INFECTIOUS DISEASE | Facility: CLINIC | Age: 50
End: 2020-03-06

## 2020-04-27 ENCOUNTER — TRANSCRIPTION ENCOUNTER (OUTPATIENT)
Age: 50
End: 2020-04-27

## 2020-04-28 RX ORDER — ALBUTEROL SULFATE 2.5 MG/3ML
(2.5 MG/3ML) SOLUTION RESPIRATORY (INHALATION)
Qty: 1 | Refills: 5 | Status: ACTIVE | COMMUNITY
Start: 2018-09-20

## 2020-04-28 RX ORDER — ALBUTEROL SULFATE 90 UG/1
108 (90 BASE) AEROSOL, METERED RESPIRATORY (INHALATION) EVERY 6 HOURS
Qty: 1 | Refills: 5 | Status: COMPLETED | COMMUNITY
Start: 2020-02-11 | End: 2020-04-27

## 2020-04-28 RX ORDER — CLOBETASOL PROPIONATE 0.05 G/ML
0.05 SPRAY TOPICAL TWICE DAILY
Qty: 1 | Refills: 2 | Status: ACTIVE | COMMUNITY

## 2020-05-27 NOTE — H&P ADULT - NSHPPOADEEPVENOUSTHROMB_GEN_A_CORE
Pt's  called to request a refill of     HYDROcodone-acetaminophen (NORCO) 5-325 MG per tablet    [facility] [place]  PH: [phone]  FAX: [phone]   no

## 2020-07-28 NOTE — H&P ADULT - PROBLEM/PLAN-7
His rectal swab shows resistant to several antibiotics including gentamicin. Fortunately, we can give him a gram of Rocephin IM and Augmentin pre-and post biopsy. Tell him I am sending those in electronically to his pharmacy. Please schedule him for TRUS/ultrasound-guided prostate biopsies. DISPLAY PLAN FREE TEXT

## 2020-11-09 NOTE — ED PROVIDER NOTE - ATTESTATION, MLM
Degenerative hip disease  planned for MARLA in future     CAD   abn stress test   cath shows three vessel CAD   plan for cabg     Mod AS  stable   not visualized on repeat echo     HTN  labile   will address after surgery     CKD   fu with renal     abn EKG  q waves in anterior leads consistent with old MI     Pre-Operative Cardiac Risk Stratification and Optimization  Based on current ACC/AHA guidelines, patient history and physical exam, the patient is considered to have high risk   given significant 3vcad , will postpone non cardiac surgery till safe after cabg   I have reviewed and confirmed nurses' notes regarding past medical, surgical, and family history.

## 2020-11-17 ENCOUNTER — TRANSCRIPTION ENCOUNTER (OUTPATIENT)
Age: 50
End: 2020-11-17

## 2020-11-17 ENCOUNTER — NON-APPOINTMENT (OUTPATIENT)
Age: 50
End: 2020-11-17

## 2020-12-18 ENCOUNTER — NON-APPOINTMENT (OUTPATIENT)
Age: 50
End: 2020-12-18

## 2021-03-23 NOTE — PATIENT PROFILE ADULT. - NSTOBACCO QUIT READY_GEN_A_CORE_SD
Medication:   Requested Prescriptions     Pending Prescriptions Disp Refills    montelukast (SINGULAIR) 10 MG tablet [Pharmacy Med Name: MONTELUKAST SOD 10 MG TABLET] 30 tablet 0     Sig: TAKE ONE TABLET BY MOUTH DAILY        Last Filled:  7/30/20 #90 refills 3    Patient Phone Number: [phone] (home)     Last appt: 2/4/2021   Next appt: 8/12/2021    Last OARRS: No flowsheet data found.
ready to quit/thinking about quitting

## 2021-07-14 NOTE — ED PROVIDER NOTE - ENMT, MLM
Impression: S/P Cataract Extraction by phacoemulsification with IOL placement; ORA OS - 34 Days. Presence of intraocular lens  Z96.1. Excellent post op course   Post operative instructions reviewed - Plan: Refer back to Dr. Vero Pedro for consult of outcome of cataract surgery per patient. RTC with Dr. Shyanne Forte for Re refraction before ordering glasses. Airway patent.

## 2022-07-18 NOTE — ED ADULT NURSE NOTE - TEMPLATE LIST FOR HEAD TO TOE ASSESSMENT
"  Vanessa Vazquez presented for a  Medicare AWV and comprehensive Health Risk Assessment today. The following components were reviewed and updated:  Patient accompanied by wife  , who was present throughout the visit and aided in information gathering.        · Medical history  · Family History  · Social history  · Allergies and Current Medications  · Health Risk Assessment  · Health Maintenance  · Care Team         ** See Completed Assessments for Annual Wellness Visit within the encounter summary.**         The following assessments were completed:  · Living Situation  · CAGE  · Depression Screening  · Timed Get Up and Go  · Whisper Test  · Cognitive Function Screening  · Nutrition Screening  · ADL Screening  · PAQ Screening        Vitals:    07/18/22 1452   BP: 136/70   Pulse: 70   Resp: 18   Temp: 98.6 °F (37 °C)   SpO2: 99%   Weight: 65.7 kg (144 lb 13.5 oz)   Height: 5' 2" (1.575 m)     Body mass index is 26.49 kg/m².  Physical Exam  Vitals and nursing note reviewed.   Constitutional:       Appearance: Normal appearance. She is well-developed.   HENT:      Head: Normocephalic and atraumatic.   Eyes:      Pupils: Pupils are equal, round, and reactive to light.   Neck:      Vascular: No carotid bruit.   Cardiovascular:      Rate and Rhythm: Normal rate and regular rhythm.      Pulses: Normal pulses.      Heart sounds: Normal heart sounds. No murmur heard.    No gallop.   Pulmonary:      Effort: Pulmonary effort is normal.      Breath sounds: Normal breath sounds.   Abdominal:      General: Bowel sounds are normal. There is no distension.      Palpations: Abdomen is soft.      Tenderness: There is no abdominal tenderness.   Musculoskeletal:         General: No tenderness. Normal range of motion.   Skin:     General: Skin is warm and dry.   Neurological:      Mental Status: She is alert.      Motor: No abnormal muscle tone.      Gait: Gait normal.   Psychiatric:         Mood and Affect: Mood is elated.         " Speech: Speech normal.         Behavior: Behavior normal.         Thought Content: Thought content normal.         Cognition and Memory: Memory is impaired. She exhibits impaired recent memory.         Judgment: Judgment normal.       Current Outpatient Medications   Medication Instructions    aspirin (ECOTRIN) 81 mg, Daily    donepeziL (ARICEPT) 10 mg, Oral, Daily    gatifloxacin 0.5 % Drop drops 1 drop, Ophthalmic, 2 times daily, Eyedrops to start one day before surgery    ketorolac 0.5% (ACULAR) 0.5 % Drop 1 drop, Right Eye, 4 times daily, Eyedrops to start one day before surgery    ketorolac 0.5% (ACULAR) 0.5 % Drop 1 drop, Left Eye, 4 times daily, Eyedrops to start one day before surgery    moxifloxacin (VIGAMOX) 0.5 % ophthalmic solution 1 drop, Right Eye, Every 12 hours, Start eyedrops one day before surgery    pravastatin (PRAVACHOL) 40 MG tablet TAKE ONE TABLET BY MOUTH ONE TIME DAILY    prednisoLONE acetate (PRED FORTE) 1 % DrpS 1 drop, Right Eye, 4 times daily, Start one day before surgery    prednisoLONE acetate (PRED FORTE) 1 % DrpS 1 drop, Left Eye, 4 times daily, Eyedrops to start one day before surgery    vitamin D (VITAMIN D3) 1,000 Units, Oral, Daily             Diagnoses and health risks identified today and associated recommendations/orders:    1. Encounter for preventive health examination   discuss the need for 2nd covid booster.    2. Dementia without behavioral disturbance, unspecified dementia type  Chronic and Stable on Aricept- overall still active  With no changes since the last AWV. Continue current treatment plan as previously prescribed with your PCP    3. Stage 3b chronic kidney disease  Chronic and Stable. Continue current treatment plan as previously prescribed with your PCP    4. Hypertriglyceridemia  Chronic and Stable on Pravastatin. Continue current treatment plan as previously prescribed with your PCP    5. Bilateral hearing loss, unspecified hearing loss  type  Chronic and Stable. Hearing aides but not in use    6. Urinary incontinence, unspecified type  Chronic and Stable  With pads. Continue current treatment plan as previously prescribed with your PCP    7. Osteopenia of hip, unspecified laterality  Chronic and Stable on vitamin D . Continue current treatment plan as previously prescribed with your PCP    I offered to discuss advanced care planning, including how to pick a person who would make decisions for you if you were unable to make them for yourself, called a health care power of , and what kind of decisions you might make such as use of life sustaining treatments such as ventilators and tube feeding when faced with a life limiting illness recorded on a living will that they will need to know. (How you want to be cared for as you near the end of your natural life)     X  Patient has advanced directives written and agrees to provide copies to the institution.    Provided Vanessa with a 5-10 year written screening schedule and personal prevention plan. Recommendations were developed using the USPSTF age appropriate recommendations. Education, counseling, and referrals were provided as needed. After Visit Summary printed and given to patient which includes a list of additional screenings\tests needed.    Follow up in about 1 year (around 7/18/2023) for Follow up with PCP.    Lorri Fairbanks NP    General

## 2022-08-21 ENCOUNTER — EMERGENCY (EMERGENCY)
Facility: HOSPITAL | Age: 52
LOS: 1 days | Discharge: HOME | End: 2022-08-21
Attending: EMERGENCY MEDICINE | Admitting: EMERGENCY MEDICINE
Payer: MEDICAID

## 2022-08-21 ENCOUNTER — INPATIENT (INPATIENT)
Facility: HOSPITAL | Age: 52
LOS: 2 days | Discharge: HOME | DRG: 372 | End: 2022-08-24
Attending: STUDENT IN AN ORGANIZED HEALTH CARE EDUCATION/TRAINING PROGRAM | Admitting: STUDENT IN AN ORGANIZED HEALTH CARE EDUCATION/TRAINING PROGRAM
Payer: MEDICAID

## 2022-08-21 VITALS
HEIGHT: 73 IN | HEART RATE: 125 BPM | RESPIRATION RATE: 18 BRPM | SYSTOLIC BLOOD PRESSURE: 136 MMHG | WEIGHT: 225.09 LBS | DIASTOLIC BLOOD PRESSURE: 78 MMHG | OXYGEN SATURATION: 98 % | TEMPERATURE: 98 F

## 2022-08-21 VITALS
RESPIRATION RATE: 17 BRPM | OXYGEN SATURATION: 100 % | WEIGHT: 225.09 LBS | DIASTOLIC BLOOD PRESSURE: 85 MMHG | SYSTOLIC BLOOD PRESSURE: 116 MMHG | HEART RATE: 99 BPM | TEMPERATURE: 98 F | HEIGHT: 73 IN

## 2022-08-21 VITALS
OXYGEN SATURATION: 97 % | RESPIRATION RATE: 15 BRPM | TEMPERATURE: 98 F | DIASTOLIC BLOOD PRESSURE: 75 MMHG | HEART RATE: 81 BPM | SYSTOLIC BLOOD PRESSURE: 113 MMHG

## 2022-08-21 DIAGNOSIS — Z21 ASYMPTOMATIC HUMAN IMMUNODEFICIENCY VIRUS [HIV] INFECTION STATUS: ICD-10-CM

## 2022-08-21 DIAGNOSIS — Z98.890 OTHER SPECIFIED POSTPROCEDURAL STATES: Chronic | ICD-10-CM

## 2022-08-21 DIAGNOSIS — Z91.14 PATIENT'S OTHER NONCOMPLIANCE WITH MEDICATION REGIMEN: ICD-10-CM

## 2022-08-21 DIAGNOSIS — R10.30 LOWER ABDOMINAL PAIN, UNSPECIFIED: ICD-10-CM

## 2022-08-21 DIAGNOSIS — B20 HUMAN IMMUNODEFICIENCY VIRUS [HIV] DISEASE: ICD-10-CM

## 2022-08-21 DIAGNOSIS — R63.8 OTHER SYMPTOMS AND SIGNS CONCERNING FOOD AND FLUID INTAKE: ICD-10-CM

## 2022-08-21 DIAGNOSIS — N39.0 URINARY TRACT INFECTION, SITE NOT SPECIFIED: ICD-10-CM

## 2022-08-21 DIAGNOSIS — R19.7 DIARRHEA, UNSPECIFIED: ICD-10-CM

## 2022-08-21 DIAGNOSIS — E87.2 ACIDOSIS: ICD-10-CM

## 2022-08-21 DIAGNOSIS — Z20.822 CONTACT WITH AND (SUSPECTED) EXPOSURE TO COVID-19: ICD-10-CM

## 2022-08-21 DIAGNOSIS — K52.9 NONINFECTIVE GASTROENTERITIS AND COLITIS, UNSPECIFIED: ICD-10-CM

## 2022-08-21 LAB
ALBUMIN SERPL ELPH-MCNC: 3.6 G/DL — SIGNIFICANT CHANGE UP (ref 3.3–5)
ALBUMIN SERPL ELPH-MCNC: 4.3 G/DL — SIGNIFICANT CHANGE UP (ref 3.3–5)
ALP SERPL-CCNC: 106 U/L — SIGNIFICANT CHANGE UP (ref 40–120)
ALP SERPL-CCNC: 109 U/L — SIGNIFICANT CHANGE UP (ref 40–120)
ALT FLD-CCNC: 19 U/L — SIGNIFICANT CHANGE UP (ref 10–45)
ALT FLD-CCNC: 22 U/L — SIGNIFICANT CHANGE UP (ref 10–45)
ANION GAP SERPL CALC-SCNC: 10 MMOL/L — SIGNIFICANT CHANGE UP (ref 5–17)
ANION GAP SERPL CALC-SCNC: 11 MMOL/L — SIGNIFICANT CHANGE UP (ref 5–17)
ANION GAP SERPL CALC-SCNC: 11 MMOL/L — SIGNIFICANT CHANGE UP (ref 5–17)
ANION GAP SERPL CALC-SCNC: 13 MMOL/L — SIGNIFICANT CHANGE UP (ref 5–17)
ANISOCYTOSIS BLD QL: SLIGHT — SIGNIFICANT CHANGE UP
APPEARANCE UR: CLEAR — SIGNIFICANT CHANGE UP
AST SERPL-CCNC: 22 U/L — SIGNIFICANT CHANGE UP (ref 10–40)
AST SERPL-CCNC: 24 U/L — SIGNIFICANT CHANGE UP (ref 10–40)
BACTERIA # UR AUTO: PRESENT /HPF
BASE EXCESS BLDV CALC-SCNC: -16.6 MMOL/L — LOW (ref -2–3)
BASOPHILS # BLD AUTO: 0 K/UL — SIGNIFICANT CHANGE UP (ref 0–0.2)
BASOPHILS # BLD AUTO: 0.09 K/UL — SIGNIFICANT CHANGE UP (ref 0–0.2)
BASOPHILS NFR BLD AUTO: 0 % — SIGNIFICANT CHANGE UP (ref 0–2)
BASOPHILS NFR BLD AUTO: 0.7 % — SIGNIFICANT CHANGE UP (ref 0–2)
BILIRUB SERPL-MCNC: 0.3 MG/DL — SIGNIFICANT CHANGE UP (ref 0.2–1.2)
BILIRUB SERPL-MCNC: 0.5 MG/DL — SIGNIFICANT CHANGE UP (ref 0.2–1.2)
BILIRUB UR-MCNC: NEGATIVE — SIGNIFICANT CHANGE UP
BUN SERPL-MCNC: 11 MG/DL — SIGNIFICANT CHANGE UP (ref 7–23)
BUN SERPL-MCNC: 12 MG/DL — SIGNIFICANT CHANGE UP (ref 7–23)
BUN SERPL-MCNC: 6 MG/DL — LOW (ref 7–23)
BUN SERPL-MCNC: 7 MG/DL — SIGNIFICANT CHANGE UP (ref 7–23)
BURR CELLS BLD QL SMEAR: PRESENT — SIGNIFICANT CHANGE UP
C DIFF GDH STL QL: NEGATIVE — SIGNIFICANT CHANGE UP
C DIFF GDH STL QL: SIGNIFICANT CHANGE UP
CA-I SERPL-SCNC: 1.25 MMOL/L — SIGNIFICANT CHANGE UP (ref 1.15–1.33)
CALCIUM SERPL-MCNC: 8 MG/DL — LOW (ref 8.4–10.5)
CALCIUM SERPL-MCNC: 8.1 MG/DL — LOW (ref 8.4–10.5)
CALCIUM SERPL-MCNC: 8.6 MG/DL — SIGNIFICANT CHANGE UP (ref 8.4–10.5)
CALCIUM SERPL-MCNC: 8.7 MG/DL — SIGNIFICANT CHANGE UP (ref 8.4–10.5)
CHLORIDE SERPL-SCNC: 105 MMOL/L — SIGNIFICANT CHANGE UP (ref 96–108)
CHLORIDE SERPL-SCNC: 107 MMOL/L — SIGNIFICANT CHANGE UP (ref 96–108)
CHLORIDE SERPL-SCNC: 112 MMOL/L — HIGH (ref 96–108)
CHLORIDE SERPL-SCNC: 113 MMOL/L — HIGH (ref 96–108)
CO2 BLDV-SCNC: 13.6 MMOL/L — LOW (ref 22–26)
CO2 SERPL-SCNC: 11 MMOL/L — LOW (ref 22–31)
CO2 SERPL-SCNC: 12 MMOL/L — LOW (ref 22–31)
CO2 SERPL-SCNC: 13 MMOL/L — LOW (ref 22–31)
CO2 SERPL-SCNC: 16 MMOL/L — LOW (ref 22–31)
COLOR SPEC: YELLOW — SIGNIFICANT CHANGE UP
COMMENT - URINE: SIGNIFICANT CHANGE UP
CREAT SERPL-MCNC: 0.9 MG/DL — SIGNIFICANT CHANGE UP (ref 0.5–1.3)
CREAT SERPL-MCNC: 1 MG/DL — SIGNIFICANT CHANGE UP (ref 0.5–1.3)
CREAT SERPL-MCNC: 1.27 MG/DL — SIGNIFICANT CHANGE UP (ref 0.5–1.3)
CREAT SERPL-MCNC: 1.41 MG/DL — HIGH (ref 0.5–1.3)
CULTURE RESULTS: SIGNIFICANT CHANGE UP
DIFF PNL FLD: ABNORMAL
EGFR: 103 ML/MIN/1.73M2 — SIGNIFICANT CHANGE UP
EGFR: 60 ML/MIN/1.73M2 — SIGNIFICANT CHANGE UP
EGFR: 68 ML/MIN/1.73M2 — SIGNIFICANT CHANGE UP
EGFR: 91 ML/MIN/1.73M2 — SIGNIFICANT CHANGE UP
EOSINOPHIL # BLD AUTO: 0 K/UL — SIGNIFICANT CHANGE UP (ref 0–0.5)
EOSINOPHIL # BLD AUTO: 0 K/UL — SIGNIFICANT CHANGE UP (ref 0–0.5)
EOSINOPHIL NFR BLD AUTO: 0 % — SIGNIFICANT CHANGE UP (ref 0–6)
EOSINOPHIL NFR BLD AUTO: 0 % — SIGNIFICANT CHANGE UP (ref 0–6)
EPI CELLS # UR: SIGNIFICANT CHANGE UP /HPF (ref 0–5)
GAS PNL BLDA: SIGNIFICANT CHANGE UP
GAS PNL BLDV: 133 MMOL/L — LOW (ref 136–145)
GAS PNL BLDV: SIGNIFICANT CHANGE UP
GIANT PLATELETS BLD QL SMEAR: PRESENT — SIGNIFICANT CHANGE UP
GLUCOSE SERPL-MCNC: 88 MG/DL — SIGNIFICANT CHANGE UP (ref 70–99)
GLUCOSE SERPL-MCNC: 89 MG/DL — SIGNIFICANT CHANGE UP (ref 70–99)
GLUCOSE SERPL-MCNC: 96 MG/DL — SIGNIFICANT CHANGE UP (ref 70–99)
GLUCOSE SERPL-MCNC: 99 MG/DL — SIGNIFICANT CHANGE UP (ref 70–99)
GLUCOSE UR QL: NEGATIVE — SIGNIFICANT CHANGE UP
GRAN CASTS # UR COMP ASSIST: ABNORMAL /LPF
HCO3 BLDV-SCNC: 12 MMOL/L — LOW (ref 22–29)
HCT VFR BLD CALC: 42.4 % — SIGNIFICANT CHANGE UP (ref 39–50)
HCT VFR BLD CALC: 44.2 % — SIGNIFICANT CHANGE UP (ref 39–50)
HGB BLD-MCNC: 14.4 G/DL — SIGNIFICANT CHANGE UP (ref 13–17)
HGB BLD-MCNC: 15 G/DL — SIGNIFICANT CHANGE UP (ref 13–17)
HYALINE CASTS # UR AUTO: ABNORMAL /LPF (ref 0–2)
KETONES UR-MCNC: NEGATIVE — SIGNIFICANT CHANGE UP
LACTATE SERPL-SCNC: 1 MMOL/L — SIGNIFICANT CHANGE UP (ref 0.5–2)
LEUKOCYTE ESTERASE UR-ACNC: ABNORMAL
LIDOCAIN IGE QN: 10 U/L — SIGNIFICANT CHANGE UP (ref 7–60)
LIDOCAIN IGE QN: 11 U/L — SIGNIFICANT CHANGE UP (ref 7–60)
LYMPHOCYTES # BLD AUTO: 0.92 K/UL — LOW (ref 1–3.3)
LYMPHOCYTES # BLD AUTO: 1.86 K/UL — SIGNIFICANT CHANGE UP (ref 1–3.3)
LYMPHOCYTES # BLD AUTO: 14.7 % — SIGNIFICANT CHANGE UP (ref 13–44)
LYMPHOCYTES # BLD AUTO: 8.7 % — LOW (ref 13–44)
MANUAL SMEAR VERIFICATION: SIGNIFICANT CHANGE UP
MANUAL SMEAR VERIFICATION: SIGNIFICANT CHANGE UP
MCHC RBC-ENTMCNC: 27.7 PG — SIGNIFICANT CHANGE UP (ref 27–34)
MCHC RBC-ENTMCNC: 28.2 PG — SIGNIFICANT CHANGE UP (ref 27–34)
MCHC RBC-ENTMCNC: 33.9 GM/DL — SIGNIFICANT CHANGE UP (ref 32–36)
MCHC RBC-ENTMCNC: 34 GM/DL — SIGNIFICANT CHANGE UP (ref 32–36)
MCV RBC AUTO: 81.7 FL — SIGNIFICANT CHANGE UP (ref 80–100)
MCV RBC AUTO: 83 FL — SIGNIFICANT CHANGE UP (ref 80–100)
MONOCYTES # BLD AUTO: 0.47 K/UL — SIGNIFICANT CHANGE UP (ref 0–0.9)
MONOCYTES # BLD AUTO: 0.92 K/UL — HIGH (ref 0–0.9)
MONOCYTES NFR BLD AUTO: 3.7 % — SIGNIFICANT CHANGE UP (ref 2–14)
MONOCYTES NFR BLD AUTO: 8.7 % — SIGNIFICANT CHANGE UP (ref 2–14)
NEUTROPHILS # BLD AUTO: 8.68 K/UL — HIGH (ref 1.8–7.4)
NEUTROPHILS # BLD AUTO: 9.95 K/UL — HIGH (ref 1.8–7.4)
NEUTROPHILS NFR BLD AUTO: 72.8 % — SIGNIFICANT CHANGE UP (ref 43–77)
NEUTROPHILS NFR BLD AUTO: 81.7 % — HIGH (ref 43–77)
NEUTS BAND # BLD: 5.9 % — SIGNIFICANT CHANGE UP (ref 0–8)
NITRITE UR-MCNC: NEGATIVE — SIGNIFICANT CHANGE UP
PCO2 BLDV: 40 MMHG — LOW (ref 42–55)
PH BLDV: 7.1 — LOW (ref 7.32–7.43)
PH UR: 6 — SIGNIFICANT CHANGE UP (ref 5–8)
PLAT MORPH BLD: ABNORMAL
PLAT MORPH BLD: NORMAL — SIGNIFICANT CHANGE UP
PLATELET # BLD AUTO: 220 K/UL — SIGNIFICANT CHANGE UP (ref 150–400)
PLATELET # BLD AUTO: 244 K/UL — SIGNIFICANT CHANGE UP (ref 150–400)
PO2 BLDV: 23 MMHG — SIGNIFICANT CHANGE UP (ref 25–45)
POLYCHROMASIA BLD QL SMEAR: SLIGHT — SIGNIFICANT CHANGE UP
POTASSIUM BLDV-SCNC: 3.4 MMOL/L — LOW (ref 3.5–5.1)
POTASSIUM SERPL-MCNC: 2.8 MMOL/L — CRITICAL LOW (ref 3.5–5.3)
POTASSIUM SERPL-MCNC: 2.9 MMOL/L — CRITICAL LOW (ref 3.5–5.3)
POTASSIUM SERPL-MCNC: 3.8 MMOL/L — SIGNIFICANT CHANGE UP (ref 3.5–5.3)
POTASSIUM SERPL-MCNC: SIGNIFICANT CHANGE UP MMOL/L (ref 3.5–5.3)
POTASSIUM SERPL-SCNC: 2.8 MMOL/L — CRITICAL LOW (ref 3.5–5.3)
POTASSIUM SERPL-SCNC: 2.9 MMOL/L — CRITICAL LOW (ref 3.5–5.3)
POTASSIUM SERPL-SCNC: 3.8 MMOL/L — SIGNIFICANT CHANGE UP (ref 3.5–5.3)
POTASSIUM SERPL-SCNC: SIGNIFICANT CHANGE UP MMOL/L (ref 3.5–5.3)
PROT SERPL-MCNC: 8.1 G/DL — SIGNIFICANT CHANGE UP (ref 6–8.3)
PROT SERPL-MCNC: 9 G/DL — HIGH (ref 6–8.3)
PROT UR-MCNC: 30 MG/DL
RBC # BLD: 5.11 M/UL — SIGNIFICANT CHANGE UP (ref 4.2–5.8)
RBC # BLD: 5.41 M/UL — SIGNIFICANT CHANGE UP (ref 4.2–5.8)
RBC # FLD: 16 % — HIGH (ref 10.3–14.5)
RBC # FLD: 16.1 % — HIGH (ref 10.3–14.5)
RBC BLD AUTO: ABNORMAL
RBC BLD AUTO: ABNORMAL
RBC CASTS # UR COMP ASSIST: < 5 /HPF — SIGNIFICANT CHANGE UP
SAO2 % BLDV: 33.6 % — LOW (ref 67–88)
SARS-COV-2 RNA SPEC QL NAA+PROBE: NEGATIVE — SIGNIFICANT CHANGE UP
SARS-COV-2 RNA SPEC QL NAA+PROBE: NEGATIVE — SIGNIFICANT CHANGE UP
SODIUM SERPL-SCNC: 130 MMOL/L — LOW (ref 135–145)
SODIUM SERPL-SCNC: 134 MMOL/L — LOW (ref 135–145)
SODIUM SERPL-SCNC: 135 MMOL/L — SIGNIFICANT CHANGE UP (ref 135–145)
SODIUM SERPL-SCNC: 135 MMOL/L — SIGNIFICANT CHANGE UP (ref 135–145)
SP GR SPEC: >=1.03 — SIGNIFICANT CHANGE UP (ref 1–1.03)
SPECIMEN SOURCE: SIGNIFICANT CHANGE UP
UROBILINOGEN FLD QL: 0.2 E.U./DL — SIGNIFICANT CHANGE UP
VARIANT LYMPHS # BLD: 0.9 % — SIGNIFICANT CHANGE UP (ref 0–6)
VARIANT LYMPHS # BLD: 2.2 % — SIGNIFICANT CHANGE UP (ref 0–6)
WBC # BLD: 10.62 K/UL — HIGH (ref 3.8–10.5)
WBC # BLD: 12.64 K/UL — HIGH (ref 3.8–10.5)
WBC # FLD AUTO: 10.62 K/UL — HIGH (ref 3.8–10.5)
WBC # FLD AUTO: 12.64 K/UL — HIGH (ref 3.8–10.5)
WBC UR QL: > 10 /HPF

## 2022-08-21 PROCEDURE — 84100 ASSAY OF PHOSPHORUS: CPT

## 2022-08-21 PROCEDURE — 36415 COLL VENOUS BLD VENIPUNCTURE: CPT

## 2022-08-21 PROCEDURE — 83690 ASSAY OF LIPASE: CPT

## 2022-08-21 PROCEDURE — 96374 THER/PROPH/DIAG INJ IV PUSH: CPT | Mod: XU

## 2022-08-21 PROCEDURE — 83735 ASSAY OF MAGNESIUM: CPT

## 2022-08-21 PROCEDURE — 99284 EMERGENCY DEPT VISIT MOD MDM: CPT | Mod: 25

## 2022-08-21 PROCEDURE — G1004: CPT

## 2022-08-21 PROCEDURE — 87086 URINE CULTURE/COLONY COUNT: CPT

## 2022-08-21 PROCEDURE — 87635 SARS-COV-2 COVID-19 AMP PRB: CPT

## 2022-08-21 PROCEDURE — 81001 URINALYSIS AUTO W/SCOPE: CPT

## 2022-08-21 PROCEDURE — 80053 COMPREHEN METABOLIC PANEL: CPT

## 2022-08-21 PROCEDURE — 74177 CT ABD & PELVIS W/CONTRAST: CPT | Mod: MG

## 2022-08-21 PROCEDURE — 85025 COMPLETE CBC W/AUTO DIFF WBC: CPT

## 2022-08-21 PROCEDURE — 87449 NOS EACH ORGANISM AG IA: CPT

## 2022-08-21 PROCEDURE — 80048 BASIC METABOLIC PNL TOTAL CA: CPT

## 2022-08-21 PROCEDURE — 99285 EMERGENCY DEPT VISIT HI MDM: CPT

## 2022-08-21 PROCEDURE — 87324 CLOSTRIDIUM AG IA: CPT

## 2022-08-21 PROCEDURE — 87507 IADNA-DNA/RNA PROBE TQ 12-25: CPT

## 2022-08-21 PROCEDURE — 74177 CT ABD & PELVIS W/CONTRAST: CPT | Mod: 26,MG

## 2022-08-21 RX ORDER — METRONIDAZOLE 500 MG
1 TABLET ORAL
Qty: 30 | Refills: 0
Start: 2022-08-21 | End: 2022-08-30

## 2022-08-21 RX ORDER — METRONIDAZOLE 500 MG
500 TABLET ORAL ONCE
Refills: 0 | Status: COMPLETED | OUTPATIENT
Start: 2022-08-21 | End: 2022-08-21

## 2022-08-21 RX ORDER — IPRATROPIUM/ALBUTEROL SULFATE 18-103MCG
3 AEROSOL WITH ADAPTER (GRAM) INHALATION EVERY 6 HOURS
Refills: 0 | Status: DISCONTINUED | OUTPATIENT
Start: 2022-08-21 | End: 2022-08-22

## 2022-08-21 RX ORDER — ONDANSETRON 8 MG/1
4 TABLET, FILM COATED ORAL ONCE
Refills: 0 | Status: COMPLETED | OUTPATIENT
Start: 2022-08-21 | End: 2022-08-21

## 2022-08-21 RX ORDER — DIATRIZOATE MEGLUMINE 180 MG/ML
30 INJECTION, SOLUTION INTRAVESICAL ONCE
Refills: 0 | Status: COMPLETED | OUTPATIENT
Start: 2022-08-21 | End: 2022-08-21

## 2022-08-21 RX ORDER — SODIUM CHLORIDE 9 MG/ML
2000 INJECTION INTRAMUSCULAR; INTRAVENOUS; SUBCUTANEOUS ONCE
Refills: 0 | Status: COMPLETED | OUTPATIENT
Start: 2022-08-21 | End: 2022-08-21

## 2022-08-21 RX ORDER — SODIUM CHLORIDE 9 MG/ML
1000 INJECTION INTRAMUSCULAR; INTRAVENOUS; SUBCUTANEOUS ONCE
Refills: 0 | Status: COMPLETED | OUTPATIENT
Start: 2022-08-21 | End: 2022-08-21

## 2022-08-21 RX ORDER — AZITHROMYCIN 500 MG/1
1 TABLET, FILM COATED ORAL
Qty: 2 | Refills: 0
Start: 2022-08-21 | End: 2022-08-22

## 2022-08-21 RX ORDER — MORPHINE SULFATE 50 MG/1
4 CAPSULE, EXTENDED RELEASE ORAL ONCE
Refills: 0 | Status: DISCONTINUED | OUTPATIENT
Start: 2022-08-21 | End: 2022-08-21

## 2022-08-21 RX ORDER — AZITHROMYCIN 500 MG/1
500 TABLET, FILM COATED ORAL ONCE
Refills: 0 | Status: COMPLETED | OUTPATIENT
Start: 2022-08-21 | End: 2022-08-21

## 2022-08-21 RX ORDER — CEFTRIAXONE 500 MG/1
1000 INJECTION, POWDER, FOR SOLUTION INTRAMUSCULAR; INTRAVENOUS EVERY 24 HOURS
Refills: 0 | Status: DISCONTINUED | OUTPATIENT
Start: 2022-08-21 | End: 2022-08-24

## 2022-08-21 RX ORDER — SODIUM CHLORIDE 9 MG/ML
1000 INJECTION, SOLUTION INTRAVENOUS
Refills: 0 | Status: DISCONTINUED | OUTPATIENT
Start: 2022-08-21 | End: 2022-08-22

## 2022-08-21 RX ORDER — POTASSIUM CHLORIDE 20 MEQ
40 PACKET (EA) ORAL ONCE
Refills: 0 | Status: COMPLETED | OUTPATIENT
Start: 2022-08-21 | End: 2022-08-21

## 2022-08-21 RX ORDER — SODIUM BICARBONATE 1 MEQ/ML
50 SYRINGE (ML) INTRAVENOUS ONCE
Refills: 0 | Status: COMPLETED | OUTPATIENT
Start: 2022-08-21 | End: 2022-08-21

## 2022-08-21 RX ORDER — POTASSIUM CHLORIDE 20 MEQ
10 PACKET (EA) ORAL ONCE
Refills: 0 | Status: COMPLETED | OUTPATIENT
Start: 2022-08-21 | End: 2022-08-21

## 2022-08-21 RX ORDER — ENOXAPARIN SODIUM 100 MG/ML
40 INJECTION SUBCUTANEOUS EVERY 24 HOURS
Refills: 0 | Status: DISCONTINUED | OUTPATIENT
Start: 2022-08-21 | End: 2022-08-24

## 2022-08-21 RX ORDER — BUDESONIDE AND FORMOTEROL FUMARATE DIHYDRATE 160; 4.5 UG/1; UG/1
2 AEROSOL RESPIRATORY (INHALATION)
Refills: 0 | Status: DISCONTINUED | OUTPATIENT
Start: 2022-08-21 | End: 2022-08-24

## 2022-08-21 RX ADMIN — DIATRIZOATE MEGLUMINE 30 MILLILITER(S): 180 INJECTION, SOLUTION INTRAVESICAL at 01:05

## 2022-08-21 RX ADMIN — ONDANSETRON 4 MILLIGRAM(S): 8 TABLET, FILM COATED ORAL at 19:57

## 2022-08-21 RX ADMIN — Medication 40 MILLIEQUIVALENT(S): at 01:33

## 2022-08-21 RX ADMIN — Medication 40 MILLIEQUIVALENT(S): at 02:53

## 2022-08-21 RX ADMIN — Medication 100 MILLIGRAM(S): at 20:01

## 2022-08-21 RX ADMIN — SODIUM CHLORIDE 2000 MILLILITER(S): 9 INJECTION INTRAMUSCULAR; INTRAVENOUS; SUBCUTANEOUS at 01:05

## 2022-08-21 RX ADMIN — Medication 100 MILLIEQUIVALENT(S): at 04:55

## 2022-08-21 RX ADMIN — CEFTRIAXONE 100 MILLIGRAM(S): 500 INJECTION, POWDER, FOR SOLUTION INTRAMUSCULAR; INTRAVENOUS at 23:06

## 2022-08-21 RX ADMIN — Medication 40 MILLIEQUIVALENT(S): at 04:55

## 2022-08-21 RX ADMIN — SODIUM CHLORIDE 1000 MILLILITER(S): 9 INJECTION INTRAMUSCULAR; INTRAVENOUS; SUBCUTANEOUS at 20:54

## 2022-08-21 RX ADMIN — SODIUM CHLORIDE 80 MILLILITER(S): 9 INJECTION, SOLUTION INTRAVENOUS at 23:06

## 2022-08-21 RX ADMIN — Medication 500 MILLIGRAM(S): at 20:54

## 2022-08-21 RX ADMIN — MORPHINE SULFATE 4 MILLIGRAM(S): 50 CAPSULE, EXTENDED RELEASE ORAL at 20:54

## 2022-08-21 RX ADMIN — Medication 3 MILLILITER(S): at 22:57

## 2022-08-21 RX ADMIN — Medication 50 MILLIEQUIVALENT(S): at 23:31

## 2022-08-21 RX ADMIN — MORPHINE SULFATE 4 MILLIGRAM(S): 50 CAPSULE, EXTENDED RELEASE ORAL at 20:00

## 2022-08-21 RX ADMIN — Medication 500 MILLIGRAM(S): at 03:49

## 2022-08-21 RX ADMIN — SODIUM CHLORIDE 1000 MILLILITER(S): 9 INJECTION INTRAMUSCULAR; INTRAVENOUS; SUBCUTANEOUS at 19:55

## 2022-08-21 RX ADMIN — AZITHROMYCIN 500 MILLIGRAM(S): 500 TABLET, FILM COATED ORAL at 03:49

## 2022-08-21 NOTE — ED ADULT TRIAGE NOTE - CHIEF COMPLAINT QUOTE
Pt discharged today after admit for E.Coli and intestinal parasites, reports worsening abd pain, n/v/d. Unable to tolerate PO. No known fevers at home.

## 2022-08-21 NOTE — ED ADULT NURSE NOTE - CAS TRG GEN SKIN COLOR
Name: Gabe Callejas  : 1951  MRN: [id number]    Date of Service: 2022    Preoperative Diagnosis: Screen.    Postoperative Diagnosis: AC,TC polyps.X2    Procedure Performed: Colonoscopy with snare polypectomy and hot biopsy.    Surgeon: Rodolfo Obando MD, FASCJAD    Anesthesia: MAC    Indication: 71 year old male desires screening exam.    Procedure Description: Patient was taken to the procedure room and placed on the stretcher in left lateral decubitus position. After adequate IV sedation was obtained, digital rectal examination was done which was unremarkable. Flexible colonoscope was introduced transanally to the cecum. The ileocecal valve and appendiceal orifice identified. Inspection of the colon, rectum including retroflexion view revealed larger than 1 cm flat laterally spreading polyp at proximal AC. This was removed using snare polypectomy technique. Post polypectomy site revealed no evidence of active bleeding.    Also, there is small raised polyp at TC. This was hot biopsied. Post hot biopsy site showed no active bleeding. Scope was pulled out transanally.     Incidental finding of scattered diverticulosis throughout sigmoid colon without stricture or mass.    Patient tolerated the procedure well and transferred to the recovery room in stable condition.    Patient is recommended to have a follow up colonoscopy in 3-10 years pending the pathology report.     Normal for race

## 2022-08-21 NOTE — H&P ADULT - HISTORY OF PRESENT ILLNESS
HPI  53 yo M with PMHx of HIV (noncompliant with meds x 2 years unknown CD4), HTN, asthma/COPD, c/o worsening abd pain and diarrhea. Pt seen last night for abd pain, n/v/d x 1 week at [facility] ED. His GI PCR +for shigella/enteroinvasive ecoli and EPEC; Ct shows enterocolitis. Pt was sent home with flagyl and zithromax. Pt states he was only able to  the zithromax, last dose this afternoon however reports pain worsening. Associated with 2 episodes today of nbnb vomiting and multiples episodes of watery diarrhea. Reports eating only small amounts of bland foods but then having diarrhea. Denies fever, chills, cp, sob, back pain, urinary symptoms.  Of note, on initial visit K was below 3 however today on return K 3.8     Labs noteable for leukocytosis, bicarb 12, Cl- 112, urine with bacteria, WBC, trace leuk esterase. VSS. Patient given 2L NS, IV flagyl, and Morphine for pain.      HPI  51 yo M with PMHx of HIV (noncompliant with meds x 2 years unknown CD4), HTN, asthma/COPD, c/o worsening abd pain and diarrhea. Pt seen last night for abd pain, n/v/d x 1 week at [facility] ED. He stated that his diarrhea started 7 days ago and has never been bloody and has been having multiple episodes of watery diarrhea x 7 days. Initially was eating PO however for last couple days poor appetite and intake. Reports mild-moderate generalized abd pain not changed in severity by defecation.  His GI PCR +for shigella/enteroinvasive ecoli and EPEC; Ct shows enterocolitis. Pt was sent home with flagyl and zithromax. Pt states he was only able to  the zithromax, last dose this afternoon however reports pain worsening. Associated with 2 episodes today of nbnb vomiting and multiples episodes of watery diarrhea. Reports eating only small amounts of bland foods but then having diarrhea. Denies fever, chills, cp, sob, back pain, urinary symptoms.  Of note, on initial visit K was below 3 however today on return K 3.8     Labs noteable for leukocytosis, bicarb 12, Cl- 112, urine with bacteria, WBC, trace leuk esterase. VSS. Patient given 2L NS, IV flagyl, and Morphine for pain. Ordered stat BMP and VBG of SYLVIA

## 2022-08-21 NOTE — H&P ADULT - NSHPLABSRESULTS_GEN_ALL_CORE
LABS:                         14.4   12.64 )-----------( 220      ( 21 Aug 2022 19:58 )             42.4     08-21    135  |  112<H>  |  7   ----------------------------<  96  3.8   |  12<L>  |  1.00    Ca    8.6      21 Aug 2022 19:58  Phos  3.6     08-21  Mg     1.9     08-21    TPro  8.1  /  Alb  3.6  /  TBili  0.3  /  DBili  x   /  AST  22  /  ALT  19  /  AlkPhos  106  08-21      Urinalysis Basic - ( 21 Aug 2022 14:19 )    Color: Yellow / Appearance: Clear / SG: >=1.030 / pH: x  Gluc: x / Ketone: NEGATIVE  / Bili: Negative / Urobili: 0.2 E.U./dL   Blood: x / Protein: 30 mg/dL / Nitrite: NEGATIVE   Leuk Esterase: Trace / RBC: < 5 /HPF / WBC > 10 /HPF   Sq Epi: x / Non Sq Epi: 0-5 /HPF / Bacteria: Present /HPF                RADIOLOGY, EKG & ADDITIONAL TESTS:

## 2022-08-21 NOTE — ED PROVIDER NOTE - NS ED ATTENDING STATEMENT MOD
[Follow-Up - Clinic] : a clinic follow-up of
This was a shared visit with the ALVERTO. I reviewed and verified the documentation and independently performed the documented:

## 2022-08-21 NOTE — ED PROVIDER NOTE - ATTENDING APP SHARED VISIT CONTRIBUTION OF CARE
Attending Statement: I have personally performed a face to face diagnostic evaluation on this patient. I have reviewed the ACP note and agree with the history, exam and plan of care, except as noted.     Attending Contribution to Care:  52M with hx HIV (noncompliant with meds) presents c/o diarrhea for the past week, lower abd pain.  Pt afebrile in ED, tachy to 120s, appears dry on exam.  Labs significant for K+2.8, normal mag.  Pt given PO repletion, IV fluid, CT a/p pending result.  Will plan to repeat vs, repeat bmp after potassium repletion, f/u CT report and reassess. Dispo pending above.

## 2022-08-21 NOTE — H&P ADULT - PROBLEM SELECTOR PLAN 4
Patient with trace LE, +WBC, Bacteria, however asx   -IV rocephin as above will treat both UTI and enterocolitis   -switch to PO as appropriate

## 2022-08-21 NOTE — H&P ADULT - ASSESSMENT
53 yo M with PMHx of HIV (noncompliant with meds x 2 years unknown CD4), HTN, asthma/COPD, c/o worsening abd pain and diarrhea admitted for poor PO intake and continued diarrhea.

## 2022-08-21 NOTE — ED ADULT NURSE NOTE - OBJECTIVE STATEMENT
pt was discharged from ED this am and prescribed antibiotics for enterocolitis , pt states that he returned because he is not any better and still having diarrhea

## 2022-08-21 NOTE — ED PROVIDER NOTE - PROGRESS NOTE DETAILS
GI PCR positive for giardia and e. coli.  Pt feeling improved, given flagyl (tinidazole not available) and azithromycin, CT report pending Director - pt received from Dr Ríos ~ 240 a w JOY Hayes continuing to follow.  VS, labs, imaging reviewed.  Stool studies + - abx ordered, rpt k still low - additional k ordered.  Pt feeling much better after ivf w improved vs.  CT w + enterocolitis c/w pt's stool studies, also w viral type lung changes w/o resp sx and covid neg.  Plan dc to [facility] w [facility] pmd/hiv md. pt received further potassium, feeling better, will d/c on antibiotics, to f/u with Dr. Pacheco, return to ED if sx worsen.

## 2022-08-21 NOTE — ED PROVIDER NOTE - NSFOLLOWUPINSTRUCTIONS_ED_ALL_ED_FT
Acute Diarrhea    WHAT YOU NEED TO KNOW:    Acute diarrhea starts quickly and lasts a short time, usually 1 to 3 days. It can last up to 2 weeks. You may not be able to control your diarrhea. Acute diarrhea usually stops on its own.     DISCHARGE INSTRUCTIONS:    Return to the emergency department if:   •You feel confused.       •Your heartbeat is faster than usual.       •Your eyes look deeply sunken, or you have no tears when you cry.       •You urinate less than usual, or your urine is dark yellow.       •You have blood or mucus in your bowel movements.      •You have severe abdominal pain.       •You are unable to drink any liquids.       Contact your healthcare provider if:   •Your symptoms do not get better with treatment.       •You have a fever higher than 101.3°F (38.5°C).       •You have trouble eating and drinking because you are vomiting.       •Your diarrhea does not get better in 7 days.       •You have questions or concerns about your condition or care.       Follow up with your healthcare provider as directed: Write down your questions so you remember to ask them during your visits.     Medicines:  •Diarrhea medicine is an over-the-counter medicine that helps slow or stop your diarrhea. Do not take this medicine unless your healthcare provider says it is okay.       •Antibiotics may be given to help treat an infection caused by bacteria.       •Antiparasitics may be given to treat an infection caused by parasites.       •Take your medicine as directed. Contact your healthcare provider if you think your medicine is not helping or if you have side effects. Tell him or her if you are allergic to any medicine. Keep a list of the medicines, vitamins, and herbs you take. Include the amounts, and when and why you take them. Bring the list or the pill bottles to follow-up visits. Carry your medicine list with you in case of an emergency.      Self-care:   •Drink liquids as directed. Liquids will help prevent dehydration caused by diarrhea. Ask your healthcare provider how much liquid to drink each day and which liquids are best for you. You may need to drink an oral rehydration solution (ORS). An ORS has the right amounts of water, salts, and sugar you need to replace body fluids. You can buy an ORS at most grocery stores and pharmacies.       •Eat foods that are easy to digest. Examples include rice, lentils, cereal, bananas, potatoes, and bread. It also includes some fruits (bananas, melon), well-cooked vegetables, and lean meats. Do not eat foods high in fiber, fat, and sugar. Do not drink alcohol until your diarrhea is gone.       Prevent acute diarrhea:   •Wash your hands often. Use soap and water. Wash your hands before you eat or prepare food. Also wash your hands after you use the bathroom. Use an alcohol-based hand gel when soap and water are not available.   Handwashing           •Keep bathroom surfaces clean. This helps prevent the spread of germs that cause acute diarrhea.       •Wash fruits and vegetables well before you eat them. This can help remove germs that cause diarrhea. If possible, remove the skin from fruits and vegetables, or cook them well before you eat them.       •Cook meat and poultry as directed. Meat includes beef and pork. Poultry includes chicken, turkey, and duck.?Cook ground meat to 160°F.       ?Cook ground poultry, whole poultry, or cuts of poultry to at least 165°F. Remove the poultry from heat. Let it stand for 3 minutes before you eat it.       ?Cook whole cuts of meat other than poultry to at least 145°F. Remove the meat from heat. Let it stand for 3 minutes before you eat it.       •Wash dishes that have touched raw meat or poultry with hot water and soap. This includes cutting boards, utensils, dishes, and serving containers.       •Place raw or cooked meat or poultry in the refrigerator as soon as possible. Bacteria can grow in meat or poultry that is left at room temperature too long.       •Do not eat raw or undercooked oysters, clams, or mussels. These foods may be contaminated and cause infection.       •Drink only filtered or treated water when you travel. Do not put ice in your drinks. Drink bottled water whenever possible.

## 2022-08-21 NOTE — ED ADULT NURSE NOTE - OBJECTIVE STATEMENT
Received ambulatory with steady gait with chief complaints of abdominal pain and diarrhea since last week. Patient states feeling dehydrated, as each time he drinks liquid and eat food he would have a loose bowel movement. Denies nausea, vomiting and blood in stool.    Patient AOX4, speaking full sentences.  +PERRLA.  Patient denies chest pain, shortness of breath, difficulty breathing and any form of distress not noted. Resps even and nonlabored. Moves all extremities. No obvious trauma/injury/deformity noted. Patient oriented to ED area. All needs attended. POC reviewed. Purposeful proactive hourly rounding in progress.

## 2022-08-21 NOTE — ED PROVIDER NOTE - CARE PLAN
1 Principal Discharge DX:	Abdominal pain, vomiting, and diarrhea  Secondary Diagnosis:	Infectious colitis, enteritis and gastroenteritis

## 2022-08-21 NOTE — ED PROVIDER NOTE - OBJECTIVE STATEMENT
53 y/o m hx HIV (noncompliant with meds) presents c/o diarrhea x 1 week, mild lower abd pain.  Pt reports >10 stools per day during the past week, nonbloody with intermittent crampy lower abd pain.  Denies fever, chills, n/v, urinary sx, all other ROS negative.

## 2022-08-21 NOTE — ED PROVIDER NOTE - CLINICAL SUMMARY MEDICAL DECISION MAKING FREE TEXT BOX
53 yo M with pmh of HIV (noncompliant with meds x 2 years unknown CD4) c/o worsening abd pain. Pt seen last night for abd pain, n/v/d x 1 week. GI PCR +for shigella/enteroinvasive ecoli and EPEC; Ct shows enterocolitis, pt sent home with flagyl and zithromax. Pt states he was only able to  the zithromax, last dose this afternoon. Reports pain worsening. Associated with 2 episodes today of nbnb vomiting and multiples episodes of watery diarrhea. VSS. Abd soft, +diffuse tenderness. 51 yo M with pmh of HIV (noncompliant with meds x 2 years unknown CD4) c/o worsening abd pain. Pt seen last night for abd pain, n/v/d x 1 week. GI PCR +for shigella/enteroinvasive ecoli and EPEC; Ct shows enterocolitis, pt sent home with flagyl and zithromax. Pt states he was only able to  the zithromax, last dose this afternoon. Reports pain worsening. Associated with 2 episodes today of nbnb vomiting and multiples episodes of watery diarrhea. VSS. Abd soft, +diffuse tenderness. Labs, fluids, antiemetics, anticipate admission

## 2022-08-21 NOTE — ED PROVIDER NOTE - PATIENT PORTAL LINK FT
You can access the FollowMyHealth Patient Portal offered by [facility] by registering at the following website: [email or web address]. By joining "[employer]’s FollowMyHealth portal, you will also be able to view your health information using other applications (apps) compatible with our system.

## 2022-08-21 NOTE — PATIENT PROFILE ADULT - FALL HARM RISK - HARM RISK INTERVENTIONS
No Assistance with ambulation/Assistance OOB with selected safe patient handling equipment/Communicate Risk of Fall with Harm to all staff/Reinforce activity limits and safety measures with patient and family/Tailored Fall Risk Interventions/Use of alarms - bed, chair and/or voice tab/Visual Cue: Yellow wristband and red socks/Bed in lowest position, wheels locked, appropriate side rails in place/Call bell, personal items and telephone in reach/Instruct patient to call for assistance before getting out of bed or chair/Non-slip footwear when patient is out of bed/[place] to call system/Physically safe environment - no spills, clutter or unnecessary equipment/Purposeful Proactive Rounding/Room/bathroom lighting operational, light cord in reach

## 2022-08-21 NOTE — ED PROVIDER NOTE - PHYSICAL EXAMINATION
CONSTITUTIONAL: Well-appearing;  in no apparent distress.   HEAD: Normocephalic; atraumatic.   EYES: PERRL; EOM intact; conjunctiva and sclera clear  ENT: normal nose; no rhinorrhea; normal pharynx with no erythema or lesions.   NECK: Supple; non-tender; no LAD  CARDIOVASCULAR: Normal S1, S2; No audible murmurs. Regular rate and rhythm.   RESPIRATORY: Breathing easily; breath sounds clear and equal bilaterally; no wheezes, rhonchi, or rales.  GI: Soft; non-distended; diffuse ttp   MSK: FROM at all extremities, normal tone   EXT: No cyanosis or edema; N/V intact  SKIN: Normal for age and race; warm; dry; good turgor; no apparent lesions or rash.   NEURO: A & O x 3; face symmetric; grossly unremarkable.   PSYCHOLOGICAL: The patient’s mood and manner are appropriate.

## 2022-08-21 NOTE — H&P ADULT - PROBLEM SELECTOR PLAN 3
Pt with bicarb of 11 and elevated Cl- with NAGMA likely i/s/o diarrhea and profound bicarb loss. Stat BMP and VGB ordered in ED upon admission  -replace bicarb IV if pH <7.2  -received 2L NS in ED so stat BMP ordered to check Cl- levels. Consider LR for future fluid resuscitation.

## 2022-08-21 NOTE — H&P ADULT - PROBLEM SELECTOR PLAN 1
Patient with diarrhea x 7 days with abdominal pain admitted for enterocolitis. Was seen in ED yesterday and sent home on PO abx however patient felt sx worsening so admited for IV abx. Poor PO intake and K was noted to be 2.8 yesterday however 3.8 today. C diff negative but will send for Crypto given hx of HIV.   -IV flagyl 500mg  -IV Rocephin 1g  -f/u Crypto antigen

## 2022-08-21 NOTE — H&P ADULT - PROBLEM SELECTOR PLAN 5
F: None   E: Replete as necessary K>4 Mg>2  N: DASH diet   DVT Prophylaxis: Lovenox 40mg daily   GI prophylaxis: None   CODE STATUS: FULL Pt with hx of COPD not on any medications currently but endorses symbicort use in past   -Symbicort 2 puffs daily  -duonebs q6 PRN

## 2022-08-21 NOTE — H&P ADULT - NSHPPHYSICALEXAM_GEN_ALL_CORE
Vital Signs Last 12 Hrs  T(F): 97.7 (08-21-22 @ 19:26), Max: 97.7 (08-21-22 @ 19:26)  HR: 99 (08-21-22 @ 19:26) (99 - 99)  BP: 116/85 (08-21-22 @ 19:26) (116/85 - 116/85)  BP(mean): --  RR: 17 (08-21-22 @ 19:26) (17 - 17)  SpO2: 100% (08-21-22 @ 19:26) (100% - 100%)    PHYSICAL EXAM:  Constitutional: NAD, comfortable in bed.  HEENT: NC/AT, PERRLA, EOMI, no conjunctival pallor or scleral icterus, MMM  Neck: Supple, no JVD  Respiratory: CTA B/L. No w/r/r.   Cardiovascular: RRR, normal S1 and S2, no m/r/g.   Gastrointestinal: +BS, soft NTND, no guarding or rebound tenderness, no palpable masses   Extremities: wwp; no cyanosis, clubbing or edema.   Vascular: Pulses equal and strong throughout.   Neurological: AAOx3, no CN deficits, strength and sensation intact throughout.   Skin: No gross skin abnormalities or rashes Vital Signs Last 12 Hrs  T(F): 97.7 (08-21-22 @ 19:26), Max: 97.7 (08-21-22 @ 19:26)  HR: 99 (08-21-22 @ 19:26) (99 - 99)  BP: 116/85 (08-21-22 @ 19:26) (116/85 - 116/85)  BP(mean): --  RR: 17 (08-21-22 @ 19:26) (17 - 17)  SpO2: 100% (08-21-22 @ 19:26) (100% - 100%)    PHYSICAL EXAM:  Constitutional: NAD, comfortable in bed.  HEENT: NC/AT, PERRLA,   Neck: Supple, no JVD  Respiratory: CTA B/L. No w/r/r.   Cardiovascular: RRR, normal S1 and S2, no m/r/g.   Gastrointestinal: +BS, soft mild tenderness diffusely. no rebound, no guarding.   Extremities: wwp; no cyanosis, clubbing or edema.   Vascular: Pulses equal and strong throughout.   Neurological: AAOx3, no focal deficits  Skin: No gross skin abnormalities or rashes

## 2022-08-21 NOTE — ED PROVIDER NOTE - NS ED ATTENDING STATEMENT MOD
This was a shared visit with the ALVERTO. I reviewed and verified the documentation and independently performed the documented:

## 2022-08-21 NOTE — H&P ADULT - PROBLEM SELECTOR PLAN 2
patient follows with Dr. Pacheco outpt and has had HIV for 17 years. Has been non compliant x 2 years b/c of Covid he states.   -f/u AM CD4 and Viral Load   -Call Dr. Pacheco / HIV consult in AM to discuss. Pt is well known to her clinic and he would like her to be aware of admission and how to restart LYNN

## 2022-08-21 NOTE — H&P ADULT - PROBLEM SELECTOR PLAN 6
F: None   E: Replete as necessary K>4 Mg>2  N: DASH diet   DVT Prophylaxis: Lovenox 40mg daily   GI prophylaxis: None   CODE STATUS: FULL

## 2022-08-21 NOTE — ED PROVIDER NOTE - CLINICAL SUMMARY MEDICAL DECISION MAKING FREE TEXT BOX
51 y/o m hx HIV (noncompliant with meds) presents c/o diarrhea for the past week, lower abd pain.  Pt afebrile in ED, tachy to 120s, appears dry on exam.  Labs significant for K+2.8, normal mag.  Pt given PO repletion, IV fluid, CT a/p pending result.  Will plan to repeat vs, repeat bmp after potassium repletion, f/u CT report and reassess.

## 2022-08-22 DIAGNOSIS — E87.6 HYPOKALEMIA: ICD-10-CM

## 2022-08-22 DIAGNOSIS — J44.9 CHRONIC OBSTRUCTIVE PULMONARY DISEASE, UNSPECIFIED: ICD-10-CM

## 2022-08-22 DIAGNOSIS — E87.2 ACIDOSIS: ICD-10-CM

## 2022-08-22 LAB
4/8 RATIO: 0.54 RATIO — LOW (ref 0.9–3.6)
ABS CD8: 914 /UL — HIGH (ref 142–740)
ALBUMIN SERPL ELPH-MCNC: 3.2 G/DL — LOW (ref 3.3–5)
ALP SERPL-CCNC: 96 U/L — SIGNIFICANT CHANGE UP (ref 40–120)
ALT FLD-CCNC: 17 U/L — SIGNIFICANT CHANGE UP (ref 10–45)
ANION GAP SERPL CALC-SCNC: 10 MMOL/L — SIGNIFICANT CHANGE UP (ref 5–17)
ANION GAP SERPL CALC-SCNC: 11 MMOL/L — SIGNIFICANT CHANGE UP (ref 5–17)
ANION GAP SERPL CALC-SCNC: 9 MMOL/L — SIGNIFICANT CHANGE UP (ref 5–17)
ANISOCYTOSIS BLD QL: SLIGHT — SIGNIFICANT CHANGE UP
AST SERPL-CCNC: 23 U/L — SIGNIFICANT CHANGE UP (ref 10–40)
B-OH-BUTYR SERPL-SCNC: 0 MMOL/L — SIGNIFICANT CHANGE UP
BASOPHILS # BLD AUTO: 0 K/UL — SIGNIFICANT CHANGE UP (ref 0–0.2)
BASOPHILS NFR BLD AUTO: 0 % — SIGNIFICANT CHANGE UP (ref 0–2)
BILIRUB DIRECT SERPL-MCNC: <0.2 MG/DL — SIGNIFICANT CHANGE UP (ref 0–0.3)
BILIRUB INDIRECT FLD-MCNC: SIGNIFICANT CHANGE UP MG/DL (ref 0.2–1)
BILIRUB SERPL-MCNC: 0.3 MG/DL — SIGNIFICANT CHANGE UP (ref 0.2–1.2)
BUN SERPL-MCNC: 4 MG/DL — LOW (ref 7–23)
BUN SERPL-MCNC: 5 MG/DL — LOW (ref 7–23)
BUN SERPL-MCNC: 6 MG/DL — LOW (ref 7–23)
CALCIUM SERPL-MCNC: 7.9 MG/DL — LOW (ref 8.4–10.5)
CALCIUM SERPL-MCNC: 7.9 MG/DL — LOW (ref 8.4–10.5)
CALCIUM SERPL-MCNC: 8.4 MG/DL — SIGNIFICANT CHANGE UP (ref 8.4–10.5)
CD3 BLASTS SPEC-ACNC: 1419 /UL — SIGNIFICANT CHANGE UP (ref 672–1870)
CD3 BLASTS SPEC-ACNC: 84 % — HIGH (ref 59–83)
CD4 %: 29 % — LOW (ref 30–62)
CD8 %: 54 % — HIGH (ref 12–36)
CHLORIDE SERPL-SCNC: 114 MMOL/L — HIGH (ref 96–108)
CHLORIDE SERPL-SCNC: 116 MMOL/L — HIGH (ref 96–108)
CHLORIDE SERPL-SCNC: 116 MMOL/L — HIGH (ref 96–108)
CHLORIDE UR-SCNC: 40 MMOL/L — SIGNIFICANT CHANGE UP
CO2 SERPL-SCNC: 10 MMOL/L — CRITICAL LOW (ref 22–31)
CO2 SERPL-SCNC: 15 MMOL/L — LOW (ref 22–31)
CO2 SERPL-SCNC: 16 MMOL/L — LOW (ref 22–31)
CREAT SERPL-MCNC: 0.72 MG/DL — SIGNIFICANT CHANGE UP (ref 0.5–1.3)
CREAT SERPL-MCNC: 0.78 MG/DL — SIGNIFICANT CHANGE UP (ref 0.5–1.3)
CREAT SERPL-MCNC: 0.89 MG/DL — SIGNIFICANT CHANGE UP (ref 0.5–1.3)
CULTURE RESULTS: SIGNIFICANT CHANGE UP
DACRYOCYTES BLD QL SMEAR: SLIGHT — SIGNIFICANT CHANGE UP
EGFR: 104 ML/MIN/1.73M2 — SIGNIFICANT CHANGE UP
EGFR: 108 ML/MIN/1.73M2 — SIGNIFICANT CHANGE UP
EGFR: 111 ML/MIN/1.73M2 — SIGNIFICANT CHANGE UP
EOSINOPHIL # BLD AUTO: 0.09 K/UL — SIGNIFICANT CHANGE UP (ref 0–0.5)
EOSINOPHIL NFR BLD AUTO: 0.9 % — SIGNIFICANT CHANGE UP (ref 0–6)
GLUCOSE SERPL-MCNC: 100 MG/DL — HIGH (ref 70–99)
GLUCOSE SERPL-MCNC: 111 MG/DL — HIGH (ref 70–99)
GLUCOSE SERPL-MCNC: 87 MG/DL — SIGNIFICANT CHANGE UP (ref 70–99)
HAV IGM SER-ACNC: SIGNIFICANT CHANGE UP
HBV CORE AB SER-ACNC: SIGNIFICANT CHANGE UP
HBV CORE IGM SER-ACNC: SIGNIFICANT CHANGE UP
HBV SURFACE AB SER-ACNC: SIGNIFICANT CHANGE UP
HBV SURFACE AG SER-ACNC: SIGNIFICANT CHANGE UP
HCT VFR BLD CALC: 38.3 % — LOW (ref 39–50)
HCV AB S/CO SERPL IA: 110.8 S/CO — HIGH
HCV AB SERPL-IMP: REACTIVE
HGB BLD-MCNC: 12.6 G/DL — LOW (ref 13–17)
LYMPHOCYTES # BLD AUTO: 2.87 K/UL — SIGNIFICANT CHANGE UP (ref 1–3.3)
LYMPHOCYTES # BLD AUTO: 29.1 % — SIGNIFICANT CHANGE UP (ref 13–44)
MACROCYTES BLD QL: SLIGHT — SIGNIFICANT CHANGE UP
MAGNESIUM SERPL-MCNC: 1.8 MG/DL — SIGNIFICANT CHANGE UP (ref 1.6–2.6)
MAGNESIUM SERPL-MCNC: 2.3 MG/DL — SIGNIFICANT CHANGE UP (ref 1.6–2.6)
MANUAL SMEAR VERIFICATION: SIGNIFICANT CHANGE UP
MCHC RBC-ENTMCNC: 27.6 PG — SIGNIFICANT CHANGE UP (ref 27–34)
MCHC RBC-ENTMCNC: 32.9 GM/DL — SIGNIFICANT CHANGE UP (ref 32–36)
MCV RBC AUTO: 84 FL — SIGNIFICANT CHANGE UP (ref 80–100)
MONOCYTES # BLD AUTO: 0.63 K/UL — SIGNIFICANT CHANGE UP (ref 0–0.9)
MONOCYTES NFR BLD AUTO: 6.4 % — SIGNIFICANT CHANGE UP (ref 2–14)
NEUTROPHILS # BLD AUTO: 6.26 K/UL — SIGNIFICANT CHANGE UP (ref 1.8–7.4)
NEUTROPHILS NFR BLD AUTO: 63.6 % — SIGNIFICANT CHANGE UP (ref 43–77)
OVALOCYTES BLD QL SMEAR: SLIGHT — SIGNIFICANT CHANGE UP
PHOSPHATE SERPL-MCNC: 2.7 MG/DL — SIGNIFICANT CHANGE UP (ref 2.5–4.5)
PHOSPHATE SERPL-MCNC: 3.2 MG/DL — SIGNIFICANT CHANGE UP (ref 2.5–4.5)
PLAT MORPH BLD: NORMAL — SIGNIFICANT CHANGE UP
PLATELET # BLD AUTO: 209 K/UL — SIGNIFICANT CHANGE UP (ref 150–400)
POIKILOCYTOSIS BLD QL AUTO: SLIGHT — SIGNIFICANT CHANGE UP
POLYCHROMASIA BLD QL SMEAR: SLIGHT — SIGNIFICANT CHANGE UP
POTASSIUM SERPL-MCNC: 2.6 MMOL/L — CRITICAL LOW (ref 3.5–5.3)
POTASSIUM SERPL-MCNC: 3.1 MMOL/L — LOW (ref 3.5–5.3)
POTASSIUM SERPL-MCNC: 3.8 MMOL/L — SIGNIFICANT CHANGE UP (ref 3.5–5.3)
POTASSIUM SERPL-SCNC: 2.6 MMOL/L — CRITICAL LOW (ref 3.5–5.3)
POTASSIUM SERPL-SCNC: 3.1 MMOL/L — LOW (ref 3.5–5.3)
POTASSIUM SERPL-SCNC: 3.8 MMOL/L — SIGNIFICANT CHANGE UP (ref 3.5–5.3)
POTASSIUM UR-SCNC: 4 MMOL/L — SIGNIFICANT CHANGE UP
PROT SERPL-MCNC: 7.3 G/DL — SIGNIFICANT CHANGE UP (ref 6–8.3)
RBC # BLD: 4.56 M/UL — SIGNIFICANT CHANGE UP (ref 4.2–5.8)
RBC # FLD: 16.6 % — HIGH (ref 10.3–14.5)
RBC BLD AUTO: ABNORMAL
SMUDGE CELLS # BLD: PRESENT — SIGNIFICANT CHANGE UP
SODIUM SERPL-SCNC: 137 MMOL/L — SIGNIFICANT CHANGE UP (ref 135–145)
SODIUM SERPL-SCNC: 139 MMOL/L — SIGNIFICANT CHANGE UP (ref 135–145)
SODIUM SERPL-SCNC: 141 MMOL/L — SIGNIFICANT CHANGE UP (ref 135–145)
SODIUM UR-SCNC: 24 MMOL/L — SIGNIFICANT CHANGE UP
SPECIMEN SOURCE: SIGNIFICANT CHANGE UP
SPHEROCYTES BLD QL SMEAR: SLIGHT — SIGNIFICANT CHANGE UP
T-CELL CD4 SUBSET PNL BLD: 492 /UL — SIGNIFICANT CHANGE UP (ref 489–1457)
TSH SERPL-MCNC: 1.67 UIU/ML — SIGNIFICANT CHANGE UP (ref 0.27–4.2)
WBC # BLD: 9.85 K/UL — SIGNIFICANT CHANGE UP (ref 3.8–10.5)
WBC # FLD AUTO: 9.85 K/UL — SIGNIFICANT CHANGE UP (ref 3.8–10.5)

## 2022-08-22 PROCEDURE — 99223 1ST HOSP IP/OBS HIGH 75: CPT | Mod: GC

## 2022-08-22 PROCEDURE — 99221 1ST HOSP IP/OBS SF/LOW 40: CPT

## 2022-08-22 PROCEDURE — 99233 SBSQ HOSP IP/OBS HIGH 50: CPT | Mod: GC

## 2022-08-22 RX ORDER — METRONIDAZOLE 500 MG
500 TABLET ORAL EVERY 8 HOURS
Refills: 0 | Status: DISCONTINUED | OUTPATIENT
Start: 2022-08-22 | End: 2022-08-24

## 2022-08-22 RX ORDER — MAGNESIUM SULFATE 500 MG/ML
2 VIAL (ML) INJECTION ONCE
Refills: 0 | Status: COMPLETED | OUTPATIENT
Start: 2022-08-22 | End: 2022-08-22

## 2022-08-22 RX ORDER — POTASSIUM CHLORIDE 20 MEQ
10 PACKET (EA) ORAL
Refills: 0 | Status: COMPLETED | OUTPATIENT
Start: 2022-08-22 | End: 2022-08-22

## 2022-08-22 RX ORDER — SODIUM CHLORIDE 9 MG/ML
1000 INJECTION, SOLUTION INTRAVENOUS
Refills: 0 | Status: DISCONTINUED | OUTPATIENT
Start: 2022-08-22 | End: 2022-08-23

## 2022-08-22 RX ORDER — SODIUM CHLORIDE 9 MG/ML
1000 INJECTION, SOLUTION INTRAVENOUS
Refills: 0 | Status: DISCONTINUED | OUTPATIENT
Start: 2022-08-22 | End: 2022-08-22

## 2022-08-22 RX ORDER — POTASSIUM CHLORIDE 20 MEQ
40 PACKET (EA) ORAL
Refills: 0 | Status: COMPLETED | OUTPATIENT
Start: 2022-08-22 | End: 2022-08-22

## 2022-08-22 RX ORDER — POTASSIUM CHLORIDE 20 MEQ
40 PACKET (EA) ORAL ONCE
Refills: 0 | Status: COMPLETED | OUTPATIENT
Start: 2022-08-22 | End: 2022-08-22

## 2022-08-22 RX ORDER — IPRATROPIUM/ALBUTEROL SULFATE 18-103MCG
3 AEROSOL WITH ADAPTER (GRAM) INHALATION EVERY 6 HOURS
Refills: 0 | Status: DISCONTINUED | OUTPATIENT
Start: 2022-08-22 | End: 2022-08-24

## 2022-08-22 RX ORDER — POTASSIUM CHLORIDE 20 MEQ
20 PACKET (EA) ORAL
Refills: 0 | Status: DISCONTINUED | OUTPATIENT
Start: 2022-08-22 | End: 2022-08-22

## 2022-08-22 RX ADMIN — CEFTRIAXONE 100 MILLIGRAM(S): 500 INJECTION, POWDER, FOR SOLUTION INTRAMUSCULAR; INTRAVENOUS at 21:36

## 2022-08-22 RX ADMIN — Medication 100 MILLIGRAM(S): at 07:19

## 2022-08-22 RX ADMIN — Medication 100 MILLIGRAM(S): at 21:24

## 2022-08-22 RX ADMIN — Medication 100 MILLIEQUIVALENT(S): at 02:56

## 2022-08-22 RX ADMIN — Medication 100 MILLIGRAM(S): at 13:34

## 2022-08-22 RX ADMIN — ENOXAPARIN SODIUM 40 MILLIGRAM(S): 100 INJECTION SUBCUTANEOUS at 22:26

## 2022-08-22 RX ADMIN — SODIUM CHLORIDE 125 MILLILITER(S): 9 INJECTION, SOLUTION INTRAVENOUS at 05:44

## 2022-08-22 RX ADMIN — Medication 100 MILLIEQUIVALENT(S): at 09:23

## 2022-08-22 RX ADMIN — BUDESONIDE AND FORMOTEROL FUMARATE DIHYDRATE 2 PUFF(S): 160; 4.5 AEROSOL RESPIRATORY (INHALATION) at 07:19

## 2022-08-22 RX ADMIN — Medication 40 MILLIEQUIVALENT(S): at 05:46

## 2022-08-22 RX ADMIN — Medication 40 MILLIEQUIVALENT(S): at 03:50

## 2022-08-22 RX ADMIN — BUDESONIDE AND FORMOTEROL FUMARATE DIHYDRATE 2 PUFF(S): 160; 4.5 AEROSOL RESPIRATORY (INHALATION) at 19:13

## 2022-08-22 RX ADMIN — Medication 100 MILLIEQUIVALENT(S): at 01:52

## 2022-08-22 RX ADMIN — Medication 100 MILLIEQUIVALENT(S): at 13:35

## 2022-08-22 RX ADMIN — Medication 100 MILLIEQUIVALENT(S): at 07:20

## 2022-08-22 RX ADMIN — Medication 25 GRAM(S): at 04:34

## 2022-08-22 RX ADMIN — ENOXAPARIN SODIUM 40 MILLIGRAM(S): 100 INJECTION SUBCUTANEOUS at 00:24

## 2022-08-22 RX ADMIN — SODIUM CHLORIDE 125 MILLILITER(S): 9 INJECTION, SOLUTION INTRAVENOUS at 18:07

## 2022-08-22 RX ADMIN — Medication 20 MILLIEQUIVALENT(S): at 01:16

## 2022-08-22 RX ADMIN — Medication 20 MILLIEQUIVALENT(S): at 02:56

## 2022-08-22 RX ADMIN — Medication 40 MILLIEQUIVALENT(S): at 09:22

## 2022-08-22 RX ADMIN — Medication 100 MILLIEQUIVALENT(S): at 05:46

## 2022-08-22 NOTE — PROGRESS NOTE ADULT - SUBJECTIVE AND OBJECTIVE BOX
Stepdown Acceptance from [facility] to [facility]    Hospital Course: 51 yo M with PMH HIV (CD4 count 434 in 2/2020), HTN, asthma/COPD px to [facility] ED on 8/21 with 1 week hx of generalized abd pain, nausea/vomiting, and diarrhea found to have leukocytosis, NAGMA, and enterocolitis on CT w GI PCR growing ED, pt repleted with potassium 40meq PO and 20meq IV, and bicarb 50meq IVP.  Pt also received ceftriaxone 1g and metronidazole 500mg, and 2L NS. EKG showed U waves and repeat labs did show improved K and bicarb. ICU consulted with concern for NAGMA 2/2 diarrhea and insensible losses with electrolyte abnormalities and need for closer tele monitoring given EKG changes. GI PCR was positive for EPEC, EIEC, and giardia, with CTAP evidence of enterocolitis. Pt transferred to [facility] and continued potassium and magnesium repletion, antibiotics and started on half NS with 50meq bicarb additive drip. Pt. was repleted with 160mEq of K with increase to 3.1. He has no EKG changes concerning for hypokalemia and his bicarb has increased to 15 from 10.     INTERVAL HPI/OVERNIGHT EVENTS:    SUBJECTIVE: Patient seen and examined at bedside.    OBJECTIVE:    VITAL SIGNS:  ICU Vital Signs Last 24 Hrs  T(C): 36.6 (22 Aug 2022 17:14), Max: 37.3 (22 Aug 2022 01:09)  T(F): 97.8 (22 Aug 2022 17:14), Max: 99.2 (22 Aug 2022 01:09)  HR: 72 (22 Aug 2022 15:54) (72 - 90)  BP: 103/57 (22 Aug 2022 15:54) (103/57 - 130/75)  BP(mean): 72 (22 Aug 2022 15:54) (72 - 99)  ABP: --  ABP(mean): --  RR: 19 (22 Aug 2022 15:54) (16 - 19)  SpO2: 100% (22 Aug 2022 15:54) (97% - 100%)    O2 Parameters below as of 22 Aug 2022 13:47  Patient On (Oxygen Delivery Method): room air              08-21 @ 07:01  -  08-22 @ 07:00  --------------------------------------------------------  IN: 375 mL / OUT: 600 mL / NET: -225 mL    08-22 @ 07:01  -  08-22 @ 20:02  --------------------------------------------------------  IN: 1900 mL / OUT: 0 mL / NET: 1900 mL      CAPILLARY BLOOD GLUCOSE          PHYSICAL EXAM:    General: NAD  HEENT: NC/AT; PERRL, clear conjunctiva  Neck: supple  Respiratory: CTA b/l  Cardiovascular: +S1/S2; RRR  Abdomen: soft, NT/ND; +BS x4  Extremities: WWP, 2+ peripheral pulses b/l; no LE edema  Skin: normal color and turgor; no rash  Neurological:     MEDICATIONS:  MEDICATIONS  (STANDING):  budesonide  80 MICROgram(s)/formoterol 4.5 MICROgram(s) Inhaler 2 Puff(s) Inhalation two times a day  cefTRIAXone   IVPB 1000 milliGRAM(s) IV Intermittent every 24 hours  enoxaparin Injectable 40 milliGRAM(s) SubCutaneous every 24 hours  metroNIDAZOLE  IVPB 500 milliGRAM(s) IV Intermittent every 8 hours  sodium chloride 0.45% 1000 milliLiter(s) (125 mL/Hr) IV Continuous <Continuous>    MEDICATIONS  (PRN):  albuterol/ipratropium for Nebulization 3 milliLiter(s) Nebulizer every 6 hours PRN Bronchospasm      ALLERGIES:  Allergies    No Known Allergies    Intolerances        LABS:                        12.6   9.85  )-----------( 209      ( 22 Aug 2022 05:27 )             38.3     08-22    141  |  116<H>  |  4<L>  ----------------------------<  100<H>  3.8   |  16<L>  |  0.78    Ca    7.9<L>      22 Aug 2022 15:21  Phos  2.7     08-22  Mg     2.3     08-22    TPro  7.3  /  Alb  3.2<L>  /  TBili  0.3  /  DBili  <0.2  /  AST  23  /  ALT  17  /  AlkPhos  96  08-21      Urinalysis Basic - ( 21 Aug 2022 14:19 )    Color: Yellow / Appearance: Clear / SG: >=1.030 / pH: x  Gluc: x / Ketone: NEGATIVE  / Bili: Negative / Urobili: 0.2 E.U./dL   Blood: x / Protein: 30 mg/dL / Nitrite: NEGATIVE   Leuk Esterase: Trace / RBC: < 5 /HPF / WBC > 10 /HPF   Sq Epi: x / Non Sq Epi: 0-5 /HPF / Bacteria: Present /HPF        RADIOLOGY & ADDITIONAL TESTS: Reviewed. [facility]down Acceptance from [facility]    Hospital Course: 51 yo M with PMH HIV (CD4 count 434 in 2/2020), HTN, asthma/COPD px to [facility] ED on 8/21 with 1 week hx of generalized abd pain, nausea/vomiting, and diarrhea found to have leukocytosis, NAGMA, hypokalemia and enterocolitis on CT w GI PCR + for shigella, enteroinvasive e coli, giardia, EPEC. Repleted Potassium and given bicarb in ED and started on ceftriaxone and metronidazole, and given 2L NS. He was admitted to the floor .Pt stepped up to [facility] with concern for worsening NAGMA 2/2 diarrhea and insensible losses with electrolyte abnormalities and need for closer tele monitoring given EKG changes, U waves, in setting of hypokalemia. Here he was repleted, and started on 1/2NS w bicarb. Bicarb improved and no EKG changes found on repeat. Stepped down to the floor w no more concern for EKG changes from hypokalemia.    INTERVAL HPI/OVERNIGHT EVENTS: Pt's last BM earlier in the day which was watery. No complaiunts of abd pain, CP, SOB. Pt wants to go home because he has work tomorrow.    SUBJECTIVE: Patient seen and examined at bedside.    OBJECTIVE:    VITAL SIGNS:  ICU Vital Signs Last 24 Hrs  T(C): 36.6 (22 Aug 2022 17:14), Max: 37.3 (22 Aug 2022 01:09)  T(F): 97.8 (22 Aug 2022 17:14), Max: 99.2 (22 Aug 2022 01:09)  HR: 72 (22 Aug 2022 15:54) (72 - 90)  BP: 103/57 (22 Aug 2022 15:54) (103/57 - 130/75)  BP(mean): 72 (22 Aug 2022 15:54) (72 - 99)  ABP: --  ABP(mean): --  RR: 19 (22 Aug 2022 15:54) (16 - 19)  SpO2: 100% (22 Aug 2022 15:54) (97% - 100%)    O2 Parameters below as of 22 Aug 2022 13:47  Patient On (Oxygen Delivery Method): room air              08-21 @ 07:01  -  08-22 @ 07:00  --------------------------------------------------------  IN: 375 mL / OUT: 600 mL / NET: -225 mL    08-22 @ 07:01  - 08-22 @ 20:02  --------------------------------------------------------  IN: 1900 mL / OUT: 0 mL / NET: 1900 mL      CAPILLARY BLOOD GLUCOSE          PHYSICAL EXAM:    General: MIddle aged M lying flat, appears comfortable  HEENT: MMM  Neck: supple  Respiratory: CTA b/l, no increased work of breathing  Cardiovascular: RRR, no murmur  Abdomen: hyperactive bowel sounds, mild distension, soft, nontender  Extremities: WWP, no edema  Skin: normal color and turgor; no rash  Neurological: AOx3, moves all extremities spontaneously    MEDICATIONS:  MEDICATIONS  (STANDING):  budesonide  80 MICROgram(s)/formoterol 4.5 MICROgram(s) Inhaler 2 Puff(s) Inhalation two times a day  cefTRIAXone   IVPB 1000 milliGRAM(s) IV Intermittent every 24 hours  enoxaparin Injectable 40 milliGRAM(s) SubCutaneous every 24 hours  metroNIDAZOLE  IVPB 500 milliGRAM(s) IV Intermittent every 8 hours  sodium chloride 0.45% 1000 milliLiter(s) (125 mL/Hr) IV Continuous <Continuous>    MEDICATIONS  (PRN):  albuterol/ipratropium for Nebulization 3 milliLiter(s) Nebulizer every 6 hours PRN Bronchospasm      ALLERGIES:  Allergies    No Known Allergies    Intolerances        LABS:                        12.6   9.85  )-----------( 209      ( 22 Aug 2022 05:27 )             38.3     08-22    141  |  116<H>  |  4<L>  ----------------------------<  100<H>  3.8   |  16<L>  |  0.78    Ca    7.9<L>      22 Aug 2022 15:21  Phos  2.7     08-22  Mg     2.3     08-22    TPro  7.3  /  Alb  3.2<L>  /  TBili  0.3  /  DBili  <0.2  /  AST  23  /  ALT  17  /  AlkPhos  96  08-21      Urinalysis Basic - ( 21 Aug 2022 14:19 )    Color: Yellow / Appearance: Clear / SG: >=1.030 / pH: x  Gluc: x / Ketone: NEGATIVE  / Bili: Negative / Urobili: 0.2 E.U./dL   Blood: x / Protein: 30 mg/dL / Nitrite: NEGATIVE   Leuk Esterase: Trace / RBC: < 5 /HPF / WBC > 10 /HPF   Sq Epi: x / Non Sq Epi: 0-5 /HPF / Bacteria: Present /HPF        RADIOLOGY & ADDITIONAL TESTS: Reviewed.

## 2022-08-22 NOTE — PROGRESS NOTE ADULT - PROBLEM SELECTOR PLAN 6
F: None   E: Replete as necessary K>4 Mg>2  N: DASH diet   DVT Prophylaxis: Lovenox 40mg daily   GI prophylaxis: None   CODE STATUS: FULL.

## 2022-08-22 NOTE — PROGRESS NOTE ADULT - PROBLEM SELECTOR PLAN 4
Patient follows with Dr. Tara haddad and has had HIV for 17 years. Has been non compliant x 2 years b/c of Covid he states. Prev on discovy and tivicay. HIV consulted, rec holding home meds. CD4 492  - f/u Viral Load   - Crypt Ag and stool O &P  - f/u HIV consult

## 2022-08-22 NOTE — CONSULT NOTE ADULT - ASSESSMENT
53 yo M with PMH HIV (CD4 count 434 in 2/2020), HTN, asthma/COPD px to [facility] ED on 8/21 with 1 week hx of generalized abd pain, nausea/vomiting, and diarrhea. ICU consulted with concern for NAGMA 2/2 diarrhea and insensible losses with electrolyte abnormalities and need for closer monitoring.    #Enterocolitis  Pt initially px to ED on 8/21 reporting 1 week hx of watery diarrhea, non-bloody with poor PO intake. He was found to be C.diff negative, GI PCR for Shigella/Enteroinvasive E.coli, and Giardia with CTAP evidence of enterocolitis Pt otherwise hemodynamically stable and was discharged from ED earlier with Flagyl/Azithromycin but reports only having taken Azithromycin x1 dose before returning to ED again with worsening abd pain and continued NBNB emesis and watery diarrhea. On current visit, pt admitted to [facility] where initial labs were remarkable for leukocytosis to 12k with bandemia.   - s/p CTX 1g IV + Flagyl 500mg IV x1 in ED  - c/w CTX/Flagyl  - Tylenol PRN for fevers     #Hypokalemia  Initial K 3.8 on current admission however pt with several episodes of nausea/vomiting and diarrhea in ED with subsequent labs remarkable for K 2.6. EKG concerning for U-waves.   - Ordered for PO KCl 20 mEq x3, IV KCl 10 mEq x3  - Repeat BMP, Mg, Phos s/p electrolyte repletion   - Repeat EKG (resolving changes)     #NAGMA  Initial labs on admission concerning for NAGMA with Bicarb 10-11 likely 2/2 GI losses from ongoing diarrhea/emesis. ABG performed, remarkable for pH 7.27, pCO2 21, Bicarb 10. Now s/p 1 amp bicarb.   - Repeat BMP, ABG s/p bicarb  - Management of enterocolitis as above     Dispo: 53 yo M with PMH HIV (CD4 count 434 in 2/2020), HTN, asthma/COPD px to [facility] ED on 8/21 with 1 week hx of generalized abd pain, nausea/vomiting, and diarrhea. ICU consulted with concern for NAGMA 2/2 diarrhea and insensible losses with electrolyte abnormalities and need for closer monitoring.    #Enterocolitis  Pt initially px to ED on 8/21 reporting 1 week hx of watery diarrhea, non-bloody with poor PO intake. He was found to be C.diff negative, GI PCR for Shigella/Enteroinvasive E.coli, and Giardia with CTAP evidence of enterocolitis Pt otherwise hemodynamically stable and was discharged from ED earlier with Flagyl/Azithromycin but reports only having taken Azithromycin x1 dose before returning to ED again with worsening abd pain and continued NBNB emesis and watery diarrhea. On current visit, pt admitted to [facility] where initial labs were remarkable for leukocytosis to 12k with bandemia.   - s/p CTX 1g IV + Flagyl 500mg IV x1 in ED  - c/w CTX/Flagyl  - Tylenol PRN for fevers   - Send stool ova/parasites  - Increase mIVF to LR @ 150cc/hr     #Hypokalemia  Initial K 3.8 on current admission however pt with several episodes of nausea/vomiting and diarrhea in ED with subsequent labs remarkable for K 2.6. EKG concerning for U-waves.   - Ordered for PO KCl 20 mEq x3, IV KCl 10 mEq x3  - Repeat BMP, Mg, Phos s/p electrolyte repletion   - Repeat EKG (resolving changes)     #NAGMA  Initial labs on admission concerning for NAGMA with Bicarb 10-11 likely 2/2 GI losses from ongoing diarrhea/emesis. ABG performed, remarkable for pH 7.27, pCO2 21, Bicarb 10. Now s/p 1 amp bicarb.   - Repeat BMP, ABG s/p bicarb  - Increase mIVF to LR @ 150cc/hr   - Management of enterocolitis as above   - Caution with bicarb repletion i/s/o hypokalemia     Dispo: [facility] / Telemetry    Discussed with Dr. Brown (intensivist)

## 2022-08-22 NOTE — PROGRESS NOTE ADULT - PROBLEM SELECTOR PLAN 2
Initial K 3.8 on current admission however pt with several episodes of nausea/vomiting and diarrhea in ED with subsequent labs remarkable for K 2.6. EKG concerning for U-waves.     - s/p 160meQ of potassium repletion.   - no EKG changes   - f/u PM BMP  - replete K PRN  - may be stable for step-down if K is stable.

## 2022-08-22 NOTE — PROGRESS NOTE ADULT - PROBLEM SELECTOR PLAN 5
Pt with hx of COPD not on any medications currently but endorses symbicort use in past   -Symbicort 2 puffs daily  -duonebs q6 PRN.

## 2022-08-22 NOTE — PROGRESS NOTE ADULT - PROBLEM SELECTOR PLAN 2
Initial K 3.8 on current admission however pt with several episodes of nausea/vomiting and diarrhea in ED with subsequent labs remarkable for K 2.6. EKG initially concerning for U-waves. Repleted w most recent K 3.8. No more ekg changes  - s/p 160meQ of potassium repletion.   - f/u PM BMP  - replete K PRN

## 2022-08-22 NOTE — PROGRESS NOTE ADULT - SUBJECTIVE AND OBJECTIVE BOX
DEJA CUNNINGHAM 52y Male  MRN#: [id number]   CODE STATUS: FULL      SUBJECTIVE  Patient is a 52y old Male who presents with a chief complaint of diarrhea (22 Aug 2022 07:21)  Currently admitted to medicine with the primary diagnosis of Abdominal pain, vomiting, and diarrhea    Today is hospital day 1d, and this morning he is resting in bed comfortably and reports no overnight events.     ===HOSPITAL COURSE===  51 yo M with PMH HIV (CD4 count 434 in 2/2020), HTN, asthma/COPD px to [facility] ED on 8/21 with 1 week hx of generalized abd pain, nausea/vomiting, and diarrhea. Labs significant for rising WBC 12.6 with bandemia, worsening hypokalemia K 2.8, bicarb 16 ->10, Mg 1.9, and venous pH 7.10. In ED, pt repleted with potassium 40meq PO and 20meq IV, and bicarb 50meq IVP.  Pt also received ceftriaxone 1g and metronidazole 500mg, and 2L NS. EKG showed U waves and repeat labs did show improved K and bicarb. ICU consulted with concern for NAGMA 2/2 diarrhea and insensible losses with electrolyte abnormalities and need for closer tele monitoring given EKG changes. GI PCR was positive for EPEC, EIEC, and giardia, with CTAP evidence of enterocolitis. Pt transferred to [facility] and continued potassium and magnesium repletion, antibiotics and started on half NS with 50meq bicarb additive drip. Pt. was repleted with 160mEq of K with increase to 3.1. He has no EKG changes concerning for hypokalemia and his bicarb has increased to 15 from 10.       Present Today:           Massey Catheter (x)No/ ()Yes? Indication:          Central Line (x)No/ ()Yes? Indication:          IV Fluids (x)No/ ()Yes? Type:  Rate:  Indication:      OBJECTIVE  PAST MEDICAL & SURGICAL HISTORY  COPD (chronic obstructive pulmonary disease)    Asthma    HIV (human immunodeficiency virus infection)    Psoriasis    Intracerebral hemorrhage    Depression    S/P tendon repair  R hand tendon repair      Home Meds:  azithromycin 500 mg oral tablet: 1 tab(s) orally every 24 hours   azithromycin 500 mg oral tablet: 1 tab(s) orally once a day   Descovy 200 mg-25 mg oral tablet: 1 tab(s) orally once a day   Habitrol 21 mg/24 hr transdermal film, extended release: 1 patch transdermal once a day (at bedtime)    Lexapro 10 mg oral tablet: 1 tab(s) orally once a day  metroNIDAZOLE 500 mg oral tablet: 1 tab(s) orally 3 times a day   ProAir HFA 90 mcg/inh inhalation aerosol: 2 puff(s) inhaled 4 times a day, As Needed  Symbicort 80 mcg-4.5 mcg/inh inhalation aerosol: 2 puff(s) inhaled 2 times a day  Tivicay 50 mg oral tablet: 1 tab(s) orally once a day     ALLERGIES:  No Known Allergies    MEDICATIONS:  STANDING MEDICATIONS  budesonide  80 MICROgram(s)/formoterol 4.5 MICROgram(s) Inhaler 2 Puff(s) Inhalation two times a day  cefTRIAXone   IVPB 1000 milliGRAM(s) IV Intermittent every 24 hours  enoxaparin Injectable 40 milliGRAM(s) SubCutaneous every 24 hours  metroNIDAZOLE  IVPB 500 milliGRAM(s) IV Intermittent every 8 hours  potassium chloride  10 mEq/100 mL IVPB 10 milliEquivalent(s) IV Intermittent every 1 hour  sodium chloride 0.45% 1000 milliLiter(s) IV Continuous <Continuous>    PRN MEDICATIONS  albuterol/ipratropium for Nebulization 3 milliLiter(s) Nebulizer every 6 hours PRN      VITAL SIGNS: Last 24 Hours  T(C): 36.3 (22 Aug 2022 10:16), Max: 37.3 (22 Aug 2022 01:09)  T(F): 97.4 (22 Aug 2022 10:16), Max: 99.2 (22 Aug 2022 01:09)  HR: 80 (22 Aug 2022 08:16) (74 - 99)  BP: 118/72 (22 Aug 2022 08:16) (116/85 - 130/75)  BP(mean): 90 (22 Aug 2022 08:16) (90 - 99)  RR: 16 (22 Aug 2022 08:16) (16 - 18)  SpO2: 99% (22 Aug 2022 08:16) (97% - 100%)    LABS:                        12.6   9.85  )-----------( 209      ( 22 Aug 2022 05:27 )             38.3     08-22    139  |  114<H>  |  5<L>  ----------------------------<  87  3.1<L>   |  15<L>  |  0.89    Ca    8.4      22 Aug 2022 05:27  Phos  2.7     08-22  Mg     2.3     08-22    TPro  7.3  /  Alb  3.2<L>  /  TBili  0.3  /  DBili  <0.2  /  AST  23  /  ALT  17  /  AlkPhos  96  08-21      Urinalysis Basic - ( 21 Aug 2022 14:19 )    Color: Yellow / Appearance: Clear / SG: >=1.030 / pH: x  Gluc: x / Ketone: NEGATIVE  / Bili: Negative / Urobili: 0.2 E.U./dL   Blood: x / Protein: 30 mg/dL / Nitrite: NEGATIVE   Leuk Esterase: Trace / RBC: < 5 /HPF / WBC > 10 /HPF   Sq Epi: x / Non Sq Epi: 0-5 /HPF / Bacteria: Present /HPF      ABG - ( 21 Aug 2022 23:56 )  pH, Arterial: 7.27  pH, Blood: x     /  pCO2: 21    /  pO2: 109   / HCO3: 10    / Base Excess: -15.2 /  SaO2: 98.4              Lactate, Blood: 1.0 mmol/L (08-21-22 @ 23:53)      Culture - Urine (collected 21 Aug 2022 14:19)  Source: Clean Catch None  Final Report (22 Aug 2022 08:43):    Specimen appears CONTAMINATED. Lab suggests repeat clean catch specimen.    Urinalysis with Rflx Culture (collected 21 Aug 2022 14:19)    GI PCR Panel, Stool (collected 21 Aug 2022 01:08)  Source: .Stool Feces  Final Report (21 Aug 2022 03:17):    Shigella/Enteroinvasive E. coli (EIEC)    Giardia lamblia    Enteropathogenic E. coli (EPEC)    DETECTED by PCR    *******Please Note:*******    GI panel PCR evaluates for:    Campylobacter, Plesiomonas shigelloides, Salmonella,    Vibrio, Yersinia enterocolitica, Enteroaggregative    Escherichia coli (EAEC), Enteropathogenic E.coli (EPEC),    Enterotoxigenic E. coli (ETEC) lt/st, Shiga-like    toxin-producing E. coli (STEC) stx1/stx2,    Shigella/ Enteroinvasive E. coli (EIEC), Cryptosporidium,    Cyclospora cayetanensis, Entamoeba histolytica,    Giardia lamblia, Adenovirus F 40/41, Astrovirus,    Norovirus GI/GII, Rotavirus A, Sapovirus          RADIOLOGY:  < from: CT Abdomen and Pelvis w/ Oral Cont and w/ IV Cont (08.21.22 @ 02:44) >  IMPRESSION:  Diffusely fluid distended stomach and small and large bowel are   suggestive of enterocolitis. No mechanical obstruction.    Mesenteric adenopathy, likely reactive.    Scattered patchy peribronchial opacities throughout the lung bases are   suggestive of infection/inflammation.    < end of copied text >      PHYSICAL EXAM:  General: NAD, AAOx3  HEENT: atraumatic, normocephalic  Pulmonary: clear to auscultation bilaterally; No wheeze  Cardiovascular: Regular rate and rhythm; no murmurs, rubs or gallops. Normal S1S2  Gastrointestinal: Soft, nontender, nondistended; bowel sounds present  Musculoskeletal: 2+ peripheral pulses, no clubbing, cyanosis or edema  Neurology: Pt. alert and oriented, fluent speech, able to move all extremities  Skin: no rashes +scar on R. shin

## 2022-08-22 NOTE — PROGRESS NOTE ADULT - ASSESSMENT
51 yo M with PMH HIV (CD4 count 434 in 2/2020), HTN, asthma/COPD px to [facility] ED on 8/21 with 1 week hx of generalized abd pain, nausea/vomiting, and diarrhea, found have K 2.8, NAGMA, and evidence of enterocolitis on CTAB, and requiring tele monitoring for EKG changes.  51 yo M with PMH HIV (CD4 count 434 in 2/2020), HTN, asthma/COPD px to [facility] ED on 8/21 with 1 week hx of generalized abd pain, nausea/vomiting, and diarrhea, found have K 2.8, NAGMA, and evidence of enterocolitis on CTAB, and requiring tele monitoring for EKG changes,  c/f possible OI iso poorly controlled HIV.

## 2022-08-22 NOTE — PROGRESS NOTE ADULT - ASSESSMENT
51 yo M with PMH HIV (CD4 count 434 in 2/2020), HTN, asthma/COPD px to [facility] ED on 8/21 with 1 week hx of generalized abd pain, nausea/vomiting, and diarrhea, found have K 2.8, NAGMA, and evidence of enterocolitis on CTAB, and requiring tele monitoring for EKG changes,  c/f possible OI iso poorly controlled HIV. Pt. now with improved electrolytes and no changes on EKG.

## 2022-08-22 NOTE — PROGRESS NOTE ADULT - PROBLEM SELECTOR PLAN 1
Pt initially px to ED on 8/21 reporting 1 week hx of watery diarrhea, non-bloody with poor PO intake. He was found to be C.diff negative, GI PCR for Shigella/Enteroinvasive E.coli, and Giardia with CTAP evidence of enterocolitis Pt otherwise hemodynamically stable and was discharged from ED earlier with Flagyl/Azithromycin but reports only having taken Azithromycin x1 dose before returning to ED again with worsening abd pain and continued NBNB emesis and watery diarrhea. On current visit, pt admitted to [facility] where initial labs were remarkable for leukocytosis to 12k with bandemia.     - s/p CTX 1g IV + Flagyl 500mg IV x1 in ED  - c/w CTX 1g IV q24 and Flagyl 500mg IV q8  - c/w half NS with 50meq bicarb at 125 cc/hr  - Tylenol PRN for fevers   - send stool ova/parasites Pt initially px to ED on 8/21 reporting 1 week hx of watery diarrhea, non-bloody with poor PO intake. He was found to be C.diff negative, GI PCR for Shigella/Enteroinvasive E.coli, and Giardia with CTAP evidence of enterocolitis Pt otherwise hemodynamically stable and was discharged from ED earlier with Flagyl/Azithromycin but reports only having taken Azithromycin x1 dose before returning to ED again with worsening abd pain and continued NBNB emesis and watery diarrhea. On current visit, pt admitted to [facility] where initial labs were remarkable for leukocytosis to 12k with bandemia.     - s/p CTX 1g IV + Flagyl 500mg IV x1 in ED  - c/w CTX 1g IV q24 and Flagyl 500mg IV q8  - c/w half NS with 50meq bicarb at 125 cc/hr  - Tylenol PRN for fevers   - send stool ova/parasites  - FU stool crypt Ag

## 2022-08-22 NOTE — PROGRESS NOTE ADULT - SUBJECTIVE AND OBJECTIVE BOX
****** TRANSFER ACCEPTANCE [facility] to [facility] ******    Hospital Course:  51 yo M with PMH HIV (CD4 count 434 in 2/2020), HTN, asthma/COPD px to [facility] ED on 8/21 with 1 week hx of generalized abd pain, nausea/vomiting, and diarrhea. Labs significant for rising WBC 12.6 with bandemia, worsening hypokalemia K 2.8, bicarb 16 ->10, Mg 1.9, and arterial pH 7.10. In ED, pt repleted with potassium 40meq PO and 20meq IV, and bicarb 50meq IVP.  Pt also received ceftriaxone 1g and metronidazole 500mg, and 2L NS. EKG showed U waves and repeat labs did show improved K and bicarb. ICU consulted with concern for NAGMA 2/2 diarrhea and insensible losses with electrolyte abnormalities and need for closer tele monitoring given EKG changes. GI PCR was positive for EPEC, EIEC, and giardia, with CTAP evidence of enterocolitis. Pt transferred to [facility] and continued potassium and magnesium repletion, metronidazole 500mg IV q8, ceftriaxone 1g IV q24, and started on half NS with 50meq bicarb additive drip.    HPI:  52M PMHx  HIV (CD4 count 434 in 2/2020), HTN, asthma/COPD px to [facility] ED on 8/21 with 1 week hx of generalized abd pain, nausea/vomiting, and diarrhea. Initially reporting 20-40 episodes of non-bloody, watery diarrhea over last 1 week, with poor PO intake. He was found to be C.diff negative, GI PCR for Shigella/Enteroinvasive E.coli and Giardia, with CTAP evidence of enterocolitis Pt otherwise hemodynamically stable and was discharged from ED with Flagyl/Azithromycin but reports only having taken Azithromycin x1 dose before returning to ED again with worsening abd pain and continued NBNB emesis and watery diarrhea. On current visit, pt admitted to [facility] where initial labs were remarkable for leukocytosis to 12k with bandemia, hypokalemia to 2.6, bicarb 10. ICU consulted with concern for NAGMA 2/2 diarrhea and insensible losses with electrolyte abnormalities and need for closer monitoring.    Of note, he reports being non-adherent to any medications and has not taken any HIV, HTN medications in over 2 years. He reports being sexually active with male partners, both oral/anal receptive intercourse with multiple partners, no use of barrier protection. Does not know if his last sexual partner is currently experiencing any similar sx. His current complaints include generalized abd pain and nausea. He denies chest pain, difficulty breathing.       VITAL SIGNS:  T(F): 97.3 (08-22-22 @ 04:18)  HR: 74 (08-22-22 @ 04:01)  BP: 130/75 (08-22-22 @ 04:01)  RR: 18 (08-22-22 @ 04:01)  SpO2: 100% (08-22-22 @ 04:01)  Wt(kg): --    I/O:    08-21-22 @ 07:01  -  08-22-22 @ 05:30  --------------------------------------------------------  IN: 0 mL / OUT: 600 mL / NET: -600 mL        PHYSICAL EXAM:    Constitutional: NAD  HEENT: PERRL, EOMI, sclera non-icteric, neck supple, trachea midline, no masses, no JVD, MMM, good dentition  Respiratory: CTA b/l, good air entry b/l, no wheezing, no rhonchi, no rales, without accessory muscle use and no intercostal retractions  Cardiovascular: RRR, normal S1S2, no M/R/G  Gastrointestinal: abdomen soft, NTND, no masses palpable, BS normal  Extremities: Warm, well perfused, pulses equal bilateral upper and lower extremities, no edema, no clubbing  Neurological: AAOx3, CN Grossly intact  Skin: Normal temperature, warm, dry    MEDICATIONS  (STANDING):  budesonide  80 MICROgram(s)/formoterol 4.5 MICROgram(s) Inhaler 2 Puff(s) Inhalation two times a day  cefTRIAXone   IVPB 1000 milliGRAM(s) IV Intermittent every 24 hours  enoxaparin Injectable 40 milliGRAM(s) SubCutaneous every 24 hours  metroNIDAZOLE  IVPB 500 milliGRAM(s) IV Intermittent every 8 hours  potassium chloride    Tablet ER 40 milliEquivalent(s) Oral every 2 hours  potassium chloride  10 mEq/100 mL IVPB 10 milliEquivalent(s) IV Intermittent every 1 hour  sodium chloride 0.45% 1000 milliLiter(s) (125 mL/Hr) IV Continuous <Continuous>    MEDICATIONS  (PRN):  albuterol/ipratropium for Nebulization 3 milliLiter(s) Nebulizer every 6 hours PRN Bronchospasm      Allergies    No Known Allergies    Intolerances        LABS:                        14.4   12.64 )-----------( 220      ( 21 Aug 2022 19:58 )             42.4     08-21    137  |  116<H>  |  6<L>  ----------------------------<  111<H>  2.6<LL>   |  10<LL>  |  0.72    Ca    7.9<L>      21 Aug 2022 23:53  Phos  3.2     08-21  Mg     1.8     08-21    TPro  7.3  /  Alb  3.2<L>  /  TBili  0.3  /  DBili  <0.2  /  AST  23  /  ALT  17  /  AlkPhos  96  08-21      Urinalysis Basic - ( 21 Aug 2022 14:19 )    Color: Yellow / Appearance: Clear / SG: >=1.030 / pH: x  Gluc: x / Ketone: NEGATIVE  / Bili: Negative / Urobili: 0.2 E.U./dL   Blood: x / Protein: 30 mg/dL / Nitrite: NEGATIVE   Leuk Esterase: Trace / RBC: < 5 /HPF / WBC > 10 /HPF   Sq Epi: x / Non Sq Epi: 0-5 /HPF / Bacteria: Present /HPF            Urinalysis with Rflx Culture (collected 08-21-22 @ 14:19)        Urinalysis with Rflx Culture (collected 08-21-22 @ 14:19)        RADIOLOGY & ADDITIONAL TESTS:  Reviewed   ****** TRANSFER ACCEPTANCE [facility] to [facility] ******    Hospital Course:  53 yo M with PMH HIV (CD4 count 434 in 2/2020), HTN, asthma/COPD px to [facility] ED on 8/21 with 1 week hx of generalized abd pain, nausea/vomiting, and diarrhea. Labs significant for rising WBC 12.6 with bandemia, worsening hypokalemia K 2.8, bicarb 16 ->10, Mg 1.9, and arterial pH 7.10. In ED, pt repleted with potassium 40meq PO and 20meq IV, and bicarb 50meq IVP.  Pt also received ceftriaxone 1g and metronidazole 500mg, and 2L NS. EKG showed U waves and repeat labs did show improved K and bicarb. ICU consulted with concern for NAGMA 2/2 diarrhea and insensible losses with electrolyte abnormalities and need for closer tele monitoring given EKG changes. GI PCR was positive for EPEC, EIEC, and giardia, with CTAP evidence of enterocolitis. Pt transferred to [facility] and continued potassium and magnesium repletion, metronidazole 500mg IV q8, ceftriaxone 1g IV q24, and started on half NS with 50meq bicarb additive drip.    HPI:  52M PMHx  HIV (CD4 count 434 in 2/2020), HTN, asthma/COPD px to [facility] ED on 8/21 with 1 week hx of generalized abd pain, nausea/vomiting, and diarrhea. Initially reporting 20-40 episodes of non-bloody, watery diarrhea over last 1 week, with poor PO intake. He was found to be C.diff negative, GI PCR for Shigella/Enteroinvasive E.coli and Giardia, with CTAP evidence of enterocolitis Pt otherwise hemodynamically stable and was discharged from ED with Flagyl/Azithromycin but reports only having taken Azithromycin x1 dose before returning to ED again with worsening abd pain and continued NBNB emesis and watery diarrhea. On current visit, pt admitted to [facility] where initial labs were remarkable for leukocytosis to 12k with bandemia, hypokalemia to 2.6, bicarb 10. ICU consulted with concern for NAGMA 2/2 diarrhea and insensible losses with electrolyte abnormalities and need for closer monitoring.    Of note, he reports being non-adherent to any medications and has not taken any HIV, HTN medications in over 2 years. He reports being sexually active with male partners, both oral/anal receptive intercourse with multiple partners, no use of barrier protection. Does not know if his last sexual partner is currently experiencing any similar sx. His current complaints include generalized abd pain and nausea. He denies chest pain, difficulty breathing.       VITAL SIGNS:  T(F): 97.3 (08-22-22 @ 04:18)  HR: 74 (08-22-22 @ 04:01)  BP: 130/75 (08-22-22 @ 04:01)  RR: 18 (08-22-22 @ 04:01)  SpO2: 100% (08-22-22 @ 04:01)  Wt(kg): --    I/O:    08-21-22 @ 07:01  -  08-22-22 @ 05:30  --------------------------------------------------------  IN: 0 mL / OUT: 600 mL / NET: -600 mL        PHYSICAL EXAM:    Constitutional: NAD  HEENT: PERRL, EOMI, sclera non-icteric, neck supple, trachea midline, no masses, no JVD, MMM, good dentition  Respiratory: CTA b/l, good air entry b/l, no wheezing, no rhonchi, no rales, without accessory muscle use and no intercostal retractions  Cardiovascular: RRR, normal S1S2, no M/R/G  Gastrointestinal: abdomen soft, NTND, no masses palpable, BS normal  Extremities: Warm, well perfused, pulses equal bilateral upper and lower extremities, no edema, no clubbing  Neurological: AAOx3, CN Grossly intact  Skin: Normal temperature, warm, dry    MEDICATIONS  (STANDING):  budesonide  80 MICROgram(s)/formoterol 4.5 MICROgram(s) Inhaler 2 Puff(s) Inhalation two times a day  cefTRIAXone   IVPB 1000 milliGRAM(s) IV Intermittent every 24 hours  enoxaparin Injectable 40 milliGRAM(s) SubCutaneous every 24 hours  metroNIDAZOLE  IVPB 500 milliGRAM(s) IV Intermittent every 8 hours  potassium chloride    Tablet ER 40 milliEquivalent(s) Oral every 2 hours  potassium chloride  10 mEq/100 mL IVPB 10 milliEquivalent(s) IV Intermittent every 1 hour  sodium chloride 0.45% 1000 milliLiter(s) (125 mL/Hr) IV Continuous <Continuous>    MEDICATIONS  (PRN):  albuterol/ipratropium for Nebulization 3 milliLiter(s) Nebulizer every 6 hours PRN Bronchospasm      Allergies    No Known Allergies    Intolerances        LABS:                        14.4   12.64 )-----------( 220      ( 21 Aug 2022 19:58 )             42.4     08-21    137  |  116<H>  |  6<L>  ----------------------------<  111<H>  2.6<LL>   |  10<LL>  |  0.72    Ca    7.9<L>      21 Aug 2022 23:53  Phos  3.2     08-21  Mg     1.8     08-21    TPro  7.3  /  Alb  3.2<L>  /  TBili  0.3  /  DBili  <0.2  /  AST  23  /  ALT  17  /  AlkPhos  96  08-21      Urinalysis Basic - ( 21 Aug 2022 14:19 )    Color: Yellow / Appearance: Clear / SG: >=1.030 / pH: x  Gluc: x / Ketone: NEGATIVE  / Bili: Negative / Urobili: 0.2 E.U./dL   Blood: x / Protein: 30 mg/dL / Nitrite: NEGATIVE   Leuk Esterase: Trace / RBC: < 5 /HPF / WBC > 10 /HPF   Sq Epi: x / Non Sq Epi: 0-5 /HPF / Bacteria: Present /HPF            Urinalysis with Rflx Culture (collected 08-21-22 @ 14:19)        Urinalysis with Rflx Culture (collected 08-21-22 @ 14:19)        RADIOLOGY & ADDITIONAL TESTS:  Reviewed   ****** TRANSFER ACCEPTANCE [facility] to [facility] ******    Hospital Course:  53 yo M with PMH HIV (CD4 count 434 in 2/2020), HTN, asthma/COPD px to [facility] ED on 8/21 with 1 week hx of generalized abd pain, nausea/vomiting, and diarrhea. Labs significant for rising WBC 12.6 with bandemia, worsening hypokalemia K 2.8, bicarb 16 ->10, Mg 1.9, and venous pH 7.10. In ED, pt repleted with potassium 40meq PO and 20meq IV, and bicarb 50meq IVP.  Pt also received ceftriaxone 1g and metronidazole 500mg, and 2L NS. EKG showed U waves and repeat labs did show improved K and bicarb. ICU consulted with concern for NAGMA 2/2 diarrhea and insensible losses with electrolyte abnormalities and need for closer tele monitoring given EKG changes. GI PCR was positive for EPEC, EIEC, and giardia, with CTAP evidence of enterocolitis. Pt transferred to [facility] and continued potassium and magnesium repletion, metronidazole 500mg IV q8, ceftriaxone 1g IV q24, and started on half NS with 50meq bicarb additive drip.    HPI:  52M PMHx  HIV (CD4 count 434 in 2/2020), HTN, asthma/COPD px to [facility] ED on 8/21 with 1 week hx of generalized abd pain, nausea/vomiting, and diarrhea, found to have severe metabolic acidosis, hypokalemia and enterocolitis, c/f possible OI iso poorly controlled HIV. Initially reporting 20-40 episodes of non-bloody, watery diarrhea over last 1 week, with poor PO intake. He was found to be C.diff negative, GI PCR for Shigella/Enteroinvasive E.coli and Giardia, with CTAP evidence of enterocolitis. Pt otherwise hemodynamically stable and was discharged from ED with Flagyl/Azithromycin but reports only having taken Azithromycin x1 dose before returning to ED again with worsening abd pain and continued NBNB emesis and watery diarrhea. On current visit, pt admitted to [facility] where initial labs were remarkable for leukocytosis to 12k with bandemia, hypokalemia to 2.6, bicarb 10. ICU consulted with concern for NAGMA 2/2 diarrhea and insensible losses with electrolyte abnormalities and need for closer monitoring. Of note, he reports being non-adherent to any medications and has not taken any HIV, HTN medications in over 2 years. He reports being sexually active with male partners, both oral/anal receptive intercourse with multiple partners, no use of barrier protection. Does not know if his last sexual partner is currently experiencing any similar sx. His current complaints include generalized abd pain and nausea. He denies chest pain, difficulty breathing.       VITAL SIGNS:  T(F): 97.3 (08-22-22 @ 04:18)  HR: 74 (08-22-22 @ 04:01)  BP: 130/75 (08-22-22 @ 04:01)  RR: 18 (08-22-22 @ 04:01)  SpO2: 100% (08-22-22 @ 04:01)    I/O:    08-21-22 @ 07:01  -  08-22-22 @ 05:30  --------------------------------------------------------  IN: 0 mL / OUT: 600 mL / NET: -600 mL    PHYSICAL EXAM:    Constitutional: NAD  HEENT: PERRL, EOMI, sclera non-icteric, neck supple, trachea midline, no masses, no JVD, MMM, good dentition  Respiratory: CTA b/l, good air entry b/l, no wheezing, no rhonchi, no rales, without accessory muscle use and no intercostal retractions  Cardiovascular: RRR, normal S1S2, no M/R/G  Gastrointestinal: abdomen soft, NTND, no masses palpable, BS normal  Extremities: Warm, well perfused, pulses equal bilateral upper and lower extremities, no edema, no clubbing  Neurological: AAOx3, CN Grossly intact  Skin: Normal temperature, warm, dry    MEDICATIONS  (STANDING):  budesonide  80 MICROgram(s)/formoterol 4.5 MICROgram(s) Inhaler 2 Puff(s) Inhalation two times a day  cefTRIAXone   IVPB 1000 milliGRAM(s) IV Intermittent every 24 hours  enoxaparin Injectable 40 milliGRAM(s) SubCutaneous every 24 hours  metroNIDAZOLE  IVPB 500 milliGRAM(s) IV Intermittent every 8 hours  potassium chloride    Tablet ER 40 milliEquivalent(s) Oral every 2 hours  potassium chloride  10 mEq/100 mL IVPB 10 milliEquivalent(s) IV Intermittent every 1 hour  sodium chloride 0.45% 1000 milliLiter(s) (125 mL/Hr) IV Continuous <Continuous>    MEDICATIONS  (PRN):  albuterol/ipratropium for Nebulization 3 milliLiter(s) Nebulizer every 6 hours PRN Bronchospasm      Allergies    No Known Allergies    Intolerances        LABS:                        14.4   12.64 )-----------( 220      ( 21 Aug 2022 19:58 )             42.4     08-21    137  |  116<H>  |  6<L>  ----------------------------<  111<H>  2.6<LL>   |  10<LL>  |  0.72    Ca    7.9<L>      21 Aug 2022 23:53  Phos  3.2     08-21  Mg     1.8     08-21    TPro  7.3  /  Alb  3.2<L>  /  TBili  0.3  /  DBili  <0.2  /  AST  23  /  ALT  17  /  AlkPhos  96  08-21      Urinalysis Basic - ( 21 Aug 2022 14:19 )    Color: Yellow / Appearance: Clear / SG: >=1.030 / pH: x  Gluc: x / Ketone: NEGATIVE  / Bili: Negative / Urobili: 0.2 E.U./dL   Blood: x / Protein: 30 mg/dL / Nitrite: NEGATIVE   Leuk Esterase: Trace / RBC: < 5 /HPF / WBC > 10 /HPF   Sq Epi: x / Non Sq Epi: 0-5 /HPF / Bacteria: Present /HPF            Urinalysis with Rflx Culture (collected 08-21-22 @ 14:19)        Urinalysis with Rflx Culture (collected 08-21-22 @ 14:19)        RADIOLOGY & ADDITIONAL TESTS:  Reviewed

## 2022-08-22 NOTE — PROGRESS NOTE ADULT - PROBLEM SELECTOR PLAN 1
Pt initially px to ED on 8/21 reporting 1 week hx of watery diarrhea, non-bloody with poor PO intake. He was found to be C.diff negative, GI PCR for Shigella/Enteroinvasive E.coli, and Giardia with CTAP evidence of enterocolitis Pt otherwise hemodynamically stable and was discharged from ED later returned to ED again with worsening abd pain and continued NBNB emesis and watery diarrhea. On current visit, pt admitted to [facility] where initial labs were remarkable for leukocytosis to 12k with bandemia. Pt. was stepped up for new EKG changes in the setting of hypokalemia.    - s/p CTX 1g IV + Flagyl 500mg IV x1 in ED  - cont. CTX 1g IV q24 and Flagyl 500mg IV q8  - c/w half NS with 50meq bicarb at 125 cc/hr  - Tylenol PRN for fevers   - f/u stool ova/parasites  - FU stool crypt Ag

## 2022-08-22 NOTE — PROGRESS NOTE ADULT - PROBLEM SELECTOR PLAN 4
Patient follows with Dr. Pacheco outpt and has had HIV for 17 years. Has been non compliant x 2 years b/c of Covid he states.     -f/u AM CD4 and Viral Load   -Call Dr. Pacheco / HIV consult in AM to discuss. Pt is well known to her clinic and he would like her to be aware of admission and how to restart LYNN. Patient follows with Dr. Pacheco outpt and has had HIV for 17 years. Has been non compliant x 2 years b/c of Covid he states. Prev on discovy and tivicay     -f/u AM CD4 and Viral Load   - Crypt Ag and stool O &P  -Call Dr. Pacheco / HIV consult in AM to discuss. Pt is well known to her clinic and he would like her to be aware of admission and how to restart LYNN. May want to hold off on ART until ruled out OI   - Would do routine HIV labs as well- Hepatitis, RPR, GC/Ch;amydia while patient is in house

## 2022-08-22 NOTE — PROGRESS NOTE ADULT - PROBLEM SELECTOR PLAN 3
Initial labs on admission concerning for NAGMA with Bicarb 10-11 likely 2/2 GI losses from ongoing diarrhea/emesis. ABG performed, remarkable for pH 7.27, pCO2 21, Bicarb 10. Repleted w bicarb then 1/2 NS w bicarb. Appropriately compensated CO2. Bicarb improving, now 16  - cont. half NS with 50meq bicarb at 125 cc/hr for 3 hours  - will f/u BMP in AM  - Management of enterocolitis as above   - Caution with bicarb repletion i/s/o hypokalemia, as could worsen

## 2022-08-22 NOTE — PROGRESS NOTE ADULT - PROBLEM SELECTOR PLAN 6
F: 1/2 NS with 50meq of bicarb @125  E: Replete as necessary K>4 Mg>2  N: DASH diet   DVT Prophylaxis: Lovenox 40mg daily   GI prophylaxis: None   CODE STATUS: FULL.

## 2022-08-22 NOTE — PROGRESS NOTE ADULT - PROBLEM SELECTOR PLAN 3
Initial labs on admission concerning for NAGMA with Bicarb 10-11 likely 2/2 GI losses from ongoing diarrhea/emesis. ABG performed, remarkable for pH 7.27, pCO2 21, Bicarb 10. Now s/p 1 amp bicarb.     - Repeat BMP, trend Bicarbs  - bicarb improving to 15  - Well compensated based off most recent pH, can repeat ABG if clinically worsens   - cont. half NS with 50meq bicarb at 125 cc/hr  - will f/u BMP in PM and will re-evaluate Bicarb fluids.   - Management of enterocolitis as above   - Caution with bicarb repletion i/s/o hypokalemia, as could worsen

## 2022-08-22 NOTE — PROGRESS NOTE ADULT - PROBLEM SELECTOR PLAN 4
Patient follows with Dr. Tara haddad and has had HIV for 17 years. Has been non compliant x 2 years b/c of Covid he states. Prev on discovy and tivicay     - f/u AM CD4 and Viral Load   - Crypt Ag and stool O &P  - f/u HIV consult

## 2022-08-22 NOTE — PROGRESS NOTE ADULT - PROBLEM SELECTOR PLAN 3
Initial labs on admission concerning for NAGMA with Bicarb 10-11 likely 2/2 GI losses from ongoing diarrhea/emesis. ABG performed, remarkable for pH 7.27, pCO2 21, Bicarb 10. Now s/p 1 amp bicarb.     - Repeat BMP, ABG s/p bicarb  - c/w half NS with 50meq bicarb at 125 cc/hr  - Management of enterocolitis as above   - Caution with bicarb repletion i/s/o hypokalemia Initial labs on admission concerning for NAGMA with Bicarb 10-11 likely 2/2 GI losses from ongoing diarrhea/emesis. ABG performed, remarkable for pH 7.27, pCO2 21, Bicarb 10. Now s/p 1 amp bicarb.     - Repeat BMP, trend Bicarbs  - Well compensated based off most recent pH, can repeat ABG if clinically worsens   - FU urine AG to r/o RTA  - Consider renal consult   - c/w half NS with 50meq bicarb at 125 cc/hr  - Management of enterocolitis as above   - Caution with bicarb repletion i/s/o hypokalemia, as could worsen

## 2022-08-22 NOTE — PROGRESS NOTE ADULT - PROBLEM SELECTOR PLAN 1
With GI PCR + for shigella, enteroinvasive e coli, giardia, EPEC. NAGMA and hypokalemia 2/2 diarrhea. Started on CTX and flagyl. Now w slight improvement in diarrhea, no N/V, and improvement in electrolyte abnormalities w repletion.  - cont. CTX 1g IV q24 and Flagyl 500mg IV q8  - c/w half NS with 50meq bicarb at 125 cc/hr for 3 hours  - Tylenol PRN for fevers   - f/u stool ova/parasites  - FU stool crypt Ag

## 2022-08-22 NOTE — PROGRESS NOTE ADULT - PROBLEM SELECTOR PLAN 6
F: 1/2 NS with 50meq of bicarb @125 x3 hours  E: Replete as necessary K>4 Mg>2  N: DASH diet   DVT Prophylaxis: Lovenox 40mg daily   GI prophylaxis: None   CODE STATUS: FULL.

## 2022-08-22 NOTE — CHART NOTE - NSCHARTNOTEFT_GEN_A_CORE
HIV Consult    52M w/ HIV presents for diarrhea    Off meds for 1 year.  Prev on Descovy/Tivicay, known to [facility] but last seen in 2020.      Subjective/ROS: Denies HA, CP, SOB, n/v, changes in urinary habits.  12pt ROS otherwise negative.  Diarrhea improving w/ abx.    VITALS  Vital Signs Last 24 Hrs  T(C): 36.8 (22 Aug 2022 13:27), Max: 37.3 (22 Aug 2022 01:09)  T(F): 98.3 (22 Aug 2022 13:27), Max: 99.2 (22 Aug 2022 01:09)  HR: 90 (22 Aug 2022 13:47) (74 - 99)  BP: 122/78 (22 Aug 2022 13:47) (116/85 - 130/75)  BP(mean): 94 (22 Aug 2022 13:47) (90 - 99)  RR: 16 (22 Aug 2022 13:47) (16 - 18)  SpO2: 100% (22 Aug 2022 13:47) (97% - 100%)    Parameters below as of 22 Aug 2022 13:47  Patient On (Oxygen Delivery Method): room air      PHYSICAL EXAM  General: A&Ox3; NAD  Head: NC/AT; MMM; PERRL; EOMI;  Neck: Supple; no JVD  Respiratory: CTA B/L; no wheezes/crackles   Cardiovascular: Regular rhythm/rate; S1/S2   Gastrointestinal: Soft; NTND; normoactive BS  Extremities: WWP; no edema/cyanosis  Neurological:  CNII-XII grossly intact; no obvious focal deficits    MEDICATIONS  (STANDING):  budesonide  80 MICROgram(s)/formoterol 4.5 MICROgram(s) Inhaler 2 Puff(s) Inhalation two times a day  cefTRIAXone   IVPB 1000 milliGRAM(s) IV Intermittent every 24 hours  enoxaparin Injectable 40 milliGRAM(s) SubCutaneous every 24 hours  metroNIDAZOLE  IVPB 500 milliGRAM(s) IV Intermittent every 8 hours  sodium chloride 0.45% 1000 milliLiter(s) (125 mL/Hr) IV Continuous <Continuous>    MEDICATIONS  (PRN):  albuterol/ipratropium for Nebulization 3 milliLiter(s) Nebulizer every 6 hours PRN Bronchospasm      No Known Allergies      LABS                        12.6   9.85  )-----------( 209      ( 22 Aug 2022 05:27 )             38.3     08-22    139  |  114<H>  |  5<L>  ----------------------------<  87  3.1<L>   |  15<L>  |  0.89    Ca    8.4      22 Aug 2022 05:27  Phos  2.7     08-22  Mg     2.3     08-22    TPro  7.3  /  Alb  3.2<L>  /  TBili  0.3  /  DBili  <0.2  /  AST  23  /  ALT  17  /  AlkPhos  96  08-21      Urinalysis Basic - ( 21 Aug 2022 14:19 )    Color: Yellow / Appearance: Clear / SG: >=1.030 / pH: x  Gluc: x / Ketone: NEGATIVE  / Bili: Negative / Urobili: 0.2 E.U./dL   Blood: x / Protein: 30 mg/dL / Nitrite: NEGATIVE   Leuk Esterase: Trace / RBC: < 5 /HPF / WBC > 10 /HPF   Sq Epi: x / Non Sq Epi: 0-5 /HPF / Bacteria: Present /HPF    IMAGING/EKG/ETC: Reviewed    Enterocolitis on CT 2/2 Giardia, EIEC, EPEC/Shigella.  CDiff negative.    52M w/ HIV presents w/ enterocolitis.    Follow up stool culture given MSM and inc risk of MDR shigella.  Improving on abx which is a good sign.    F/u T-cell subset and VL  Protein gap is not significant   Abs lymphocyte count not helpful.    If CD4 < 200, please start Bactrim.    If CD4 <100-150, will consider additional OI screening.    Hold ARVs pending labs.    HIV team will continue to follow.

## 2022-08-22 NOTE — PROGRESS NOTE ADULT - PROBLEM SELECTOR PLAN 2
Initial K 3.8 on current admission however pt with several episodes of nausea/vomiting and diarrhea in ED with subsequent labs remarkable for K 2.6. EKG concerning for U-waves.     - s/p PO KCl 20 mEq x2 and IV KCl 10 mEq x2 in ED  - c/w potassium, mag repletion   - Repeat BMP, Mg, Phos s/p electrolyte repletion   - Repeat EKG (resolving changes) Initial K 3.8 on current admission however pt with several episodes of nausea/vomiting and diarrhea in ED with subsequent labs remarkable for K 2.6. EKG concerning for U-waves.     - s/p PO KCl 80mg and IV KCl 20meq repleation   - c/w potassium, mag repletion   - Repeat BMP, Mg, Phos s/p electrolyte repletion   - Repeat EKG (resolving changes)  - Tele

## 2022-08-22 NOTE — PROGRESS NOTE ADULT - ASSESSMENT
53 yo M with PMH HIV (CD4 count 434 in 2/2020), HTN, asthma/COPD px to [facility] ED on 8/21 with 1 week hx of generalized abd pain, nausea/vomiting, and diarrhea found to have leukocytosis, NAGMA 2/2 diarrhea, hypokalemia, and enterocolitis on CT w GI PCR + for shigella, enteroinvasive e coli, giardia, EPEC.

## 2022-08-22 NOTE — CONSULT NOTE ADULT - SUBJECTIVE AND OBJECTIVE BOX
********** ICU CONSULT NOTE **********    HPI:  53 yo M with PMH HIV (CD4 count 434 in 2/2020), HTN, asthma/COPD px to [facility] ED on 8/21 with 1 week hx of generalized abd pain, nausea/vomiting, and diarrhea.     Pt initially px to ED on 8/21 reporting 1 week hx of watery diarrhea, non-bloody with poor PO intake. He was found to be C.diff negative, GI PCR for Shigella/Enteroinvasive E.coli and Giardia, with CTAP evidence of enterocolitis Pt otherwise hemodynamically stable and was discharged from ED with Flagyl/Azithromycin but reports only having taken Azithromycin x1 dose before returning to ED again with worsening abd pain and continued NBNB emesis and watery diarrhea. On current visit, pt admitted to [facility] where initial labs were remarkable for leukocytosis to 12k with bandemia, hypokalemia to 2.6, bicarb 10. ICU consulted with concern for NAGMA 2/2 diarrhea and insensible losses with electrolyte abnormalities and need for closer monitoring.    ICU consult team seen/examined patient at bedside on [facility].      VITAL SIGNS:  ICU Vital Signs Last 24 Hrs  T(C): 37.3 (22 Aug 2022 01:09), Max: 37.3 (22 Aug 2022 01:09)  T(F): 99.2 (22 Aug 2022 01:09), Max: 99.2 (22 Aug 2022 01:09)  HR: 74 (21 Aug 2022 22:15) (74 - 99)  BP: 117/73 (21 Aug 2022 22:15) (113/75 - 128/79)  RR: 18 (21 Aug 2022 22:15) (15 - 18)  SpO2: 100% (21 Aug 2022 22:15) (97% - 100%)    O2 Parameters below as of 21 Aug 2022 22:15  Patient On (Oxygen Delivery Method): room air    PHYSICAL EXAM:  General: NAD; Lying comfortably in bed  HEENT: NC/AT; PERRL, clear conjunctiva; MMM  Neck: Supple. No JVD or thyromegaly   Respiratory: CTA b/l. No wheezes, rhonchi, rales.  Cardiovascular: +S1/S2; RRR; No murmurs, rubs, gallops.  Abdomen: soft, NT/ND; +BS x4  Extremities: WWP, 2+ peripheral pulses b/l; no LE edema  Skin: normal color and turgor; no rash  Neurological: AOx3      MEDICATIONS  (STANDING):  albuterol/ipratropium for Nebulization 3 milliLiter(s) Nebulizer every 6 hours  budesonide  80 MICROgram(s)/formoterol 4.5 MICROgram(s) Inhaler 2 Puff(s) Inhalation two times a day  cefTRIAXone   IVPB 1000 milliGRAM(s) IV Intermittent every 24 hours  enoxaparin Injectable 40 milliGRAM(s) SubCutaneous every 24 hours  lactated ringers. 1000 milliLiter(s) (80 mL/Hr) IV Continuous <Continuous>  magnesium sulfate  IVPB 2 Gram(s) IV Intermittent once  potassium chloride    Tablet ER 20 milliEquivalent(s) Oral every 2 hours  potassium chloride  10 mEq/100 mL IVPB 10 milliEquivalent(s) IV Intermittent every 1 hour      ALLERGIES:  No Known Allergies        LABS:                        14.4   12.64 )-----------( 220      ( 21 Aug 2022 19:58 )             42.4     08-21    137  |  116<H>  |  6<L>  ----------------------------<  111<H>  2.6<LL>   |  10<LL>  |  0.72    Ca    7.9<L>      21 Aug 2022 23:53  Phos  3.2     08-21  Mg     1.8     08-21    TPro  7.3  /  Alb  3.2<L>  /  TBili  0.3  /  DBili  <0.2  /  AST  23  /  ALT  17  /  AlkPhos  96  08-21      Urinalysis Basic - ( 21 Aug 2022 14:19 )    Color: Yellow / Appearance: Clear / SG: >=1.030 / pH: x  Gluc: x / Ketone: NEGATIVE  / Bili: Negative / Urobili: 0.2 E.U./dL   Blood: x / Protein: 30 mg/dL / Nitrite: NEGATIVE   Leuk Esterase: Trace / RBC: < 5 /HPF / WBC > 10 /HPF   Sq Epi: x / Non Sq Epi: 0-5 /HPF / Bacteria: Present /HPF        RADIOLOGY & ADDITIONAL TESTS: Reviewed.    < from: CT Abdomen and Pelvis w/ Oral Cont and w/ IV Cont (08.21.22 @ 02:44) >  Diffusely fluid distended stomach and small and large bowel are   suggestive of enterocolitis. No mechanical obstruction.    Mesenteric adenopathy, likely reactive.    Scattered patchy peribronchial opacities throughout the lung bases are   suggestive of infection/inflammation.   ********** ICU CONSULT NOTE **********    HPI:  53 yo M with PMH HIV (CD4 count 434 in 2/2020), HTN, asthma/COPD, depression px to [facility] ED on 8/21 with 1 week hx of generalized abd pain, nausea/vomiting, and diarrhea.     Pt initially px to ED on 8/21 reporting 1 week hx of watery diarrhea, non-bloody with poor PO intake. He was found to be C.diff negative, GI PCR for Shigella/Enteroinvasive E.coli and Giardia, with CTAP evidence of enterocolitis Pt otherwise hemodynamically stable and was discharged from ED with Flagyl/Azithromycin but reports only having taken Azithromycin x1 dose before returning to ED again with worsening abd pain and continued NBNB emesis and watery diarrhea. On current visit, pt admitted to [facility] where initial labs were remarkable for leukocytosis to 12k with bandemia, hypokalemia to 2.6, bicarb 10. ICU consulted with concern for NAGMA 2/2 diarrhea and insensible losses with electrolyte abnormalities and need for closer monitoring.    ICU consult team seen/examined patient at bedside on [facility].  Pt reports 20-40 episodes of watery diarrhea for approx a week, has had 5-10 episodes since px to the ED today. For the past week, he reports very minimal PO intake and has only been able to tolerate small sips of water. He reports being non-adherent to any medications and has not taken any medications in over 2 years. He reports being sexually active with male partners, both oral/anal receptive intercourse with multiple partners, no use of barrier protection. Does not know if his last sexual partner is currently experiencing any similar sx. His current complaints include generalized abd pain and nausea. He denies chest pain, difficulty breathing.     VITAL SIGNS:  ICU Vital Signs Last 24 Hrs  T(C): 37.3 (22 Aug 2022 01:09), Max: 37.3 (22 Aug 2022 01:09)  T(F): 99.2 (22 Aug 2022 01:09), Max: 99.2 (22 Aug 2022 01:09)  HR: 74 (21 Aug 2022 22:15) (74 - 99)  BP: 117/73 (21 Aug 2022 22:15) (113/75 - 128/79)  RR: 18 (21 Aug 2022 22:15) (15 - 18)  SpO2: 100% (21 Aug 2022 22:15) (97% - 100%)    O2 Parameters below as of 21 Aug 2022 22:15  Patient On (Oxygen Delivery Method): room air    PHYSICAL EXAM:  General: Laying in hospital bed, appears mildly uncomfortable but not in acute distress.   HEENT: dry MM. No lesions/thrush noticed.   Neck: Supple. No JVD or thyromegaly   Respiratory: Tachypneic. CTA b/l. No wheezes, rhonchi, rales.  Cardiovascular: Tachycardic. No murmurs/rubs/gallops.   Abdomen: soft, distended, mildly tender to palpation throughout. +BS x4.   Extremities: WWP, 2+ peripheral pulses b/l; no LE edema  Neurological: AOx3. Following commands and answering all questions.       MEDICATIONS  (STANDING):  albuterol/ipratropium for Nebulization 3 milliLiter(s) Nebulizer every 6 hours  budesonide  80 MICROgram(s)/formoterol 4.5 MICROgram(s) Inhaler 2 Puff(s) Inhalation two times a day  cefTRIAXone   IVPB 1000 milliGRAM(s) IV Intermittent every 24 hours  enoxaparin Injectable 40 milliGRAM(s) SubCutaneous every 24 hours  lactated ringers. 1000 milliLiter(s) (80 mL/Hr) IV Continuous <Continuous>  magnesium sulfate  IVPB 2 Gram(s) IV Intermittent once  potassium chloride    Tablet ER 20 milliEquivalent(s) Oral every 2 hours  potassium chloride  10 mEq/100 mL IVPB 10 milliEquivalent(s) IV Intermittent every 1 hour      ALLERGIES:  No Known Allergies        LABS:                        14.4   12.64 )-----------( 220      ( 21 Aug 2022 19:58 )             42.4     08-21    137  |  116<H>  |  6<L>  ----------------------------<  111<H>  2.6<LL>   |  10<LL>  |  0.72    Ca    7.9<L>      21 Aug 2022 23:53  Phos  3.2     08-21  Mg     1.8     08-21    TPro  7.3  /  Alb  3.2<L>  /  TBili  0.3  /  DBili  <0.2  /  AST  23  /  ALT  17  /  AlkPhos  96  08-21      Urinalysis Basic - ( 21 Aug 2022 14:19 )    Color: Yellow / Appearance: Clear / SG: >=1.030 / pH: x  Gluc: x / Ketone: NEGATIVE  / Bili: Negative / Urobili: 0.2 E.U./dL   Blood: x / Protein: 30 mg/dL / Nitrite: NEGATIVE   Leuk Esterase: Trace / RBC: < 5 /HPF / WBC > 10 /HPF   Sq Epi: x / Non Sq Epi: 0-5 /HPF / Bacteria: Present /HPF        RADIOLOGY & ADDITIONAL TESTS: Reviewed.    < from: CT Abdomen and Pelvis w/ Oral Cont and w/ IV Cont (08.21.22 @ 02:44) >  Diffusely fluid distended stomach and small and large bowel are   suggestive of enterocolitis. No mechanical obstruction.    Mesenteric adenopathy, likely reactive.    Scattered patchy peribronchial opacities throughout the lung bases are   suggestive of infection/inflammation.

## 2022-08-23 DIAGNOSIS — B19.20 UNSPECIFIED VIRAL HEPATITIS C WITHOUT HEPATIC COMA: ICD-10-CM

## 2022-08-23 LAB
ALBUMIN SERPL ELPH-MCNC: 3.1 G/DL — LOW (ref 3.3–5)
ALP SERPL-CCNC: 109 U/L — SIGNIFICANT CHANGE UP (ref 40–120)
ALT FLD-CCNC: 19 U/L — SIGNIFICANT CHANGE UP (ref 10–45)
ANION GAP SERPL CALC-SCNC: 10 MMOL/L — SIGNIFICANT CHANGE UP (ref 5–17)
ANISOCYTOSIS BLD QL: SLIGHT — SIGNIFICANT CHANGE UP
APTT BLD: 38.3 SEC — HIGH (ref 27.5–35.5)
AST SERPL-CCNC: 22 U/L — SIGNIFICANT CHANGE UP (ref 10–40)
BASOPHILS # BLD AUTO: 0 K/UL — SIGNIFICANT CHANGE UP (ref 0–0.2)
BASOPHILS NFR BLD AUTO: 0 % — SIGNIFICANT CHANGE UP (ref 0–2)
BILIRUB SERPL-MCNC: 0.3 MG/DL — SIGNIFICANT CHANGE UP (ref 0.2–1.2)
BLD GP AB SCN SERPL QL: NEGATIVE — SIGNIFICANT CHANGE UP
BUN SERPL-MCNC: 4 MG/DL — LOW (ref 7–23)
BURR CELLS BLD QL SMEAR: PRESENT — SIGNIFICANT CHANGE UP
CALCIUM SERPL-MCNC: 8.1 MG/DL — LOW (ref 8.4–10.5)
CHLORIDE SERPL-SCNC: 112 MMOL/L — HIGH (ref 96–108)
CO2 SERPL-SCNC: 19 MMOL/L — LOW (ref 22–31)
CREAT SERPL-MCNC: 0.79 MG/DL — SIGNIFICANT CHANGE UP (ref 0.5–1.3)
DACRYOCYTES BLD QL SMEAR: SLIGHT — SIGNIFICANT CHANGE UP
EGFR: 108 ML/MIN/1.73M2 — SIGNIFICANT CHANGE UP
EOSINOPHIL # BLD AUTO: 0.12 K/UL — SIGNIFICANT CHANGE UP (ref 0–0.5)
EOSINOPHIL NFR BLD AUTO: 1.8 % — SIGNIFICANT CHANGE UP (ref 0–6)
GLUCOSE SERPL-MCNC: 76 MG/DL — SIGNIFICANT CHANGE UP (ref 70–99)
HCT VFR BLD CALC: 35 % — LOW (ref 39–50)
HGB BLD-MCNC: 12 G/DL — LOW (ref 13–17)
HIV-1 VIRAL LOAD RESULT: ABNORMAL
HIV-1 VIRAL LOAD RESULT: ABNORMAL
HIV1 RNA # SERPL NAA+PROBE: SIGNIFICANT CHANGE UP
HIV1 RNA # SERPL NAA+PROBE: SIGNIFICANT CHANGE UP
HIV1 RNA SER-IMP: SIGNIFICANT CHANGE UP
HIV1 RNA SER-IMP: SIGNIFICANT CHANGE UP
HIV1 RNA SERPL NAA+PROBE-ACNC: ABNORMAL
HIV1 RNA SERPL NAA+PROBE-ACNC: ABNORMAL
HIV1 RNA SERPL NAA+PROBE-LOG#: 4.66 — SIGNIFICANT CHANGE UP
HIV1 RNA SERPL NAA+PROBE-LOG#: 5.25 — SIGNIFICANT CHANGE UP
HYPOCHROMIA BLD QL: SLIGHT — SIGNIFICANT CHANGE UP
INR BLD: 1.19 — HIGH (ref 0.88–1.16)
LYMPHOCYTES # BLD AUTO: 1.67 K/UL — SIGNIFICANT CHANGE UP (ref 1–3.3)
LYMPHOCYTES # BLD AUTO: 24.6 % — SIGNIFICANT CHANGE UP (ref 13–44)
MAGNESIUM SERPL-MCNC: 1.7 MG/DL — SIGNIFICANT CHANGE UP (ref 1.6–2.6)
MANUAL SMEAR VERIFICATION: SIGNIFICANT CHANGE UP
MCHC RBC-ENTMCNC: 27.8 PG — SIGNIFICANT CHANGE UP (ref 27–34)
MCHC RBC-ENTMCNC: 34.3 GM/DL — SIGNIFICANT CHANGE UP (ref 32–36)
MCV RBC AUTO: 81 FL — SIGNIFICANT CHANGE UP (ref 80–100)
MICROCYTES BLD QL: SLIGHT — SIGNIFICANT CHANGE UP
MONOCYTES # BLD AUTO: 0.47 K/UL — SIGNIFICANT CHANGE UP (ref 0–0.9)
MONOCYTES NFR BLD AUTO: 7 % — SIGNIFICANT CHANGE UP (ref 2–14)
NEUTROPHILS # BLD AUTO: 4.33 K/UL — SIGNIFICANT CHANGE UP (ref 1.8–7.4)
NEUTROPHILS NFR BLD AUTO: 64 % — SIGNIFICANT CHANGE UP (ref 43–77)
PHOSPHATE SERPL-MCNC: 2.4 MG/DL — LOW (ref 2.5–4.5)
PLAT MORPH BLD: NORMAL — SIGNIFICANT CHANGE UP
PLATELET # BLD AUTO: 205 K/UL — SIGNIFICANT CHANGE UP (ref 150–400)
POIKILOCYTOSIS BLD QL AUTO: SIGNIFICANT CHANGE UP
POLYCHROMASIA BLD QL SMEAR: SLIGHT — SIGNIFICANT CHANGE UP
POTASSIUM SERPL-MCNC: 3.1 MMOL/L — LOW (ref 3.5–5.3)
POTASSIUM SERPL-SCNC: 3.1 MMOL/L — LOW (ref 3.5–5.3)
PROT SERPL-MCNC: 7 G/DL — SIGNIFICANT CHANGE UP (ref 6–8.3)
PROTHROM AB SERPL-ACNC: 14.2 SEC — HIGH (ref 10.5–13.4)
RBC # BLD: 4.32 M/UL — SIGNIFICANT CHANGE UP (ref 4.2–5.8)
RBC # FLD: 16.9 % — HIGH (ref 10.3–14.5)
RBC BLD AUTO: ABNORMAL
RH IG SCN BLD-IMP: POSITIVE — SIGNIFICANT CHANGE UP
SODIUM SERPL-SCNC: 141 MMOL/L — SIGNIFICANT CHANGE UP (ref 135–145)
VARIANT LYMPHS # BLD: 2.6 % — SIGNIFICANT CHANGE UP (ref 0–6)
WBC # BLD: 6.77 K/UL — SIGNIFICANT CHANGE UP (ref 3.8–10.5)
WBC # FLD AUTO: 6.77 K/UL — SIGNIFICANT CHANGE UP (ref 3.8–10.5)

## 2022-08-23 PROCEDURE — 99232 SBSQ HOSP IP/OBS MODERATE 35: CPT | Mod: GC

## 2022-08-23 PROCEDURE — 76705 ECHO EXAM OF ABDOMEN: CPT | Mod: 26

## 2022-08-23 RX ORDER — BICTEGRAVIR SODIUM, EMTRICITABINE, AND TENOFOVIR ALAFENAMIDE FUMARATE 30; 120; 15 MG/1; MG/1; MG/1
1 TABLET ORAL DAILY
Refills: 0 | Status: DISCONTINUED | OUTPATIENT
Start: 2022-08-23 | End: 2022-08-24

## 2022-08-23 RX ORDER — POTASSIUM CHLORIDE 20 MEQ
40 PACKET (EA) ORAL ONCE
Refills: 0 | Status: DISCONTINUED | OUTPATIENT
Start: 2022-08-23 | End: 2022-08-23

## 2022-08-23 RX ORDER — POTASSIUM PHOSPHATE, MONOBASIC POTASSIUM PHOSPHATE, DIBASIC 236; 224 MG/ML; MG/ML
30 INJECTION, SOLUTION INTRAVENOUS ONCE
Refills: 0 | Status: COMPLETED | OUTPATIENT
Start: 2022-08-23 | End: 2022-08-23

## 2022-08-23 RX ORDER — POTASSIUM CHLORIDE 20 MEQ
40 PACKET (EA) ORAL ONCE
Refills: 0 | Status: COMPLETED | OUTPATIENT
Start: 2022-08-23 | End: 2022-08-23

## 2022-08-23 RX ADMIN — ENOXAPARIN SODIUM 40 MILLIGRAM(S): 100 INJECTION SUBCUTANEOUS at 23:02

## 2022-08-23 RX ADMIN — Medication 100 MILLIGRAM(S): at 15:17

## 2022-08-23 RX ADMIN — Medication 100 MILLIGRAM(S): at 21:41

## 2022-08-23 RX ADMIN — BUDESONIDE AND FORMOTEROL FUMARATE DIHYDRATE 2 PUFF(S): 160; 4.5 AEROSOL RESPIRATORY (INHALATION) at 12:20

## 2022-08-23 RX ADMIN — POTASSIUM PHOSPHATE, MONOBASIC POTASSIUM PHOSPHATE, DIBASIC 83.33 MILLIMOLE(S): 236; 224 INJECTION, SOLUTION INTRAVENOUS at 12:20

## 2022-08-23 RX ADMIN — CEFTRIAXONE 100 MILLIGRAM(S): 500 INJECTION, POWDER, FOR SOLUTION INTRAMUSCULAR; INTRAVENOUS at 21:41

## 2022-08-23 RX ADMIN — BUDESONIDE AND FORMOTEROL FUMARATE DIHYDRATE 2 PUFF(S): 160; 4.5 AEROSOL RESPIRATORY (INHALATION) at 19:15

## 2022-08-23 RX ADMIN — Medication 100 MILLIGRAM(S): at 06:12

## 2022-08-23 RX ADMIN — BICTEGRAVIR SODIUM, EMTRICITABINE, AND TENOFOVIR ALAFENAMIDE FUMARATE 1 TABLET(S): 30; 120; 15 TABLET ORAL at 19:15

## 2022-08-23 RX ADMIN — Medication 40 MILLIEQUIVALENT(S): at 18:15

## 2022-08-23 NOTE — PROGRESS NOTE ADULT - PROBLEM SELECTOR PLAN 2
Initial K 3.8 on current admission however pt with several episodes of nausea/vomiting and diarrhea in ED with subsequent labs remarkable for K 2.6. EKG initially concerning for U-waves. Repleted w most recent K 3.8. No more ekg changes  - s/p 160meQ of potassium repletion.   - f/u PM BMP  - replete K PRN Initial K 3.8 on current admission however pt with several episodes of nausea/vomiting and diarrhea in ED with subsequent labs remarkable for K 2.6. EKG initially concerning for U-waves. Repleted w most recent K 3.8. No more ekg changes  - s/p K repletion  - replete K PRN

## 2022-08-23 NOTE — PROGRESS NOTE ADULT - PROBLEM SELECTOR PLAN 5
Pt with hx of COPD not on any medications currently but endorses symbicort use in past   -Symbicort 2 puffs daily  -duonebs q6 PRN. Initial labs on admission concerning for NAGMA with Bicarb 10-11 likely 2/2 GI losses from ongoing diarrhea/emesis. ABG performed, remarkable for pH 7.27, pCO2 21, Bicarb 10. Repleted w bicarb then 1/2 NS w bicarb. Appropriately compensated CO2. Bicarb improving.   - BMP in AM  - Management of enterocolitis as above   - Caution with bicarb repletion i/s/o hypokalemia, as could worsen

## 2022-08-23 NOTE — PROGRESS NOTE ADULT - PROBLEM SELECTOR PLAN 6
F: 1/2 NS with 50meq of bicarb @125 x3 hours  E: Replete as necessary K>4 Mg>2  N: DASH diet   DVT Prophylaxis: Lovenox 40mg daily   GI prophylaxis: None   CODE STATUS: FULL. Pt with hx of COPD not on any medications currently but endorses symbicort use in past   -Symbicort 2 puffs daily  -duonebs q6 PRN.

## 2022-08-23 NOTE — PROGRESS NOTE ADULT - ATTENDING COMMENTS
52-year-old male with a PMHx of HIV (non-compliant with HAART), HTN and COPD who presented with abdominal pain and diarrhea, found to have leukocytosis, NAGMA and hypokalemia.  CT showed enterocolitis with GI PCR positive for shigella, E. coli and giardia.  Patient was briefly admitted to[facility] for significant electrolyte abnormalities with EKG changes but is now transferred back to [facility].   Currently on CTX/Flagyl with significant improvement in diarrhea and electrolyte abnormalities.      Plan:  -continue with CTX and Flagyl while inpatient, can likely transition to Cefpodoxime and Flagyl upon discharge to complete 10-14 day course   -follow up stool culture, ova/parasites and crypt antigen   -continue to monitor potassium and replete as needed, NAGMA improving with improvement in diarrhea   -HIV team consulted, appreciate recommendations, will need to clarify when to restart HAART  -follow up viral load (CD4 492)  -outpatient follow up for hepatitis C treatment consideration, f/u RUQ US (does not need to be complete inpatient)    Rest of plan as per resident note.
#enterocolitis: GI PCR + for shigella, enteroinvasive e coli, giardia, EPEC. Symptoms improved; c/w Ceft/flagyl. c/w maintence fluids, f/up stool cx, crypto ag. Replete electrolytes q12hrs
Patient seen and examined with house-staff during bedside rounds.  Resident note read, including vitals, physical findings, laboratory data, and radiological reports.   Revisions included below.  Direct personal management at bed side and extensive interpretation of the data.  Plan was outlined and discussed in details with the housestaff.  Decision making of high complexity  Action taken for acute disease activity to reflect the level of care provided:  - medication reconciliation  - review laboratory data  Patient clinically stable.  No further diarrhea.  The patient tolerating diet.  Patient started on antimicrobial for his pancolitis.  He has nonionic gap metabolic acidosis related to the GI losses.
seen and d/w [facility] team  51 y/o m, HIV w 1 w diarrhea  . Multiple PCR pos results noted.  On rocephin / flagyl.  f/u O and P  SYLVIA noted w hypokalemia.    Plan  Aggressive K and Mg supplementation.  Slow Bicarb replacement, w labs q 2-4 h, to avoid worsening of hypokalemia.  time spent 1 h total

## 2022-08-23 NOTE — PROGRESS NOTE ADULT - PROBLEM SELECTOR PLAN 3
Initial labs on admission concerning for NAGMA with Bicarb 10-11 likely 2/2 GI losses from ongoing diarrhea/emesis. ABG performed, remarkable for pH 7.27, pCO2 21, Bicarb 10. Repleted w bicarb then 1/2 NS w bicarb. Appropriately compensated CO2. Bicarb improving, now 16  - cont. half NS with 50meq bicarb at 125 cc/hr for 3 hours  - will f/u BMP in AM  - Management of enterocolitis as above   - Caution with bicarb repletion i/s/o hypokalemia, as could worsen Patient previously followed with Dr. Pacheco outpt and has had HIV for 17 years. States noncompliance with HAART x 2 years b/c of Covid. Prev on discovy and tivicay. HIV consulted, rec holding home meds.   - CD4 492, Viral load 179,865  - F/u Crypt Ag and stool O &P  - HIV consult reccs starting Biktarvy. Pending confirmation that starting Biktarvy will not interfere with new Hep C dx/initiation of tx

## 2022-08-23 NOTE — PROGRESS NOTE ADULT - SUBJECTIVE AND OBJECTIVE BOX
** INCOMPLETE **    OVERNIGHT EVENTS:     SUBJECTIVE:    VITAL SIGNS:  Vital Signs Last 24 Hrs  T(C): 36.6 (23 Aug 2022 12:32), Max: 36.7 (23 Aug 2022 05:31)  T(F): 97.9 (23 Aug 2022 12:32), Max: 98 (23 Aug 2022 05:31)  HR: 77 (23 Aug 2022 12:32) (72 - 87)  BP: 126/78 (23 Aug 2022 12:32) (103/57 - 134/76)  BP(mean): 74 (22 Aug 2022 20:06) (72 - 74)  RR: 18 (23 Aug 2022 12:32) (18 - 19)  SpO2: 97% (23 Aug 2022 12:32) (97% - 100%)    Parameters below as of 23 Aug 2022 12:32  Patient On (Oxygen Delivery Method): room air        PHYSICAL EXAM:  General: NAD; speaking in full sentences  HEENT: NC/AT; PERRL; EOMI; MMM  Neck: supple; no JVD  Cardiac: RRR; +S1/S2  Pulm: CTA B/L; no W/R/R  GI: soft, NT/ND, +BS  Extremities: WWP; no edema, clubbing or cyanosis  Vasc: 2+ radial, DP pulses B/L  Neuro: AAOx3; no focal deficits    MEDICATIONS:  MEDICATIONS  (STANDING):  budesonide  80 MICROgram(s)/formoterol 4.5 MICROgram(s) Inhaler 2 Puff(s) Inhalation two times a day  cefTRIAXone   IVPB 1000 milliGRAM(s) IV Intermittent every 24 hours  enoxaparin Injectable 40 milliGRAM(s) SubCutaneous every 24 hours  metroNIDAZOLE  IVPB 500 milliGRAM(s) IV Intermittent every 8 hours  potassium chloride    Tablet ER 40 milliEquivalent(s) Oral once  sodium chloride 0.45% 1000 milliLiter(s) (125 mL/Hr) IV Continuous <Continuous>    MEDICATIONS  (PRN):  albuterol/ipratropium for Nebulization 3 milliLiter(s) Nebulizer every 6 hours PRN Bronchospasm      ALLERGIES:  Allergies    No Known Allergies    Intolerances        LABS:                        12.0   6.77  )-----------( 205      ( 23 Aug 2022 05:30 )             35.0     08-23    141  |  112<H>  |  4<L>  ----------------------------<  76  3.1<L>   |  19<L>  |  0.79    Ca    8.1<L>      23 Aug 2022 05:30  Phos  2.4     08-23  Mg     1.7     08-23    TPro  7.0  /  Alb  3.1<L>  /  TBili  0.3  /  DBili  x   /  AST  22  /  ALT  19  /  AlkPhos  109  08-23    PT/INR - ( 23 Aug 2022 05:30 )   PT: 14.2 sec;   INR: 1.19          PTT - ( 23 Aug 2022 05:30 )  PTT:38.3 sec    RADIOLOGY & ADDITIONAL TESTS: Reviewed. OVERNIGHT EVENTS: Pt had 3 BM's o/n, more formed than previously, nonbloody with mucous.   SUBJECTIVE: Pt examined at bedside, feels well and denies abdominal pain. States diarrhea has improved significantly, both in frequency and form. Denies HA, F/C, N/V, chest pain, SOB, hematuria, melena, LE edema.     VITAL SIGNS:  Vital Signs Last 24 Hrs  T(C): 36.6 (23 Aug 2022 12:32), Max: 36.7 (23 Aug 2022 05:31)  T(F): 97.9 (23 Aug 2022 12:32), Max: 98 (23 Aug 2022 05:31)  HR: 77 (23 Aug 2022 12:32) (72 - 87)  BP: 126/78 (23 Aug 2022 12:32) (103/57 - 134/76)  BP(mean): 74 (22 Aug 2022 20:06) (72 - 74)  RR: 18 (23 Aug 2022 12:32) (18 - 19)  SpO2: 97% (23 Aug 2022 12:32) (97% - 100%)    Parameters below as of 23 Aug 2022 12:32  Patient On (Oxygen Delivery Method): room air        PHYSICAL EXAM:  General: NAD; speaking in full sentences  HEENT: NC/AT; PERRL; EOMI; MMM  Neck: supple; no JVD  Cardiac: RRR; +S1/S2  Pulm: CTA B/L; no W/R/R  GI: soft, NT/ND, +BS  Extremities: WWP; no edema, clubbing or cyanosis  Vasc: 2+ radial, DP pulses B/L  Neuro: AAOx3; no focal deficits    MEDICATIONS:  MEDICATIONS  (STANDING):  budesonide  80 MICROgram(s)/formoterol 4.5 MICROgram(s) Inhaler 2 Puff(s) Inhalation two times a day  cefTRIAXone   IVPB 1000 milliGRAM(s) IV Intermittent every 24 hours  enoxaparin Injectable 40 milliGRAM(s) SubCutaneous every 24 hours  metroNIDAZOLE  IVPB 500 milliGRAM(s) IV Intermittent every 8 hours  potassium chloride    Tablet ER 40 milliEquivalent(s) Oral once  sodium chloride 0.45% 1000 milliLiter(s) (125 mL/Hr) IV Continuous <Continuous>    MEDICATIONS  (PRN):  albuterol/ipratropium for Nebulization 3 milliLiter(s) Nebulizer every 6 hours PRN Bronchospasm      ALLERGIES:  Allergies    No Known Allergies    Intolerances        LABS:                        12.0   6.77  )-----------( 205      ( 23 Aug 2022 05:30 )             35.0     08-23    141  |  112<H>  |  4<L>  ----------------------------<  76  3.1<L>   |  19<L>  |  0.79    Ca    8.1<L>      23 Aug 2022 05:30  Phos  2.4     08-23  Mg     1.7     08-23    TPro  7.0  /  Alb  3.1<L>  /  TBili  0.3  /  DBili  x   /  AST  22  /  ALT  19  /  AlkPhos  109  08-23    PT/INR - ( 23 Aug 2022 05:30 )   PT: 14.2 sec;   INR: 1.19          PTT - ( 23 Aug 2022 05:30 )  PTT:38.3 sec    RADIOLOGY & ADDITIONAL TESTS: Reviewed. OVERNIGHT EVENTS: Pt had 3 BM's o/n, more formed than previously, nonbloody with mucous.   SUBJECTIVE: Pt examined at bedside, feels well and denies abdominal pain. States diarrhea has improved significantly, both in frequency and form. Denies HA, F/C, N/V, chest pain, SOB, hematuria, melena, LE edema.     VITAL SIGNS:  Vital Signs Last 24 Hrs  T(C): 36.6 (23 Aug 2022 12:32), Max: 36.7 (23 Aug 2022 05:31)  T(F): 97.9 (23 Aug 2022 12:32), Max: 98 (23 Aug 2022 05:31)  HR: 77 (23 Aug 2022 12:32) (72 - 87)  BP: 126/78 (23 Aug 2022 12:32) (103/57 - 134/76)  BP(mean): 74 (22 Aug 2022 20:06) (72 - 74)  RR: 18 (23 Aug 2022 12:32) (18 - 19)  SpO2: 97% (23 Aug 2022 12:32) (97% - 100%)    Parameters below as of 23 Aug 2022 12:32  Patient On (Oxygen Delivery Method): room air    PHYSICAL EXAM:  General: NAD; speaking in full sentences  HEENT: NC/AT; PERRL; EOMI; MMM  Neck: supple; no JVD  Cardiac: RRR; +S1/S2  Pulm: CTA B/L; no W/R/R  GI: soft, NT/ND, +BS  Extremities: WWP; no edema, clubbing or cyanosis  Vasc: 2+ radial, DP pulses B/L  Neuro: AAOx3; no focal deficits    MEDICATIONS:  MEDICATIONS  (STANDING):  budesonide  80 MICROgram(s)/formoterol 4.5 MICROgram(s) Inhaler 2 Puff(s) Inhalation two times a day  cefTRIAXone   IVPB 1000 milliGRAM(s) IV Intermittent every 24 hours  enoxaparin Injectable 40 milliGRAM(s) SubCutaneous every 24 hours  metroNIDAZOLE  IVPB 500 milliGRAM(s) IV Intermittent every 8 hours  potassium chloride    Tablet ER 40 milliEquivalent(s) Oral once  sodium chloride 0.45% 1000 milliLiter(s) (125 mL/Hr) IV Continuous <Continuous>    MEDICATIONS  (PRN):  albuterol/ipratropium for Nebulization 3 milliLiter(s) Nebulizer every 6 hours PRN Bronchospasm      ALLERGIES:  Allergies    No Known Allergies    Intolerances        LABS:                        12.0   6.77  )-----------( 205      ( 23 Aug 2022 05:30 )             35.0     08-23    141  |  112<H>  |  4<L>  ----------------------------<  76  3.1<L>   |  19<L>  |  0.79    Ca    8.1<L>      23 Aug 2022 05:30  Phos  2.4     08-23  Mg     1.7     08-23    TPro  7.0  /  Alb  3.1<L>  /  TBili  0.3  /  DBili  x   /  AST  22  /  ALT  19  /  AlkPhos  109  08-23    PT/INR - ( 23 Aug 2022 05:30 )   PT: 14.2 sec;   INR: 1.19          PTT - ( 23 Aug 2022 05:30 )  PTT:38.3 sec    RADIOLOGY & ADDITIONAL TESTS: Reviewed.

## 2022-08-23 NOTE — PROGRESS NOTE ADULT - PROBLEM SELECTOR PLAN 1
With GI PCR + for shigella, enteroinvasive e coli, giardia, EPEC. NAGMA and hypokalemia 2/2 diarrhea. Started on CTX and flagyl. Now w slight improvement in diarrhea, no N/V, and improvement in electrolyte abnormalities w repletion.  - cont. CTX 1g IV q24 and Flagyl 500mg IV q8  - c/w half NS with 50meq bicarb at 125 cc/hr for 3 hours  - Tylenol PRN for fevers   - f/u stool ova/parasites  - FU stool crypt Ag With GI PCR + for shigella, enteroinvasive e coli, giardia, EPEC. NAGMA and hypokalemia 2/2 diarrhea. Started on CTX and flagyl. Now w significant improvement in diarrhea, no N/V, and improvement in electrolyte abnormalities w repletion.  - cont. CTX 1g IV q24 and Flagyl 500mg IV q8  - Tylenol PRN for fevers   - f/u stool ova/parasites, spec received  - FU stool crypt Ag, spec received

## 2022-08-23 NOTE — CHART NOTE - NSCHARTNOTEFT_GEN_A_CORE
***INCOMPLETE NOTE****    HIV Consult    52M w/ HIV (off meds for 2 years) presents for diarrhea, found to have enterocolitis. Prev on Descovy/Tivicay, known to [facility] but last seen in 2020.    Subjective/ROS: Reports diarrhea has significantly improved, only having 4 episodes since last night. States he has an appetite again and was able to tolerate breakfast without nausea/vomiting. No headache, dizziness, chest pain, shortness of breath, abdominal pain.    T(C): 36.6 (23 Aug 2022 12:32), Max: 36.7 (23 Aug 2022 05:31)  T(F): 97.9 (23 Aug 2022 12:32), Max: 98 (23 Aug 2022 05:31)  HR: 77 (23 Aug 2022 12:32) (72 - 87)  BP: 126/78 (23 Aug 2022 12:32) (103/57 - 134/76)  BP(mean): 74 (22 Aug 2022 20:06) (72 - 74)  ABP: --  ABP(mean): --  RR: 18 (23 Aug 2022 12:32) (18 - 19)  SpO2: 97% (23 Aug 2022 12:32) (97% - 100%)    O2 Parameters below as of 23 Aug 2022 12:32  Patient On (Oxygen Delivery Method): room air    PHYSICAL EXAM  General: A&Ox3; NAD  Head: NC/AT; MMM; PERRL; EOMI;  Neck: Supple; no JVD  Respiratory: CTA B/L; no wheezes/crackles   Cardiovascular: Regular rhythm/rate; S1/S2   Gastrointestinal: Soft; NTND; normoactive BS  Extremities: WWP; no edema/cyanosis  Neurological:  CNII-XII grossly intact; no obvious focal deficits    MEDICATIONS  (STANDING):  budesonide  80 MICROgram(s)/formoterol 4.5 MICROgram(s) Inhaler 2 Puff(s) Inhalation two times a day  cefTRIAXone   IVPB 1000 milliGRAM(s) IV Intermittent every 24 hours  enoxaparin Injectable 40 milliGRAM(s) SubCutaneous every 24 hours  metroNIDAZOLE  IVPB 500 milliGRAM(s) IV Intermittent every 8 hours  potassium chloride    Tablet ER 40 milliEquivalent(s) Oral once  sodium chloride 0.45% 1000 milliLiter(s) (125 mL/Hr) IV Continuous <Continuous>    MEDICATIONS  (PRN):  albuterol/ipratropium for Nebulization 3 milliLiter(s) Nebulizer every 6 hours PRN Bronchospasm      LABS:                         12.0   6.77  )-----------( 205      ( 23 Aug 2022 05:30 )             35.0     08-23    141  |  112<H>  |  4<L>  ----------------------------<  76  3.1<L>   |  19<L>  |  0.79    Ca    8.1<L>      23 Aug 2022 05:30  Phos  2.4     08-23  Mg     1.7     08-23    TPro  7.0  /  Alb  3.1<L>  /  TBili  0.3  /  DBili  x   /  AST  22  /  ALT  19  /  AlkPhos  109  08-23    PT/INR - ( 23 Aug 2022 05:30 )   PT: 14.2 sec;   INR: 1.19          PTT - ( 23 Aug 2022 05:30 )  PTT:38.3 sec    RADIOLOGY, EKG & ADDITIONAL TESTS: Reviewed.       Assessment/Plan:  52M w/ HIV (off ARV since 2020) admitted for enterocolitis 2/2 Giardia, EIEC, EPEC/Shigella, currently being treated with CTX and Flagyl, now improving.     -CD4 492 and viral load detectable  -Pt previously on Descovy/Tivicay, recommend starting Biktarvy today  -continue to monitor renal and hepatic function    Case discussed with Dr. Pacheco. ***INCOMPLETE NOTE****    HIV Consult    52M w/ HIV (off meds for 2 years) presents for diarrhea, found to have enterocolitis. Prev on Descovy/Tivicay, known to [facility] but last seen in 2020.    Subjective/ROS: Reports diarrhea has significantly improved, only having 4 episodes since last night. States he has an appetite again and was able to tolerate breakfast without nausea/vomiting. No headache, dizziness, chest pain, shortness of breath, abdominal pain.    T(C): 36.6 (23 Aug 2022 12:32), Max: 36.7 (23 Aug 2022 05:31)  T(F): 97.9 (23 Aug 2022 12:32), Max: 98 (23 Aug 2022 05:31)  HR: 77 (23 Aug 2022 12:32) (72 - 87)  BP: 126/78 (23 Aug 2022 12:32) (103/57 - 134/76)  BP(mean): 74 (22 Aug 2022 20:06) (72 - 74)  ABP: --  ABP(mean): --  RR: 18 (23 Aug 2022 12:32) (18 - 19)  SpO2: 97% (23 Aug 2022 12:32) (97% - 100%)    O2 Parameters below as of 23 Aug 2022 12:32  Patient On (Oxygen Delivery Method): room air    PHYSICAL EXAM  General: A&Ox3; NAD  Head: NC/AT; MMM; PERRL; EOMI;  Neck: Supple; no JVD  Respiratory: CTA B/L; no wheezes/crackles   Cardiovascular: Regular rhythm/rate; S1/S2   Gastrointestinal: Soft; NTND; normoactive BS  Extremities: WWP; no edema/cyanosis  Neurological:  CNII-XII grossly intact; no obvious focal deficits    MEDICATIONS  (STANDING):  budesonide  80 MICROgram(s)/formoterol 4.5 MICROgram(s) Inhaler 2 Puff(s) Inhalation two times a day  cefTRIAXone   IVPB 1000 milliGRAM(s) IV Intermittent every 24 hours  enoxaparin Injectable 40 milliGRAM(s) SubCutaneous every 24 hours  metroNIDAZOLE  IVPB 500 milliGRAM(s) IV Intermittent every 8 hours  potassium chloride    Tablet ER 40 milliEquivalent(s) Oral once  sodium chloride 0.45% 1000 milliLiter(s) (125 mL/Hr) IV Continuous <Continuous>    MEDICATIONS  (PRN):  albuterol/ipratropium for Nebulization 3 milliLiter(s) Nebulizer every 6 hours PRN Bronchospasm      LABS:                         12.0   6.77  )-----------( 205      ( 23 Aug 2022 05:30 )             35.0     08-23    141  |  112<H>  |  4<L>  ----------------------------<  76  3.1<L>   |  19<L>  |  0.79    Ca    8.1<L>      23 Aug 2022 05:30  Phos  2.4     08-23  Mg     1.7     08-23    TPro  7.0  /  Alb  3.1<L>  /  TBili  0.3  /  DBili  x   /  AST  22  /  ALT  19  /  AlkPhos  109  08-23    PT/INR - ( 23 Aug 2022 05:30 )   PT: 14.2 sec;   INR: 1.19          PTT - ( 23 Aug 2022 05:30 )  PTT:38.3 sec    RADIOLOGY, EKG & ADDITIONAL TESTS: Reviewed.       Assessment/Plan:  52M w/ HIV (off ARV since 2020) admitted for enterocolitis 2/2 Giardia, EIEC, EPEC/Shigella, currently being treated with CTX and Flagyl, now improving.     -CD4 492 and viral load detectable  -Pt previously on Descovy/Tivicay, recommend starting Biktarvy today  -continue to monitor renal and hepatic function    Case discussed with Dr. Pacheco. HIV team will continue to follow. HIV Consult    52M w/ HIV (off meds for 2 years) presents for diarrhea, found to have enterocolitis. Prev on Descovy/Tivicay, known to [facility] but last seen in 2020.    Subjective/ROS: Reports diarrhea has significantly improved, only having 4 episodes since last night. States he has an appetite again and was able to tolerate breakfast without nausea/vomiting. No headache, dizziness, chest pain, shortness of breath, abdominal pain.    T(C): 36.6 (23 Aug 2022 12:32), Max: 36.7 (23 Aug 2022 05:31)  T(F): 97.9 (23 Aug 2022 12:32), Max: 98 (23 Aug 2022 05:31)  HR: 77 (23 Aug 2022 12:32) (72 - 87)  BP: 126/78 (23 Aug 2022 12:32) (103/57 - 134/76)  BP(mean): 74 (22 Aug 2022 20:06) (72 - 74)  ABP: --  ABP(mean): --  RR: 18 (23 Aug 2022 12:32) (18 - 19)  SpO2: 97% (23 Aug 2022 12:32) (97% - 100%)    O2 Parameters below as of 23 Aug 2022 12:32  Patient On (Oxygen Delivery Method): room air    PHYSICAL EXAM  General: A&Ox3; NAD  Head: NC/AT; MMM; PERRL; EOMI;  Neck: Supple; no JVD  Respiratory: CTA B/L; no wheezes/crackles   Cardiovascular: Regular rhythm/rate; S1/S2   Gastrointestinal: Soft; NTND; normoactive BS  Extremities: WWP; no edema/cyanosis  Neurological:  CNII-XII grossly intact; no obvious focal deficits    MEDICATIONS  (STANDING):  budesonide  80 MICROgram(s)/formoterol 4.5 MICROgram(s) Inhaler 2 Puff(s) Inhalation two times a day  cefTRIAXone   IVPB 1000 milliGRAM(s) IV Intermittent every 24 hours  enoxaparin Injectable 40 milliGRAM(s) SubCutaneous every 24 hours  metroNIDAZOLE  IVPB 500 milliGRAM(s) IV Intermittent every 8 hours  potassium chloride    Tablet ER 40 milliEquivalent(s) Oral once  sodium chloride 0.45% 1000 milliLiter(s) (125 mL/Hr) IV Continuous <Continuous>    MEDICATIONS  (PRN):  albuterol/ipratropium for Nebulization 3 milliLiter(s) Nebulizer every 6 hours PRN Bronchospasm      LABS:                         12.0   6.77  )-----------( 205      ( 23 Aug 2022 05:30 )             35.0     08-23    141  |  112<H>  |  4<L>  ----------------------------<  76  3.1<L>   |  19<L>  |  0.79    Ca    8.1<L>      23 Aug 2022 05:30  Phos  2.4     08-23  Mg     1.7     08-23    TPro  7.0  /  Alb  3.1<L>  /  TBili  0.3  /  DBili  x   /  AST  22  /  ALT  19  /  AlkPhos  109  08-23    PT/INR - ( 23 Aug 2022 05:30 )   PT: 14.2 sec;   INR: 1.19          PTT - ( 23 Aug 2022 05:30 )  PTT:38.3 sec    RADIOLOGY, EKG & ADDITIONAL TESTS: Reviewed.       Assessment/Plan:  52M w/ HIV (off ARV since 2020) admitted for enterocolitis 2/2 Giardia, EIEC, EPEC/Shigella, currently being treated with CTX and Flagyl, now improving.     -CD4 492 and viral load detectable  -Pt previously on Descovy/Tivicay, recommend starting Biktarvy today  -continue to monitor renal and hepatic function    Case discussed with Dr. Pacheco. HIV team will continue to follow. HIV Consult    52M w/ HIV (off meds for 2 years) presents for diarrhea, found to have enterocolitis. Prev on Descovy/Tivicay, known to [facility] but last seen in 2020.    Subjective/ROS: Reports diarrhea has significantly improved, only having 4 episodes since last night. States he has an appetite again and was able to tolerate breakfast without nausea/vomiting. No headache, dizziness, chest pain, shortness of breath, abdominal pain.    T(C): 36.6 (23 Aug 2022 12:32), Max: 36.7 (23 Aug 2022 05:31)  T(F): 97.9 (23 Aug 2022 12:32), Max: 98 (23 Aug 2022 05:31)  HR: 77 (23 Aug 2022 12:32) (72 - 87)  BP: 126/78 (23 Aug 2022 12:32) (103/57 - 134/76)  BP(mean): 74 (22 Aug 2022 20:06) (72 - 74)  ABP: --  ABP(mean): --  RR: 18 (23 Aug 2022 12:32) (18 - 19)  SpO2: 97% (23 Aug 2022 12:32) (97% - 100%)    O2 Parameters below as of 23 Aug 2022 12:32  Patient On (Oxygen Delivery Method): room air    PHYSICAL EXAM  General: A&Ox3; NAD  Head: NC/AT; MMM; PERRL; EOMI;  Neck: Supple; no JVD  Respiratory: CTA B/L; no wheezes/crackles   Cardiovascular: Regular rhythm/rate; S1/S2   Gastrointestinal: Soft; NTND; normoactive BS  Extremities: WWP; no edema/cyanosis  Neurological:  CNII-XII grossly intact; no obvious focal deficits    MEDICATIONS  (STANDING):  budesonide  80 MICROgram(s)/formoterol 4.5 MICROgram(s) Inhaler 2 Puff(s) Inhalation two times a day  cefTRIAXone   IVPB 1000 milliGRAM(s) IV Intermittent every 24 hours  enoxaparin Injectable 40 milliGRAM(s) SubCutaneous every 24 hours  metroNIDAZOLE  IVPB 500 milliGRAM(s) IV Intermittent every 8 hours  potassium chloride    Tablet ER 40 milliEquivalent(s) Oral once  sodium chloride 0.45% 1000 milliLiter(s) (125 mL/Hr) IV Continuous <Continuous>    MEDICATIONS  (PRN):  albuterol/ipratropium for Nebulization 3 milliLiter(s) Nebulizer every 6 hours PRN Bronchospasm      LABS:                         12.0   6.77  )-----------( 205      ( 23 Aug 2022 05:30 )             35.0     08-23    141  |  112<H>  |  4<L>  ----------------------------<  76  3.1<L>   |  19<L>  |  0.79    Ca    8.1<L>      23 Aug 2022 05:30  Phos  2.4     08-23  Mg     1.7     08-23    TPro  7.0  /  Alb  3.1<L>  /  TBili  0.3  /  DBili  x   /  AST  22  /  ALT  19  /  AlkPhos  109  08-23    PT/INR - ( 23 Aug 2022 05:30 )   PT: 14.2 sec;   INR: 1.19          PTT - ( 23 Aug 2022 05:30 )  PTT:38.3 sec    RADIOLOGY, EKG & ADDITIONAL TESTS: Reviewed.       Assessment/Plan:  52M w/ HIV (off ARV since 2020) admitted for enterocolitis 2/2 Giardia, EIEC, EPEC/Shigella, currently being treated with CTX and Flagyl, now improving.     -CD4 492 and viral load detectable  -Pt previously on Descovy/Tivicay, recommend starting Biktarvy today  -continue to monitor renal and hepatic function    Case discussed with Dr. Pacheco. HIV team will continue to follow.    I discussed the patient with Dr. Balderas yesterday  Today I saw and evaluated the patient at bedside.    Patient at [facility] in past, lost to FU and DC ARVs two years ago. Was on Descovy and Tivicay    Hep C non reactive 2019- now reactive    Restart ARVs with Biktarvy    FU appointment within a week of discharge to [facility] [phone]    We will treat his Hep C as well in outpatient setting

## 2022-08-23 NOTE — PROGRESS NOTE ADULT - ASSESSMENT
53 yo M with PMH HIV (CD4 count 434 in 2/2020), HTN, asthma/COPD px to [facility] ED on 8/21 with 1 week hx of generalized abd pain, nausea/vomiting, and diarrhea found to have leukocytosis, NAGMA 2/2 diarrhea, hypokalemia, and enterocolitis on CT w GI PCR + for shigella, enteroinvasive e coli, giardia, EPEC. 51 yo M with PMH HIV (CD4 count 434 in 2/2020), HTN, asthma/COPD px to [facility] ED on 8/21with 1 week hx of generalized abd pain, nausea/vomiting, and diarrhea found to have leukocytosis, NAGMA 2/2 diarrhea, hypokalemia, and enterocolitis on CT w GI PCR + for shigella, enteroinvasive e coli, giardia, EPEC, and + for Hep C.

## 2022-08-23 NOTE — PROGRESS NOTE ADULT - PROBLEM SELECTOR PLAN 4
Patient follows with Dr. Tara haddad and has had HIV for 17 years. Has been non compliant x 2 years b/c of Covid he states. Prev on discovy and tivicay. HIV consulted, rec holding home meds. CD4 492  - f/u Viral Load   - Crypt Ag and stool O &P  - f/u HIV consult Pt found to be + for Hep C. No known Hep C infections in the past, current + status was unknown to pt, now aware.  - HepC S/CO ratio = 110  - f/u outpatient with Dr. Pacheco for further Hep C workup and initiation of tx

## 2022-08-24 ENCOUNTER — TRANSCRIPTION ENCOUNTER (OUTPATIENT)
Age: 52
End: 2022-08-24

## 2022-08-24 VITALS
HEART RATE: 80 BPM | OXYGEN SATURATION: 96 % | RESPIRATION RATE: 18 BRPM | TEMPERATURE: 99 F | DIASTOLIC BLOOD PRESSURE: 68 MMHG | SYSTOLIC BLOOD PRESSURE: 113 MMHG

## 2022-08-24 LAB
ALBUMIN SERPL ELPH-MCNC: 3 G/DL — LOW (ref 3.3–5)
ALP SERPL-CCNC: 93 U/L — SIGNIFICANT CHANGE UP (ref 40–120)
ALT FLD-CCNC: 16 U/L — SIGNIFICANT CHANGE UP (ref 10–45)
ANION GAP SERPL CALC-SCNC: 8 MMOL/L — SIGNIFICANT CHANGE UP (ref 5–17)
ANISOCYTOSIS BLD QL: SLIGHT — SIGNIFICANT CHANGE UP
AST SERPL-CCNC: 22 U/L — SIGNIFICANT CHANGE UP (ref 10–40)
BASOPHILS # BLD AUTO: 0.05 K/UL — SIGNIFICANT CHANGE UP (ref 0–0.2)
BASOPHILS NFR BLD AUTO: 0.9 % — SIGNIFICANT CHANGE UP (ref 0–2)
BILIRUB SERPL-MCNC: 0.3 MG/DL — SIGNIFICANT CHANGE UP (ref 0.2–1.2)
BUN SERPL-MCNC: 5 MG/DL — LOW (ref 7–23)
CALCIUM SERPL-MCNC: 8.1 MG/DL — LOW (ref 8.4–10.5)
CHLORIDE SERPL-SCNC: 109 MMOL/L — HIGH (ref 96–108)
CO2 SERPL-SCNC: 23 MMOL/L — SIGNIFICANT CHANGE UP (ref 22–31)
CREAT SERPL-MCNC: 0.77 MG/DL — SIGNIFICANT CHANGE UP (ref 0.5–1.3)
CULTURE RESULTS: SIGNIFICANT CHANGE UP
EGFR: 108 ML/MIN/1.73M2 — SIGNIFICANT CHANGE UP
EOSINOPHIL # BLD AUTO: 0.05 K/UL — SIGNIFICANT CHANGE UP (ref 0–0.5)
EOSINOPHIL NFR BLD AUTO: 0.9 % — SIGNIFICANT CHANGE UP (ref 0–6)
GLUCOSE SERPL-MCNC: 100 MG/DL — HIGH (ref 70–99)
HCT VFR BLD CALC: 35.6 % — LOW (ref 39–50)
HCV RNA SERPL NAA DL=5-ACNC: SIGNIFICANT CHANGE UP IU/ML
HCV RNA SPEC NAA+PROBE-LOG IU: 6.27 LOGIU/ML — SIGNIFICANT CHANGE UP
HCV RNA SPEC NAA+PROBE-LOG IU: DETECTED
HGB BLD-MCNC: 12.1 G/DL — LOW (ref 13–17)
INR BLD: 1.27 — HIGH (ref 0.88–1.16)
LYMPHOCYTES # BLD AUTO: 1.88 K/UL — SIGNIFICANT CHANGE UP (ref 1–3.3)
LYMPHOCYTES # BLD AUTO: 33.3 % — SIGNIFICANT CHANGE UP (ref 13–44)
MAGNESIUM SERPL-MCNC: 1.9 MG/DL — SIGNIFICANT CHANGE UP (ref 1.6–2.6)
MANUAL SMEAR VERIFICATION: SIGNIFICANT CHANGE UP
MCHC RBC-ENTMCNC: 27.3 PG — SIGNIFICANT CHANGE UP (ref 27–34)
MCHC RBC-ENTMCNC: 34 GM/DL — SIGNIFICANT CHANGE UP (ref 32–36)
MCV RBC AUTO: 80.4 FL — SIGNIFICANT CHANGE UP (ref 80–100)
MELD SCORE WITH DIALYSIS: 22 POINTS — SIGNIFICANT CHANGE UP
MELD SCORE WITHOUT DIALYSIS: 9 POINTS — SIGNIFICANT CHANGE UP
MICROCYTES BLD QL: SLIGHT — SIGNIFICANT CHANGE UP
MONOCYTES # BLD AUTO: 0.25 K/UL — SIGNIFICANT CHANGE UP (ref 0–0.9)
MONOCYTES NFR BLD AUTO: 4.4 % — SIGNIFICANT CHANGE UP (ref 2–14)
NEUTROPHILS # BLD AUTO: 3.28 K/UL — SIGNIFICANT CHANGE UP (ref 1.8–7.4)
NEUTROPHILS NFR BLD AUTO: 57.9 % — SIGNIFICANT CHANGE UP (ref 43–77)
OVALOCYTES BLD QL SMEAR: SLIGHT — SIGNIFICANT CHANGE UP
PHOSPHATE SERPL-MCNC: 3.5 MG/DL — SIGNIFICANT CHANGE UP (ref 2.5–4.5)
PLAT MORPH BLD: ABNORMAL
PLATELET # BLD AUTO: 222 K/UL — SIGNIFICANT CHANGE UP (ref 150–400)
POIKILOCYTOSIS BLD QL AUTO: SLIGHT — SIGNIFICANT CHANGE UP
POTASSIUM SERPL-MCNC: 3.4 MMOL/L — LOW (ref 3.5–5.3)
POTASSIUM SERPL-SCNC: 3.4 MMOL/L — LOW (ref 3.5–5.3)
PROT SERPL-MCNC: 6.7 G/DL — SIGNIFICANT CHANGE UP (ref 6–8.3)
PROTHROM AB SERPL-ACNC: 15.2 SEC — HIGH (ref 10.5–13.4)
RBC # BLD: 4.43 M/UL — SIGNIFICANT CHANGE UP (ref 4.2–5.8)
RBC # FLD: 16.8 % — HIGH (ref 10.3–14.5)
RBC BLD AUTO: ABNORMAL
SMUDGE CELLS # BLD: PRESENT — SIGNIFICANT CHANGE UP
SODIUM SERPL-SCNC: 140 MMOL/L — SIGNIFICANT CHANGE UP (ref 135–145)
SPECIMEN SOURCE: SIGNIFICANT CHANGE UP
SPHEROCYTES BLD QL SMEAR: SLIGHT — SIGNIFICANT CHANGE UP
VARIANT LYMPHS # BLD: 2.6 % — SIGNIFICANT CHANGE UP (ref 0–6)
WBC # BLD: 5.66 K/UL — SIGNIFICANT CHANGE UP (ref 3.8–10.5)
WBC # FLD AUTO: 5.66 K/UL — SIGNIFICANT CHANGE UP (ref 3.8–10.5)

## 2022-08-24 PROCEDURE — 99285 EMERGENCY DEPT VISIT HI MDM: CPT | Mod: 25

## 2022-08-24 PROCEDURE — 87340 HEPATITIS B SURFACE AG IA: CPT

## 2022-08-24 PROCEDURE — 86359 T CELLS TOTAL COUNT: CPT

## 2022-08-24 PROCEDURE — 84132 ASSAY OF SERUM POTASSIUM: CPT

## 2022-08-24 PROCEDURE — 86803 HEPATITIS C AB TEST: CPT

## 2022-08-24 PROCEDURE — 99232 SBSQ HOSP IP/OBS MODERATE 35: CPT

## 2022-08-24 PROCEDURE — 80076 HEPATIC FUNCTION PANEL: CPT

## 2022-08-24 PROCEDURE — 80053 COMPREHEN METABOLIC PANEL: CPT

## 2022-08-24 PROCEDURE — 84295 ASSAY OF SERUM SODIUM: CPT

## 2022-08-24 PROCEDURE — 99238 HOSP IP/OBS DSCHRG MGMT 30/<: CPT | Mod: GC

## 2022-08-24 PROCEDURE — 87522 HEPATITIS C REVRS TRNSCRPJ: CPT

## 2022-08-24 PROCEDURE — 86360 T CELL ABSOLUTE COUNT/RATIO: CPT

## 2022-08-24 PROCEDURE — 84443 ASSAY THYROID STIM HORMONE: CPT

## 2022-08-24 PROCEDURE — 86850 RBC ANTIBODY SCREEN: CPT

## 2022-08-24 PROCEDURE — 83605 ASSAY OF LACTIC ACID: CPT

## 2022-08-24 PROCEDURE — 86709 HEPATITIS A IGM ANTIBODY: CPT

## 2022-08-24 PROCEDURE — 86705 HEP B CORE ANTIBODY IGM: CPT

## 2022-08-24 PROCEDURE — 84300 ASSAY OF URINE SODIUM: CPT

## 2022-08-24 PROCEDURE — 82010 KETONE BODYS QUAN: CPT

## 2022-08-24 PROCEDURE — 85730 THROMBOPLASTIN TIME PARTIAL: CPT

## 2022-08-24 PROCEDURE — 96375 TX/PRO/DX INJ NEW DRUG ADDON: CPT

## 2022-08-24 PROCEDURE — 86704 HEP B CORE ANTIBODY TOTAL: CPT

## 2022-08-24 PROCEDURE — 84100 ASSAY OF PHOSPHORUS: CPT

## 2022-08-24 PROCEDURE — 87521 HEPATITIS C PROBE&RVRS TRNSC: CPT

## 2022-08-24 PROCEDURE — 85610 PROTHROMBIN TIME: CPT

## 2022-08-24 PROCEDURE — 80048 BASIC METABOLIC PNL TOTAL CA: CPT

## 2022-08-24 PROCEDURE — 86900 BLOOD TYPING SEROLOGIC ABO: CPT

## 2022-08-24 PROCEDURE — 94640 AIRWAY INHALATION TREATMENT: CPT

## 2022-08-24 PROCEDURE — 86780 TREPONEMA PALLIDUM: CPT

## 2022-08-24 PROCEDURE — 84133 ASSAY OF URINE POTASSIUM: CPT

## 2022-08-24 PROCEDURE — 82436 ASSAY OF URINE CHLORIDE: CPT

## 2022-08-24 PROCEDURE — 85025 COMPLETE CBC W/AUTO DIFF WBC: CPT

## 2022-08-24 PROCEDURE — 82330 ASSAY OF CALCIUM: CPT

## 2022-08-24 PROCEDURE — 36415 COLL VENOUS BLD VENIPUNCTURE: CPT

## 2022-08-24 PROCEDURE — 87177 OVA AND PARASITES SMEARS: CPT

## 2022-08-24 PROCEDURE — 83690 ASSAY OF LIPASE: CPT

## 2022-08-24 PROCEDURE — 86706 HEP B SURFACE ANTIBODY: CPT

## 2022-08-24 PROCEDURE — 87536 HIV-1 QUANT&REVRSE TRNSCRPJ: CPT

## 2022-08-24 PROCEDURE — 76705 ECHO EXAM OF ABDOMEN: CPT

## 2022-08-24 PROCEDURE — 86901 BLOOD TYPING SEROLOGIC RH(D): CPT

## 2022-08-24 PROCEDURE — 96365 THER/PROPH/DIAG IV INF INIT: CPT

## 2022-08-24 PROCEDURE — 87328 CRYPTOSPORIDIUM AG IA: CPT

## 2022-08-24 PROCEDURE — 83735 ASSAY OF MAGNESIUM: CPT

## 2022-08-24 PROCEDURE — 82803 BLOOD GASES ANY COMBINATION: CPT

## 2022-08-24 RX ORDER — METRONIDAZOLE 500 MG
1 TABLET ORAL
Qty: 12 | Refills: 0
Start: 2022-08-24 | End: 2022-08-27

## 2022-08-24 RX ORDER — METRONIDAZOLE 500 MG
1 TABLET ORAL
Qty: 21 | Refills: 0
Start: 2022-08-24 | End: 2022-08-30

## 2022-08-24 RX ADMIN — Medication 100 MILLIGRAM(S): at 06:08

## 2022-08-24 RX ADMIN — Medication 100 MILLIGRAM(S): at 13:43

## 2022-08-24 RX ADMIN — BICTEGRAVIR SODIUM, EMTRICITABINE, AND TENOFOVIR ALAFENAMIDE FUMARATE 1 TABLET(S): 30; 120; 15 TABLET ORAL at 13:22

## 2022-08-24 RX ADMIN — BUDESONIDE AND FORMOTEROL FUMARATE DIHYDRATE 2 PUFF(S): 160; 4.5 AEROSOL RESPIRATORY (INHALATION) at 07:34

## 2022-08-24 NOTE — DISCHARGE NOTE PROVIDER - HOSPITAL COURSE
58 y/o F with PMH of COPD on 2L oxygen at night, daily prednisone of 10mg, Diastolic CHF, Hypogammaglobulinemia, Adrenal Insufficiency,  Schizoaffective d/o, Chronic constipation and ileus, Neurogenic bladder, s/p Suprapubic catheter placement, Chronic Hyponatremia  was recently d/c from  where she was Tx for UTI and COPD exacerbation, discharged on 5/4 admitted on 5/6 for evaluation of shortness of breath, cough productive of yellow sputum. Patient history per medical record.     1. Patient admitted with dyspnea, most likely secondary to viral bronchitis and copd exacerbation; possible component of chf exacerbation  - follow up cultures   - serial cbc and monitor temperature   - reviewed prior medical records to evaluate for resistant or atypical pathogens   - oxygen and nebs as needed   - will monitor off antibiotics at this time  - encouraged ambulation with physical therapy  2. other issues: per medicine 53 yo M with PMH HIV (CD4 count 434 in 2/2020), HTN, asthma/COPD px to [facility] ED on 8/21with 1 week hx of generalized abd pain, nausea/vomiting, and diarrhea found to have leukocytosis, NAGMA 2/2 diarrhea, hypokalemia, and enterocolitis on CT w GI PCR + for shigella, enteroinvasive e coli, giardia, EPEC, and + for Hep C.     #Enterocolitis.   ·  Plan: With GI PCR + for shigella, enteroinvasive e coli, giardia, EPEC. NAGMA and hypokalemia 2/2 diarrhea. Started on CTX and flagyl. Now w significant improvement in diarrhea, no N/V, and improvement in electrolyte abnormalities w repletion.  - CTX 1g IV q24 and Flagyl 500mg IV q8  - Tylenol PRN for fever      #Hypokalemia.   ·  Plan: Initial K 3.8 on current admission however pt with several episodes of nausea/vomiting and diarrhea in ED with subsequent labs remarkable for K 2.6. EKG initially concerning for U-waves. Repleted w most recent K 3.8. No more ekg changes  - replete K PRN.  - outpatient K for 1 week. 20mEq daily    #HIV disease.  ·  Plan: Patient previously followed with Dr. Pacheco outpt and has had HIV for 17 years. States noncompliance with HAART x 2 years b/c of Covid. Prev on discovy and tivicay. HIV consulted, rec holding home meds.   - CD4 492, Viral load 179,865  - HIV consult reccs starting Biktarvy.     #Hepatitis C infection.  ·  Plan: Pt found to be + for Hep C. No known Hep C infections in the past, current + status was unknown to pt, now aware.  - HepC S/CO ratio = 110  - f/u outpatient with Dr. Pacheco for further Hep C workup and initiation of tx.    #Normal anion gap metabolic acidosis. RESOLVED  ·  Plan: Initial labs on admission concerning for NAGMA with Bicarb 10-11 likely 2/2 GI losses from ongoing diarrhea/emesis. ABG performed, remarkable for pH 7.27, pCO2 21, Bicarb 10. Repleted w bicarb then 1/2 NS w bicarb. Appropriately compensated CO2. Bicarb improving.   - Management of enterocolitis as above       #COPD, mild.  ·  Plan: Pt with hx of COPD not on any medications currently but endorses symbicort use in past   -Symbicort 2 puffs daily  -duonebs q6 PRN.    Medications Started: Cefdinir, Flagyl, and Biktarvy  Labs to follow: CBC  Exams to follow: Abdominal    Physical Exam at Discharge  General: NAD, nervous tic bites down on jaw repeatedly  Head: NC/AT; MMM; PERRL; EOMI;  Neck: Supple; no JVD  Respiratory: CTAB; no wheezes/rales/rhonchi  Cardiovascular: Regular rhythm/rate; S1/S2+, no murmurs, rubs gallops   Gastrointestinal: Soft; NTND; bowel sounds normal and present  Extremities: WWP; no edema/cyanosis  Neurological: A&Ox3, CNII-XII grossly intact; no obvious focal deficits

## 2022-08-24 NOTE — DISCHARGE NOTE NURSING/CASE MANAGEMENT/SOCIAL WORK - PATIENT PORTAL LINK FT
You can access the FollowMyHealth Patient Portal offered by [facility] by registering at the following website: [email or web address]. By joining [employer]’s FollowMyHealth portal, you will also be able to view your health information using other applications (apps) compatible with our system.

## 2022-08-24 NOTE — DISCHARGE NOTE NURSING/CASE MANAGEMENT/SOCIAL WORK - NSDCPEFALRISK_GEN_ALL_CORE
For information on Fall & Injury Prevention, visit: [email or web address] OR  [email or web address] OR  https://www.cdc.gov/steadi/patient.html

## 2022-08-24 NOTE — CHART NOTE - NSCHARTNOTEFT_GEN_A_CORE
I have personally seen and examined the patient.  I fully participated in the care of this patient.  I have made amendments to the documentation where necessary, and agree with the history, physical exam, and plan as documented by the Resident.     52-year-old male with a PMHx of HIV (non-compliant with HAART), HTN and COPD who presented with abdominal pain and diarrhea, found to have leukocytosis, NAGMA and hypokalemia.  CT showed enterocolitis with GI PCR positive for shigella, E. coli and giardia.  Patient was briefly admitted to[facility] for significant electrolyte abnormalities with EKG changes but is now transferred back to [facility].   Currently on CTX/Flagyl with significant improvement in diarrhea and electrolyte abnormalities.  Plan to transition to PO Abx today and will be discharged home.     Plan:  -transition to Cefpodoxime and Flagyl today to complete 14 day course   -follow up stool culture, ova/parasites and crypt antigen   -re-started Biktarvy as per HIV team recommendations  -outpatient follow up for hepatitis C treatment  -discharge home with 7 days of 20 mEq PO potassium

## 2022-08-24 NOTE — DISCHARGE NOTE PROVIDER - NSDCMRMEDTOKEN_GEN_ALL_CORE_FT
bictegravir/emtricitabine/tenofovir 50 mg-200 mg-25 mg oral tablet: 1 tab(s) orally once a day  cefdinir 300 mg oral capsule: 1 cap(s) orally every 12 hours   Habitrol 21 mg/24 hr transdermal film, extended release: 1 patch transdermal once a day (at bedtime)    Lexapro 10 mg oral tablet: 1 tab(s) orally once a day  metroNIDAZOLE 500 mg oral tablet: 1 tab(s) orally 3 times a day  Potassium Chloride (Eqv-K-Tab) 20 mEq oral tablet, extended release: 1 tab(s) orally once a day   ProAir HFA 90 mcg/inh inhalation aerosol: 2 puff(s) inhaled 4 times a day, As Needed  Symbicort 80 mcg-4.5 mcg/inh inhalation aerosol: 2 puff(s) inhaled 2 times a day

## 2022-08-24 NOTE — DISCHARGE NOTE PROVIDER - CARE PROVIDER_API CALL
Tammi Pacheco)  Internal Medicine  [address] [address]  Phone: [phone]  Fax: [phone]  Established Patient  Scheduled Appointment: 08/30/2022 11:00 AM

## 2022-08-24 NOTE — DISCHARGE NOTE PROVIDER - NSDCCPCAREPLAN_GEN_ALL_CORE_FT
PRINCIPAL DISCHARGE DIAGNOSIS  Diagnosis: Infectious colitis, enteritis and gastroenteritis  Assessment and Plan of Treatment: You were found to have infectious colitis. Stool samples returned 3 pathogens that were causing your symptoms of diarrhea, fatigue, and abdominal pain. These pathogens were E. Coli, Shigella, and Giardia. We are treating you with a 7 day total course of antibiotics and will have you follow up with Dr. Pacheco next Tuesday 8/30/22. Please take every dose of your antibiotics.  If you begin to have worsening diarrhea, abdominal pain, fevers, or chills please seek care immediately.      SECONDARY DISCHARGE DIAGNOSES  Diagnosis: HIV disease  Assessment and Plan of Treatment: We restarted your Biktarvy. Please continue to take this medication once daily and follow up with Dr. Pacheco    Diagnosis: Hepatitis C  Assessment and Plan of Treatment: You were found to be positive for Hepatitis C. Please follow up with Dr. Pacheco for further evaluation.

## 2022-08-24 NOTE — DISCHARGE NOTE PROVIDER - PROVIDER TOKENS
PROVIDER:[TOKEN:[[id number]],SCHEDULEDAPPT:[08/30/2022],SCHEDULEDAPPTTIME:[11:00 AM],ESTABLISHEDPATIENT:[T]]

## 2022-08-25 LAB
CULTURE RESULTS: SIGNIFICANT CHANGE UP
SPECIMEN SOURCE: SIGNIFICANT CHANGE UP
T PALLIDUM AB TITR SER: NEGATIVE — SIGNIFICANT CHANGE UP

## 2022-08-25 RX ORDER — CEFPODOXIME PROXETIL 100 MG
1 TABLET ORAL
Qty: 14 | Refills: 0
Start: 2022-08-25 | End: 2022-08-31

## 2022-08-25 RX ORDER — CEFDINIR 250 MG/5ML
1 POWDER, FOR SUSPENSION ORAL
Qty: 8 | Refills: 0
Start: 2022-08-25 | End: 2022-08-28

## 2022-08-25 RX ORDER — BICTEGRAVIR SODIUM, EMTRICITABINE, AND TENOFOVIR ALAFENAMIDE FUMARATE 30; 120; 15 MG/1; MG/1; MG/1
1 TABLET ORAL
Qty: 30 | Refills: 0
Start: 2022-08-25 | End: 2022-09-23

## 2022-08-25 RX ORDER — POTASSIUM CHLORIDE 20 MEQ
1 PACKET (EA) ORAL
Qty: 7 | Refills: 0
Start: 2022-08-25 | End: 2022-08-31

## 2022-08-25 RX ORDER — CEFPODOXIME PROXETIL 100 MG
1 TABLET ORAL
Qty: 8 | Refills: 0
Start: 2022-08-25 | End: 2022-08-28

## 2022-08-26 LAB
HCV RNA FLD QL NAA+PROBE: SIGNIFICANT CHANGE UP
HCV RNA SPEC QL PROBE+SIG AMP: DETECTED

## 2022-08-29 ENCOUNTER — NON-APPOINTMENT (OUTPATIENT)
Age: 52
End: 2022-08-29

## 2022-08-29 DIAGNOSIS — E87.2 ACIDOSIS: ICD-10-CM

## 2022-08-29 DIAGNOSIS — F17.210 NICOTINE DEPENDENCE, CIGARETTES, UNCOMPLICATED: ICD-10-CM

## 2022-08-29 DIAGNOSIS — R10.9 UNSPECIFIED ABDOMINAL PAIN: ICD-10-CM

## 2022-08-29 DIAGNOSIS — B20 HUMAN IMMUNODEFICIENCY VIRUS [HIV] DISEASE: ICD-10-CM

## 2022-08-29 DIAGNOSIS — A04.4 OTHER INTESTINAL ESCHERICHIA COLI INFECTIONS: ICD-10-CM

## 2022-08-29 DIAGNOSIS — J44.9 CHRONIC OBSTRUCTIVE PULMONARY DISEASE, UNSPECIFIED: ICD-10-CM

## 2022-08-29 DIAGNOSIS — E83.42 HYPOMAGNESEMIA: ICD-10-CM

## 2022-08-29 DIAGNOSIS — B19.20 UNSPECIFIED VIRAL HEPATITIS C WITHOUT HEPATIC COMA: ICD-10-CM

## 2022-08-29 DIAGNOSIS — Z91.14 PATIENT'S OTHER NONCOMPLIANCE WITH MEDICATION REGIMEN: ICD-10-CM

## 2022-08-29 DIAGNOSIS — N39.0 URINARY TRACT INFECTION, SITE NOT SPECIFIED: ICD-10-CM

## 2022-08-29 DIAGNOSIS — E87.8 OTHER DISORDERS OF ELECTROLYTE AND FLUID BALANCE, NOT ELSEWHERE CLASSIFIED: ICD-10-CM

## 2022-08-29 DIAGNOSIS — I10 ESSENTIAL (PRIMARY) HYPERTENSION: ICD-10-CM

## 2022-08-29 DIAGNOSIS — A04.8 OTHER SPECIFIED BACTERIAL INTESTINAL INFECTIONS: ICD-10-CM

## 2022-08-29 DIAGNOSIS — Z79.2 LONG TERM (CURRENT) USE OF ANTIBIOTICS: ICD-10-CM

## 2022-08-29 DIAGNOSIS — E87.6 HYPOKALEMIA: ICD-10-CM

## 2022-08-29 DIAGNOSIS — A07.1 GIARDIASIS [LAMBLIASIS]: ICD-10-CM

## 2022-08-29 DIAGNOSIS — J45.909 UNSPECIFIED ASTHMA, UNCOMPLICATED: ICD-10-CM

## 2022-08-29 DIAGNOSIS — A03.9 SHIGELLOSIS, UNSPECIFIED: ICD-10-CM

## 2022-08-29 DIAGNOSIS — F32.A DEPRESSION, UNSPECIFIED: ICD-10-CM

## 2022-08-30 ENCOUNTER — APPOINTMENT (OUTPATIENT)
Dept: INFECTIOUS DISEASE | Facility: CLINIC | Age: 52
End: 2022-08-30

## 2022-08-30 ENCOUNTER — NON-APPOINTMENT (OUTPATIENT)
Age: 52
End: 2022-08-30

## 2022-11-13 NOTE — ED PROVIDER NOTE - NS ED MD DISPO DIVISION
Subjective     Seen at bedside this morning, Urdu phone  used.  Feels well overall, no reported chest pain or palpitations.  Breathing comfortably on room air.    Objective     I/O's    Intake/Output Summary (Last 24 hours) at 11/13/2022 0915  Last data filed at 11/12/2022 1000  Gross per 24 hour   Intake 120 ml   Output --   Net 120 ml       Last Recorded Vitals  Vitals with min/max:    Temp:  [97.3 °F (36.3 °C)-98.6 °F (37 °C)] 97.7 °F (36.5 °C)  Heart Rate:  [79-99] 94  Resp:  [18] 18  BP: ()/() 111/58      Vital Last Value 24 Hour Range   Temperature 97.7 °F (36.5 °C) (11/13/22 0736) Temp  Min: 97.3 °F (36.3 °C)  Max: 98.6 °F (37 °C)   Pulse 94 (11/13/22 0909) Pulse  Min: 79  Max: 99   Respiratory 18 (11/13/22 0625) Resp  Min: 18  Max: 18   Non-Invasive  Blood Pressure 111/58 (11/13/22 0736) BP  Min: 92/51  Max: 152/109   Pulse Oximetry 99 % (11/13/22 0736) SpO2  Min: 97 %  Max: 99 %   Arterial   Blood Pressure   No data recorded        Body mass index is 22.48 kg/m².    General:  Alert, awake, no acute distress   HEENT: PERRL, EOMI, moist mucus membranes  Cardiovascular:  Irregular rhythm, regular rate, no murmur.  Respiratory: Non-labored respirations on room air, no wheezing  Gastrointestinal: Soft, non-tender, non-distended.   Musculoskeletal: No swelling  Skin: No lesions or erythema  Neurological:  AOX3, no focal deficits  Psych: Normal mood, affect    Labs   Recent Labs   Lab 11/13/22 0324   WBC 13.4*   RBC 4.90   HGB 13.4   HCT 43.3        Recent Labs   Lab 11/13/22  0324 11/12/22  0328 11/11/22  1415 11/11/22  0433   SODIUM 138 140  --  140   POTASSIUM 4.5 4.8 4.7 2.9*   CHLORIDE 104 106  --  100   CO2 32 33*  --  33*   BUN 22* 20  --  21*   CREATININE 0.98* 0.94  --  0.89   CALCIUM 7.8* 7.5*  --  7.4*   ALBUMIN 2.3* 2.4*  --  2.4*   BILIRUBIN 0.9 0.9  --  0.9   ALKPT 115 134*  --  141*   GPT 50 67*  --  90*   AST 27 41*  --  62*   GLUCOSE 105* 113*  --  103*     Recent  Labs   Lab 11/09/22  1500   PT 12.2*   INR 1.1        Assessment and Plan    #New onset atrial fibrillation  #Acute left atrial appendage thrombus  #HFrEF  #Demand ischemia  Cardiology following, appreciate recs  Unable to complete cardioversion due to thrombus  Continue full dose anticoagulation with Lovenox for 5 days, then will transition to Eliquis.  Continue rate control with metoprolol succinate 100 mg twice daily  Continue Entresto  Appears euvolemic on exam, holding further diuresis currently  Monitor on telemetry      #Leukocytosis  Etiology unclear.  No focal consolidations on CXR from 11/12.  Minimal urine leukocytes noted on UA, urine cultures pending    #Abnormal LFTs  Likely in setting of congestion due to HFrEF, downtrending on repeat labs     #CAMERON, resolved     #Lactic acidosis, resolved     DVT PPx - on therapeutic lovenox     FULL CODE     Carmelo Hickey MD     [facility]

## 2022-12-16 NOTE — PATIENT PROFILE ADULT. - FUNCTIONAL SCREEN CURRENT LEVEL: DRESSING, MLM
Wound was examined.  Large amount of drainage from the wound.  I told him to elevate the wound when it causes pain.  We will repack.  And have him elevate the foot.  He may need some compression.  Let us see how he does over the weekend.      Cultures have grown nothing so far.  May just take some time for the drainage to slow down.     (0) independent

## 2023-01-13 NOTE — DISCHARGE NOTE NURSING/CASE MANAGEMENT/SOCIAL WORK - NSDCPETBCESMAN_GEN_ALL_CORE
If you are a smoker, it is important for your health to stop smoking. Please be aware that second hand smoke is also harmful. Sski Pregnancy And Lactation Text: This medication is Pregnancy Category D and isn't considered safe during pregnancy. It is excreted in breast milk.

## 2023-03-08 ENCOUNTER — EMERGENCY (EMERGENCY)
Facility: HOSPITAL | Age: 53
LOS: 1 days | Discharge: LEFT BEFORE DISCHARGE | End: 2023-03-08
Attending: STUDENT IN AN ORGANIZED HEALTH CARE EDUCATION/TRAINING PROGRAM | Admitting: STUDENT IN AN ORGANIZED HEALTH CARE EDUCATION/TRAINING PROGRAM
Payer: MEDICAID

## 2023-03-08 VITALS
RESPIRATION RATE: 18 BRPM | HEART RATE: 150 BPM | WEIGHT: 250 LBS | SYSTOLIC BLOOD PRESSURE: 151 MMHG | TEMPERATURE: 101 F | OXYGEN SATURATION: 98 % | DIASTOLIC BLOOD PRESSURE: 83 MMHG

## 2023-03-08 VITALS
HEART RATE: 117 BPM | OXYGEN SATURATION: 99 % | DIASTOLIC BLOOD PRESSURE: 69 MMHG | SYSTOLIC BLOOD PRESSURE: 113 MMHG | RESPIRATION RATE: 18 BRPM | TEMPERATURE: 103 F

## 2023-03-08 DIAGNOSIS — A03.9 SHIGELLOSIS, UNSPECIFIED: ICD-10-CM

## 2023-03-08 DIAGNOSIS — R10.9 UNSPECIFIED ABDOMINAL PAIN: ICD-10-CM

## 2023-03-08 DIAGNOSIS — B20 HUMAN IMMUNODEFICIENCY VIRUS [HIV] DISEASE: ICD-10-CM

## 2023-03-08 DIAGNOSIS — R19.7 DIARRHEA, UNSPECIFIED: ICD-10-CM

## 2023-03-08 DIAGNOSIS — Z53.29 PROCEDURE AND TREATMENT NOT CARRIED OUT BECAUSE OF PATIENT'S DECISION FOR OTHER REASONS: ICD-10-CM

## 2023-03-08 DIAGNOSIS — Z98.890 OTHER SPECIFIED POSTPROCEDURAL STATES: Chronic | ICD-10-CM

## 2023-03-08 DIAGNOSIS — D72.829 ELEVATED WHITE BLOOD CELL COUNT, UNSPECIFIED: ICD-10-CM

## 2023-03-08 DIAGNOSIS — B96.20 UNSPECIFIED ESCHERICHIA COLI [E. COLI] AS THE CAUSE OF DISEASES CLASSIFIED ELSEWHERE: ICD-10-CM

## 2023-03-08 DIAGNOSIS — R68.2 DRY MOUTH, UNSPECIFIED: ICD-10-CM

## 2023-03-08 DIAGNOSIS — R50.9 FEVER, UNSPECIFIED: ICD-10-CM

## 2023-03-08 DIAGNOSIS — Z20.822 CONTACT WITH AND (SUSPECTED) EXPOSURE TO COVID-19: ICD-10-CM

## 2023-03-08 DIAGNOSIS — R00.0 TACHYCARDIA, UNSPECIFIED: ICD-10-CM

## 2023-03-08 DIAGNOSIS — Z91.14 PATIENT'S OTHER NONCOMPLIANCE WITH MEDICATION REGIMEN: ICD-10-CM

## 2023-03-08 LAB
ALBUMIN SERPL ELPH-MCNC: 4 G/DL — SIGNIFICANT CHANGE UP (ref 3.3–5)
ALP SERPL-CCNC: 97 U/L — SIGNIFICANT CHANGE UP (ref 40–120)
ALT FLD-CCNC: 31 U/L — SIGNIFICANT CHANGE UP (ref 10–45)
ANION GAP SERPL CALC-SCNC: 14 MMOL/L — SIGNIFICANT CHANGE UP (ref 5–17)
APPEARANCE UR: CLEAR — SIGNIFICANT CHANGE UP
AST SERPL-CCNC: 36 U/L — SIGNIFICANT CHANGE UP (ref 10–40)
BACTERIA # UR AUTO: PRESENT /HPF
BASOPHILS # BLD AUTO: 0.03 K/UL — SIGNIFICANT CHANGE UP (ref 0–0.2)
BASOPHILS NFR BLD AUTO: 0.2 % — SIGNIFICANT CHANGE UP (ref 0–2)
BILIRUB SERPL-MCNC: 0.5 MG/DL — SIGNIFICANT CHANGE UP (ref 0.2–1.2)
BILIRUB UR-MCNC: NEGATIVE — SIGNIFICANT CHANGE UP
BUN SERPL-MCNC: 9 MG/DL — SIGNIFICANT CHANGE UP (ref 7–23)
CALCIUM SERPL-MCNC: 8.8 MG/DL — SIGNIFICANT CHANGE UP (ref 8.4–10.5)
CHLORIDE SERPL-SCNC: 101 MMOL/L — SIGNIFICANT CHANGE UP (ref 96–108)
CO2 SERPL-SCNC: 20 MMOL/L — LOW (ref 22–31)
COLOR SPEC: YELLOW — SIGNIFICANT CHANGE UP
COMMENT - URINE: SIGNIFICANT CHANGE UP
CREAT SERPL-MCNC: 0.73 MG/DL — SIGNIFICANT CHANGE UP (ref 0.5–1.3)
DIFF PNL FLD: NEGATIVE — SIGNIFICANT CHANGE UP
EGFR: 110 ML/MIN/1.73M2 — SIGNIFICANT CHANGE UP
EOSINOPHIL # BLD AUTO: 0.03 K/UL — SIGNIFICANT CHANGE UP (ref 0–0.5)
EOSINOPHIL NFR BLD AUTO: 0.2 % — SIGNIFICANT CHANGE UP (ref 0–6)
EPI CELLS # UR: SIGNIFICANT CHANGE UP /HPF (ref 0–5)
GI PCR PANEL: DETECTED
GLUCOSE SERPL-MCNC: 137 MG/DL — HIGH (ref 70–99)
GLUCOSE UR QL: NEGATIVE — SIGNIFICANT CHANGE UP
HCT VFR BLD CALC: 41.6 % — SIGNIFICANT CHANGE UP (ref 39–50)
HGB BLD-MCNC: 13.8 G/DL — SIGNIFICANT CHANGE UP (ref 13–17)
IMM GRANULOCYTES NFR BLD AUTO: 0.5 % — SIGNIFICANT CHANGE UP (ref 0–0.9)
KETONES UR-MCNC: NEGATIVE — SIGNIFICANT CHANGE UP
LACTATE SERPL-SCNC: 1.3 MMOL/L — SIGNIFICANT CHANGE UP (ref 0.5–2)
LACTATE SERPL-SCNC: 3.3 MMOL/L — HIGH (ref 0.5–2)
LEUKOCYTE ESTERASE UR-ACNC: NEGATIVE — SIGNIFICANT CHANGE UP
LIDOCAIN IGE QN: 35 U/L — SIGNIFICANT CHANGE UP (ref 7–60)
LYMPHOCYTES # BLD AUTO: 1.67 K/UL — SIGNIFICANT CHANGE UP (ref 1–3.3)
LYMPHOCYTES # BLD AUTO: 11.1 % — LOW (ref 13–44)
MCHC RBC-ENTMCNC: 27.9 PG — SIGNIFICANT CHANGE UP (ref 27–34)
MCHC RBC-ENTMCNC: 33.2 GM/DL — SIGNIFICANT CHANGE UP (ref 32–36)
MCV RBC AUTO: 84 FL — SIGNIFICANT CHANGE UP (ref 80–100)
MONOCYTES # BLD AUTO: 0.97 K/UL — HIGH (ref 0–0.9)
MONOCYTES NFR BLD AUTO: 6.5 % — SIGNIFICANT CHANGE UP (ref 2–14)
NEUTROPHILS # BLD AUTO: 12.23 K/UL — HIGH (ref 1.8–7.4)
NEUTROPHILS NFR BLD AUTO: 81.5 % — HIGH (ref 43–77)
NITRITE UR-MCNC: NEGATIVE — SIGNIFICANT CHANGE UP
NRBC # BLD: 0 /100 WBCS — SIGNIFICANT CHANGE UP (ref 0–0)
PH UR: 6 — SIGNIFICANT CHANGE UP (ref 5–8)
PLATELET # BLD AUTO: 239 K/UL — SIGNIFICANT CHANGE UP (ref 150–400)
POTASSIUM SERPL-MCNC: 3.3 MMOL/L — LOW (ref 3.5–5.3)
POTASSIUM SERPL-SCNC: 3.3 MMOL/L — LOW (ref 3.5–5.3)
PROT SERPL-MCNC: 8.7 G/DL — HIGH (ref 6–8.3)
PROT UR-MCNC: ABNORMAL MG/DL
RBC # BLD: 4.95 M/UL — SIGNIFICANT CHANGE UP (ref 4.2–5.8)
RBC # FLD: 16.2 % — HIGH (ref 10.3–14.5)
RBC CASTS # UR COMP ASSIST: < 5 /HPF — SIGNIFICANT CHANGE UP
SARS-COV-2 RNA SPEC QL NAA+PROBE: NEGATIVE — SIGNIFICANT CHANGE UP
SHIGELLA DNA SPEC QL NAA+PROBE: DETECTED
SODIUM SERPL-SCNC: 135 MMOL/L — SIGNIFICANT CHANGE UP (ref 135–145)
SP GR SPEC: 1.02 — SIGNIFICANT CHANGE UP (ref 1–1.03)
UROBILINOGEN FLD QL: 0.2 E.U./DL — SIGNIFICANT CHANGE UP
WBC # BLD: 15 K/UL — HIGH (ref 3.8–10.5)
WBC # FLD AUTO: 15 K/UL — HIGH (ref 3.8–10.5)
WBC UR QL: < 5 /HPF — SIGNIFICANT CHANGE UP

## 2023-03-08 PROCEDURE — 83605 ASSAY OF LACTIC ACID: CPT

## 2023-03-08 PROCEDURE — 71045 X-RAY EXAM CHEST 1 VIEW: CPT | Mod: 26

## 2023-03-08 PROCEDURE — 87040 BLOOD CULTURE FOR BACTERIA: CPT

## 2023-03-08 PROCEDURE — 85025 COMPLETE CBC W/AUTO DIFF WBC: CPT

## 2023-03-08 PROCEDURE — 87635 SARS-COV-2 COVID-19 AMP PRB: CPT

## 2023-03-08 PROCEDURE — 87045 FECES CULTURE AEROBIC BACT: CPT

## 2023-03-08 PROCEDURE — 87507 IADNA-DNA/RNA PROBE TQ 12-25: CPT

## 2023-03-08 PROCEDURE — 99285 EMERGENCY DEPT VISIT HI MDM: CPT

## 2023-03-08 PROCEDURE — 80053 COMPREHEN METABOLIC PANEL: CPT

## 2023-03-08 PROCEDURE — 99284 EMERGENCY DEPT VISIT MOD MDM: CPT | Mod: 25

## 2023-03-08 PROCEDURE — 81001 URINALYSIS AUTO W/SCOPE: CPT

## 2023-03-08 PROCEDURE — 36415 COLL VENOUS BLD VENIPUNCTURE: CPT

## 2023-03-08 PROCEDURE — 83690 ASSAY OF LIPASE: CPT

## 2023-03-08 PROCEDURE — 96375 TX/PRO/DX INJ NEW DRUG ADDON: CPT

## 2023-03-08 PROCEDURE — 96374 THER/PROPH/DIAG INJ IV PUSH: CPT

## 2023-03-08 PROCEDURE — 87186 SC STD MICRODIL/AGAR DIL: CPT

## 2023-03-08 PROCEDURE — 71045 X-RAY EXAM CHEST 1 VIEW: CPT

## 2023-03-08 RX ORDER — KETOROLAC TROMETHAMINE 30 MG/ML
15 SYRINGE (ML) INJECTION ONCE
Refills: 0 | Status: DISCONTINUED | OUTPATIENT
Start: 2023-03-08 | End: 2023-03-08

## 2023-03-08 RX ORDER — IBUPROFEN 200 MG
600 TABLET ORAL ONCE
Refills: 0 | Status: DISCONTINUED | OUTPATIENT
Start: 2023-03-08 | End: 2023-03-11

## 2023-03-08 RX ORDER — ONDANSETRON 8 MG/1
4 TABLET, FILM COATED ORAL ONCE
Refills: 0 | Status: COMPLETED | OUTPATIENT
Start: 2023-03-08 | End: 2023-03-08

## 2023-03-08 RX ORDER — SODIUM CHLORIDE 9 MG/ML
1000 INJECTION INTRAMUSCULAR; INTRAVENOUS; SUBCUTANEOUS ONCE
Refills: 0 | Status: COMPLETED | OUTPATIENT
Start: 2023-03-08 | End: 2023-03-08

## 2023-03-08 RX ORDER — ACETAMINOPHEN 500 MG
1000 TABLET ORAL ONCE
Refills: 0 | Status: COMPLETED | OUTPATIENT
Start: 2023-03-08 | End: 2023-03-08

## 2023-03-08 RX ORDER — ACETAMINOPHEN 500 MG
1000 TABLET ORAL ONCE
Refills: 0 | Status: DISCONTINUED | OUTPATIENT
Start: 2023-03-08 | End: 2023-03-11

## 2023-03-08 RX ORDER — CIPROFLOXACIN LACTATE 400MG/40ML
1 VIAL (ML) INTRAVENOUS
Qty: 14 | Refills: 0
Start: 2023-03-08 | End: 2023-03-14

## 2023-03-08 RX ADMIN — Medication 1000 MILLIGRAM(S): at 11:34

## 2023-03-08 RX ADMIN — Medication 15 MILLIGRAM(S): at 11:21

## 2023-03-08 RX ADMIN — SODIUM CHLORIDE 1000 MILLILITER(S): 9 INJECTION INTRAMUSCULAR; INTRAVENOUS; SUBCUTANEOUS at 11:21

## 2023-03-08 RX ADMIN — SODIUM CHLORIDE 1000 MILLILITER(S): 9 INJECTION INTRAMUSCULAR; INTRAVENOUS; SUBCUTANEOUS at 11:54

## 2023-03-08 RX ADMIN — ONDANSETRON 4 MILLIGRAM(S): 8 TABLET, FILM COATED ORAL at 11:21

## 2023-03-08 NOTE — ED PROVIDER NOTE - ATTENDING APP SHARED VISIT CONTRIBUTION OF CARE
i discussed the care of the pt directly with the ACP while the pt was in the ED. i have reviewed the ACP note and agree w/ the history, exam and plan of care other than as noted above.

## 2023-03-08 NOTE — ED ADULT TRIAGE NOTE - CHIEF COMPLAINT QUOTE
Patient c/o of fever, elevated BP, anxiety and depression, crying in ED triage, lightheadedness, abdominal pain with episodes of diarrhea, states has Hx of E. Coli, Campylobacter, shigellosis infedtion. Also c/o of chronic hip pain.  States wants to be seen by Psychiatry.  Denies SI, HI, hallucinations, states " I just not want to hurt anymore."  EKG in progress.  Immediate upgrade due to possible sepsis, elevated temperature and HR.

## 2023-03-08 NOTE — ED PROVIDER NOTE - OBJECTIVE STATEMENT
The pt is a 51 y/o M, HIV+ (?VL/CD4, non compliant w/meds x few yrs), who presents to ED c/o diarrhea x few wks. Pt states loose BMs, non bloody, some abd cramping, started having fever yest, no meds taken for fever.  Admits to feeling anxious. Denies n/v, cp, sob, cough, dizziness, syncope, recent travel or abx use, SI, HI, VH, AH.

## 2023-03-08 NOTE — ED ADULT NURSE NOTE - OBJECTIVE STATEMENT
Patient to the ED c/o abdominal pain and diarrhea x 5 weeks, fever and chills x 2 days. HX of multiple GI infections. HX of HIV, not on medications, has not followed up with PCP x 4 years. AAOX4, NAD.

## 2023-03-08 NOTE — ED PROVIDER NOTE - CLINICAL SUMMARY MEDICAL DECISION MAKING FREE TEXT BOX
pt c/o non bloody diarrhea x few wks, fever x few d, hiv+ (?vl/cd4, non compliant w/meds), pt febrile and tachy upon arrival - has not taken any meds for symptoms, non toxic appearing, given meds and ivf w/improvement of vitals, ? viral etiology - abx held until stool studies, labs w/slight leukocytosis, + shigella/ecoli found - pt eloped from dept prior to results and dispo-- but rx for cipro sent to pharm since immunocompromised, pt was aware that his pmd was contacted earlier in the stay

## 2023-03-08 NOTE — ED ADULT NURSE REASSESSMENT NOTE - NS ED NURSE REASSESS COMMENT FT1
Came back from break with another nurse covering patient, patient not in room for unknown amount of time. Provider notified.

## 2023-03-13 LAB
-  AMPICILLIN: SIGNIFICANT CHANGE UP
-  CIPROFLOXACIN: SIGNIFICANT CHANGE UP
-  TRIMETHOPRIM/SULFAMETHOXAZOLE: SIGNIFICANT CHANGE UP
CULTURE RESULTS: SIGNIFICANT CHANGE UP
CULTURE RESULTS: SIGNIFICANT CHANGE UP
METHOD TYPE: SIGNIFICANT CHANGE UP
SPECIMEN SOURCE: SIGNIFICANT CHANGE UP
SPECIMEN SOURCE: SIGNIFICANT CHANGE UP

## 2023-03-23 ENCOUNTER — APPOINTMENT (OUTPATIENT)
Dept: INFECTIOUS DISEASE | Facility: CLINIC | Age: 53
End: 2023-03-23

## 2023-08-28 NOTE — PROGRESS NOTE ADULT - PROBLEM SELECTOR PROBLEM 1
Render Note In Bullet Format When Appropriate: No Duration Of Freeze Thaw-Cycle (Seconds): 3 Show Aperture Variable?: Yes Post-Care Instructions: I reviewed with the patient in detail post-care instructions. Patient is to wear sunprotection, and avoid picking at any of the treated lesions. Pt may apply Vaseline to crusted or scabbing areas. Number Of Freeze-Thaw Cycles: 1 freeze-thaw cycle Consent: The patient's consent was obtained including but not limited to risks of crusting, scabbing, blistering, scarring, darker or lighter pigmentary change, recurrence, incomplete removal and infection. Detail Level: Detailed Sepsis

## 2023-11-11 ENCOUNTER — INPATIENT (INPATIENT)
Facility: HOSPITAL | Age: 53
LOS: 3 days | Discharge: HOME | DRG: 974 | End: 2023-11-15
Attending: INTERNAL MEDICINE | Admitting: INTERNAL MEDICINE
Payer: MEDICAID

## 2023-11-11 VITALS
DIASTOLIC BLOOD PRESSURE: 104 MMHG | RESPIRATION RATE: 20 BRPM | HEIGHT: 73 IN | HEART RATE: 113 BPM | WEIGHT: 199.96 LBS | OXYGEN SATURATION: 97 % | SYSTOLIC BLOOD PRESSURE: 161 MMHG | TEMPERATURE: 98 F

## 2023-11-11 DIAGNOSIS — I10 ESSENTIAL (PRIMARY) HYPERTENSION: ICD-10-CM

## 2023-11-11 DIAGNOSIS — F17.200 NICOTINE DEPENDENCE, UNSPECIFIED, UNCOMPLICATED: ICD-10-CM

## 2023-11-11 DIAGNOSIS — Z98.890 OTHER SPECIFIED POSTPROCEDURAL STATES: Chronic | ICD-10-CM

## 2023-11-11 DIAGNOSIS — Z86.73 PERSONAL HISTORY OF TRANSIENT ISCHEMIC ATTACK (TIA), AND CEREBRAL INFARCTION WITHOUT RESIDUAL DEFICITS: ICD-10-CM

## 2023-11-11 DIAGNOSIS — B20 HUMAN IMMUNODEFICIENCY VIRUS [HIV] DISEASE: ICD-10-CM

## 2023-11-11 DIAGNOSIS — R19.7 DIARRHEA, UNSPECIFIED: ICD-10-CM

## 2023-11-11 DIAGNOSIS — E83.42 HYPOMAGNESEMIA: ICD-10-CM

## 2023-11-11 DIAGNOSIS — A09 INFECTIOUS GASTROENTERITIS AND COLITIS, UNSPECIFIED: ICD-10-CM

## 2023-11-11 DIAGNOSIS — L40.9 PSORIASIS, UNSPECIFIED: ICD-10-CM

## 2023-11-11 DIAGNOSIS — J45.909 UNSPECIFIED ASTHMA, UNCOMPLICATED: ICD-10-CM

## 2023-11-11 DIAGNOSIS — E43 UNSPECIFIED SEVERE PROTEIN-CALORIE MALNUTRITION: ICD-10-CM

## 2023-11-11 DIAGNOSIS — B37.81 CANDIDAL ESOPHAGITIS: ICD-10-CM

## 2023-11-11 DIAGNOSIS — B37.0 CANDIDAL STOMATITIS: ICD-10-CM

## 2023-11-11 DIAGNOSIS — Z91.148 PATIENT'S OTHER NONCOMPLIANCE WITH MEDICATION REGIMEN FOR OTHER REASON: ICD-10-CM

## 2023-11-11 DIAGNOSIS — R35.0 FREQUENCY OF MICTURITION: ICD-10-CM

## 2023-11-11 DIAGNOSIS — E87.6 HYPOKALEMIA: ICD-10-CM

## 2023-11-11 DIAGNOSIS — L73.8 OTHER SPECIFIED FOLLICULAR DISORDERS: ICD-10-CM

## 2023-11-11 DIAGNOSIS — F17.210 NICOTINE DEPENDENCE, CIGARETTES, UNCOMPLICATED: ICD-10-CM

## 2023-11-11 DIAGNOSIS — Z79.899 OTHER LONG TERM (CURRENT) DRUG THERAPY: ICD-10-CM

## 2023-11-11 DIAGNOSIS — F32.9 MAJOR DEPRESSIVE DISORDER, SINGLE EPISODE, UNSPECIFIED: ICD-10-CM

## 2023-11-11 DIAGNOSIS — Z29.9 ENCOUNTER FOR PROPHYLACTIC MEASURES, UNSPECIFIED: ICD-10-CM

## 2023-11-11 DIAGNOSIS — B96.20 UNSPECIFIED ESCHERICHIA COLI [E. COLI] AS THE CAUSE OF DISEASES CLASSIFIED ELSEWHERE: ICD-10-CM

## 2023-11-11 DIAGNOSIS — A03.9 SHIGELLOSIS, UNSPECIFIED: ICD-10-CM

## 2023-11-11 DIAGNOSIS — Z87.09 PERSONAL HISTORY OF OTHER DISEASES OF THE RESPIRATORY SYSTEM: ICD-10-CM

## 2023-11-11 LAB
ALBUMIN SERPL ELPH-MCNC: 3.3 G/DL — SIGNIFICANT CHANGE UP (ref 3.3–5)
ALBUMIN SERPL ELPH-MCNC: 3.3 G/DL — SIGNIFICANT CHANGE UP (ref 3.3–5)
ALBUMIN SERPL ELPH-MCNC: 3.5 G/DL — SIGNIFICANT CHANGE UP (ref 3.3–5)
ALBUMIN SERPL ELPH-MCNC: 3.5 G/DL — SIGNIFICANT CHANGE UP (ref 3.3–5)
ALP SERPL-CCNC: 83 U/L — SIGNIFICANT CHANGE UP (ref 40–120)
ALP SERPL-CCNC: 83 U/L — SIGNIFICANT CHANGE UP (ref 40–120)
ALP SERPL-CCNC: 86 U/L — SIGNIFICANT CHANGE UP (ref 40–120)
ALP SERPL-CCNC: 86 U/L — SIGNIFICANT CHANGE UP (ref 40–120)
ALT FLD-CCNC: 18 U/L — SIGNIFICANT CHANGE UP (ref 10–45)
ALT FLD-CCNC: 18 U/L — SIGNIFICANT CHANGE UP (ref 10–45)
ALT FLD-CCNC: 21 U/L — SIGNIFICANT CHANGE UP (ref 10–45)
ALT FLD-CCNC: 21 U/L — SIGNIFICANT CHANGE UP (ref 10–45)
ANION GAP SERPL CALC-SCNC: 10 MMOL/L — SIGNIFICANT CHANGE UP (ref 5–17)
ANION GAP SERPL CALC-SCNC: 10 MMOL/L — SIGNIFICANT CHANGE UP (ref 5–17)
ANION GAP SERPL CALC-SCNC: 12 MMOL/L — SIGNIFICANT CHANGE UP (ref 5–17)
ANION GAP SERPL CALC-SCNC: 12 MMOL/L — SIGNIFICANT CHANGE UP (ref 5–17)
APPEARANCE UR: CLEAR — SIGNIFICANT CHANGE UP
APPEARANCE UR: CLEAR — SIGNIFICANT CHANGE UP
APTT BLD: 40.9 SEC — HIGH (ref 24.5–35.6)
APTT BLD: 40.9 SEC — HIGH (ref 24.5–35.6)
AST SERPL-CCNC: 32 U/L — SIGNIFICANT CHANGE UP (ref 10–40)
AST SERPL-CCNC: 32 U/L — SIGNIFICANT CHANGE UP (ref 10–40)
AST SERPL-CCNC: 35 U/L — SIGNIFICANT CHANGE UP (ref 10–40)
AST SERPL-CCNC: 35 U/L — SIGNIFICANT CHANGE UP (ref 10–40)
BASOPHILS # BLD AUTO: 0.04 K/UL — SIGNIFICANT CHANGE UP (ref 0–0.2)
BASOPHILS # BLD AUTO: 0.04 K/UL — SIGNIFICANT CHANGE UP (ref 0–0.2)
BASOPHILS # BLD AUTO: 0.06 K/UL — SIGNIFICANT CHANGE UP (ref 0–0.2)
BASOPHILS # BLD AUTO: 0.06 K/UL — SIGNIFICANT CHANGE UP (ref 0–0.2)
BASOPHILS NFR BLD AUTO: 0.5 % — SIGNIFICANT CHANGE UP (ref 0–2)
BASOPHILS NFR BLD AUTO: 0.5 % — SIGNIFICANT CHANGE UP (ref 0–2)
BASOPHILS NFR BLD AUTO: 0.7 % — SIGNIFICANT CHANGE UP (ref 0–2)
BASOPHILS NFR BLD AUTO: 0.7 % — SIGNIFICANT CHANGE UP (ref 0–2)
BILIRUB SERPL-MCNC: 0.2 MG/DL — SIGNIFICANT CHANGE UP (ref 0.2–1.2)
BILIRUB UR-MCNC: NEGATIVE — SIGNIFICANT CHANGE UP
BILIRUB UR-MCNC: NEGATIVE — SIGNIFICANT CHANGE UP
BLD GP AB SCN SERPL QL: NEGATIVE — SIGNIFICANT CHANGE UP
BLD GP AB SCN SERPL QL: NEGATIVE — SIGNIFICANT CHANGE UP
BUN SERPL-MCNC: 7 MG/DL — SIGNIFICANT CHANGE UP (ref 7–23)
BUN SERPL-MCNC: 7 MG/DL — SIGNIFICANT CHANGE UP (ref 7–23)
BUN SERPL-MCNC: 8 MG/DL — SIGNIFICANT CHANGE UP (ref 7–23)
BUN SERPL-MCNC: 8 MG/DL — SIGNIFICANT CHANGE UP (ref 7–23)
C DIFF GDH STL QL: NEGATIVE — SIGNIFICANT CHANGE UP
C DIFF GDH STL QL: NEGATIVE — SIGNIFICANT CHANGE UP
C DIFF GDH STL QL: SIGNIFICANT CHANGE UP
C DIFF GDH STL QL: SIGNIFICANT CHANGE UP
CALCIUM SERPL-MCNC: 9.2 MG/DL — SIGNIFICANT CHANGE UP (ref 8.4–10.5)
CALCIUM SERPL-MCNC: 9.2 MG/DL — SIGNIFICANT CHANGE UP (ref 8.4–10.5)
CALCIUM SERPL-MCNC: 9.5 MG/DL — SIGNIFICANT CHANGE UP (ref 8.4–10.5)
CALCIUM SERPL-MCNC: 9.5 MG/DL — SIGNIFICANT CHANGE UP (ref 8.4–10.5)
CHLORIDE SERPL-SCNC: 101 MMOL/L — SIGNIFICANT CHANGE UP (ref 96–108)
CHLORIDE SERPL-SCNC: 101 MMOL/L — SIGNIFICANT CHANGE UP (ref 96–108)
CHLORIDE SERPL-SCNC: 102 MMOL/L — SIGNIFICANT CHANGE UP (ref 96–108)
CHLORIDE SERPL-SCNC: 102 MMOL/L — SIGNIFICANT CHANGE UP (ref 96–108)
CO2 SERPL-SCNC: 26 MMOL/L — SIGNIFICANT CHANGE UP (ref 22–31)
CO2 SERPL-SCNC: 26 MMOL/L — SIGNIFICANT CHANGE UP (ref 22–31)
CO2 SERPL-SCNC: 29 MMOL/L — SIGNIFICANT CHANGE UP (ref 22–31)
CO2 SERPL-SCNC: 29 MMOL/L — SIGNIFICANT CHANGE UP (ref 22–31)
COLOR SPEC: YELLOW — SIGNIFICANT CHANGE UP
COLOR SPEC: YELLOW — SIGNIFICANT CHANGE UP
CREAT SERPL-MCNC: 0.72 MG/DL — SIGNIFICANT CHANGE UP (ref 0.5–1.3)
CREAT SERPL-MCNC: 0.72 MG/DL — SIGNIFICANT CHANGE UP (ref 0.5–1.3)
CREAT SERPL-MCNC: 0.78 MG/DL — SIGNIFICANT CHANGE UP (ref 0.5–1.3)
CREAT SERPL-MCNC: 0.78 MG/DL — SIGNIFICANT CHANGE UP (ref 0.5–1.3)
DIFF PNL FLD: NEGATIVE — SIGNIFICANT CHANGE UP
DIFF PNL FLD: NEGATIVE — SIGNIFICANT CHANGE UP
EGFR: 107 ML/MIN/1.73M2 — SIGNIFICANT CHANGE UP
EGFR: 107 ML/MIN/1.73M2 — SIGNIFICANT CHANGE UP
EGFR: 110 ML/MIN/1.73M2 — SIGNIFICANT CHANGE UP
EGFR: 110 ML/MIN/1.73M2 — SIGNIFICANT CHANGE UP
EOSINOPHIL # BLD AUTO: 0.38 K/UL — SIGNIFICANT CHANGE UP (ref 0–0.5)
EOSINOPHIL # BLD AUTO: 0.38 K/UL — SIGNIFICANT CHANGE UP (ref 0–0.5)
EOSINOPHIL # BLD AUTO: 0.39 K/UL — SIGNIFICANT CHANGE UP (ref 0–0.5)
EOSINOPHIL # BLD AUTO: 0.39 K/UL — SIGNIFICANT CHANGE UP (ref 0–0.5)
EOSINOPHIL NFR BLD AUTO: 4.6 % — SIGNIFICANT CHANGE UP (ref 0–6)
EPEC DNA STL QL NAA+PROBE: DETECTED
EPEC DNA STL QL NAA+PROBE: DETECTED
GI PCR PANEL: DETECTED
GI PCR PANEL: DETECTED
GLUCOSE SERPL-MCNC: 102 MG/DL — HIGH (ref 70–99)
GLUCOSE SERPL-MCNC: 102 MG/DL — HIGH (ref 70–99)
GLUCOSE SERPL-MCNC: 106 MG/DL — HIGH (ref 70–99)
GLUCOSE SERPL-MCNC: 106 MG/DL — HIGH (ref 70–99)
GLUCOSE UR QL: NEGATIVE MG/DL — SIGNIFICANT CHANGE UP
GLUCOSE UR QL: NEGATIVE MG/DL — SIGNIFICANT CHANGE UP
HCT VFR BLD CALC: 35.6 % — LOW (ref 39–50)
HCT VFR BLD CALC: 35.6 % — LOW (ref 39–50)
HCT VFR BLD CALC: 36.5 % — LOW (ref 39–50)
HCT VFR BLD CALC: 36.5 % — LOW (ref 39–50)
HGB BLD-MCNC: 11.8 G/DL — LOW (ref 13–17)
HGB BLD-MCNC: 11.8 G/DL — LOW (ref 13–17)
HGB BLD-MCNC: 12.4 G/DL — LOW (ref 13–17)
HGB BLD-MCNC: 12.4 G/DL — LOW (ref 13–17)
IMM GRANULOCYTES NFR BLD AUTO: 0.5 % — SIGNIFICANT CHANGE UP (ref 0–0.9)
IMM GRANULOCYTES NFR BLD AUTO: 0.5 % — SIGNIFICANT CHANGE UP (ref 0–0.9)
IMM GRANULOCYTES NFR BLD AUTO: 0.6 % — SIGNIFICANT CHANGE UP (ref 0–0.9)
IMM GRANULOCYTES NFR BLD AUTO: 0.6 % — SIGNIFICANT CHANGE UP (ref 0–0.9)
INR BLD: 1.01 — SIGNIFICANT CHANGE UP (ref 0.85–1.18)
INR BLD: 1.01 — SIGNIFICANT CHANGE UP (ref 0.85–1.18)
KETONES UR-MCNC: NEGATIVE MG/DL — SIGNIFICANT CHANGE UP
KETONES UR-MCNC: NEGATIVE MG/DL — SIGNIFICANT CHANGE UP
LEUKOCYTE ESTERASE UR-ACNC: NEGATIVE — SIGNIFICANT CHANGE UP
LEUKOCYTE ESTERASE UR-ACNC: NEGATIVE — SIGNIFICANT CHANGE UP
LYMPHOCYTES # BLD AUTO: 2.13 K/UL — SIGNIFICANT CHANGE UP (ref 1–3.3)
LYMPHOCYTES # BLD AUTO: 2.13 K/UL — SIGNIFICANT CHANGE UP (ref 1–3.3)
LYMPHOCYTES # BLD AUTO: 2.4 K/UL — SIGNIFICANT CHANGE UP (ref 1–3.3)
LYMPHOCYTES # BLD AUTO: 2.4 K/UL — SIGNIFICANT CHANGE UP (ref 1–3.3)
LYMPHOCYTES # BLD AUTO: 24.9 % — SIGNIFICANT CHANGE UP (ref 13–44)
LYMPHOCYTES # BLD AUTO: 24.9 % — SIGNIFICANT CHANGE UP (ref 13–44)
LYMPHOCYTES # BLD AUTO: 28.7 % — SIGNIFICANT CHANGE UP (ref 13–44)
LYMPHOCYTES # BLD AUTO: 28.7 % — SIGNIFICANT CHANGE UP (ref 13–44)
MAGNESIUM SERPL-MCNC: 1.2 MG/DL — LOW (ref 1.6–2.6)
MAGNESIUM SERPL-MCNC: 1.2 MG/DL — LOW (ref 1.6–2.6)
MAGNESIUM SERPL-MCNC: 1.4 MG/DL — LOW (ref 1.6–2.6)
MAGNESIUM SERPL-MCNC: 1.4 MG/DL — LOW (ref 1.6–2.6)
MCHC RBC-ENTMCNC: 27.9 PG — SIGNIFICANT CHANGE UP (ref 27–34)
MCHC RBC-ENTMCNC: 27.9 PG — SIGNIFICANT CHANGE UP (ref 27–34)
MCHC RBC-ENTMCNC: 28 PG — SIGNIFICANT CHANGE UP (ref 27–34)
MCHC RBC-ENTMCNC: 28 PG — SIGNIFICANT CHANGE UP (ref 27–34)
MCHC RBC-ENTMCNC: 33.1 GM/DL — SIGNIFICANT CHANGE UP (ref 32–36)
MCHC RBC-ENTMCNC: 33.1 GM/DL — SIGNIFICANT CHANGE UP (ref 32–36)
MCHC RBC-ENTMCNC: 34 GM/DL — SIGNIFICANT CHANGE UP (ref 32–36)
MCHC RBC-ENTMCNC: 34 GM/DL — SIGNIFICANT CHANGE UP (ref 32–36)
MCV RBC AUTO: 82.2 FL — SIGNIFICANT CHANGE UP (ref 80–100)
MCV RBC AUTO: 82.2 FL — SIGNIFICANT CHANGE UP (ref 80–100)
MCV RBC AUTO: 84.6 FL — SIGNIFICANT CHANGE UP (ref 80–100)
MCV RBC AUTO: 84.6 FL — SIGNIFICANT CHANGE UP (ref 80–100)
MONOCYTES # BLD AUTO: 0.87 K/UL — SIGNIFICANT CHANGE UP (ref 0–0.9)
MONOCYTES # BLD AUTO: 0.87 K/UL — SIGNIFICANT CHANGE UP (ref 0–0.9)
MONOCYTES # BLD AUTO: 0.92 K/UL — HIGH (ref 0–0.9)
MONOCYTES # BLD AUTO: 0.92 K/UL — HIGH (ref 0–0.9)
MONOCYTES NFR BLD AUTO: 10.4 % — SIGNIFICANT CHANGE UP (ref 2–14)
MONOCYTES NFR BLD AUTO: 10.4 % — SIGNIFICANT CHANGE UP (ref 2–14)
MONOCYTES NFR BLD AUTO: 10.8 % — SIGNIFICANT CHANGE UP (ref 2–14)
MONOCYTES NFR BLD AUTO: 10.8 % — SIGNIFICANT CHANGE UP (ref 2–14)
NEUTROPHILS # BLD AUTO: 4.61 K/UL — SIGNIFICANT CHANGE UP (ref 1.8–7.4)
NEUTROPHILS # BLD AUTO: 4.61 K/UL — SIGNIFICANT CHANGE UP (ref 1.8–7.4)
NEUTROPHILS # BLD AUTO: 5.01 K/UL — SIGNIFICANT CHANGE UP (ref 1.8–7.4)
NEUTROPHILS # BLD AUTO: 5.01 K/UL — SIGNIFICANT CHANGE UP (ref 1.8–7.4)
NEUTROPHILS NFR BLD AUTO: 55.2 % — SIGNIFICANT CHANGE UP (ref 43–77)
NEUTROPHILS NFR BLD AUTO: 55.2 % — SIGNIFICANT CHANGE UP (ref 43–77)
NEUTROPHILS NFR BLD AUTO: 58.5 % — SIGNIFICANT CHANGE UP (ref 43–77)
NEUTROPHILS NFR BLD AUTO: 58.5 % — SIGNIFICANT CHANGE UP (ref 43–77)
NITRITE UR-MCNC: NEGATIVE — SIGNIFICANT CHANGE UP
NITRITE UR-MCNC: NEGATIVE — SIGNIFICANT CHANGE UP
NRBC # BLD: 0 /100 WBCS — SIGNIFICANT CHANGE UP (ref 0–0)
PH UR: 6.5 — SIGNIFICANT CHANGE UP (ref 5–8)
PH UR: 6.5 — SIGNIFICANT CHANGE UP (ref 5–8)
PHOSPHATE SERPL-MCNC: 3 MG/DL — SIGNIFICANT CHANGE UP (ref 2.5–4.5)
PHOSPHATE SERPL-MCNC: 3 MG/DL — SIGNIFICANT CHANGE UP (ref 2.5–4.5)
PHOSPHATE SERPL-MCNC: 3.3 MG/DL — SIGNIFICANT CHANGE UP (ref 2.5–4.5)
PHOSPHATE SERPL-MCNC: 3.3 MG/DL — SIGNIFICANT CHANGE UP (ref 2.5–4.5)
PLATELET # BLD AUTO: 254 K/UL — SIGNIFICANT CHANGE UP (ref 150–400)
PLATELET # BLD AUTO: 254 K/UL — SIGNIFICANT CHANGE UP (ref 150–400)
PLATELET # BLD AUTO: 255 K/UL — SIGNIFICANT CHANGE UP (ref 150–400)
PLATELET # BLD AUTO: 255 K/UL — SIGNIFICANT CHANGE UP (ref 150–400)
POTASSIUM SERPL-MCNC: 3.1 MMOL/L — LOW (ref 3.5–5.3)
POTASSIUM SERPL-MCNC: 3.1 MMOL/L — LOW (ref 3.5–5.3)
POTASSIUM SERPL-MCNC: 3.3 MMOL/L — LOW (ref 3.5–5.3)
POTASSIUM SERPL-MCNC: 3.3 MMOL/L — LOW (ref 3.5–5.3)
POTASSIUM SERPL-SCNC: 3.1 MMOL/L — LOW (ref 3.5–5.3)
POTASSIUM SERPL-SCNC: 3.1 MMOL/L — LOW (ref 3.5–5.3)
POTASSIUM SERPL-SCNC: 3.3 MMOL/L — LOW (ref 3.5–5.3)
POTASSIUM SERPL-SCNC: 3.3 MMOL/L — LOW (ref 3.5–5.3)
PROT SERPL-MCNC: 7.8 G/DL — SIGNIFICANT CHANGE UP (ref 6–8.3)
PROT SERPL-MCNC: 7.8 G/DL — SIGNIFICANT CHANGE UP (ref 6–8.3)
PROT SERPL-MCNC: 8 G/DL — SIGNIFICANT CHANGE UP (ref 6–8.3)
PROT SERPL-MCNC: 8 G/DL — SIGNIFICANT CHANGE UP (ref 6–8.3)
PROT UR-MCNC: NEGATIVE MG/DL — SIGNIFICANT CHANGE UP
PROT UR-MCNC: NEGATIVE MG/DL — SIGNIFICANT CHANGE UP
PROTHROM AB SERPL-ACNC: 11.5 SEC — SIGNIFICANT CHANGE UP (ref 9.5–13)
PROTHROM AB SERPL-ACNC: 11.5 SEC — SIGNIFICANT CHANGE UP (ref 9.5–13)
RBC # BLD: 4.21 M/UL — SIGNIFICANT CHANGE UP (ref 4.2–5.8)
RBC # BLD: 4.21 M/UL — SIGNIFICANT CHANGE UP (ref 4.2–5.8)
RBC # BLD: 4.44 M/UL — SIGNIFICANT CHANGE UP (ref 4.2–5.8)
RBC # BLD: 4.44 M/UL — SIGNIFICANT CHANGE UP (ref 4.2–5.8)
RBC # FLD: 13.5 % — SIGNIFICANT CHANGE UP (ref 10.3–14.5)
RBC # FLD: 13.5 % — SIGNIFICANT CHANGE UP (ref 10.3–14.5)
RBC # FLD: 13.7 % — SIGNIFICANT CHANGE UP (ref 10.3–14.5)
RBC # FLD: 13.7 % — SIGNIFICANT CHANGE UP (ref 10.3–14.5)
RH IG SCN BLD-IMP: POSITIVE — SIGNIFICANT CHANGE UP
RH IG SCN BLD-IMP: POSITIVE — SIGNIFICANT CHANGE UP
SHIGELLA DNA SPEC QL NAA+PROBE: DETECTED
SHIGELLA DNA SPEC QL NAA+PROBE: DETECTED
SODIUM SERPL-SCNC: 139 MMOL/L — SIGNIFICANT CHANGE UP (ref 135–145)
SODIUM SERPL-SCNC: 139 MMOL/L — SIGNIFICANT CHANGE UP (ref 135–145)
SODIUM SERPL-SCNC: 141 MMOL/L — SIGNIFICANT CHANGE UP (ref 135–145)
SODIUM SERPL-SCNC: 141 MMOL/L — SIGNIFICANT CHANGE UP (ref 135–145)
SP GR SPEC: 1.01 — SIGNIFICANT CHANGE UP (ref 1–1.03)
SP GR SPEC: 1.01 — SIGNIFICANT CHANGE UP (ref 1–1.03)
T PALLIDUM AB TITR SER: NEGATIVE — SIGNIFICANT CHANGE UP
T PALLIDUM AB TITR SER: NEGATIVE — SIGNIFICANT CHANGE UP
UROBILINOGEN FLD QL: 0.2 MG/DL — SIGNIFICANT CHANGE UP (ref 0.2–1)
UROBILINOGEN FLD QL: 0.2 MG/DL — SIGNIFICANT CHANGE UP (ref 0.2–1)
WBC # BLD: 8.35 K/UL — SIGNIFICANT CHANGE UP (ref 3.8–10.5)
WBC # BLD: 8.35 K/UL — SIGNIFICANT CHANGE UP (ref 3.8–10.5)
WBC # BLD: 8.55 K/UL — SIGNIFICANT CHANGE UP (ref 3.8–10.5)
WBC # BLD: 8.55 K/UL — SIGNIFICANT CHANGE UP (ref 3.8–10.5)
WBC # FLD AUTO: 8.35 K/UL — SIGNIFICANT CHANGE UP (ref 3.8–10.5)
WBC # FLD AUTO: 8.35 K/UL — SIGNIFICANT CHANGE UP (ref 3.8–10.5)
WBC # FLD AUTO: 8.55 K/UL — SIGNIFICANT CHANGE UP (ref 3.8–10.5)
WBC # FLD AUTO: 8.55 K/UL — SIGNIFICANT CHANGE UP (ref 3.8–10.5)

## 2023-11-11 PROCEDURE — 71045 X-RAY EXAM CHEST 1 VIEW: CPT | Mod: 26

## 2023-11-11 PROCEDURE — 99222 1ST HOSP IP/OBS MODERATE 55: CPT

## 2023-11-11 PROCEDURE — 99285 EMERGENCY DEPT VISIT HI MDM: CPT | Mod: 25

## 2023-11-11 PROCEDURE — 93010 ELECTROCARDIOGRAM REPORT: CPT

## 2023-11-11 PROCEDURE — 99223 1ST HOSP IP/OBS HIGH 75: CPT | Mod: GC

## 2023-11-11 RX ORDER — BUDESONIDE AND FORMOTEROL FUMARATE DIHYDRATE 160; 4.5 UG/1; UG/1
2 AEROSOL RESPIRATORY (INHALATION)
Refills: 0 | Status: DISCONTINUED | OUTPATIENT
Start: 2023-11-11 | End: 2023-11-15

## 2023-11-11 RX ORDER — SODIUM CHLORIDE 9 MG/ML
1000 INJECTION INTRAMUSCULAR; INTRAVENOUS; SUBCUTANEOUS ONCE
Refills: 0 | Status: COMPLETED | OUTPATIENT
Start: 2023-11-11 | End: 2023-11-11

## 2023-11-11 RX ORDER — LANOLIN ALCOHOL/MO/W.PET/CERES
3 CREAM (GRAM) TOPICAL AT BEDTIME
Refills: 0 | Status: DISCONTINUED | OUTPATIENT
Start: 2023-11-11 | End: 2023-11-15

## 2023-11-11 RX ORDER — EMTRICITABINE AND TENOFOVIR DISOPROXIL FUMARATE 200; 300 MG/1; MG/1
1 TABLET, FILM COATED ORAL DAILY
Refills: 0 | Status: DISCONTINUED | OUTPATIENT
Start: 2023-11-11 | End: 2023-11-11

## 2023-11-11 RX ORDER — ESCITALOPRAM OXALATE 10 MG/1
10 TABLET, FILM COATED ORAL DAILY
Refills: 0 | Status: DISCONTINUED | OUTPATIENT
Start: 2023-11-11 | End: 2023-11-15

## 2023-11-11 RX ORDER — ACETAMINOPHEN 500 MG
650 TABLET ORAL EVERY 6 HOURS
Refills: 0 | Status: DISCONTINUED | OUTPATIENT
Start: 2023-11-11 | End: 2023-11-15

## 2023-11-11 RX ORDER — POTASSIUM CHLORIDE 20 MEQ
40 PACKET (EA) ORAL ONCE
Refills: 0 | Status: COMPLETED | OUTPATIENT
Start: 2023-11-11 | End: 2023-11-11

## 2023-11-11 RX ORDER — FLUCONAZOLE 150 MG/1
400 TABLET ORAL EVERY 24 HOURS
Refills: 0 | Status: DISCONTINUED | OUTPATIENT
Start: 2023-11-12 | End: 2023-11-13

## 2023-11-11 RX ORDER — FLUCONAZOLE 150 MG/1
400 TABLET ORAL ONCE
Refills: 0 | Status: COMPLETED | OUTPATIENT
Start: 2023-11-11 | End: 2023-11-11

## 2023-11-11 RX ORDER — MAGNESIUM SULFATE 500 MG/ML
2 VIAL (ML) INJECTION ONCE
Refills: 0 | Status: COMPLETED | OUTPATIENT
Start: 2023-11-11 | End: 2023-11-11

## 2023-11-11 RX ORDER — IPRATROPIUM/ALBUTEROL SULFATE 18-103MCG
3 AEROSOL WITH ADAPTER (GRAM) INHALATION EVERY 6 HOURS
Refills: 0 | Status: DISCONTINUED | OUTPATIENT
Start: 2023-11-11 | End: 2023-11-15

## 2023-11-11 RX ORDER — DOLUTEGRAVIR SODIUM 25 MG/1
50 TABLET, FILM COATED ORAL DAILY
Refills: 0 | Status: DISCONTINUED | OUTPATIENT
Start: 2023-11-11 | End: 2023-11-11

## 2023-11-11 RX ORDER — MAGNESIUM SULFATE 500 MG/ML
1 VIAL (ML) INJECTION ONCE
Refills: 0 | Status: COMPLETED | OUTPATIENT
Start: 2023-11-11 | End: 2023-11-11

## 2023-11-11 RX ORDER — NYSTATIN 500MM UNIT
500000 POWDER (EA) MISCELLANEOUS
Refills: 0 | Status: DISCONTINUED | OUTPATIENT
Start: 2023-11-11 | End: 2023-11-11

## 2023-11-11 RX ORDER — FLUCONAZOLE 150 MG/1
TABLET ORAL
Refills: 0 | Status: DISCONTINUED | OUTPATIENT
Start: 2023-11-11 | End: 2023-11-13

## 2023-11-11 RX ORDER — HYDROCORTISONE 1 %
1 OINTMENT (GRAM) TOPICAL
Refills: 0 | Status: DISCONTINUED | OUTPATIENT
Start: 2023-11-11 | End: 2023-11-13

## 2023-11-11 RX ORDER — ENOXAPARIN SODIUM 100 MG/ML
40 INJECTION SUBCUTANEOUS EVERY 24 HOURS
Refills: 0 | Status: DISCONTINUED | OUTPATIENT
Start: 2023-11-11 | End: 2023-11-15

## 2023-11-11 RX ORDER — ONDANSETRON 8 MG/1
4 TABLET, FILM COATED ORAL EVERY 8 HOURS
Refills: 0 | Status: DISCONTINUED | OUTPATIENT
Start: 2023-11-11 | End: 2023-11-15

## 2023-11-11 RX ADMIN — DOLUTEGRAVIR SODIUM 50 MILLIGRAM(S): 25 TABLET, FILM COATED ORAL at 12:56

## 2023-11-11 RX ADMIN — Medication 1 APPLICATION(S): at 18:20

## 2023-11-11 RX ADMIN — Medication 40 MILLIEQUIVALENT(S): at 03:56

## 2023-11-11 RX ADMIN — BUDESONIDE AND FORMOTEROL FUMARATE DIHYDRATE 2 PUFF(S): 160; 4.5 AEROSOL RESPIRATORY (INHALATION) at 22:00

## 2023-11-11 RX ADMIN — ENOXAPARIN SODIUM 40 MILLIGRAM(S): 100 INJECTION SUBCUTANEOUS at 05:56

## 2023-11-11 RX ADMIN — FLUCONAZOLE 100 MILLIGRAM(S): 150 TABLET ORAL at 07:09

## 2023-11-11 RX ADMIN — EMTRICITABINE AND TENOFOVIR DISOPROXIL FUMARATE 1 TABLET(S): 200; 300 TABLET, FILM COATED ORAL at 12:41

## 2023-11-11 RX ADMIN — BUDESONIDE AND FORMOTEROL FUMARATE DIHYDRATE 2 PUFF(S): 160; 4.5 AEROSOL RESPIRATORY (INHALATION) at 09:18

## 2023-11-11 RX ADMIN — Medication 1 TABLET(S): at 05:55

## 2023-11-11 RX ADMIN — ESCITALOPRAM OXALATE 10 MILLIGRAM(S): 10 TABLET, FILM COATED ORAL at 12:40

## 2023-11-11 RX ADMIN — Medication 1 GRAM(S): at 05:47

## 2023-11-11 RX ADMIN — Medication 100 GRAM(S): at 03:56

## 2023-11-11 RX ADMIN — SODIUM CHLORIDE 1000 MILLILITER(S): 9 INJECTION INTRAMUSCULAR; INTRAVENOUS; SUBCUTANEOUS at 03:56

## 2023-11-11 RX ADMIN — Medication 40 MILLIEQUIVALENT(S): at 12:56

## 2023-11-11 RX ADMIN — Medication 25 GRAM(S): at 12:41

## 2023-11-11 RX ADMIN — Medication 500000 UNIT(S): at 06:02

## 2023-11-11 RX ADMIN — SODIUM CHLORIDE 1000 MILLILITER(S): 9 INJECTION INTRAMUSCULAR; INTRAVENOUS; SUBCUTANEOUS at 05:47

## 2023-11-11 RX ADMIN — SODIUM CHLORIDE 666.67 MILLILITER(S): 9 INJECTION INTRAMUSCULAR; INTRAVENOUS; SUBCUTANEOUS at 06:06

## 2023-11-11 NOTE — ED PROVIDER NOTE - CLINICAL SUMMARY MEDICAL DECISION MAKING FREE TEXT BOX
53 year old male with history of HIV, not on meds for 3 years, presenting with diffuse rash and sore throat, overall in setting of poor outpatient follow up/social barrier to care/vulnerable demographic, CD4 count likely low give presence of thrush, also concerning for possible co-infection with ?syphillis given diffuse rash although it spares palms/soles/genitals--will plan for labwork, cxr, ua, and admission for f/u of counts/viral load and establishment of care

## 2023-11-11 NOTE — ED PROVIDER NOTE - PHYSICAL EXAMINATION
Gen - Nontoxic but chronically ill appearing/frail male; AOx3  HEENT - NCAT, EOMI, moist mucous membranes, +white residue over posterior tongue  Neck - supple  Resp - CTAB, no increased WOB/tachypneia  CV -  RRR, no m/r/g  Abd - soft, NT, ND; no guarding or rebound  Back - no CVA tenderness  MSK - FROM of b/l UE and LE, no gross deformities  Extrem - no LE edema  Neuro - no focal motor or sensation deficits  Skin - warm, well perfused; maculopapular somewhat xerotic/scaly appearing lesions and patches over b/l forearms and face, no mucosal involvement, negative Nikolsky's, spares palms

## 2023-11-11 NOTE — H&P ADULT - ATTENDING COMMENTS
A/P: 53 year old male with pmhx of HIV, not on meds for 3 years, HTN, Depression, Asthma, and CVA 5-6 years, ago worked up at [facility], p/w  diffuse rash, sore throat w/ new onset hoarse voice, diarrhea, and urinary frequency in the setting of unmanaged HIV and possible opportunistic infections.    #HIV  #Candida esophagitis   - ID consulted   -Will continue Fluconazole and Bactrim   -Will f/u CD4 count and viral load     #Facial rash   -Pt with facial rash; Pt stated that he has hx of psoriasis  - Dermatology consult in the AM   -Will start Hydrocortisone cream 1%  BID     #Questionable insect bites on b/l arm   -Will continue contact precaution  -Will start Hydrocortisone cream 1%  BID     #Diarrhea  -Pt has not had any further episodes of diarrhea since this morning     #Hypokalemia  #Hypomagnesemia   - Will monitor electrolytes and replete prn     #Depression   #HTN   -Pt has been not been taking his antihypertensive medications for several years  - Will f/u BP and restart antihypertensive if required     #DISPO  - BO for assistance with dispo once medically stable for discharge . A/P: 53 year old male with pmhx of HIV, not on meds for 3 years, HTN, Depression, Asthma, and CVA 5-6 years, ago worked up at [facility], p/w  diffuse rash, sore throat w/ new onset hoarse voice, diarrhea, and urinary frequency in the setting of unmanaged HIV and possible opportunistic infections.    #HIV  #Thrush/ Candida esophagitis   - ID consulted   -Will continue Fluconazole and Bactrim   -Will f/u CD4 count and viral load     #Facial rash   -Pt with facial rash; Pt stated that he has hx of psoriasis  - Dermatology consult in the AM   -Will start Hydrocortisone cream 1%  BID     #Questionable insect bites on b/l arm   -Will continue contact precaution  -Will start Hydrocortisone cream 1%  BID     #Diarrhea  -Pt has not had any further episodes of diarrhea since this morning     #Hypokalemia  #Hypomagnesemia   - Will monitor electrolytes and replete prn     #Depression   #HTN   -Pt has been not been taking his antihypertensive medications for several years  - Will f/u BP and restart antihypertensive if required     #DISPO  - BO for assistance with dispo once medically stable for discharge .

## 2023-11-11 NOTE — PROGRESS NOTE ADULT - ASSESSMENT
53 year old male with pmhx of HIV, not on meds for 3 years, HTN, Depression, Asthma, and CVA 5-6 years, ago worked up at [facility], who presents with diffuse rash, sore throat w/ new onset hoarse voice, diarrhea, and urinary frequency in the setting of unmanaged HIV and possible opportunistic infections

## 2023-11-11 NOTE — H&P ADULT - PROBLEM SELECTOR PLAN 9
F: s/p 1L NS, give additional 1L  E: replete as needed  N: DASH    DVT: lovenox F: s/p 1L NS, will give additional 1L  E: replete as needed  N: DASH    DVT: lovenox

## 2023-11-11 NOTE — PROGRESS NOTE ADULT - PROBLEM SELECTOR PLAN 3
Patient presents with chronic diarrhea that comes and goes, last major episode 2 days ago. States bm's remain loose though. Was hospitalized in 08/2022 for enterocolitis with GI PCR+ for shigella, enteroinvasive e coli, giardia, EPEC. Currently not septic, holding off on empiric tx    Plan:  - f/u GI PCR  - f/u C. Diff

## 2023-11-11 NOTE — H&P ADULT - PROBLEM SELECTOR PLAN 8
Previously on Symbicort and albuterol rescue inhaler. Denies any recent exacerbations.    Plan:  - c/w Symbicort  - c/w PRN jesse

## 2023-11-11 NOTE — PROGRESS NOTE ADULT - SUBJECTIVE AND OBJECTIVE BOX
**INCOMPLETE NOTE    OVERNIGHT EVENTS:    SUBJECTIVE:  Patient seen and examined at bedside.    Vital Signs Last 12 Hrs  T(F): 98.8 (11-11-23 @ 06:08), Max: 99.1 (11-11-23 @ 03:59)  HR: 95 (11-11-23 @ 06:08) (95 - 113)  BP: 147/86 (11-11-23 @ 06:08) (147/86 - 161/104)  BP(mean): --  RR: 17 (11-11-23 @ 06:08) (17 - 20)  SpO2: 98% (11-11-23 @ 06:08) (97% - 98%)  I&O's Summary      PHYSICAL EXAM:  Constitutional: NAD, comfortable in bed.  HEENT: NC/AT, PERRLA, EOMI, no conjunctival pallor or scleral icterus, MMM  Neck: Supple, no JVD  Respiratory: CTA B/L. No w/r/r.   Cardiovascular: RRR, normal S1 and S2, no m/r/g.   Gastrointestinal: +BS, soft NTND, no guarding or rebound tenderness, no palpable masses   Extremities: wwp; no cyanosis, clubbing or edema.   Vascular: Pulses equal and strong throughout.   Neurological: AAOx3, no CN deficits, strength and sensation intact throughout.   Skin: No gross skin abnormalities or rashes        LABS:                        11.8   8.35  )-----------( 254      ( 11 Nov 2023 08:04 )             35.6     11-11    141  |  102  |  8   ----------------------------<  102<H>  3.3<L>   |  29  |  0.72    Ca    9.2      11 Nov 2023 08:04  Phos  3.3     11-11  Mg     1.4     11-11    TPro  7.8  /  Alb  3.3  /  TBili  0.2  /  DBili  x   /  AST  32  /  ALT  18  /  AlkPhos  83  11-11    PT/INR - ( 11 Nov 2023 08:04 )   PT: 11.5 sec;   INR: 1.01          PTT - ( 11 Nov 2023 08:04 )  PTT:40.9 sec  Urinalysis Basic - ( 11 Nov 2023 08:04 )    Color: x / Appearance: x / SG: x / pH: x  Gluc: 102 mg/dL / Ketone: x  / Bili: x / Urobili: x   Blood: x / Protein: x / Nitrite: x   Leuk Esterase: x / RBC: x / WBC x   Sq Epi: x / Non Sq Epi: x / Bacteria: x          RADIOLOGY & ADDITIONAL TESTS:    MEDICATIONS  (STANDING):  budesonide  80 MICROgram(s)/formoterol 4.5 MICROgram(s) Inhaler 2 Puff(s) Inhalation two times a day  dolutegravir 50 milliGRAM(s) Oral daily  emtricitabine 200 mG/tenofovir alafenamide 25 mG (DESCOVY) Tablet 1 Tablet(s) Oral daily  enoxaparin Injectable 40 milliGRAM(s) SubCutaneous every 24 hours  escitalopram 10 milliGRAM(s) Oral daily  fluconAZOLE IVPB      trimethoprim  160 mG/sulfamethoxazole 800 mG 1 Tablet(s) Oral daily    MEDICATIONS  (PRN):  acetaminophen     Tablet .. 650 milliGRAM(s) Oral every 6 hours PRN Temp greater or equal to 38C (100.4F), Mild Pain (1 - 3)  albuterol/ipratropium for Nebulization 3 milliLiter(s) Nebulizer every 6 hours PRN Shortness of Breath and/or Wheezing  melatonin 3 milliGRAM(s) Oral at bedtime PRN Insomnia  ondansetron Injectable 4 milliGRAM(s) IV Push every 8 hours PRN Nausea and/or Vomiting   OVERNIGHT EVENTS: no acute events overnight.     SUBJECTIVE:  Patient seen and examined at bedside. Patient endorses no acute complaints, states his diarrhea has improved. Denies chest pain, SOB, n/v.     Vital Signs Last 12 Hrs  T(F): 98.8 (11-11-23 @ 06:08), Max: 99.1 (11-11-23 @ 03:59)  HR: 95 (11-11-23 @ 06:08) (95 - 113)  BP: 147/86 (11-11-23 @ 06:08) (147/86 - 161/104)  BP(mean): --  RR: 17 (11-11-23 @ 06:08) (17 - 20)  SpO2: 98% (11-11-23 @ 06:08) (97% - 98%)  I&O's Summary      PHYSICAL EXAM:  PHYSICAL EXAM:  Constitutional: resting in bed, fairly comfortable  HEENT: NC/AT, PER, anicteric sclera, oral thrush, widespread rash/psoriasis on face and eyes  Respiratory: CTA B/L; no W/R/R  Cardiac: +S1/S2; RRR; no M/R/G  Gastrointestinal: soft, NT/ND; no rebound or guarding  Back: spine midline, no CVAT B/L, macular rash on upper back  Extremities: WWP, no clubbing, cyanosis, or peripheral edema  Vascular: 2+ radial  Dermatologic: skin warm, dry and intact; folliculitis on arms and areas of chest  Neurologic: AAOx3; CNII-XII grossly intact; no focal deficits        LABS:                        11.8   8.35  )-----------( 254      ( 11 Nov 2023 08:04 )             35.6     11-11    141  |  102  |  8   ----------------------------<  102<H>  3.3<L>   |  29  |  0.72    Ca    9.2      11 Nov 2023 08:04  Phos  3.3     11-11  Mg     1.4     11-11    TPro  7.8  /  Alb  3.3  /  TBili  0.2  /  DBili  x   /  AST  32  /  ALT  18  /  AlkPhos  83  11-11    PT/INR - ( 11 Nov 2023 08:04 )   PT: 11.5 sec;   INR: 1.01          PTT - ( 11 Nov 2023 08:04 )  PTT:40.9 sec  Urinalysis Basic - ( 11 Nov 2023 08:04 )    Color: x / Appearance: x / SG: x / pH: x  Gluc: 102 mg/dL / Ketone: x  / Bili: x / Urobili: x   Blood: x / Protein: x / Nitrite: x   Leuk Esterase: x / RBC: x / WBC x   Sq Epi: x / Non Sq Epi: x / Bacteria: x          RADIOLOGY & ADDITIONAL TESTS:    MEDICATIONS  (STANDING):  budesonide  80 MICROgram(s)/formoterol 4.5 MICROgram(s) Inhaler 2 Puff(s) Inhalation two times a day  dolutegravir 50 milliGRAM(s) Oral daily  emtricitabine 200 mG/tenofovir alafenamide 25 mG (DESCOVY) Tablet 1 Tablet(s) Oral daily  enoxaparin Injectable 40 milliGRAM(s) SubCutaneous every 24 hours  escitalopram 10 milliGRAM(s) Oral daily  fluconAZOLE IVPB      trimethoprim  160 mG/sulfamethoxazole 800 mG 1 Tablet(s) Oral daily    MEDICATIONS  (PRN):  acetaminophen     Tablet .. 650 milliGRAM(s) Oral every 6 hours PRN Temp greater or equal to 38C (100.4F), Mild Pain (1 - 3)  albuterol/ipratropium for Nebulization 3 milliLiter(s) Nebulizer every 6 hours PRN Shortness of Breath and/or Wheezing  melatonin 3 milliGRAM(s) Oral at bedtime PRN Insomnia  ondansetron Injectable 4 milliGRAM(s) IV Push every 8 hours PRN Nausea and/or Vomiting

## 2023-11-11 NOTE — ED PROVIDER NOTE - OBJECTIVE STATEMENT
53 year old male with history of HIV, not on meds for 3 years, presenting with diffuse rash and sore throat. States that he has not taken meds for HIV since COVID, unable to follow up outpatient as his doctor retired and he has been battling depression as well, feels like his "counts are low" and has developed diffuse rash over b/l arms/face, does also have hx of psoriasis. Spares palms/soles, denies genital lesions. No fevers or chills but overall feels weak. Reports that he has oral thrush, voice is hoarse. Feels that he needs help with getting back on track with HIV management.

## 2023-11-11 NOTE — ED ADULT NURSE NOTE - CAS TRG GEN SKIN COLOR
Department of Anesthesiology  Postprocedure Note    Patient: Avelina Chun  MRN: [id number]  YOB: 1955  Date of evaluation: 4/5/2021  Time:  11:41 AM     Procedure Summary     Date: 04/05/21 Room / Location: [facility] OR 03 / [facility]    Anesthesia Start:  Anesthesia Stop:     Procedure: CATHETER INSERTION HEMODIALYSIS (N/A ) Diagnosis: (renal failure)    Surgeons: Ta Mobley MD Responsible Provider:     Anesthesia Type: MAC ASA Status: 4          Anesthesia Type: MAC    Brock Phase I: Brock Score: 10    Brock Phase II:      Last vitals: Reviewed and per EMR flowsheets.        Anesthesia Post Evaluation    Patient location during evaluation: PACU  Patient participation: complete - patient participated  Level of consciousness: awake  Pain score: 3  Airway patency: patent  Nausea & Vomiting: no nausea and no vomiting  Complications: no  Cardiovascular status: blood pressure returned to baseline  Respiratory status: acceptable  Hydration status: euvolemic Normal for race

## 2023-11-11 NOTE — H&P ADULT - PROBLEM SELECTOR PLAN 5
Previously on lexapro 10 mg but has not taken in a while. Endorses depression led to him not taking his medications after his  passed. Requesting psych consult.    Plan:  - c/w lexapro  - Psych consult in AM

## 2023-11-11 NOTE — H&P ADULT - PROBLEM SELECTOR PLAN 3
Patient presents with chronic diarrhea that comes and goes. Was hospitalized in 08/2022 for enterocolitis with GI PCR+ for shigella, enteroinvasive e coli, giardia, EPEC. Currently not septic, holding off on empiric tx    Plan:  - f/u GI PCR Patient presents with chronic diarrhea that comes and goes, last major episode 2 days ago. States bm's remain loose though. Was hospitalized in 08/2022 for enterocolitis with GI PCR+ for shigella, enteroinvasive e coli, giardia, EPEC. Currently not septic, holding off on empiric tx    Plan:  - f/u GI PCR

## 2023-11-11 NOTE — PROGRESS NOTE ADULT - PROBLEM SELECTOR PLAN 9
F: s/p 1L NS, will give additional 1L  E: replete as needed  N: DASH  DVT: lovenox  Dispo: [facility]

## 2023-11-11 NOTE — H&P ADULT - NSHPREVIEWOFSYSTEMS_GEN_ALL_CORE
Patient endorses fatigue, rash, hoarse voice, diarrhea, and urinary frequency. Denies fever/chills, chest pain, dyspnea, abdominal pain, dysuria.

## 2023-11-11 NOTE — H&P ADULT - PROBLEM SELECTOR PLAN 1
Patient w/ HIV/AIDS, previously followed with Dr. Pacheco outpatient, who presents with rash, diarrhea, fatigue, and oral thrush. States noncompliance with HIV meds for 3 years in the setting of worsening depression. Prev on Discovy and Tivicay.    Plan:  - c/w previous meds  - f/u VL and CD4  - start bactrim for prophylaxis Patient w/ HIV/AIDS, previously followed with Dr. Pacheco outpatient, who presents with rash, diarrhea, fatigue, and oral thrush. States noncompliance with HIV meds for 3 years in the setting of worsening depression. Prev on Discovy and Tivicay. Neg Kernig and Brudzinski signs on exam    Plan:  - c/w previous meds  - f/u VL and CD4  - start bactrim for prophylaxis Patient w/ HIV/AIDS, previously followed with Dr. Pacheco outpatient, who presents with rash, diarrhea, fatigue, and oral thrush. States noncompliance with HIV meds for 3 years in the setting of worsening depression. Prev on Descovy and Tivicay. Neg Kernig and Brudzinski signs on exam    Plan:  - c/w previous meds  - f/u VL and CD4  - start bactrim for prophylaxis Patient w/ HIV/AIDS, previously followed with Dr. Pacheco outpatient, who presents with rash, diarrhea, fatigue, and oral thrush. States noncompliance with HIV meds for 3 years in the setting of worsening depression. Prev on Descovy and Tivicay. Neg Kernig and Brudzinski signs on exam    Plan:  - c/w previous meds  - f/u VL and CD4  - start bactrim for prophylaxis  - ID consult in AM

## 2023-11-11 NOTE — H&P ADULT - PROBLEM SELECTOR PLAN 7
Previously told he was hypertensive but never started any medications that he can recall. Presents with elevated BP of 161/104.     Plan:  - consider starting losartan 25mg if pt remains hypertensive

## 2023-11-11 NOTE — H&P ADULT - NSHPPHYSICALEXAM_GEN_ALL_CORE
PHYSICAL EXAM:  Constitutional: resting in bed, fairly comfortable  HEENT: NC/AT, PER, anicteric sclera, oral thrush, widespread rash/psoriasis on face and eyes  Respiratory: CTA B/L; no W/R/R  Cardiac: +S1/S2; RRR; no M/R/G  Gastrointestinal: soft, NT/ND; no rebound or guarding  Back: spine midline, no CVAT B/L, macular rash on upper back  Extremities: WWP, no clubbing, cyanosis, or peripheral edema  Vascular: 2+ radial  Dermatologic: skin warm, dry and intact; folliculitis on arms and areas of chest  Neurologic: AAOx3; CNII-XII grossly intact; no focal deficits

## 2023-11-11 NOTE — PROGRESS NOTE ADULT - PROBLEM SELECTOR PLAN 2
Patient presents with 2 different appearing rashes for the past 2 weeks. Rash on the face appears to be exacerbation of psoriasis in setting of HIV. Also has follicular rash on UE and chest. All possibly 2/2 unmanaged HIV/AIDS    Plan:  - c/w HAART  - f/u syphilis  - f/u ID recs  - Derm consult in AM   - c/w Hydrocortisone 1% BID

## 2023-11-11 NOTE — H&P ADULT - PROBLEM SELECTOR PLAN 6
Endorses urinary frequency with sensation of incomplete emptying of bladder. Denies any dysuria. UA neg. No known hx of urinary retention or BPH    Plan:  - f/u 1x bladder scan  - f/u A1c

## 2023-11-11 NOTE — H&P ADULT - PROBLEM SELECTOR PLAN 4
Presents with oral thrush and new onset hoarseness of voice. Hx of thrush in the past.     Plan:  - nystatin oral solution Presents with oral thrush and new onset hoarseness of voice. Hx of thrush in the past.     Plan:  - cover for esophogeal candidiasis, started on fluconazole 400mg IV qd

## 2023-11-11 NOTE — H&P ADULT - ASSESSMENT
53 year old male with pmhx of HIV, not on meds for 3 years, HTN, Depression, Asthma, and CVA 5-6 years, ago worked up at [facility], who presents with diffuse rash, sore throat w/ new onset hoarse voice, diarrhea, and urinary frequency in the setting of unmanaged HIV and possible opportunistic infections

## 2023-11-11 NOTE — ED ADULT NURSE NOTE - OBJECTIVE STATEMENT
Patient is a 53yoM presenting to the ED with generalized full bodyaches, cough, and oral pain. Patient is axox3, spont breathing on RA, ambulated well without assistance. Patient with hx of HIV, has not been on meds for over 2 years. Endorsing cough, denies chest pain/sob. Denies fevers/chills.

## 2023-11-11 NOTE — ED ADULT NURSE NOTE - CHPI ED NUR SYMPTOMS POS
WEAKNESS

## 2023-11-11 NOTE — H&P ADULT - PROBLEM SELECTOR PLAN 2
Patient presents with psoriatic appearing rash on face as well as follicular rash on arms and chest. Patient states hx of psoriasis which never went lower than his hairline. Endorses hx of similar follicular rash on extremities, sparing palms.    Plan:  - c/w HAART  - ID consult in AM  - f/u syphilis Patient presents with psoriatic appearing rash on face as well as follicular rash on arms and chest. Patient states hx of psoriasis which never went lower than his hairline. Endorses hx of similar follicular rash on extremities, sparing palms.    Plan:  - c/w HAART  - f/u syphilis  - f/u ID recs Patient presents with chronic diarrhea that comes and goes, last major episode 2 days ago. States bm's remain small and loose though, >5 bm's daily. Was hospitalized in 08/2022 for enterocolitis with GI PCR+ for shigella, enteroinvasive e coli, giardia, EPEC. Currently not septic, holding off on empiric tx    Plan:  - c/w HAART  - f/u syphilis  - f/u ID recs Patient presents with 2 different appearing rashes for the past 2 weeks. Rash on the face appears to be exacerbation of psoriasis in setting of HIV. Also has follicular rash on UE and chest. All possibly 2/2 unmanaged HIV/AIDS    Plan:  - c/w HAART  - f/u syphilis  - f/u ID recs

## 2023-11-11 NOTE — PROGRESS NOTE ADULT - PROBLEM SELECTOR PLAN 4
Presents with oral thrush and new onset hoarseness of voice. Hx of thrush in the past.     Plan:  - cover for esophogeal candidiasis, started on fluconazole 400mg IV qd

## 2023-11-11 NOTE — PROGRESS NOTE ADULT - PROBLEM SELECTOR PLAN 1
Patient w/ HIV/AIDS, previously followed with Dr. Pacheco outpatient, who presents with rash, diarrhea, fatigue, and oral thrush. States noncompliance with HIV meds for 3 years in the setting of worsening depression. Prev on Descovy and Tivicay. Neg Kernig and Brudzinski signs on exam    Plan:  - c/w previous meds  - f/u VL and CD4  - start bactrim for prophylaxis  - ID consulted, will f/u recs

## 2023-11-11 NOTE — H&P ADULT - HISTORY OF PRESENT ILLNESS
Pt is a 53 year old male with pmhx of HIV who has not been on meds for 3 years, presenting with diffuse rash and sore throat. States that he has not taken meds for HIV since COVID, unable to follow up outpatient as his doctor retired and he has been battling depression as well, feels like his "counts are low" and has developed diffuse rash over b/l arms/face, does also have hx of psoriasis. Spares palms/soles, denies genital lesions. No fevers or chills but overall feels weak. Reports that he has oral thrush, voice is hoarse. Feels that he needs help with getting back on track with HIV management. Pt is a 53 year old male with pmhx of HIV, not on meds for 3 years, HTN, Depression, Asthma, and CVA 5-6 years ago, worked up at [facility], who presents with diffuse rash, sore throat w/ new onset hoarse voice, diarrhea, and urinary frequency. Patient states he has been dealing with significant depression for the past few years since his  . For about 3 years now, has not taken any medications for any of his medical issues and has not had regular follow up with PCP. Of note, patient previously followed with Dr. Pacheco in clinic but since she left has not seen anyone. Patient states he developed psoriatic rash on his face fr the first time about 2 weeks ago. Prior to that, had never had a rash below his hairline. Patient also presents with, what appears to be, folliculitis on his arms sparing the palms. Patient also endorses oral thrush which he feels is also extending to his throat. Today he woke up and had a hoarse voice and worsening weakness so he decided to come in. Patient also endorsing diarrhea which he states is chronic, comes and goes. Denies any pain with defecation, melena, or hematochezia Lastly, patient has also been experiencing urinary frequency where he feels he does not empty his bladder completely. Denies any dysuria, no known history of BPH. Otherwise patient denies genital lesions, fevers/chills, chest pain, dyspnea, abdominal pain, dysuria.    In the ED, vitals: Afebrile, -->102, /104, RR 20 @ 97% on RA  Labs: WBC 8.55, Hgb 12.4, K 3.1, Mg 1.2, UA neg    s/p 1L NS, 1g Mg, 40meq PO K   Pt is a 53 year old male with pmhx of HIV, not on meds for 3 years, HTN, Depression, Asthma, and CVA 5-6 years ago, worked up at [facility], who presents with diffuse rash, sore throat w/ new onset hoarse voice, diarrhea, and urinary frequency. Patient states he has been dealing with significant depression for the past few years since his  . For about 3 years now, has not taken any medications for any of his medical issues and has not had regular follow up with PCP. Of note, patient previously followed with Dr. Pacheco in clinic but since she left has not seen anyone. Patient states he developed psoriatic rash on his face for the first time about 2 weeks ago. Prior to that, had never had a rash below his hairline. Patient also presents with, what appears to be, folliculitis on his arms sparing the palms. Patient also endorses oral thrush which he feels is also extending to his throat. Today he woke up and had a hoarse voice and worsening weakness so he decided to come in. Patient also endorsing diarrhea which he states is chronic, comes and goes. Denies any pain with defecation, melena, or hematochezia Lastly, patient has also been experiencing urinary frequency where he feels he does not empty his bladder completely. Denies any dysuria, no known history of BPH. Otherwise patient denies genital lesions, fevers/chills, chest pain, dyspnea, abdominal pain, dysuria.    In the ED, vitals: Afebrile, -->102, /104, RR 20 @ 97% on RA  Labs: WBC 8.55, Hgb 12.4, K 3.1, Mg 1.2, UA neg    s/p 1L NS, 1g Mg, 40meq PO K   Pt is a 53 year old male with pmhx of HIV, not on meds for 3 years, HTN, Depression, Asthma, and CVA 5-6 years ago, worked up at [facility], who presents with diffuse rash, sore throat w/ new onset hoarse voice, diarrhea, and urinary frequency. Patient states he has been dealing with significant depression for the past few years since his  . For about 3 years now, has not taken any medications for any of his medical issues and has not had regular follow up with PCP. Of note, patient previously followed with Dr. Pacheco in clinic but since she left has not seen anyone. Patient states he developed psoriatic rash on his face for the first time about 2 weeks ago. Prior to that, had never had a rash below his hairline. Patient also presents with, what appears to be, folliculitis on his arms sparing the palms. Patient also endorses oral thrush which he feels is also extending to his throat. Today he woke up and had a hoarse voice and worsening weakness so he decided to come in. Patient also endorsing diarrhea which he states is chronic, comes and goes. Denies any pain with defecation, melena, or hematochezia. States he has over 5 bm's a day, small amount of loose stool. Last episode of pure watery stool was 2 days ago. Lastly, patient has also been experiencing urinary frequency where he feels he does not empty his bladder completely. Denies any dysuria, no known history of BPH. Otherwise patient denies genital lesions, fevers/chills, chest pain, dyspnea, abdominal pain, dysuria.    In the ED, vitals: Afebrile, -->102, /104, RR 20 @ 97% on RA  Labs: WBC 8.55, Hgb 12.4, K 3.1, Mg 1.2, UA neg    s/p 1L NS, 1g Mg, 40meq PO K

## 2023-11-11 NOTE — H&P ADULT - NSHPLABSRESULTS_GEN_ALL_CORE
LABS:                        12.4   8.55  )-----------( 255      ( 2023 02:17 )             36.5         139  |  101  |  7   ----------------------------<  106<H>  3.1<L>   |  26  |  0.78    Ca    9.5      2023 02:17  Phos  3.0       Mg     1.2         TPro  8.0  /  Alb  3.5  /  TBili  0.2  /  DBili  x   /  AST  35  /  ALT  21  /  AlkPhos  86      LIVER FUNCTIONS - ( 2023 02:17 )  Alb: 3.5 g/dL / Pro: 8.0 g/dL / ALK PHOS: 86 U/L / ALT: 21 U/L / AST: 35 U/L / GGT: x             Urinalysis Basic - ( 2023 02:17 )    Color: Yellow / Appearance: Clear / S.010 / pH: x  Gluc: 106 mg/dL / Ketone: Negative mg/dL  / Bili: Negative / Urobili: 0.2 mg/dL   Blood: x / Protein: Negative mg/dL / Nitrite: Negative   Leuk Esterase: Negative / RBC: x / WBC x   Sq Epi: x / Non Sq Epi: x / Bacteria: x      CAPILLARY BLOOD GLUCOSE          Urinalysis with Rflx Culture (collected 2023 02:17)          RADIOLOGY & ADDITIONAL TESTS: Reviewed.
26-Jun-2022 18:30

## 2023-11-12 LAB
ALBUMIN SERPL ELPH-MCNC: 3.1 G/DL — LOW (ref 3.3–5)
ALBUMIN SERPL ELPH-MCNC: 3.1 G/DL — LOW (ref 3.3–5)
ALP SERPL-CCNC: 76 U/L — SIGNIFICANT CHANGE UP (ref 40–120)
ALP SERPL-CCNC: 76 U/L — SIGNIFICANT CHANGE UP (ref 40–120)
ALT FLD-CCNC: 16 U/L — SIGNIFICANT CHANGE UP (ref 10–45)
ALT FLD-CCNC: 16 U/L — SIGNIFICANT CHANGE UP (ref 10–45)
ANION GAP SERPL CALC-SCNC: 12 MMOL/L — SIGNIFICANT CHANGE UP (ref 5–17)
ANION GAP SERPL CALC-SCNC: 12 MMOL/L — SIGNIFICANT CHANGE UP (ref 5–17)
AST SERPL-CCNC: 25 U/L — SIGNIFICANT CHANGE UP (ref 10–40)
AST SERPL-CCNC: 25 U/L — SIGNIFICANT CHANGE UP (ref 10–40)
BASOPHILS # BLD AUTO: 0.04 K/UL — SIGNIFICANT CHANGE UP (ref 0–0.2)
BASOPHILS # BLD AUTO: 0.04 K/UL — SIGNIFICANT CHANGE UP (ref 0–0.2)
BASOPHILS NFR BLD AUTO: 0.6 % — SIGNIFICANT CHANGE UP (ref 0–2)
BASOPHILS NFR BLD AUTO: 0.6 % — SIGNIFICANT CHANGE UP (ref 0–2)
BILIRUB SERPL-MCNC: 0.2 MG/DL — SIGNIFICANT CHANGE UP (ref 0.2–1.2)
BILIRUB SERPL-MCNC: 0.2 MG/DL — SIGNIFICANT CHANGE UP (ref 0.2–1.2)
BUN SERPL-MCNC: 10 MG/DL — SIGNIFICANT CHANGE UP (ref 7–23)
BUN SERPL-MCNC: 10 MG/DL — SIGNIFICANT CHANGE UP (ref 7–23)
CALCIUM SERPL-MCNC: 8.5 MG/DL — SIGNIFICANT CHANGE UP (ref 8.4–10.5)
CALCIUM SERPL-MCNC: 8.5 MG/DL — SIGNIFICANT CHANGE UP (ref 8.4–10.5)
CHLORIDE SERPL-SCNC: 104 MMOL/L — SIGNIFICANT CHANGE UP (ref 96–108)
CHLORIDE SERPL-SCNC: 104 MMOL/L — SIGNIFICANT CHANGE UP (ref 96–108)
CO2 SERPL-SCNC: 23 MMOL/L — SIGNIFICANT CHANGE UP (ref 22–31)
CO2 SERPL-SCNC: 23 MMOL/L — SIGNIFICANT CHANGE UP (ref 22–31)
CREAT SERPL-MCNC: 0.7 MG/DL — SIGNIFICANT CHANGE UP (ref 0.5–1.3)
CREAT SERPL-MCNC: 0.7 MG/DL — SIGNIFICANT CHANGE UP (ref 0.5–1.3)
EGFR: 111 ML/MIN/1.73M2 — SIGNIFICANT CHANGE UP
EGFR: 111 ML/MIN/1.73M2 — SIGNIFICANT CHANGE UP
EOSINOPHIL # BLD AUTO: 0.39 K/UL — SIGNIFICANT CHANGE UP (ref 0–0.5)
EOSINOPHIL # BLD AUTO: 0.39 K/UL — SIGNIFICANT CHANGE UP (ref 0–0.5)
EOSINOPHIL NFR BLD AUTO: 5.4 % — SIGNIFICANT CHANGE UP (ref 0–6)
EOSINOPHIL NFR BLD AUTO: 5.4 % — SIGNIFICANT CHANGE UP (ref 0–6)
GLUCOSE SERPL-MCNC: 86 MG/DL — SIGNIFICANT CHANGE UP (ref 70–99)
GLUCOSE SERPL-MCNC: 86 MG/DL — SIGNIFICANT CHANGE UP (ref 70–99)
HCT VFR BLD CALC: 35.4 % — LOW (ref 39–50)
HCT VFR BLD CALC: 35.4 % — LOW (ref 39–50)
HCV AB S/CO SERPL IA: 132.7 S/CO — HIGH
HCV AB S/CO SERPL IA: 132.7 S/CO — HIGH
HCV AB SERPL-IMP: REACTIVE
HCV AB SERPL-IMP: REACTIVE
HGB BLD-MCNC: 11.4 G/DL — LOW (ref 13–17)
HGB BLD-MCNC: 11.4 G/DL — LOW (ref 13–17)
IMM GRANULOCYTES NFR BLD AUTO: 1 % — HIGH (ref 0–0.9)
IMM GRANULOCYTES NFR BLD AUTO: 1 % — HIGH (ref 0–0.9)
LYMPHOCYTES # BLD AUTO: 1.35 K/UL — SIGNIFICANT CHANGE UP (ref 1–3.3)
LYMPHOCYTES # BLD AUTO: 1.35 K/UL — SIGNIFICANT CHANGE UP (ref 1–3.3)
LYMPHOCYTES # BLD AUTO: 18.8 % — SIGNIFICANT CHANGE UP (ref 13–44)
LYMPHOCYTES # BLD AUTO: 18.8 % — SIGNIFICANT CHANGE UP (ref 13–44)
MAGNESIUM SERPL-MCNC: 1.3 MG/DL — LOW (ref 1.6–2.6)
MAGNESIUM SERPL-MCNC: 1.3 MG/DL — LOW (ref 1.6–2.6)
MCHC RBC-ENTMCNC: 27.9 PG — SIGNIFICANT CHANGE UP (ref 27–34)
MCHC RBC-ENTMCNC: 27.9 PG — SIGNIFICANT CHANGE UP (ref 27–34)
MCHC RBC-ENTMCNC: 32.2 GM/DL — SIGNIFICANT CHANGE UP (ref 32–36)
MCHC RBC-ENTMCNC: 32.2 GM/DL — SIGNIFICANT CHANGE UP (ref 32–36)
MCV RBC AUTO: 86.8 FL — SIGNIFICANT CHANGE UP (ref 80–100)
MCV RBC AUTO: 86.8 FL — SIGNIFICANT CHANGE UP (ref 80–100)
MONOCYTES # BLD AUTO: 0.73 K/UL — SIGNIFICANT CHANGE UP (ref 0–0.9)
MONOCYTES # BLD AUTO: 0.73 K/UL — SIGNIFICANT CHANGE UP (ref 0–0.9)
MONOCYTES NFR BLD AUTO: 10.1 % — SIGNIFICANT CHANGE UP (ref 2–14)
MONOCYTES NFR BLD AUTO: 10.1 % — SIGNIFICANT CHANGE UP (ref 2–14)
NEUTROPHILS # BLD AUTO: 4.62 K/UL — SIGNIFICANT CHANGE UP (ref 1.8–7.4)
NEUTROPHILS # BLD AUTO: 4.62 K/UL — SIGNIFICANT CHANGE UP (ref 1.8–7.4)
NEUTROPHILS NFR BLD AUTO: 64.1 % — SIGNIFICANT CHANGE UP (ref 43–77)
NEUTROPHILS NFR BLD AUTO: 64.1 % — SIGNIFICANT CHANGE UP (ref 43–77)
NRBC # BLD: 0 /100 WBCS — SIGNIFICANT CHANGE UP (ref 0–0)
NRBC # BLD: 0 /100 WBCS — SIGNIFICANT CHANGE UP (ref 0–0)
PHOSPHATE SERPL-MCNC: 3.1 MG/DL — SIGNIFICANT CHANGE UP (ref 2.5–4.5)
PHOSPHATE SERPL-MCNC: 3.1 MG/DL — SIGNIFICANT CHANGE UP (ref 2.5–4.5)
PLATELET # BLD AUTO: 238 K/UL — SIGNIFICANT CHANGE UP (ref 150–400)
PLATELET # BLD AUTO: 238 K/UL — SIGNIFICANT CHANGE UP (ref 150–400)
POTASSIUM SERPL-MCNC: 3.6 MMOL/L — SIGNIFICANT CHANGE UP (ref 3.5–5.3)
POTASSIUM SERPL-MCNC: 3.6 MMOL/L — SIGNIFICANT CHANGE UP (ref 3.5–5.3)
POTASSIUM SERPL-SCNC: 3.6 MMOL/L — SIGNIFICANT CHANGE UP (ref 3.5–5.3)
POTASSIUM SERPL-SCNC: 3.6 MMOL/L — SIGNIFICANT CHANGE UP (ref 3.5–5.3)
PROT SERPL-MCNC: 7.2 G/DL — SIGNIFICANT CHANGE UP (ref 6–8.3)
PROT SERPL-MCNC: 7.2 G/DL — SIGNIFICANT CHANGE UP (ref 6–8.3)
RBC # BLD: 4.08 M/UL — LOW (ref 4.2–5.8)
RBC # BLD: 4.08 M/UL — LOW (ref 4.2–5.8)
RBC # FLD: 13.9 % — SIGNIFICANT CHANGE UP (ref 10.3–14.5)
RBC # FLD: 13.9 % — SIGNIFICANT CHANGE UP (ref 10.3–14.5)
SODIUM SERPL-SCNC: 139 MMOL/L — SIGNIFICANT CHANGE UP (ref 135–145)
SODIUM SERPL-SCNC: 139 MMOL/L — SIGNIFICANT CHANGE UP (ref 135–145)
WBC # BLD: 7.2 K/UL — SIGNIFICANT CHANGE UP (ref 3.8–10.5)
WBC # BLD: 7.2 K/UL — SIGNIFICANT CHANGE UP (ref 3.8–10.5)
WBC # FLD AUTO: 7.2 K/UL — SIGNIFICANT CHANGE UP (ref 3.8–10.5)
WBC # FLD AUTO: 7.2 K/UL — SIGNIFICANT CHANGE UP (ref 3.8–10.5)

## 2023-11-12 PROCEDURE — 99232 SBSQ HOSP IP/OBS MODERATE 35: CPT

## 2023-11-12 PROCEDURE — 99233 SBSQ HOSP IP/OBS HIGH 50: CPT

## 2023-11-12 RX ORDER — POTASSIUM CHLORIDE 20 MEQ
40 PACKET (EA) ORAL ONCE
Refills: 0 | Status: COMPLETED | OUTPATIENT
Start: 2023-11-12 | End: 2023-11-12

## 2023-11-12 RX ORDER — MAGNESIUM SULFATE 500 MG/ML
2 VIAL (ML) INJECTION ONCE
Refills: 0 | Status: COMPLETED | OUTPATIENT
Start: 2023-11-12 | End: 2023-11-12

## 2023-11-12 RX ORDER — CEFTRIAXONE 500 MG/1
2000 INJECTION, POWDER, FOR SOLUTION INTRAMUSCULAR; INTRAVENOUS ONCE
Refills: 0 | Status: COMPLETED | OUTPATIENT
Start: 2023-11-12 | End: 2023-11-12

## 2023-11-12 RX ORDER — CEFTRIAXONE 500 MG/1
2000 INJECTION, POWDER, FOR SOLUTION INTRAMUSCULAR; INTRAVENOUS EVERY 24 HOURS
Refills: 0 | Status: DISCONTINUED | OUTPATIENT
Start: 2023-11-13 | End: 2023-11-15

## 2023-11-12 RX ORDER — CEFTRIAXONE 500 MG/1
INJECTION, POWDER, FOR SOLUTION INTRAMUSCULAR; INTRAVENOUS
Refills: 0 | Status: DISCONTINUED | OUTPATIENT
Start: 2023-11-12 | End: 2023-11-15

## 2023-11-12 RX ORDER — DIPHENHYDRAMINE HYDROCHLORIDE AND LIDOCAINE HYDROCHLORIDE AND ALUMINUM HYDROXIDE AND MAGNESIUM HYDRO
10 KIT EVERY 4 HOURS
Refills: 0 | Status: DISCONTINUED | OUTPATIENT
Start: 2023-11-12 | End: 2023-11-15

## 2023-11-12 RX ADMIN — Medication 1 APPLICATION(S): at 18:00

## 2023-11-12 RX ADMIN — FLUCONAZOLE 100 MILLIGRAM(S): 150 TABLET ORAL at 06:24

## 2023-11-12 RX ADMIN — DIPHENHYDRAMINE HYDROCHLORIDE AND LIDOCAINE HYDROCHLORIDE AND ALUMINUM HYDROXIDE AND MAGNESIUM HYDRO 10 MILLILITER(S): KIT at 22:14

## 2023-11-12 RX ADMIN — ESCITALOPRAM OXALATE 10 MILLIGRAM(S): 10 TABLET, FILM COATED ORAL at 10:14

## 2023-11-12 RX ADMIN — Medication 1 TABLET(S): at 10:14

## 2023-11-12 RX ADMIN — BUDESONIDE AND FORMOTEROL FUMARATE DIHYDRATE 2 PUFF(S): 160; 4.5 AEROSOL RESPIRATORY (INHALATION) at 09:17

## 2023-11-12 RX ADMIN — BUDESONIDE AND FORMOTEROL FUMARATE DIHYDRATE 2 PUFF(S): 160; 4.5 AEROSOL RESPIRATORY (INHALATION) at 22:14

## 2023-11-12 RX ADMIN — DIPHENHYDRAMINE HYDROCHLORIDE AND LIDOCAINE HYDROCHLORIDE AND ALUMINUM HYDROXIDE AND MAGNESIUM HYDRO 10 MILLILITER(S): KIT at 18:00

## 2023-11-12 RX ADMIN — Medication 40 MILLIEQUIVALENT(S): at 14:04

## 2023-11-12 RX ADMIN — ENOXAPARIN SODIUM 40 MILLIGRAM(S): 100 INJECTION SUBCUTANEOUS at 06:25

## 2023-11-12 RX ADMIN — Medication 25 GRAM(S): at 14:03

## 2023-11-12 RX ADMIN — CEFTRIAXONE 2000 MILLIGRAM(S): 500 INJECTION, POWDER, FOR SOLUTION INTRAMUSCULAR; INTRAVENOUS at 12:33

## 2023-11-12 RX ADMIN — Medication 1 APPLICATION(S): at 06:26

## 2023-11-12 RX ADMIN — DIPHENHYDRAMINE HYDROCHLORIDE AND LIDOCAINE HYDROCHLORIDE AND ALUMINUM HYDROXIDE AND MAGNESIUM HYDRO 10 MILLILITER(S): KIT at 14:03

## 2023-11-12 NOTE — PROGRESS NOTE ADULT - SUBJECTIVE AND OBJECTIVE BOX
INTERVAL HPI/OVERNIGHT EVENTS:    Patient was seen and examined at bedside.  Still with watery diarrhea.  Throat pain has improved slightly.      CONSTITUTIONAL:  Negative fever or chills, feels well, good appetite  EYES:  Negative  blurry vision or double vision  CARDIOVASCULAR:  Negative for chest pain or palpitations  RESPIRATORY:  Negative for cough, wheezing, or SOB   GASTROINTESTINAL:  Negative for nausea, vomiting, diarrhea, constipation, or abdominal pain  GENITOURINARY:  Negative frequency, urgency or dysuria  NEUROLOGIC:  No headache, confusion, dizziness, lightheadedness      ANTIBIOTICS/RELEVANT:    MEDICATIONS  (STANDING):  budesonide  80 MICROgram(s)/formoterol 4.5 MICROgram(s) Inhaler 2 Puff(s) Inhalation two times a day  cefTRIAXone   IVPB      enoxaparin Injectable 40 milliGRAM(s) SubCutaneous every 24 hours  escitalopram 10 milliGRAM(s) Oral daily  FIRST- Mouthwash  BLM 10 milliLiter(s) Swish and Spit every 4 hours  fluconAZOLE IVPB 400 milliGRAM(s) IV Intermittent every 24 hours  fluconAZOLE IVPB      hydrocortisone 1% Cream 1 Application(s) Topical two times a day    MEDICATIONS  (PRN):  acetaminophen     Tablet .. 650 milliGRAM(s) Oral every 6 hours PRN Temp greater or equal to 38C (100.4F), Mild Pain (1 - 3)  albuterol/ipratropium for Nebulization 3 milliLiter(s) Nebulizer every 6 hours PRN Shortness of Breath and/or Wheezing  melatonin 3 milliGRAM(s) Oral at bedtime PRN Insomnia  ondansetron Injectable 4 milliGRAM(s) IV Push every 8 hours PRN Nausea and/or Vomiting        Vital Signs Last 24 Hrs  T(C): 36.9 (12 Nov 2023 08:23), Max: 37.1 (11 Nov 2023 20:52)  T(F): 98.4 (12 Nov 2023 08:23), Max: 98.7 (11 Nov 2023 20:52)  HR: 94 (12 Nov 2023 08:23) (90 - 103)  BP: 144/87 (12 Nov 2023 08:23) (131/80 - 152/90)  BP(mean): 102 (11 Nov 2023 20:52) (101 - 102)  RR: 15 (12 Nov 2023 08:23) (15 - 18)  SpO2: 96% (12 Nov 2023 08:23) (95% - 97%)    Parameters below as of 12 Nov 2023 08:23  Patient On (Oxygen Delivery Method): room air        PHYSICAL EXAM:  Constitutional: non-toxic, no distress  Eyes:GOSIA, EOMI  Ear/Nose/Throat: no thrush 	  Neck:  supple  Respiratory: CTA lesly  Cardiovascular: S1S2 RRR, no murmurs  Gastrointestinal:soft, (+) BS, no HSM  Extremities:no e/e/c  Vascular: DP Pulse:	right normal; left normal      LABS:                        11.4   7.20  )-----------( 238      ( 12 Nov 2023 10:24 )             35.4     11-12    139  |  104  |  10  ----------------------------<  86  3.6   |  23  |  0.70    Ca    8.5      12 Nov 2023 10:24  Phos  3.1     11-12  Mg     1.3     11-12    TPro  7.2  /  Alb  3.1<L>  /  TBili  0.2  /  DBili  x   /  AST  25  /  ALT  16  /  AlkPhos  76  11-12    PT/INR - ( 11 Nov 2023 08:04 )   PT: 11.5 sec;   INR: 1.01          PTT - ( 11 Nov 2023 08:04 )  PTT:40.9 sec  Urinalysis Basic - ( 12 Nov 2023 10:24 )    Color: x / Appearance: x / SG: x / pH: x  Gluc: 86 mg/dL / Ketone: x  / Bili: x / Urobili: x   Blood: x / Protein: x / Nitrite: x   Leuk Esterase: x / RBC: x / WBC x   Sq Epi: x / Non Sq Epi: x / Bacteria: x        MICROBIOLOGY:    Culture - Reflex Stool (11.11.23 @ 05:18)    Specimen Source: .Stool None   Culture Results:   Culture in progress      GI PCR Panel Stool (11.11.23 @ 05:18)    GI PCR Panel: Detected: GI Panel PCR evaluates for:  Campylobacter, Plesiomonas shigelloides, Salmonella, Vibrio, Yersinia  enterocolitica, Enteroaggregative Escherichia (EAEC), Enteropathogenic E.  coli (EPEC), Enterotoxigenic E. coli (ETEC), Shiga-like toxin producing  E.coli (STEC), E. coli O157, Shigella/Enteroinvasive E. coli (EIEC),  Adenovirus, Astrovirus, Norovirus, Rotavirus, Sapovirus, Cryptosporidium,  Cyclospora cayetanensis, Entamoeba histolytica, Giardia lamblia.  For culture and susceptibility reports refer to "reflex stool culture".   Enteropathogenic E. coli (EPEC): Detected   Shigella/ Enteroinvasive E. coli: Detected: Test results will be confirmed by culture. If Shigella is isolated,  susceptibilities will be performed.      RADIOLOGY & ADDITIONAL STUDIES:

## 2023-11-12 NOTE — PROGRESS NOTE ADULT - SUBJECTIVE AND OBJECTIVE BOX
Patient was seen and examined at bedside. Case discuss with resident. Pt had a form stool however this morning the pt is reporting loose stool.     OBJECTIVE:  Vital Signs Last 24 Hrs  T(C): 36.9 (12 Nov 2023 08:23), Max: 37.1 (11 Nov 2023 20:52)  T(F): 98.4 (12 Nov 2023 08:23), Max: 98.7 (11 Nov 2023 20:52)  HR: 94 (12 Nov 2023 08:23) (90 - 103)  BP: 144/87 (12 Nov 2023 08:23) (131/80 - 152/90)  BP(mean): 102 (11 Nov 2023 20:52) (101 - 102)  RR: 15 (12 Nov 2023 08:23) (15 - 18)  SpO2: 96% (12 Nov 2023 08:23) (95% - 97%)    Parameters below as of 12 Nov 2023 08:23  Patient On (Oxygen Delivery Method): room air          PHYSICAL EXAM:  Gen: NAD laying in bed  Facial rash plaque   CV: RRR, +S1/S2, no mumur  Pulm: CTA b/l no wheezing or crackles   Abd: soft, NTND + BS no rebound or guarding   b/l foream ? folliculitis       LABS:                        11.4   7.20  )-----------( 238      ( 12 Nov 2023 10:24 )             35.4     11-12    139  |  104  |  10  ----------------------------<  86  3.6   |  23  |  0.70    Ca    8.5      12 Nov 2023 10:24  Phos  3.1     11-12  Mg     1.3     11-12    TPro  7.2  /  Alb  3.1<L>  /  TBili  0.2  /  DBili  x   /  AST  25  /  ALT  16  /  AlkPhos  76  11-12    LIVER FUNCTIONS - ( 12 Nov 2023 10:24 )  Alb: 3.1 g/dL / Pro: 7.2 g/dL / ALK PHOS: 76 U/L / ALT: 16 U/L / AST: 25 U/L / GGT: x           PT/INR - ( 11 Nov 2023 08:04 )   PT: 11.5 sec;   INR: 1.01          PTT - ( 11 Nov 2023 08:04 )  PTT:40.9 sec    GI PCR: Shigella and Enteropathogenic E. coli       acetaminophen     Tablet .. 650 milliGRAM(s) Oral every 6 hours PRN  albuterol/ipratropium for Nebulization 3 milliLiter(s) Nebulizer every 6 hours PRN  budesonide  80 MICROgram(s)/formoterol 4.5 MICROgram(s) Inhaler 2 Puff(s) Inhalation two times a day  enoxaparin Injectable 40 milliGRAM(s) SubCutaneous every 24 hours  escitalopram 10 milliGRAM(s) Oral daily  FIRST- Mouthwash  BLM 10 milliLiter(s) Swish and Spit every 4 hours  fluconAZOLE IVPB      fluconAZOLE IVPB 400 milliGRAM(s) IV Intermittent every 24 hours  hydrocortisone 1% Cream 1 Application(s) Topical two times a day  melatonin 3 milliGRAM(s) Oral at bedtime PRN  ondansetron Injectable 4 milliGRAM(s) IV Push every 8 hours PRN                     Patient was seen and examined at bedside. Case discuss with resident. Pt had a form stool however this morning the pt is reporting loose stool.     OBJECTIVE:  Vital Signs Last 24 Hrs  T(C): 36.9 (12 Nov 2023 08:23), Max: 37.1 (11 Nov 2023 20:52)  T(F): 98.4 (12 Nov 2023 08:23), Max: 98.7 (11 Nov 2023 20:52)  HR: 94 (12 Nov 2023 08:23) (90 - 103)  BP: 144/87 (12 Nov 2023 08:23) (131/80 - 152/90)  BP(mean): 102 (11 Nov 2023 20:52) (101 - 102)  RR: 15 (12 Nov 2023 08:23) (15 - 18)  SpO2: 96% (12 Nov 2023 08:23) (95% - 97%)    Parameters below as of 12 Nov 2023 08:23  Patient On (Oxygen Delivery Method): room air          PHYSICAL EXAM:  Gen: NAD laying in bed  Facial rash plaque   CV: RRR, +S1/S2, no mumur  Pulm: CTA b/l no wheezing or crackles   Abd: soft, NTND + BS no rebound or guarding   b/l forearm ? folliculitis       LABS:                        11.4   7.20  )-----------( 238      ( 12 Nov 2023 10:24 )             35.4     11-12    139  |  104  |  10  ----------------------------<  86  3.6   |  23  |  0.70    Ca    8.5      12 Nov 2023 10:24  Phos  3.1     11-12  Mg     1.3     11-12    TPro  7.2  /  Alb  3.1<L>  /  TBili  0.2  /  DBili  x   /  AST  25  /  ALT  16  /  AlkPhos  76  11-12    LIVER FUNCTIONS - ( 12 Nov 2023 10:24 )  Alb: 3.1 g/dL / Pro: 7.2 g/dL / ALK PHOS: 76 U/L / ALT: 16 U/L / AST: 25 U/L / GGT: x           PT/INR - ( 11 Nov 2023 08:04 )   PT: 11.5 sec;   INR: 1.01          PTT - ( 11 Nov 2023 08:04 )  PTT:40.9 sec    GI PCR: Shigella and Enteropathogenic E. coli       acetaminophen     Tablet .. 650 milliGRAM(s) Oral every 6 hours PRN  albuterol/ipratropium for Nebulization 3 milliLiter(s) Nebulizer every 6 hours PRN  budesonide  80 MICROgram(s)/formoterol 4.5 MICROgram(s) Inhaler 2 Puff(s) Inhalation two times a day  enoxaparin Injectable 40 milliGRAM(s) SubCutaneous every 24 hours  escitalopram 10 milliGRAM(s) Oral daily  FIRST- Mouthwash  BLM 10 milliLiter(s) Swish and Spit every 4 hours  fluconAZOLE IVPB      fluconAZOLE IVPB 400 milliGRAM(s) IV Intermittent every 24 hours  hydrocortisone 1% Cream 1 Application(s) Topical two times a day  melatonin 3 milliGRAM(s) Oral at bedtime PRN  ondansetron Injectable 4 milliGRAM(s) IV Push every 8 hours PRN

## 2023-11-12 NOTE — PATIENT PROFILE ADULT - FALL HARM RISK - HARM RISK INTERVENTIONS

## 2023-11-12 NOTE — PHYSICAL THERAPY INITIAL EVALUATION ADULT - PERTINENT HX OF CURRENT PROBLEM, REHAB EVAL
Pt is a 53 year old male with pmhx of HIV, not on meds for 3 years, HTN, Depression, Asthma, and CVA 5-6 years ago, worked up at [facility], who presents with diffuse rash, sore throat w/ new onset hoarse voice, diarrhea, and urinary frequency. Patient states he has been dealing with significant depression for the past few years since his  . For about 3 years now, has not taken any medications for any of his medical issues and has not had regular follow up with PCP. Of note, patient previously followed with Dr. Pacheco in clinic but since she left has not seen anyone. Patient states he developed psoriatic rash on his face for the first time about 2 weeks ago. Prior to that, had never had a rash below his hairline. Patient also presents with, what appears to be, folliculitis on his arms sparing the palms. Patient also endorses oral thrush which he feels is also extending to his throat. Today he woke up and had a hoarse voice and worsening weakness so he decided to come in. Patient also endorsing diarrhea which he states is chronic, comes and goes. Denies any pain with defecation, melena, or hematochezia. States he has over 5 bm's a day, small amount of loose stool. Last episode of pure watery stool was 2 days ago. Lastly, patient has also been experiencing urinary frequency where he feels he does not empty his bladder completely. Denies any dysuria, no known history of BPH. Otherwise patient denies genital lesions, fevers/chills, chest pain, dyspnea, abdominal pain, dysuria.

## 2023-11-12 NOTE — PATIENT PROFILE ADULT - HAVE YOU RECENTLY LOST WEIGHT WITHOUT TRYING?
FIORELLA LAYNE  MRN-[id number]      victor [id number] (RETACRIT) Injectable [id number] Unsure (2)

## 2023-11-12 NOTE — PROGRESS NOTE ADULT - ASSESSMENT
A/P: 53 year old male with pmhx of HIV, not on meds for 3 years, HTN, Depression, Asthma, and CVA 5-6 years, ago worked up at [facility], p/w  diffuse rash, sore throat w/ new onset hoarse voice, diarrhea, and urinary frequency in the setting of unmanaged HIV and possible opportunistic infections.    #HIV  #Candida esophagitis   - ID consult appreciated; Antiretrovirals were held as per IID rec    -Will continue Fluconazole    -Will f/u CD4 count and viral load   -Will f/u serum cryptococcal antigen,  CMV IgG and Toxo IgG and  urine histoplasma antigen   -Will f/u syphilis serology, urine for GC/chlamydia and  throat for GC/chlamydia    #Hepatitis C  - Will Hepatitis C antibody  -Pt is for outpatient f/u     #Facial rash   -Pt with facial rash; Pt stated that he has hx of psoriasis  -Will continue  Hydrocortisone cream 1%  BID     #EIEC  # EPEC/Shigella  -Pt again with loose stool this morning   -Will  start CTX and Flagyl     #Hypokalemia  #Hypomagnesemia   - Will monitor electrolytes and replete prn     #Depression   - Chart reviewed and pt was on Lexapro in the past     #HTN   -Pt with mild elevated BP w/ SBP in 140's   - Will f/u BP and restart antihypertensive if required    #DISPO  - BO for assistance with dispo once medically stable for discharge.   A/P: 53 year old male with pmhx of HIV, not on meds for 3 years, HTN, Depression, Asthma, and CVA 5-6 years, ago worked up at [facility], p/w  diffuse rash, sore throat w/ new onset hoarse voice, diarrhea, and urinary frequency in the setting of unmanaged HIV and possible opportunistic infections.    #HIV  #Candida esophagitis   - ID consult appreciated; Antiretrovirals were held as per IID rec    -Will continue Fluconazole    -Will f/u CD4 count and viral load   -Will f/u serum cryptococcal antigen,  CMV IgG and Toxo IgG and  urine histoplasma antigen   -Will f/u syphilis serology, urine for GC/chlamydia and  throat for GC/chlamydia    #Hepatitis C  - Will Hepatitis C antibody  -Pt is for outpatient f/u     #Facial rash   -Pt with facial rash; Pt stated that he has hx of psoriasis  -Will continue  Hydrocortisone cream 1%  BID     #EIEC  # EPEC/Shigella  -Pt again with loose stool this morning   -Will start CTX     #Hypokalemia  #Hypomagnesemia   - Will monitor electrolytes and replete prn     #Depression   - Chart reviewed and pt was on Lexapro in the past     #HTN   -Pt with mild elevated BP w/ SBP in 140's   - Will f/u BP and restart antihypertensive if required    #DISPO  - SW for assistance with dispo once medically stable for discharge.

## 2023-11-12 NOTE — PROGRESS NOTE ADULT - ASSESSMENT
IMPRESSION:  Uncontrolled HIV, not on ART.  Presence of thrush suggests low CD4 count, placing patient at risk for AIDS-defining illnesses/Opportunistic infections.  Rash on his head is likely due to psoriasis as per patient.  He states the rash on his arms is different.  It could be folliculitis or disseminated disease from an OI.     He also has diarrhea which has a wide differential including many infectious causes    Given throat pain and thrush on exam there is concern for candida esophagitis    Recommend:  1.  Hold Antiretrovirals (need to exclude cryptococcal disease before starting ART)  2.  Can start Ceftriaxone 2 grams IV daily for infectious diarrhea  3.  Continue Fluconazole 400 mg IV daily  4.  Follow up serum cryptococcal antigen  5.  Follow up CMV IgG and Toxo IgG  6.  Check urine histoplasma antigen   7.  Send urine for GC/chlamydia   8.  Send throat swab for GC/chlamydia  9.  Dermatology evaluation for evaluation of rash  10. If CD4 < 200, start Bactrim 1 DS PO daily     ID team 1 will follow     ID team 1 will follow

## 2023-11-13 LAB
4/8 RATIO: 0.47 RATIO — LOW (ref 0.9–3.6)
4/8 RATIO: 0.47 RATIO — LOW (ref 0.9–3.6)
ABS CD8: 789 CELLS/UL — HIGH (ref 142–740)
ABS CD8: 789 CELLS/UL — HIGH (ref 142–740)
ALBUMIN SERPL ELPH-MCNC: 3.4 G/DL — SIGNIFICANT CHANGE UP (ref 3.3–5)
ALBUMIN SERPL ELPH-MCNC: 3.4 G/DL — SIGNIFICANT CHANGE UP (ref 3.3–5)
ALP SERPL-CCNC: 73 U/L — SIGNIFICANT CHANGE UP (ref 40–120)
ALP SERPL-CCNC: 73 U/L — SIGNIFICANT CHANGE UP (ref 40–120)
ALT FLD-CCNC: 17 U/L — SIGNIFICANT CHANGE UP (ref 10–45)
ALT FLD-CCNC: 17 U/L — SIGNIFICANT CHANGE UP (ref 10–45)
ANION GAP SERPL CALC-SCNC: 9 MMOL/L — SIGNIFICANT CHANGE UP (ref 5–17)
ANION GAP SERPL CALC-SCNC: 9 MMOL/L — SIGNIFICANT CHANGE UP (ref 5–17)
AST SERPL-CCNC: 25 U/L — SIGNIFICANT CHANGE UP (ref 10–40)
AST SERPL-CCNC: 25 U/L — SIGNIFICANT CHANGE UP (ref 10–40)
BASOPHILS # BLD AUTO: 0.05 K/UL — SIGNIFICANT CHANGE UP (ref 0–0.2)
BASOPHILS # BLD AUTO: 0.05 K/UL — SIGNIFICANT CHANGE UP (ref 0–0.2)
BASOPHILS NFR BLD AUTO: 0.7 % — SIGNIFICANT CHANGE UP (ref 0–2)
BASOPHILS NFR BLD AUTO: 0.7 % — SIGNIFICANT CHANGE UP (ref 0–2)
BILIRUB SERPL-MCNC: 0.2 MG/DL — SIGNIFICANT CHANGE UP (ref 0.2–1.2)
BILIRUB SERPL-MCNC: 0.2 MG/DL — SIGNIFICANT CHANGE UP (ref 0.2–1.2)
BUN SERPL-MCNC: 9 MG/DL — SIGNIFICANT CHANGE UP (ref 7–23)
BUN SERPL-MCNC: 9 MG/DL — SIGNIFICANT CHANGE UP (ref 7–23)
CALCIUM SERPL-MCNC: 8.8 MG/DL — SIGNIFICANT CHANGE UP (ref 8.4–10.5)
CALCIUM SERPL-MCNC: 8.8 MG/DL — SIGNIFICANT CHANGE UP (ref 8.4–10.5)
CD3 BLASTS SPEC-ACNC: 1179 CELLS/UL — SIGNIFICANT CHANGE UP (ref 672–1870)
CD3 BLASTS SPEC-ACNC: 1179 CELLS/UL — SIGNIFICANT CHANGE UP (ref 672–1870)
CD3 BLASTS SPEC-ACNC: 79 % — SIGNIFICANT CHANGE UP (ref 59–83)
CD3 BLASTS SPEC-ACNC: 79 % — SIGNIFICANT CHANGE UP (ref 59–83)
CD4 %: 25 % — LOW (ref 30–62)
CD4 %: 25 % — LOW (ref 30–62)
CD8 %: 53 % — HIGH (ref 12–36)
CD8 %: 53 % — HIGH (ref 12–36)
CHLORIDE SERPL-SCNC: 104 MMOL/L — SIGNIFICANT CHANGE UP (ref 96–108)
CHLORIDE SERPL-SCNC: 104 MMOL/L — SIGNIFICANT CHANGE UP (ref 96–108)
CMV IGG FLD QL: 6.7 U/ML — HIGH
CMV IGG FLD QL: 6.7 U/ML — HIGH
CMV IGG SERPL-IMP: POSITIVE
CMV IGG SERPL-IMP: POSITIVE
CO2 SERPL-SCNC: 27 MMOL/L — SIGNIFICANT CHANGE UP (ref 22–31)
CO2 SERPL-SCNC: 27 MMOL/L — SIGNIFICANT CHANGE UP (ref 22–31)
CREAT SERPL-MCNC: 0.76 MG/DL — SIGNIFICANT CHANGE UP (ref 0.5–1.3)
CREAT SERPL-MCNC: 0.76 MG/DL — SIGNIFICANT CHANGE UP (ref 0.5–1.3)
EGFR: 108 ML/MIN/1.73M2 — SIGNIFICANT CHANGE UP
EGFR: 108 ML/MIN/1.73M2 — SIGNIFICANT CHANGE UP
EOSINOPHIL # BLD AUTO: 0.28 K/UL — SIGNIFICANT CHANGE UP (ref 0–0.5)
EOSINOPHIL # BLD AUTO: 0.28 K/UL — SIGNIFICANT CHANGE UP (ref 0–0.5)
EOSINOPHIL NFR BLD AUTO: 4.1 % — SIGNIFICANT CHANGE UP (ref 0–6)
EOSINOPHIL NFR BLD AUTO: 4.1 % — SIGNIFICANT CHANGE UP (ref 0–6)
GLUCOSE SERPL-MCNC: 92 MG/DL — SIGNIFICANT CHANGE UP (ref 70–99)
GLUCOSE SERPL-MCNC: 92 MG/DL — SIGNIFICANT CHANGE UP (ref 70–99)
HCT VFR BLD CALC: 34.5 % — LOW (ref 39–50)
HCT VFR BLD CALC: 34.5 % — LOW (ref 39–50)
HCV RNA FLD QL NAA+PROBE: SIGNIFICANT CHANGE UP
HCV RNA FLD QL NAA+PROBE: SIGNIFICANT CHANGE UP
HCV RNA SPEC QL PROBE+SIG AMP: DETECTED
HCV RNA SPEC QL PROBE+SIG AMP: DETECTED
HGB BLD-MCNC: 11.4 G/DL — LOW (ref 13–17)
HGB BLD-MCNC: 11.4 G/DL — LOW (ref 13–17)
HIV-1 VIRAL LOAD RESULT: ABNORMAL
HIV-1 VIRAL LOAD RESULT: ABNORMAL
HIV1 RNA # SERPL NAA+PROBE: SIGNIFICANT CHANGE UP
HIV1 RNA # SERPL NAA+PROBE: SIGNIFICANT CHANGE UP
HIV1 RNA SER-IMP: SIGNIFICANT CHANGE UP
HIV1 RNA SER-IMP: SIGNIFICANT CHANGE UP
HIV1 RNA SERPL NAA+PROBE-ACNC: ABNORMAL
HIV1 RNA SERPL NAA+PROBE-ACNC: ABNORMAL
HIV1 RNA SERPL NAA+PROBE-LOG#: 4.86 — SIGNIFICANT CHANGE UP
HIV1 RNA SERPL NAA+PROBE-LOG#: 4.86 — SIGNIFICANT CHANGE UP
IMM GRANULOCYTES NFR BLD AUTO: 0.6 % — SIGNIFICANT CHANGE UP (ref 0–0.9)
IMM GRANULOCYTES NFR BLD AUTO: 0.6 % — SIGNIFICANT CHANGE UP (ref 0–0.9)
LYMPHOCYTES # BLD AUTO: 1.99 K/UL — SIGNIFICANT CHANGE UP (ref 1–3.3)
LYMPHOCYTES # BLD AUTO: 1.99 K/UL — SIGNIFICANT CHANGE UP (ref 1–3.3)
LYMPHOCYTES # BLD AUTO: 29.5 % — SIGNIFICANT CHANGE UP (ref 13–44)
LYMPHOCYTES # BLD AUTO: 29.5 % — SIGNIFICANT CHANGE UP (ref 13–44)
MAGNESIUM SERPL-MCNC: 1.8 MG/DL — SIGNIFICANT CHANGE UP (ref 1.6–2.6)
MAGNESIUM SERPL-MCNC: 1.8 MG/DL — SIGNIFICANT CHANGE UP (ref 1.6–2.6)
MCHC RBC-ENTMCNC: 28.1 PG — SIGNIFICANT CHANGE UP (ref 27–34)
MCHC RBC-ENTMCNC: 28.1 PG — SIGNIFICANT CHANGE UP (ref 27–34)
MCHC RBC-ENTMCNC: 33 GM/DL — SIGNIFICANT CHANGE UP (ref 32–36)
MCHC RBC-ENTMCNC: 33 GM/DL — SIGNIFICANT CHANGE UP (ref 32–36)
MCV RBC AUTO: 85.2 FL — SIGNIFICANT CHANGE UP (ref 80–100)
MCV RBC AUTO: 85.2 FL — SIGNIFICANT CHANGE UP (ref 80–100)
MONOCYTES # BLD AUTO: 0.8 K/UL — SIGNIFICANT CHANGE UP (ref 0–0.9)
MONOCYTES # BLD AUTO: 0.8 K/UL — SIGNIFICANT CHANGE UP (ref 0–0.9)
MONOCYTES NFR BLD AUTO: 11.9 % — SIGNIFICANT CHANGE UP (ref 2–14)
MONOCYTES NFR BLD AUTO: 11.9 % — SIGNIFICANT CHANGE UP (ref 2–14)
NEUTROPHILS # BLD AUTO: 3.59 K/UL — SIGNIFICANT CHANGE UP (ref 1.8–7.4)
NEUTROPHILS # BLD AUTO: 3.59 K/UL — SIGNIFICANT CHANGE UP (ref 1.8–7.4)
NEUTROPHILS NFR BLD AUTO: 53.2 % — SIGNIFICANT CHANGE UP (ref 43–77)
NEUTROPHILS NFR BLD AUTO: 53.2 % — SIGNIFICANT CHANGE UP (ref 43–77)
NRBC # BLD: 0 /100 WBCS — SIGNIFICANT CHANGE UP (ref 0–0)
NRBC # BLD: 0 /100 WBCS — SIGNIFICANT CHANGE UP (ref 0–0)
PHOSPHATE SERPL-MCNC: 3.6 MG/DL — SIGNIFICANT CHANGE UP (ref 2.5–4.5)
PHOSPHATE SERPL-MCNC: 3.6 MG/DL — SIGNIFICANT CHANGE UP (ref 2.5–4.5)
PLATELET # BLD AUTO: 263 K/UL — SIGNIFICANT CHANGE UP (ref 150–400)
PLATELET # BLD AUTO: 263 K/UL — SIGNIFICANT CHANGE UP (ref 150–400)
POTASSIUM SERPL-MCNC: 3.7 MMOL/L — SIGNIFICANT CHANGE UP (ref 3.5–5.3)
POTASSIUM SERPL-MCNC: 3.7 MMOL/L — SIGNIFICANT CHANGE UP (ref 3.5–5.3)
POTASSIUM SERPL-SCNC: 3.7 MMOL/L — SIGNIFICANT CHANGE UP (ref 3.5–5.3)
POTASSIUM SERPL-SCNC: 3.7 MMOL/L — SIGNIFICANT CHANGE UP (ref 3.5–5.3)
PROT SERPL-MCNC: 7.9 G/DL — SIGNIFICANT CHANGE UP (ref 6–8.3)
PROT SERPL-MCNC: 7.9 G/DL — SIGNIFICANT CHANGE UP (ref 6–8.3)
RBC # BLD: 4.05 M/UL — LOW (ref 4.2–5.8)
RBC # BLD: 4.05 M/UL — LOW (ref 4.2–5.8)
RBC # FLD: 13.9 % — SIGNIFICANT CHANGE UP (ref 10.3–14.5)
RBC # FLD: 13.9 % — SIGNIFICANT CHANGE UP (ref 10.3–14.5)
SODIUM SERPL-SCNC: 140 MMOL/L — SIGNIFICANT CHANGE UP (ref 135–145)
SODIUM SERPL-SCNC: 140 MMOL/L — SIGNIFICANT CHANGE UP (ref 135–145)
T GONDII IGG SER QL: <3 IU/ML — SIGNIFICANT CHANGE UP
T GONDII IGG SER QL: <3 IU/ML — SIGNIFICANT CHANGE UP
T GONDII IGG SER QL: NEGATIVE — SIGNIFICANT CHANGE UP
T GONDII IGG SER QL: NEGATIVE — SIGNIFICANT CHANGE UP
T-CELL CD4 SUBSET PNL BLD: 374 CELLS/UL — LOW (ref 489–1457)
T-CELL CD4 SUBSET PNL BLD: 374 CELLS/UL — LOW (ref 489–1457)
WBC # BLD: 6.75 K/UL — SIGNIFICANT CHANGE UP (ref 3.8–10.5)
WBC # BLD: 6.75 K/UL — SIGNIFICANT CHANGE UP (ref 3.8–10.5)
WBC # FLD AUTO: 6.75 K/UL — SIGNIFICANT CHANGE UP (ref 3.8–10.5)
WBC # FLD AUTO: 6.75 K/UL — SIGNIFICANT CHANGE UP (ref 3.8–10.5)

## 2023-11-13 PROCEDURE — 99232 SBSQ HOSP IP/OBS MODERATE 35: CPT

## 2023-11-13 PROCEDURE — 99233 SBSQ HOSP IP/OBS HIGH 50: CPT | Mod: GC

## 2023-11-13 RX ORDER — FLUCONAZOLE 150 MG/1
400 TABLET ORAL EVERY 24 HOURS
Refills: 0 | Status: DISCONTINUED | OUTPATIENT
Start: 2023-11-14 | End: 2023-11-15

## 2023-11-13 RX ORDER — POTASSIUM CHLORIDE 20 MEQ
40 PACKET (EA) ORAL ONCE
Refills: 0 | Status: COMPLETED | OUTPATIENT
Start: 2023-11-13 | End: 2023-11-13

## 2023-11-13 RX ORDER — HYDROCORTISONE 1 %
1 OINTMENT (GRAM) TOPICAL
Refills: 0 | Status: DISCONTINUED | OUTPATIENT
Start: 2023-11-13 | End: 2023-11-15

## 2023-11-13 RX ADMIN — DIPHENHYDRAMINE HYDROCHLORIDE AND LIDOCAINE HYDROCHLORIDE AND ALUMINUM HYDROXIDE AND MAGNESIUM HYDRO 10 MILLILITER(S): KIT at 14:18

## 2023-11-13 RX ADMIN — ESCITALOPRAM OXALATE 10 MILLIGRAM(S): 10 TABLET, FILM COATED ORAL at 11:30

## 2023-11-13 RX ADMIN — BUDESONIDE AND FORMOTEROL FUMARATE DIHYDRATE 2 PUFF(S): 160; 4.5 AEROSOL RESPIRATORY (INHALATION) at 21:49

## 2023-11-13 RX ADMIN — Medication 1 APPLICATION(S): at 06:22

## 2023-11-13 RX ADMIN — DIPHENHYDRAMINE HYDROCHLORIDE AND LIDOCAINE HYDROCHLORIDE AND ALUMINUM HYDROXIDE AND MAGNESIUM HYDRO 10 MILLILITER(S): KIT at 21:49

## 2023-11-13 RX ADMIN — DIPHENHYDRAMINE HYDROCHLORIDE AND LIDOCAINE HYDROCHLORIDE AND ALUMINUM HYDROXIDE AND MAGNESIUM HYDRO 10 MILLILITER(S): KIT at 06:20

## 2023-11-13 RX ADMIN — BUDESONIDE AND FORMOTEROL FUMARATE DIHYDRATE 2 PUFF(S): 160; 4.5 AEROSOL RESPIRATORY (INHALATION) at 11:25

## 2023-11-13 RX ADMIN — FLUCONAZOLE 100 MILLIGRAM(S): 150 TABLET ORAL at 06:21

## 2023-11-13 RX ADMIN — Medication 40 MILLIEQUIVALENT(S): at 14:18

## 2023-11-13 RX ADMIN — Medication 1 APPLICATION(S): at 18:25

## 2023-11-13 RX ADMIN — CEFTRIAXONE 100 MILLIGRAM(S): 500 INJECTION, POWDER, FOR SOLUTION INTRAMUSCULAR; INTRAVENOUS at 11:30

## 2023-11-13 RX ADMIN — ENOXAPARIN SODIUM 40 MILLIGRAM(S): 100 INJECTION SUBCUTANEOUS at 06:22

## 2023-11-13 RX ADMIN — DIPHENHYDRAMINE HYDROCHLORIDE AND LIDOCAINE HYDROCHLORIDE AND ALUMINUM HYDROXIDE AND MAGNESIUM HYDRO 10 MILLILITER(S): KIT at 11:26

## 2023-11-13 RX ADMIN — DIPHENHYDRAMINE HYDROCHLORIDE AND LIDOCAINE HYDROCHLORIDE AND ALUMINUM HYDROXIDE AND MAGNESIUM HYDRO 10 MILLILITER(S): KIT at 18:25

## 2023-11-13 NOTE — PROGRESS NOTE ADULT - PROBLEM SELECTOR PLAN 3
Patient presents with chronic diarrhea that comes and goes, last major episode 2 days ago. States bm's remain loose though. Was hospitalized in 08/2022 for enterocolitis with GI PCR+ for shigella, enteroinvasive e coli, giardia, EPEC. Currently not septic, holding off on empiric tx    Plan:  - f/u GI PCR  - f/u C. Diff Patient presents with chronic diarrhea that comes and goes, last major episode 2 days ago. States bm's remain loose though. Was hospitalized in 08/2022 for enterocolitis with GI PCR+ for shigella, enteroinvasive e coli, giardia, EPEC. Currently not septic, holding off on empiric tx    Plan:  - GI PCR obtained 11/11/23 is positive for EPEC and Shigella/ETEC  - C. Diff obtained 11/11/23 is negative Patient presents with chronic diarrhea that comes and goes, last major episode 2 days ago. States bm's remain loose though. Was hospitalized in 08/2022 for enterocolitis with GI PCR+ for shigella, enteroinvasive e coli, giardia, EPEC. Currently not septic, holding off on empiric tx  - GI PCR obtained 11/11/23 is positive for EPEC and Shigella/ETEC  - C. Diff obtained 11/11/23 is negative    Plan:  -c/w Ceftriaxone 2000mg IV

## 2023-11-13 NOTE — PROGRESS NOTE ADULT - PROBLEM SELECTOR PLAN 1
Patient w/ HIV/AIDS, previously followed with Dr. Pacheco outpatient, who presents with rash, diarrhea, fatigue, and oral thrush. States noncompliance with HIV meds for 3 years in the setting of worsening depression. Prev on Descovy and Tivicay. Neg Kernig and Brudzinski signs on exam    Plan:  - c/w previous meds  - f/u VL and CD4  - start bactrim for prophylaxis  - ID consulted, will f/u recs Patient w/ HIV/AIDS, previously followed with Dr. Pacheco outpatient, who presents with rash, diarrhea, fatigue, and oral thrush. States noncompliance with HIV meds for 3 years in the setting of worsening depression. Prev on Descovy and Tivicay. Neg Kernig and Brudzinski signs on exam    Plan:  - c/w previous meds  - Obtained VL and CD4 on 11/13/23 and results were ABS CD4: 374   - hold off on bactrim for prophylaxis per ID recs   - ID consulted on 11/13/23 and suggested diarrhea is likely due to shigella+ E.Coli. Also mentioned throat pain and thrush on exam may be candida for esophagitis. Will f/u recs for continuing Ceftriaxone 2g IV daily and Fluconazole 400mg PO daily. Patient w/ HIV/AIDS, previously followed with Dr. Pacheco outpatient, who presents with rash, diarrhea, fatigue, and oral thrush. States noncompliance with HIV meds for 3 years in the setting of worsening depression. Prev on Descovy and Tivicay. Neg Kernig and Brudzinski signs on exam    - Obtained CD4 on 11/13/23 and results were ABS CD4: 374   - ID consulted on 11/13/23 and suggested diarrhea is likely due to shigella+ E.Coli. Also mentioned throat pain and thrush on exam may be candida for esophagitis.     Plan:  - hold off on HAART per ID recs 11/13/23   - Pending Viral Load   - hold off on bactrim for prophylaxis per ID recs   -c/w Ceftriaxone 2g IV daily and Fluconazole 400mg PO daily.

## 2023-11-13 NOTE — PROGRESS NOTE ADULT - ASSESSMENT
53 year old male with pmhx of HIV, not on meds for 3 years, HTN, Depression, Asthma, and CVA 5-6 years, ago worked up at [facility], who presents with diffuse rash, sore throat w/ new onset hoarse voice, diarrhea, and urinary frequency in the setting of unmanaged HIV and possible opportunistic infections

## 2023-11-13 NOTE — PROGRESS NOTE ADULT - SUBJECTIVE AND OBJECTIVE BOX
**INCOMPLETE NOTE    OVERNIGHT EVENTS:    SUBJECTIVE:  Patient seen and examined at bedside.    Vital Signs Last 12 Hrs  T(F): 98.1 (11-13-23 @ 08:50), Max: 98.7 (11-13-23 @ 05:14)  HR: 90 (11-13-23 @ 08:50) (90 - 99)  BP: 146/97 (11-13-23 @ 08:50) (126/86 - 146/97)  BP(mean): --  RR: 17 (11-13-23 @ 08:50) (17 - 18)  SpO2: 97% (11-13-23 @ 08:50) (97% - 97%)  I&O's Summary      PHYSICAL EXAM:  Constitutional: NAD, comfortable in bed.  HEENT: NC/AT, PERRLA, EOMI, no conjunctival pallor or scleral icterus, MMM  Neck: Supple, no JVD  Respiratory: CTA B/L. No w/r/r.   Cardiovascular: RRR, normal S1 and S2, no m/r/g.   Gastrointestinal: +BS, soft NTND, no guarding or rebound tenderness, no palpable masses   Extremities: wwp; no cyanosis, clubbing or edema.   Vascular: Pulses equal and strong throughout.   Neurological: AAOx3, no CN deficits, strength and sensation intact throughout.   Skin: No gross skin abnormalities or rashes        LABS:                        11.4   6.75  )-----------( 263      ( 13 Nov 2023 07:29 )             34.5     11-13    140  |  104  |  9   ----------------------------<  92  3.7   |  27  |  0.76    Ca    8.8      13 Nov 2023 07:29  Phos  3.6     11-13  Mg     1.8     11-13    TPro  7.9  /  Alb  3.4  /  TBili  0.2  /  DBili  x   /  AST  25  /  ALT  17  /  AlkPhos  73  11-13      Urinalysis Basic - ( 13 Nov 2023 07:29 )    Color: x / Appearance: x / SG: x / pH: x  Gluc: 92 mg/dL / Ketone: x  / Bili: x / Urobili: x   Blood: x / Protein: x / Nitrite: x   Leuk Esterase: x / RBC: x / WBC x   Sq Epi: x / Non Sq Epi: x / Bacteria: x          RADIOLOGY & ADDITIONAL TESTS:    MEDICATIONS  (STANDING):  budesonide  80 MICROgram(s)/formoterol 4.5 MICROgram(s) Inhaler 2 Puff(s) Inhalation two times a day  cefTRIAXone   IVPB      cefTRIAXone   IVPB 2000 milliGRAM(s) IV Intermittent every 24 hours  enoxaparin Injectable 40 milliGRAM(s) SubCutaneous every 24 hours  escitalopram 10 milliGRAM(s) Oral daily  FIRST- Mouthwash  BLM 10 milliLiter(s) Swish and Spit every 4 hours  fluconAZOLE IVPB      fluconAZOLE IVPB 400 milliGRAM(s) IV Intermittent every 24 hours  hydrocortisone 1% Cream 1 Application(s) Topical two times a day  potassium chloride    Tablet ER 40 milliEquivalent(s) Oral once    MEDICATIONS  (PRN):  acetaminophen     Tablet .. 650 milliGRAM(s) Oral every 6 hours PRN Temp greater or equal to 38C (100.4F), Mild Pain (1 - 3)  albuterol/ipratropium for Nebulization 3 milliLiter(s) Nebulizer every 6 hours PRN Shortness of Breath and/or Wheezing  melatonin 3 milliGRAM(s) Oral at bedtime PRN Insomnia  ondansetron Injectable 4 milliGRAM(s) IV Push every 8 hours PRN Nausea and/or Vomiting   **INCOMPLETE NOTE    OVERNIGHT EVENTS: Yesterday evening at 5:33pm his BP was elevated at 143/97. Besides that there are no acute overnight events.    SUBJECTIVE:  Patient seen and examined at bedside. He feels the same as when he was admitted. His diarrhea has not improved and he is still experiencing five to six episodes of watery diarrhea. He denies and nausea, emesis, and abdominal pain.     Vital Signs Last 12 Hrs  T(F): 98.1 (11-13-23 @ 08:50), Max: 98.7 (11-13-23 @ 05:14)  HR: 90 (11-13-23 @ 08:50) (90 - 99)  BP: 146/97 (11-13-23 @ 08:50) (126/86 - 146/97)  BP(mean): --  RR: 17 (11-13-23 @ 08:50) (17 - 18)  SpO2: 97% (11-13-23 @ 08:50) (97% - 97%)  I&O's Summary      PHYSICAL EXAM:  Constitutional: NAD, comfortable in bed.  HEENT: No conjunctival pallor or scleral icterus, MMM  Neck: Supple, no thyromegaly  Respiratory: CTA B/L. No w/r/r.   Cardiovascular: RRR, normal S1 and S2, no murmurs  Gastrointestinal: +BS, soft non-TTP, no palpable masses   Extremities: wwp; no edema  Vascular: Pulses +2 equal and strong throughout.   Neurological: AAOx3  Skin: Rash is present on face, bilateral arms, and bilateral legs         LABS:                        11.4   6.75  )-----------( 263      ( 13 Nov 2023 07:29 )             34.5     11-13    140  |  104  |  9   ----------------------------<  92  3.7   |  27  |  0.76    Ca    8.8      13 Nov 2023 07:29  Phos  3.6     11-13  Mg     1.8     11-13    TPro  7.9  /  Alb  3.4  /  TBili  0.2  /  DBili  x   /  AST  25  /  ALT  17  /  AlkPhos  73  11-13      Urinalysis Basic - ( 13 Nov 2023 07:29 )    Color: x / Appearance: x / SG: x / pH: x  Gluc: 92 mg/dL / Ketone: x  / Bili: x / Urobili: x   Blood: x / Protein: x / Nitrite: x   Leuk Esterase: x / RBC: x / WBC x   Sq Epi: x / Non Sq Epi: x / Bacteria: x          RADIOLOGY & ADDITIONAL TESTS:    MEDICATIONS  (STANDING):  budesonide  80 MICROgram(s)/formoterol 4.5 MICROgram(s) Inhaler 2 Puff(s) Inhalation two times a day  cefTRIAXone   IVPB      cefTRIAXone   IVPB 2000 milliGRAM(s) IV Intermittent every 24 hours  enoxaparin Injectable 40 milliGRAM(s) SubCutaneous every 24 hours  escitalopram 10 milliGRAM(s) Oral daily  FIRST- Mouthwash  BLM 10 milliLiter(s) Swish and Spit every 4 hours  fluconAZOLE IVPB      fluconAZOLE IVPB 400 milliGRAM(s) IV Intermittent every 24 hours  hydrocortisone 1% Cream 1 Application(s) Topical two times a day  potassium chloride    Tablet ER 40 milliEquivalent(s) Oral once    MEDICATIONS  (PRN):  acetaminophen     Tablet .. 650 milliGRAM(s) Oral every 6 hours PRN Temp greater or equal to 38C (100.4F), Mild Pain (1 - 3)  albuterol/ipratropium for Nebulization 3 milliLiter(s) Nebulizer every 6 hours PRN Shortness of Breath and/or Wheezing  melatonin 3 milliGRAM(s) Oral at bedtime PRN Insomnia  ondansetron Injectable 4 milliGRAM(s) IV Push every 8 hours PRN Nausea and/or Vomiting   OVERNIGHT EVENTS: Yesterday evening at 5:33pm his BP was elevated at 143/97. Besides that there are no acute overnight events.    SUBJECTIVE:  Patient seen and examined at bedside. He feels the same as when he was admitted. His diarrhea has not improved and he is still experiencing five to six episodes of watery diarrhea. He denies and nausea, emesis, and abdominal pain.     Vital Signs Last 12 Hrs  T(F): 98.1 (11-13-23 @ 08:50), Max: 98.7 (11-13-23 @ 05:14)  HR: 90 (11-13-23 @ 08:50) (90 - 99)  BP: 146/97 (11-13-23 @ 08:50) (126/86 - 146/97)  BP(mean): --  RR: 17 (11-13-23 @ 08:50) (17 - 18)  SpO2: 97% (11-13-23 @ 08:50) (97% - 97%)  I&O's Summary      PHYSICAL EXAM:  Constitutional: NAD, comfortable in bed.  HEENT: No conjunctival pallor or scleral icterus, MMM  Neck: Supple, no thyromegaly  Respiratory: CTA B/L. No w/r/r.   Cardiovascular: RRR, normal S1 and S2, no murmurs  Gastrointestinal: +BS, soft non-TTP, no palpable masses   Extremities: wwp; no edema  Vascular: Pulses +2 equal and strong throughout.   Neurological: AAOx3  Skin: Rash is present on face, bilateral arms, and bilateral legs         LABS:                        11.4   6.75  )-----------( 263      ( 13 Nov 2023 07:29 )             34.5     11-13    140  |  104  |  9   ----------------------------<  92  3.7   |  27  |  0.76    Ca    8.8      13 Nov 2023 07:29  Phos  3.6     11-13  Mg     1.8     11-13    TPro  7.9  /  Alb  3.4  /  TBili  0.2  /  DBili  x   /  AST  25  /  ALT  17  /  AlkPhos  73  11-13      Urinalysis Basic - ( 13 Nov 2023 07:29 )    Color: x / Appearance: x / SG: x / pH: x  Gluc: 92 mg/dL / Ketone: x  / Bili: x / Urobili: x   Blood: x / Protein: x / Nitrite: x   Leuk Esterase: x / RBC: x / WBC x   Sq Epi: x / Non Sq Epi: x / Bacteria: x          RADIOLOGY & ADDITIONAL TESTS:    MEDICATIONS  (STANDING):  budesonide  80 MICROgram(s)/formoterol 4.5 MICROgram(s) Inhaler 2 Puff(s) Inhalation two times a day  cefTRIAXone   IVPB      cefTRIAXone   IVPB 2000 milliGRAM(s) IV Intermittent every 24 hours  enoxaparin Injectable 40 milliGRAM(s) SubCutaneous every 24 hours  escitalopram 10 milliGRAM(s) Oral daily  FIRST- Mouthwash  BLM 10 milliLiter(s) Swish and Spit every 4 hours  fluconAZOLE IVPB      fluconAZOLE IVPB 400 milliGRAM(s) IV Intermittent every 24 hours  hydrocortisone 1% Cream 1 Application(s) Topical two times a day  potassium chloride    Tablet ER 40 milliEquivalent(s) Oral once    MEDICATIONS  (PRN):  acetaminophen     Tablet .. 650 milliGRAM(s) Oral every 6 hours PRN Temp greater or equal to 38C (100.4F), Mild Pain (1 - 3)  albuterol/ipratropium for Nebulization 3 milliLiter(s) Nebulizer every 6 hours PRN Shortness of Breath and/or Wheezing  melatonin 3 milliGRAM(s) Oral at bedtime PRN Insomnia  ondansetron Injectable 4 milliGRAM(s) IV Push every 8 hours PRN Nausea and/or Vomiting

## 2023-11-13 NOTE — PROGRESS NOTE ADULT - PROBLEM SELECTOR PLAN 9
F: s/p 1L NS, will give additional 1L  E: replete as needed  N: DASH  DVT: lovenox  Dispo: [facility]

## 2023-11-13 NOTE — PROGRESS NOTE ADULT - ASSESSMENT
IMPRESSION:  Uncontrolled HIV, not on ART.  Presence of thrush suggests low CD4 count, placing patient at risk for AIDS-defining illnesses/Opportunistic infections.  Rash on his head is likely due to psoriasis as per patient.  He states the rash on his arms is different.  It could be folliculitis or disseminated disease from an OI.     Diarrhea is likely due to Shigella + E. Coli    Given throat pain and thrush on exam there is concern for candida esophagitis    CD4 count is > 300.  No indication for Bactrim PPX    Recommend:  1.  Hold Antiretrovirals (need to exclude cryptococcal disease before starting ART)  2.  Continue Ceftriaxone 2 grams IV daily for infectious diarrhea  3.  Continue Fluconazole 400 mg IV daily  4.  Follow up serum cryptococcal antigen  5.  Check urine histoplasma antigen   6.  Follow up urine for GC/chlamydia   7.  Send throat swab for GC/chlamydia  8.  Dermatology evaluation for evaluation of rash  9. Follow up stool culture (will likely show Shigella sensitivity)    ID team 1 will follow      IMPRESSION:  Uncontrolled HIV, not on ART.  Presence of thrush suggests low CD4 count, placing patient at risk for AIDS-defining illnesses/Opportunistic infections.  Rash on his head is likely due to psoriasis as per patient.  He states the rash on his arms is different.  It could be folliculitis or disseminated disease from an OI.     Diarrhea is likely due to Shigella + E. Coli    Given throat pain and thrush on exam there is concern for candida esophagitis    CD4 count is > 300.  No indication for Bactrim PPX    Recommend:  1.  Hold Antiretrovirals (need to exclude cryptococcal disease before starting ART)  2.  Continue Ceftriaxone 2 grams IV daily for infectious diarrhea  3.  Continue Fluconazole but change to 400 mg PO daily  4.  Follow up serum cryptococcal antigen  5.  Check urine histoplasma antigen   6.  Follow up urine for GC/chlamydia   7.  Send throat swab for GC/chlamydia  8.  Dermatology evaluation for evaluation of rash  9. Follow up stool culture (will likely show Shigella sensitivity)    ID team 1 will follow

## 2023-11-13 NOTE — PROGRESS NOTE ADULT - PROBLEM SELECTOR PLAN 2
Patient presents with 2 different appearing rashes for the past 2 weeks. Rash on the face appears to be exacerbation of psoriasis in setting of HIV. Also has follicular rash on UE and chest. All possibly 2/2 unmanaged HIV/AIDS    Plan:  - c/w HAART  - f/u syphilis  - f/u ID recs  - Derm consult in AM   - c/w Hydrocortisone 1% BID Patient presents with 2 different appearing rashes for the past 2 weeks. Rash on the face appears to be exacerbation of psoriasis in setting of HIV. Also has follicular rash on UE and chest. All possibly 2/2 unmanaged HIV/AIDS    Plan:  - hold off on HAART per ID recs 11/13/23   - f/u syphilis  - f/u ID recs from 11/13/23   - Derm consult in AM   - c/w Hydrocortisone 1% BID Patient presents with 2 different appearing rashes for the past 2 weeks. Rash on the face appears to be exacerbation of psoriasis in setting of HIV. Also has follicular rash on UE and chest. All possibly 2/2 unmanaged HIV/AIDS    Plan:  - hold off on HAART per ID recs 11/13/23   - f/u syphilis  - f/u ID recs from 11/13/23   - Derm consult in AM   - c/w Hydrocortisone 2.5% BID Patient presents with 2 different appearing rashes for the past 2 weeks. Rash on the face appears to be exacerbation of psoriasis in setting of HIV. Also has follicular rash on UE and chest. 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 They recommend topical clindamycin lotion BID and once discharged OTC benzoyl peroxide 10% wash for affected areas. They suggest if any vesicles form to swab with a viral culture to rule out any VZV.   - c/w Hydrocortisone 2.5% BID Patient presents with 2 different appearing rashes for the past 2 weeks. Rash on the face appears to be exacerbation of psoriasis in setting of HIV. Also has follicular rash on UE and chest. 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 They recommend topical clindamycin lotion BID and once discharged OTC benzoyl peroxide 10% wash for affected areas. They suggest if any vesicles form to swab with a viral culture to rule out any VZV.     Plan:  - f/u syphilis  - f/u ID recs  - increase Hydrocortisone 1% to Hydrocortisone 2.5% BID per Derm recs Patient presents with 2 different appearing rashes for the past 2 weeks. Rash on the face appears to be exacerbation of psoriasis in setting of HIV. Also has follicular rash on UE and chest. 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 They recommend topical clindamycin lotion BID and once discharged OTC benzoyl peroxide 10% wash for affected areas. They suggest if any vesicles form to swab with a viral culture to rule out any VZV.     Plan:  - f/u syphilis  - f/u ID recs  -f/u clindamycin lotion not available look for alternative option   - increase Hydrocortisone 1% to Hydrocortisone 2.5% BID per Derm recs Patient presents with 2 different appearing rashes for the past 2 weeks. Rash on the face appears to be exacerbation of psoriasis in setting of HIV. Also has follicular rash on UE and chest. 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 They recommend topical clindamycin lotion BID and once discharged OTC benzoyl peroxide 10% wash for affected areas. They suggest if any vesicles form to swab with a viral culture to rule out any VZV.     Plan:  - f/u syphilis  - f/u ID recs  - consult derm for alternative to clindamycin lotion (not available in hospital)  - increase Hydrocortisone 1% to Hydrocortisone 2.5% BID per Derm recs

## 2023-11-13 NOTE — CONSULT NOTE ADULT - SUBJECTIVE AND OBJECTIVE BOX
rash worsening over past few wks  in context of stressful life event and stopping ARV    CD4 count came back above 350    currently hospitalized with shigella diarrhea and candida esophagitis  undergoing r/o crypto to restart ARV    pt has hx of psoriasis has outpt derm who he follows    arm rash is new and not itchy
HPI:  Pt is a 53 year old male with pmhx of HIV, not on meds for 3 years, HTN, Depression, Asthma, and CVA 5-6 years ago, worked up at [facility], who presents with diffuse rash, sore throat w/ new onset hoarse voice, diarrhea, and urinary frequency. Patient states he has been dealing with significant depression for the past few years since his  . For about 3 years now, has not taken any medications for any of his medical issues and has not had regular follow up with PCP. Of note, patient previously followed with Dr. Pacheco in clinic but since she left has not seen anyone. Patient states he developed psoriatic rash on his face for the first time about 2 weeks ago. Prior to that, had never had a rash below his hairline. Patient also presents with, what appears to be, folliculitis on his arms sparing the palms. Patient also endorses oral thrush which he feels is also extending to his throat. Today he woke up and had a hoarse voice and worsening weakness so he decided to come in. Patient also endorsing diarrhea which he states is chronic, comes and goes. Denies any pain with defecation, melena, or hematochezia. States he has over 5 bm's a day, small amount of loose stool. Last episode of pure watery stool was 2 days ago. Lastly, patient has also been experiencing urinary frequency where he feels he does not empty his bladder completely. Denies any dysuria, no known history of BPH. Otherwise patient denies genital lesions, fevers/chills, chest pain, dyspnea, abdominal pain, dysuria.    In the ED, vitals: Afebrile, -->102, /104, RR 20 @ 97% on RA  Labs: WBC 8.55, Hgb 12.4, K 3.1, Mg 1.2, UA neg    s/p 1L NS, 1g Mg, 40meq PO K   (2023 03:57)      PAST MEDICAL & SURGICAL HISTORY:  COPD (chronic obstructive pulmonary disease)      Asthma      HIV (human immunodeficiency virus infection)      Psoriasis      Intracerebral hemorrhage      Depression      S/P tendon repair  R hand tendon repair            REVIEW OF SYSTEMS:    General:	 no weakness; no fevers, no chills  Skin/Breast: no rash  Respiratory and Thorax: no SOB, no cough  Cardiovascular:	No chest pain  Gastrointestinal:	 no nausea, vomiting , diarrhea  Genitourinary:	no dysuria, no difficulty urinating, no hematuria  Musculoskeletal:	no weakness, no joint swelling/pain  Neurological:	no focal weakness/numbness  Endocrine:	no polyuria, no polydipsia      ANTIBIOTICS:  MEDICATIONS  (STANDING):  budesonide  80 MICROgram(s)/formoterol 4.5 MICROgram(s) Inhaler 2 Puff(s) Inhalation two times a day  dolutegravir 50 milliGRAM(s) Oral daily  emtricitabine 200 mG/tenofovir alafenamide 25 mG (DESCOVY) Tablet 1 Tablet(s) Oral daily  enoxaparin Injectable 40 milliGRAM(s) SubCutaneous every 24 hours  escitalopram 10 milliGRAM(s) Oral daily  fluconAZOLE IVPB      hydrocortisone 1% Cream 1 Application(s) Topical two times a day  trimethoprim  160 mG/sulfamethoxazole 800 mG 1 Tablet(s) Oral daily    MEDICATIONS  (PRN):  acetaminophen     Tablet .. 650 milliGRAM(s) Oral every 6 hours PRN Temp greater or equal to 38C (100.4F), Mild Pain (1 - 3)  albuterol/ipratropium for Nebulization 3 milliLiter(s) Nebulizer every 6 hours PRN Shortness of Breath and/or Wheezing  melatonin 3 milliGRAM(s) Oral at bedtime PRN Insomnia  ondansetron Injectable 4 milliGRAM(s) IV Push every 8 hours PRN Nausea and/or Vomiting      Allergies    No Known Allergies    Intolerances        SOCIAL HISTORY:    FAMILY HISTORY:  Family history of diabetes mellitus (Father)    Family history of glaucoma (Father)    Family history of other heart disease (Father)    Family history of cardiac disorder in mother        Vital Signs Last 24 Hrs  T(C): 36.7 (2023 11:20), Max: 37.3 (2023 03:59)  T(F): 98 (2023 11:20), Max: 99.1 (2023 03:59)  HR: 100 (2023 11:20) (95 - 113)  BP: 150/95 (2023 11:20) (147/86 - 161/104)  BP(mean): --  RR: 18 (2023 11:20) (17 - 20)  SpO2: 97% (2023 11:20) (97% - 98%)    Parameters below as of 2023 11:20  Patient On (Oxygen Delivery Method): room air        PHYSICAL EXAM:  Constitutional:non-toxic, no distress  Eyes:GOSIA, EOMI  Ear/Nose/Throat:  + thrush   Neck:  supple  Respiratory: CTA lesly  Cardiovascular: S1S2 RRR, no murmurs  Gastrointestinal:soft, (+) BS, no HSM  Extremities:no e/e/c  rash:  discoid rash on forehead, bilateral temples; patient also with erytematous excorations on bilateral forearms. sparing palms and soles   Vascular: DP Pulse:	right normal; left normal            LABS:                        11.8   8.35  )-----------( 254      ( 2023 08:04 )             35.6         141  |  102  |  8   ----------------------------<  102<H>  3.3<L>   |  29  |  0.72    Ca    9.2      2023 08:04  Phos  3.3       Mg     1.4         TPro  7.8  /  Alb  3.3  /  TBili  0.2  /  DBili  x   /  AST  32  /  ALT  18  /  AlkPhos  83  11-11    PT/INR - ( 2023 08:04 )   PT: 11.5 sec;   INR: 1.01          PTT - ( 2023 08:04 )  PTT:40.9 sec  Urinalysis Basic - ( 2023 08:04 )    Color: x / Appearance: x / SG: x / pH: x  Gluc: 102 mg/dL / Ketone: x  / Bili: x / Urobili: x   Blood: x / Protein: x / Nitrite: x   Leuk Esterase: x / RBC: x / WBC x   Sq Epi: x / Non Sq Epi: x / Bacteria: x        MICROBIOLOGY:        RADIOLOGY & ADDITIONAL STUDIES:    < from: Xray Chest 1 View- PORTABLE-Urgent (Xray Chest 1 View- PORTABLE-Urgent .) (23 @ 02:58) >    IMPRESSION: Mild right mid to lower lung nodular opacities, may represent   an atypical infectious process. Radiographic follow-up recommended.    < end of copied text >

## 2023-11-13 NOTE — PROGRESS NOTE ADULT - PROBLEM SELECTOR PLAN 4
Presents with oral thrush and new onset hoarseness of voice. Hx of thrush in the past.     Plan:  - cover for esophogeal candidiasis, started on fluconazole 400mg IV qd Presents with oral thrush and new onset hoarseness of voice. Hx of thrush in the past.     Plan:  - cover for esophogeal candidiasis, started on fluconazole 400mg IV qd. ID rec switch to fluconazole 400mg PO Presents with oral thrush and new onset hoarseness of voice. Hx of thrush in the past.     Plan:  - c/w fluconazole 400mg PO

## 2023-11-13 NOTE — PROGRESS NOTE ADULT - SUBJECTIVE AND OBJECTIVE BOX
INTERVAL HPI/OVERNIGHT EVENTS:    Patient was seen and examined at bedside. Diarrhea is worse today.  Odynophagia has improved.     CONSTITUTIONAL:  Negative fever or chills, feels well, good appetite  EYES:  Negative  blurry vision or double vision  CARDIOVASCULAR:  Negative for chest pain or palpitations  RESPIRATORY:  Negative for cough, wheezing, or SOB   GASTROINTESTINAL:  Negative for nausea, vomiting, diarrhea, constipation, or abdominal pain  GENITOURINARY:  Negative frequency, urgency or dysuria  NEUROLOGIC:  No headache, confusion, dizziness, lightheadedness      ANTIBIOTICS/RELEVANT:    MEDICATIONS  (STANDING):  budesonide  80 MICROgram(s)/formoterol 4.5 MICROgram(s) Inhaler 2 Puff(s) Inhalation two times a day  cefTRIAXone   IVPB      cefTRIAXone   IVPB 2000 milliGRAM(s) IV Intermittent every 24 hours  enoxaparin Injectable 40 milliGRAM(s) SubCutaneous every 24 hours  escitalopram 10 milliGRAM(s) Oral daily  FIRST- Mouthwash  BLM 10 milliLiter(s) Swish and Spit every 4 hours  fluconAZOLE IVPB      fluconAZOLE IVPB 400 milliGRAM(s) IV Intermittent every 24 hours  hydrocortisone 1% Cream 1 Application(s) Topical two times a day    MEDICATIONS  (PRN):  acetaminophen     Tablet .. 650 milliGRAM(s) Oral every 6 hours PRN Temp greater or equal to 38C (100.4F), Mild Pain (1 - 3)  albuterol/ipratropium for Nebulization 3 milliLiter(s) Nebulizer every 6 hours PRN Shortness of Breath and/or Wheezing  melatonin 3 milliGRAM(s) Oral at bedtime PRN Insomnia  ondansetron Injectable 4 milliGRAM(s) IV Push every 8 hours PRN Nausea and/or Vomiting        Vital Signs Last 24 Hrs  T(C): 36.7 (13 Nov 2023 08:50), Max: 37.1 (13 Nov 2023 05:14)  T(F): 98.1 (13 Nov 2023 08:50), Max: 98.7 (13 Nov 2023 05:14)  HR: 90 (13 Nov 2023 08:50) (90 - 99)  BP: 146/97 (13 Nov 2023 08:50) (126/86 - 147/86)  BP(mean): --  RR: 17 (13 Nov 2023 08:50) (17 - 18)  SpO2: 97% (13 Nov 2023 08:50) (95% - 97%)    Parameters below as of 13 Nov 2023 08:50  Patient On (Oxygen Delivery Method): room air        PHYSICAL EXAM:  Constitutional: non-toxic, no distress  Eyes:GOSIA, EOMI  Ear/Nose/Throat: no oral lesion, no sinus tenderness on percussion	  Neck:  supple  Respiratory: CTA lesly  Cardiovascular: S1S2 RRR, no murmurs  Gastrointestinal:soft, (+) BS, no HSM  Extremities:no e/e/c  Vascular: DP Pulse:	right normal; left normal      LABS:                        11.4   6.75  )-----------( 263      ( 13 Nov 2023 07:29 )             34.5     11-13    140  |  104  |  9   ----------------------------<  92  3.7   |  27  |  0.76    Ca    8.8      13 Nov 2023 07:29  Phos  3.6     11-13  Mg     1.8     11-13    TPro  7.9  /  Alb  3.4  /  TBili  0.2  /  DBili  x   /  AST  25  /  ALT  17  /  AlkPhos  73  11-13      Urinalysis Basic - ( 13 Nov 2023 07:29 )    Color: x / Appearance: x / SG: x / pH: x  Gluc: 92 mg/dL / Ketone: x  / Bili: x / Urobili: x   Blood: x / Protein: x / Nitrite: x   Leuk Esterase: x / RBC: x / WBC x   Sq Epi: x / Non Sq Epi: x / Bacteria: x    Toxoplasma IgG Antibody (11.12.23 @ 05:30)    Toxoplasma IgG Screen: <3.0 IU/mL   Toxoplasma IgG Interpretation: Negative: TOXOPLASMA IGG ANTIBODY  Negative       < 7.2 IU/mL  Equivocal     7.2 - 8.7 IU/mL  Positive         >= 8.8 IU/mL  A negative level does not exclude the possibility of Toxoplasmosis. Sera  collected very early in the acute stage may have IgG levels <7.2 IU/mL.  The presence of Toxoplasma IgG antibody may indicate recent infection,  reactivation or prior exposure. The assay for the presence of Toxoplasma  IgM should be used to diagnose recent infection.        Cytomegalovirus IgG Antibody, Serum (11.12.23 @ 05:30)    CMV IgG Antibody: 6.70 U/mL   CMV IgG Interpretation: Positive: Method: Liason Chemiluminescent Immunoassay  Reference Range: (values expressed as U/mL)             Negative        < 0.60        U/mL             Equivocal      0.60 - 0.69  U/mL             Positive          >= 0.70      U/mL  The presence of Cytomegalovirus IgG antibody or seroconversion may  indicate recent antigenic stimulation but are not per se confirmatory for  either recent primary infection or reactivation of a pre-existing latent  process with active viral replication.  The titer of asingle specimen  should not be used to aid in the diagnosis of recent infection.  The  assay for the presence of anti-CMV IgM antibody should be used to  diagnose recent infection.    MICROBIOLOGY:    Culture - Reflex Stool (11.11.23 @ 05:18)    Specimen Source: .Stool None   Culture Results:   Culture in progress        Urinalysis with Rflx Culture (11.11.23 @ 02:17)    Urine Appearance: Clear   Color: Yellow   Specific Gravity: 1.010   pH Urine: 6.5   Protein, Urine: Negative mg/dL   Glucose Qualitative, Urine: Negative mg/dL   Ketone - Urine: Negative mg/dL   Blood, Urine: Negative   Bilirubin: Negative   Urobilinogen: 0.2 mg/dL   Leukocyte Esterase Concentration: Negative   Nitrite: Negative    RADIOLOGY & ADDITIONAL STUDIES:

## 2023-11-13 NOTE — CONSULT NOTE ADULT - ASSESSMENT
IMPRESSION:  Uncontrolled HIV, not on ART.  Presence of thrush suggests low CD4 count, placing patient at risk for AIDS-defining illnesses/Opportunistic infections.  Rash on his head is likely due to psoriasis as per patient.  He states the rash on his arms is different.  It could be folliculitis or disseminated disease from an OI.     He also has diarrhea which has a wide differential including many infectious causes    Given throat pain and thrush on exam there is concern for candida esophagitis    Recommend:  1.  Check T cell subsets and HIV VL  2. Hold Antiretrovirals (need to exclude cryptococcal disease before starting  3.  Send serum cryptococcal antigen  4.  Check CMV IgG and Toxo IgG  5.  Check urine histoplasma antigen   6.  For thrush continue Fluconazole 400 mg IV daily  7. Check C Diff  8.  Check GI PCR  9.  Check syphilis serology  10.  Check Hepatitis C antibody  11. Check urine for GC/chlamydia  12.  Check throat for GC/chlamydia  13.  Dermatology evaluation for evaluation of rash  14. If CD4 < 200, start Bactrim 1 DS PO daily       ID team 1 will follow 
facial sebopsoriasis  recommend management with hydrocortisone 2.5% cream BID to affected areas  should help with rash and itching    for the arms the differential is a bit wider  his CD4 count is too high for eosinophilic folliculitis  the most likely culprit would be a regular bacterial folliculitis and would recommend starting treatment for this with topical clindamycin lotion BID  once he is discharged he should purchase an OTC benzoyl peroxide 10% wash and use it to the affected areas in the shower as well    importantly, if he starts to develop vesicles in these areas that preceded the crusted erosions (what he currently has), please swab with a viral culture to r/o a disseminated viral process (i.e. disseminated VZV) - at this point unlikely based on appearance but if more vesicular lesions form, it would be worth considering    similarly unlikely would be bacillary angiomatosis but would be worth considering if the lesions spread to the rest of his body; a blood culture would also be helpful in ruling this entity out but again, this is a very unlikely scenario

## 2023-11-14 LAB
-  AMPICILLIN: SIGNIFICANT CHANGE UP
-  AMPICILLIN: SIGNIFICANT CHANGE UP
-  CEFTRIAXONE: SIGNIFICANT CHANGE UP
-  CEFTRIAXONE: SIGNIFICANT CHANGE UP
-  CIPROFLOXACIN: SIGNIFICANT CHANGE UP
-  CIPROFLOXACIN: SIGNIFICANT CHANGE UP
-  TRIMETHOPRIM/SULFAMETHOXAZOLE: SIGNIFICANT CHANGE UP
-  TRIMETHOPRIM/SULFAMETHOXAZOLE: SIGNIFICANT CHANGE UP
ALBUMIN SERPL ELPH-MCNC: 3.4 G/DL — SIGNIFICANT CHANGE UP (ref 3.3–5)
ALBUMIN SERPL ELPH-MCNC: 3.4 G/DL — SIGNIFICANT CHANGE UP (ref 3.3–5)
ALP SERPL-CCNC: 73 U/L — SIGNIFICANT CHANGE UP (ref 40–120)
ALP SERPL-CCNC: 73 U/L — SIGNIFICANT CHANGE UP (ref 40–120)
ALT FLD-CCNC: 19 U/L — SIGNIFICANT CHANGE UP (ref 10–45)
ALT FLD-CCNC: 19 U/L — SIGNIFICANT CHANGE UP (ref 10–45)
ANION GAP SERPL CALC-SCNC: 9 MMOL/L — SIGNIFICANT CHANGE UP (ref 5–17)
ANION GAP SERPL CALC-SCNC: 9 MMOL/L — SIGNIFICANT CHANGE UP (ref 5–17)
AST SERPL-CCNC: 26 U/L — SIGNIFICANT CHANGE UP (ref 10–40)
AST SERPL-CCNC: 26 U/L — SIGNIFICANT CHANGE UP (ref 10–40)
BASOPHILS # BLD AUTO: 0.03 K/UL — SIGNIFICANT CHANGE UP (ref 0–0.2)
BASOPHILS # BLD AUTO: 0.03 K/UL — SIGNIFICANT CHANGE UP (ref 0–0.2)
BASOPHILS NFR BLD AUTO: 0.5 % — SIGNIFICANT CHANGE UP (ref 0–2)
BASOPHILS NFR BLD AUTO: 0.5 % — SIGNIFICANT CHANGE UP (ref 0–2)
BILIRUB SERPL-MCNC: 0.2 MG/DL — SIGNIFICANT CHANGE UP (ref 0.2–1.2)
BILIRUB SERPL-MCNC: 0.2 MG/DL — SIGNIFICANT CHANGE UP (ref 0.2–1.2)
BUN SERPL-MCNC: 11 MG/DL — SIGNIFICANT CHANGE UP (ref 7–23)
BUN SERPL-MCNC: 11 MG/DL — SIGNIFICANT CHANGE UP (ref 7–23)
C TRACH RRNA SPEC QL NAA+PROBE: SIGNIFICANT CHANGE UP
C TRACH RRNA SPEC QL NAA+PROBE: SIGNIFICANT CHANGE UP
C TRACH+GC RRNA SPEC QL PROBE: SIGNIFICANT CHANGE UP
C TRACH+GC RRNA SPEC QL PROBE: SIGNIFICANT CHANGE UP
CALCIUM SERPL-MCNC: 8.9 MG/DL — SIGNIFICANT CHANGE UP (ref 8.4–10.5)
CALCIUM SERPL-MCNC: 8.9 MG/DL — SIGNIFICANT CHANGE UP (ref 8.4–10.5)
CHLAMYDIA/N. GONORRHEA, ORAL/THROAT, TMA - SOURCE ORAL: SIGNIFICANT CHANGE UP
CHLAMYDIA/N. GONORRHEA, ORAL/THROAT, TMA - SOURCE ORAL: SIGNIFICANT CHANGE UP
CHLORIDE SERPL-SCNC: 103 MMOL/L — SIGNIFICANT CHANGE UP (ref 96–108)
CHLORIDE SERPL-SCNC: 103 MMOL/L — SIGNIFICANT CHANGE UP (ref 96–108)
CO2 SERPL-SCNC: 25 MMOL/L — SIGNIFICANT CHANGE UP (ref 22–31)
CO2 SERPL-SCNC: 25 MMOL/L — SIGNIFICANT CHANGE UP (ref 22–31)
CREAT SERPL-MCNC: 0.68 MG/DL — SIGNIFICANT CHANGE UP (ref 0.5–1.3)
CREAT SERPL-MCNC: 0.68 MG/DL — SIGNIFICANT CHANGE UP (ref 0.5–1.3)
CRYPTOC AG FLD QL: NEGATIVE — SIGNIFICANT CHANGE UP
CRYPTOC AG FLD QL: NEGATIVE — SIGNIFICANT CHANGE UP
CULTURE RESULTS: ABNORMAL
CULTURE RESULTS: ABNORMAL
EGFR: 112 ML/MIN/1.73M2 — SIGNIFICANT CHANGE UP
EGFR: 112 ML/MIN/1.73M2 — SIGNIFICANT CHANGE UP
EOSINOPHIL # BLD AUTO: 0.24 K/UL — SIGNIFICANT CHANGE UP (ref 0–0.5)
EOSINOPHIL # BLD AUTO: 0.24 K/UL — SIGNIFICANT CHANGE UP (ref 0–0.5)
EOSINOPHIL NFR BLD AUTO: 4 % — SIGNIFICANT CHANGE UP (ref 0–6)
EOSINOPHIL NFR BLD AUTO: 4 % — SIGNIFICANT CHANGE UP (ref 0–6)
GLUCOSE SERPL-MCNC: 94 MG/DL — SIGNIFICANT CHANGE UP (ref 70–99)
GLUCOSE SERPL-MCNC: 94 MG/DL — SIGNIFICANT CHANGE UP (ref 70–99)
HCT VFR BLD CALC: 36.4 % — LOW (ref 39–50)
HCT VFR BLD CALC: 36.4 % — LOW (ref 39–50)
HGB BLD-MCNC: 11.8 G/DL — LOW (ref 13–17)
HGB BLD-MCNC: 11.8 G/DL — LOW (ref 13–17)
IMM GRANULOCYTES NFR BLD AUTO: 0.5 % — SIGNIFICANT CHANGE UP (ref 0–0.9)
IMM GRANULOCYTES NFR BLD AUTO: 0.5 % — SIGNIFICANT CHANGE UP (ref 0–0.9)
LYMPHOCYTES # BLD AUTO: 1.83 K/UL — SIGNIFICANT CHANGE UP (ref 1–3.3)
LYMPHOCYTES # BLD AUTO: 1.83 K/UL — SIGNIFICANT CHANGE UP (ref 1–3.3)
LYMPHOCYTES # BLD AUTO: 30.1 % — SIGNIFICANT CHANGE UP (ref 13–44)
LYMPHOCYTES # BLD AUTO: 30.1 % — SIGNIFICANT CHANGE UP (ref 13–44)
MAGNESIUM SERPL-MCNC: 1.5 MG/DL — LOW (ref 1.6–2.6)
MAGNESIUM SERPL-MCNC: 1.5 MG/DL — LOW (ref 1.6–2.6)
MCHC RBC-ENTMCNC: 27.4 PG — SIGNIFICANT CHANGE UP (ref 27–34)
MCHC RBC-ENTMCNC: 27.4 PG — SIGNIFICANT CHANGE UP (ref 27–34)
MCHC RBC-ENTMCNC: 32.4 GM/DL — SIGNIFICANT CHANGE UP (ref 32–36)
MCHC RBC-ENTMCNC: 32.4 GM/DL — SIGNIFICANT CHANGE UP (ref 32–36)
MCV RBC AUTO: 84.5 FL — SIGNIFICANT CHANGE UP (ref 80–100)
MCV RBC AUTO: 84.5 FL — SIGNIFICANT CHANGE UP (ref 80–100)
METHOD TYPE: SIGNIFICANT CHANGE UP
METHOD TYPE: SIGNIFICANT CHANGE UP
MONOCYTES # BLD AUTO: 0.59 K/UL — SIGNIFICANT CHANGE UP (ref 0–0.9)
MONOCYTES # BLD AUTO: 0.59 K/UL — SIGNIFICANT CHANGE UP (ref 0–0.9)
MONOCYTES NFR BLD AUTO: 9.7 % — SIGNIFICANT CHANGE UP (ref 2–14)
MONOCYTES NFR BLD AUTO: 9.7 % — SIGNIFICANT CHANGE UP (ref 2–14)
N GONORRHOEA RRNA SPEC QL NAA+PROBE: SIGNIFICANT CHANGE UP
N GONORRHOEA RRNA SPEC QL NAA+PROBE: SIGNIFICANT CHANGE UP
NEUTROPHILS # BLD AUTO: 3.35 K/UL — SIGNIFICANT CHANGE UP (ref 1.8–7.4)
NEUTROPHILS # BLD AUTO: 3.35 K/UL — SIGNIFICANT CHANGE UP (ref 1.8–7.4)
NEUTROPHILS NFR BLD AUTO: 55.2 % — SIGNIFICANT CHANGE UP (ref 43–77)
NEUTROPHILS NFR BLD AUTO: 55.2 % — SIGNIFICANT CHANGE UP (ref 43–77)
NRBC # BLD: 0 /100 WBCS — SIGNIFICANT CHANGE UP (ref 0–0)
NRBC # BLD: 0 /100 WBCS — SIGNIFICANT CHANGE UP (ref 0–0)
ORGANISM # SPEC MICROSCOPIC CNT: ABNORMAL
ORGANISM # SPEC MICROSCOPIC CNT: ABNORMAL
ORGANISM # SPEC MICROSCOPIC CNT: SIGNIFICANT CHANGE UP
ORGANISM # SPEC MICROSCOPIC CNT: SIGNIFICANT CHANGE UP
PHOSPHATE SERPL-MCNC: 3.6 MG/DL — SIGNIFICANT CHANGE UP (ref 2.5–4.5)
PHOSPHATE SERPL-MCNC: 3.6 MG/DL — SIGNIFICANT CHANGE UP (ref 2.5–4.5)
PLATELET # BLD AUTO: 283 K/UL — SIGNIFICANT CHANGE UP (ref 150–400)
PLATELET # BLD AUTO: 283 K/UL — SIGNIFICANT CHANGE UP (ref 150–400)
POTASSIUM SERPL-MCNC: 4 MMOL/L — SIGNIFICANT CHANGE UP (ref 3.5–5.3)
POTASSIUM SERPL-MCNC: 4 MMOL/L — SIGNIFICANT CHANGE UP (ref 3.5–5.3)
POTASSIUM SERPL-SCNC: 4 MMOL/L — SIGNIFICANT CHANGE UP (ref 3.5–5.3)
POTASSIUM SERPL-SCNC: 4 MMOL/L — SIGNIFICANT CHANGE UP (ref 3.5–5.3)
PROT SERPL-MCNC: 7.8 G/DL — SIGNIFICANT CHANGE UP (ref 6–8.3)
PROT SERPL-MCNC: 7.8 G/DL — SIGNIFICANT CHANGE UP (ref 6–8.3)
RBC # BLD: 4.31 M/UL — SIGNIFICANT CHANGE UP (ref 4.2–5.8)
RBC # BLD: 4.31 M/UL — SIGNIFICANT CHANGE UP (ref 4.2–5.8)
RBC # FLD: 13.5 % — SIGNIFICANT CHANGE UP (ref 10.3–14.5)
RBC # FLD: 13.5 % — SIGNIFICANT CHANGE UP (ref 10.3–14.5)
SODIUM SERPL-SCNC: 137 MMOL/L — SIGNIFICANT CHANGE UP (ref 135–145)
SODIUM SERPL-SCNC: 137 MMOL/L — SIGNIFICANT CHANGE UP (ref 135–145)
SPECIMEN SOURCE: SIGNIFICANT CHANGE UP
SPECIMEN SOURCE: SIGNIFICANT CHANGE UP
WBC # BLD: 6.07 K/UL — SIGNIFICANT CHANGE UP (ref 3.8–10.5)
WBC # BLD: 6.07 K/UL — SIGNIFICANT CHANGE UP (ref 3.8–10.5)
WBC # FLD AUTO: 6.07 K/UL — SIGNIFICANT CHANGE UP (ref 3.8–10.5)
WBC # FLD AUTO: 6.07 K/UL — SIGNIFICANT CHANGE UP (ref 3.8–10.5)

## 2023-11-14 PROCEDURE — 99233 SBSQ HOSP IP/OBS HIGH 50: CPT | Mod: GC

## 2023-11-14 PROCEDURE — 99232 SBSQ HOSP IP/OBS MODERATE 35: CPT

## 2023-11-14 RX ORDER — MUPIROCIN 20 MG/G
1 OINTMENT TOPICAL
Refills: 0 | Status: DISCONTINUED | OUTPATIENT
Start: 2023-11-14 | End: 2023-11-15

## 2023-11-14 RX ORDER — MAGNESIUM SULFATE 500 MG/ML
4 VIAL (ML) INJECTION ONCE
Refills: 0 | Status: COMPLETED | OUTPATIENT
Start: 2023-11-14 | End: 2023-11-14

## 2023-11-14 RX ORDER — BICTEGRAVIR SODIUM, EMTRICITABINE, AND TENOFOVIR ALAFENAMIDE FUMARATE 30; 120; 15 MG/1; MG/1; MG/1
1 TABLET ORAL DAILY
Refills: 0 | Status: DISCONTINUED | OUTPATIENT
Start: 2023-11-14 | End: 2023-11-15

## 2023-11-14 RX ADMIN — MUPIROCIN 1 APPLICATION(S): 20 OINTMENT TOPICAL at 17:51

## 2023-11-14 RX ADMIN — FLUCONAZOLE 400 MILLIGRAM(S): 150 TABLET ORAL at 05:57

## 2023-11-14 RX ADMIN — DIPHENHYDRAMINE HYDROCHLORIDE AND LIDOCAINE HYDROCHLORIDE AND ALUMINUM HYDROXIDE AND MAGNESIUM HYDRO 10 MILLILITER(S): KIT at 16:38

## 2023-11-14 RX ADMIN — Medication 25 GRAM(S): at 16:37

## 2023-11-14 RX ADMIN — BICTEGRAVIR SODIUM, EMTRICITABINE, AND TENOFOVIR ALAFENAMIDE FUMARATE 1 TABLET(S): 30; 120; 15 TABLET ORAL at 17:45

## 2023-11-14 RX ADMIN — Medication 1 APPLICATION(S): at 05:50

## 2023-11-14 RX ADMIN — ESCITALOPRAM OXALATE 10 MILLIGRAM(S): 10 TABLET, FILM COATED ORAL at 12:22

## 2023-11-14 RX ADMIN — DIPHENHYDRAMINE HYDROCHLORIDE AND LIDOCAINE HYDROCHLORIDE AND ALUMINUM HYDROXIDE AND MAGNESIUM HYDRO 10 MILLILITER(S): KIT at 22:11

## 2023-11-14 RX ADMIN — ENOXAPARIN SODIUM 40 MILLIGRAM(S): 100 INJECTION SUBCUTANEOUS at 05:49

## 2023-11-14 RX ADMIN — DIPHENHYDRAMINE HYDROCHLORIDE AND LIDOCAINE HYDROCHLORIDE AND ALUMINUM HYDROXIDE AND MAGNESIUM HYDRO 10 MILLILITER(S): KIT at 05:50

## 2023-11-14 RX ADMIN — CEFTRIAXONE 100 MILLIGRAM(S): 500 INJECTION, POWDER, FOR SOLUTION INTRAMUSCULAR; INTRAVENOUS at 12:22

## 2023-11-14 RX ADMIN — Medication 1 APPLICATION(S): at 17:51

## 2023-11-14 RX ADMIN — BUDESONIDE AND FORMOTEROL FUMARATE DIHYDRATE 2 PUFF(S): 160; 4.5 AEROSOL RESPIRATORY (INHALATION) at 09:23

## 2023-11-14 RX ADMIN — DIPHENHYDRAMINE HYDROCHLORIDE AND LIDOCAINE HYDROCHLORIDE AND ALUMINUM HYDROXIDE AND MAGNESIUM HYDRO 10 MILLILITER(S): KIT at 09:23

## 2023-11-14 RX ADMIN — BUDESONIDE AND FORMOTEROL FUMARATE DIHYDRATE 2 PUFF(S): 160; 4.5 AEROSOL RESPIRATORY (INHALATION) at 22:11

## 2023-11-14 NOTE — PROGRESS NOTE ADULT - ASSESSMENT
53 year old male with pmhx of HIV, not on meds for 3 years, HTN, Depression, Asthma, and CVA 5-6 years, ago worked up at [facility], who presents with diffuse rash, sore throat w/ new onset hoarse voice, diarrhea, and urinary frequency in the setting of unmanaged HIV and possible opportunistic infections

## 2023-11-14 NOTE — PROGRESS NOTE ADULT - PROBLEM SELECTOR PLAN 9
F: s/p 1L NS, will give additional 1L  E: replete as needed  N: DASH  DVT: lovenox  Dispo: [facility]

## 2023-11-14 NOTE — PROGRESS NOTE ADULT - ASSESSMENT
IMPRESSION:  Uncontrolled HIV, not on ART.  Presence of thrush suggests low CD4 count, placing patient at risk for AIDS-defining illnesses/Opportunistic infections.  Rash on his head is likely due to psoriasis as per patient.  He states the rash on his arms is different.  It could be folliculitis or disseminated disease from an OI.     Diarrhea is likely due to Shigella + E. Coli    Given throat pain and thrush on exam there is concern for candida esophagitis    CD4 count is > 300.  No indication for Bactrim PPX    Recommend:  1.  Can restart antiretroviral medication given negative CMV antigen. Recommend starting Biktarvy  2.  Continue Ceftriaxone 2 grams IV daily for infectious diarrhea - will follow up sensitivity to ceftriaxone. May have to switch antibiotic choice given Shigella' s resistance to multiple abx  3.  Continue Fluconazole at 400 mg PO daily  5.  Check urine histoplasma antigen   6.  Follow up urine for GC/chlamydia   7.  Follow up throat swab for GC/chlamydia  8.  Follow up Dermatology evaluation for evaluation of rash    ID team 1 will follow      IMPRESSION:  Uncontrolled HIV, not on ART.  Presence of thrush suggests low CD4 count, placing patient at risk for AIDS-defining illnesses/Opportunistic infections.  Rash on his head is likely due to psoriasis as per patient.  He states the rash on his arms is different.  It could be folliculitis or disseminated disease from an OI.     Diarrhea is likely due to Shigella + E. Coli    Given throat pain and thrush on exam there is concern for candida esophagitis    CD4 count is > 300.  No indication for Bactrim PPX    Recommend:  1.  Can restart antiretroviral medication given negative cryptococcal antigen. Recommend starting Biktarvy  2.  Continue Ceftriaxone 2 grams IV daily for infectious diarrhea - will follow up sensitivity to ceftriaxone. May have to switch antibiotic choice given Shigella' s resistance to multiple abx  3.  Continue Fluconazole at 400 mg PO daily  5.  Check urine histoplasma antigen   6.  Follow up urine for GC/chlamydia   7.  Follow up throat swab for GC/chlamydia  8.  Follow up Dermatology evaluation for evaluation of rash    ID team 1 will follow      IMPRESSION:  Uncontrolled HIV, not on ART.  Presence of thrush suggests low CD4 count, placing patient at risk for AIDS-defining illnesses/Opportunistic infections.  Rash on his head is likely due to psoriasis as per patient.  He states the rash on his arms is different.  It could be folliculitis or disseminated disease from an OI.     Diarrhea is likely due to Shigella + E. Coli    Given throat pain and thrush on exam there is concern for candida esophagitis    CD4 count is > 300.  No indication for Bactrim PPX    Recommend:  1.  Can restart antiretroviral medication given negative cryptococcal antigen. Recommend starting Biktarvy  2.  Continue Ceftriaxone 2 grams IV daily for infectious diarrhea- shigella sensitive to ceftriaxone  3.  Continue Fluconazole at 400 mg PO daily for thrush  5.  Follow up urine histoplasma antigen   6.  Follow up urine for GC/chlamydia   7.  Follow up throat swab for GC/chlamydia  8.  Follow up Dermatology evaluation for evaluation of rash    ID team 1 will follow

## 2023-11-14 NOTE — DIETITIAN INITIAL EVALUATION ADULT - PERTINENT MEDS FT
MEDICATIONS  (STANDING):  budesonide  80 MICROgram(s)/formoterol 4.5 MICROgram(s) Inhaler 2 Puff(s) Inhalation two times a day  cefTRIAXone   IVPB      cefTRIAXone   IVPB 2000 milliGRAM(s) IV Intermittent every 24 hours  enoxaparin Injectable 40 milliGRAM(s) SubCutaneous every 24 hours  escitalopram 10 milliGRAM(s) Oral daily  FIRST- Mouthwash  BLM 10 milliLiter(s) Swish and Spit every 4 hours  fluconAZOLE   Tablet 400 milliGRAM(s) Oral every 24 hours  hydrocortisone 2.5% Lotion 1 Application(s) Topical two times a day  magnesium sulfate  IVPB 4 Gram(s) IV Intermittent once    MEDICATIONS  (PRN):  acetaminophen     Tablet .. 650 milliGRAM(s) Oral every 6 hours PRN Temp greater or equal to 38C (100.4F), Mild Pain (1 - 3)  albuterol/ipratropium for Nebulization 3 milliLiter(s) Nebulizer every 6 hours PRN Shortness of Breath and/or Wheezing  melatonin 3 milliGRAM(s) Oral at bedtime PRN Insomnia  ondansetron Injectable 4 milliGRAM(s) IV Push every 8 hours PRN Nausea and/or Vomiting

## 2023-11-14 NOTE — PROGRESS NOTE ADULT - SUBJECTIVE AND OBJECTIVE BOX
**INCOMPLETE NOTE    OVERNIGHT EVENTS:    SUBJECTIVE:  Patient seen and examined at bedside.    Vital Signs Last 12 Hrs  T(F): 97.9 (11-14-23 @ 05:27), Max: 98.5 (11-13-23 @ 21:06)  HR: 95 (11-14-23 @ 05:27) (89 - 95)  BP: 137/88 (11-14-23 @ 05:27) (131/86 - 137/88)  BP(mean): --  RR: 18 (11-14-23 @ 05:27) (18 - 18)  SpO2: 97% (11-14-23 @ 05:27) (97% - 97%)  I&O's Summary      PHYSICAL EXAM:  Constitutional: NAD, comfortable in bed.  HEENT: NC/AT, PERRLA, EOMI, no conjunctival pallor or scleral icterus, MMM  Neck: Supple, no JVD  Respiratory: CTA B/L. No w/r/r.   Cardiovascular: RRR, normal S1 and S2, no m/r/g.   Gastrointestinal: +BS, soft NTND, no guarding or rebound tenderness, no palpable masses   Extremities: wwp; no cyanosis, clubbing or edema.   Vascular: Pulses equal and strong throughout.   Neurological: AAOx3, no CN deficits, strength and sensation intact throughout.   Skin: No gross skin abnormalities or rashes        LABS:                        11.4   6.75  )-----------( 263      ( 13 Nov 2023 07:29 )             34.5     11-13    140  |  104  |  9   ----------------------------<  92  3.7   |  27  |  0.76    Ca    8.8      13 Nov 2023 07:29  Phos  3.6     11-13  Mg     1.8     11-13    TPro  7.9  /  Alb  3.4  /  TBili  0.2  /  DBili  x   /  AST  25  /  ALT  17  /  AlkPhos  73  11-13      Urinalysis Basic - ( 13 Nov 2023 07:29 )    Color: x / Appearance: x / SG: x / pH: x  Gluc: 92 mg/dL / Ketone: x  / Bili: x / Urobili: x   Blood: x / Protein: x / Nitrite: x   Leuk Esterase: x / RBC: x / WBC x   Sq Epi: x / Non Sq Epi: x / Bacteria: x          RADIOLOGY & ADDITIONAL TESTS:    MEDICATIONS  (STANDING):  budesonide  80 MICROgram(s)/formoterol 4.5 MICROgram(s) Inhaler 2 Puff(s) Inhalation two times a day  cefTRIAXone   IVPB      cefTRIAXone   IVPB 2000 milliGRAM(s) IV Intermittent every 24 hours  enoxaparin Injectable 40 milliGRAM(s) SubCutaneous every 24 hours  escitalopram 10 milliGRAM(s) Oral daily  FIRST- Mouthwash  BLM 10 milliLiter(s) Swish and Spit every 4 hours  fluconAZOLE   Tablet 400 milliGRAM(s) Oral every 24 hours  hydrocortisone 2.5% Lotion 1 Application(s) Topical two times a day    MEDICATIONS  (PRN):  acetaminophen     Tablet .. 650 milliGRAM(s) Oral every 6 hours PRN Temp greater or equal to 38C (100.4F), Mild Pain (1 - 3)  albuterol/ipratropium for Nebulization 3 milliLiter(s) Nebulizer every 6 hours PRN Shortness of Breath and/or Wheezing  melatonin 3 milliGRAM(s) Oral at bedtime PRN Insomnia  ondansetron Injectable 4 milliGRAM(s) IV Push every 8 hours PRN Nausea and/or Vomiting   **INCOMPLETE NOTE    OVERNIGHT EVENTS: PHILIPPE.     SUBJECTIVE:  Patient seen and examined at bedside. Patient has no acute complaints. He states he's improving and feeling fine today. However, the watery diarrhea has not improved and he still remains with 4-5 episodes of watery diarrhea daily.    Vital Signs Last 12 Hrs  T(F): 97.9 (11-14-23 @ 05:27), Max: 98.5 (11-13-23 @ 21:06)  HR: 95 (11-14-23 @ 05:27) (89 - 95)  BP: 137/88 (11-14-23 @ 05:27) (131/86 - 137/88)  BP(mean): --  RR: 18 (11-14-23 @ 05:27) (18 - 18)  SpO2: 97% (11-14-23 @ 05:27) (97% - 97%)  I&O's Summary      PHYSICAL EXAM:  Constitutional: NAD, comfortable in bed.  HEENT:No scleral icterus, MMM  Neck: Supple, no thyromegaly   Respiratory: CTA B/L. No w/r/r.   Cardiovascular: RRR, normal S1 and S2, no murmurs   Gastrointestinal: +BS, soft non-distended, TTP in RUQ and LUQ, no palpable masses   Extremities: wwp; no pitting edema.   Vascular: Pulses +2 equal and strong throughout.   Neurological: AAOx3  Skin: Rash present on face, bilateral upper and lower extremities and chest.         LABS:                        11.4   6.75  )-----------( 263      ( 13 Nov 2023 07:29 )             34.5     11-13    140  |  104  |  9   ----------------------------<  92  3.7   |  27  |  0.76    Ca    8.8      13 Nov 2023 07:29  Phos  3.6     11-13  Mg     1.8     11-13    TPro  7.9  /  Alb  3.4  /  TBili  0.2  /  DBili  x   /  AST  25  /  ALT  17  /  AlkPhos  73  11-13      Urinalysis Basic - ( 13 Nov 2023 07:29 )    Color: x / Appearance: x / SG: x / pH: x  Gluc: 92 mg/dL / Ketone: x  / Bili: x / Urobili: x   Blood: x / Protein: x / Nitrite: x   Leuk Esterase: x / RBC: x / WBC x   Sq Epi: x / Non Sq Epi: x / Bacteria: x          RADIOLOGY & ADDITIONAL TESTS:    MEDICATIONS  (STANDING):  budesonide  80 MICROgram(s)/formoterol 4.5 MICROgram(s) Inhaler 2 Puff(s) Inhalation two times a day  cefTRIAXone   IVPB      cefTRIAXone   IVPB 2000 milliGRAM(s) IV Intermittent every 24 hours  enoxaparin Injectable 40 milliGRAM(s) SubCutaneous every 24 hours  escitalopram 10 milliGRAM(s) Oral daily  FIRST- Mouthwash  BLM 10 milliLiter(s) Swish and Spit every 4 hours  fluconAZOLE   Tablet 400 milliGRAM(s) Oral every 24 hours  hydrocortisone 2.5% Lotion 1 Application(s) Topical two times a day    MEDICATIONS  (PRN):  acetaminophen     Tablet .. 650 milliGRAM(s) Oral every 6 hours PRN Temp greater or equal to 38C (100.4F), Mild Pain (1 - 3)  albuterol/ipratropium for Nebulization 3 milliLiter(s) Nebulizer every 6 hours PRN Shortness of Breath and/or Wheezing  melatonin 3 milliGRAM(s) Oral at bedtime PRN Insomnia  ondansetron Injectable 4 milliGRAM(s) IV Push every 8 hours PRN Nausea and/or Vomiting   OVERNIGHT EVENTS: PHILIPPE.     SUBJECTIVE:  Patient seen and examined at bedside. Patient has no acute complaints. He states he's improving and feeling fine today. However, the watery diarrhea has not improved and he still remains with 4-5 episodes of watery diarrhea daily.    Vital Signs Last 12 Hrs  T(F): 97.9 (11-14-23 @ 05:27), Max: 98.5 (11-13-23 @ 21:06)  HR: 95 (11-14-23 @ 05:27) (89 - 95)  BP: 137/88 (11-14-23 @ 05:27) (131/86 - 137/88)  BP(mean): --  RR: 18 (11-14-23 @ 05:27) (18 - 18)  SpO2: 97% (11-14-23 @ 05:27) (97% - 97%)  I&O's Summary      PHYSICAL EXAM:  Constitutional: NAD, comfortable in bed.  HEENT:No scleral icterus, MMM  Neck: Supple, no thyromegaly   Respiratory: CTA B/L. No w/r/r.   Cardiovascular: RRR, normal S1 and S2, no murmurs   Gastrointestinal: +BS, soft non-distended, TTP in RUQ and LUQ, no palpable masses   Extremities: wwp; no pitting edema.   Vascular: Pulses +2 equal and strong throughout.   Neurological: AAOx3  Skin: Rash present on face, bilateral upper and lower extremities and chest.         LABS:                        11.4   6.75  )-----------( 263      ( 13 Nov 2023 07:29 )             34.5     11-13    140  |  104  |  9   ----------------------------<  92  3.7   |  27  |  0.76    Ca    8.8      13 Nov 2023 07:29  Phos  3.6     11-13  Mg     1.8     11-13    TPro  7.9  /  Alb  3.4  /  TBili  0.2  /  DBili  x   /  AST  25  /  ALT  17  /  AlkPhos  73  11-13      Urinalysis Basic - ( 13 Nov 2023 07:29 )    Color: x / Appearance: x / SG: x / pH: x  Gluc: 92 mg/dL / Ketone: x  / Bili: x / Urobili: x   Blood: x / Protein: x / Nitrite: x   Leuk Esterase: x / RBC: x / WBC x   Sq Epi: x / Non Sq Epi: x / Bacteria: x          RADIOLOGY & ADDITIONAL TESTS:    MEDICATIONS  (STANDING):  budesonide  80 MICROgram(s)/formoterol 4.5 MICROgram(s) Inhaler 2 Puff(s) Inhalation two times a day  cefTRIAXone   IVPB      cefTRIAXone   IVPB 2000 milliGRAM(s) IV Intermittent every 24 hours  enoxaparin Injectable 40 milliGRAM(s) SubCutaneous every 24 hours  escitalopram 10 milliGRAM(s) Oral daily  FIRST- Mouthwash  BLM 10 milliLiter(s) Swish and Spit every 4 hours  fluconAZOLE   Tablet 400 milliGRAM(s) Oral every 24 hours  hydrocortisone 2.5% Lotion 1 Application(s) Topical two times a day    MEDICATIONS  (PRN):  acetaminophen     Tablet .. 650 milliGRAM(s) Oral every 6 hours PRN Temp greater or equal to 38C (100.4F), Mild Pain (1 - 3)  albuterol/ipratropium for Nebulization 3 milliLiter(s) Nebulizer every 6 hours PRN Shortness of Breath and/or Wheezing  melatonin 3 milliGRAM(s) Oral at bedtime PRN Insomnia  ondansetron Injectable 4 milliGRAM(s) IV Push every 8 hours PRN Nausea and/or Vomiting

## 2023-11-14 NOTE — DIETITIAN INITIAL EVALUATION ADULT - OTHER CALCULATIONS
Based on Standards of Care pt within % IBW (109%) thus actual body weight used for all calculations (200#). Needs adjusted for HIV, malnutrition.

## 2023-11-14 NOTE — PROGRESS NOTE ADULT - PROBLEM SELECTOR PLAN 1
Patient w/ HIV/AIDS, previously followed with Dr. Pacheco outpatient, who presents with rash, diarrhea, fatigue, and oral thrush. States noncompliance with HIV meds for 3 years in the setting of worsening depression. Prev on Descovy and Tivicay. Neg Kernig and Brudzinski signs on exam    - Obtained CD4 on 11/13/23 and results were ABS CD4: 374   - ID consulted on 11/13/23 and suggested diarrhea is likely due to shigella+ E.Coli. Also mentioned throat pain and thrush on exam may be candida for esophagitis.     Plan:  - hold off on HAART per ID recs 11/13/23   - Pending Viral Load   - hold off on bactrim for prophylaxis per ID recs   -c/w Ceftriaxone 2g IV daily and Fluconazole 400mg PO daily. Patient w/ HIV/AIDS, previously followed with Dr. Pacheco outpatient, who presents with rash, diarrhea, fatigue, and oral thrush. States noncompliance with HIV meds for 3 years in the setting of worsening depression. Prev on Descovy and Tivicay. Neg Kernig and Brudzinski signs on exam    - Obtained CD4 on 11/13/23 and results were ABS CD4: 374   - ID consulted on 11/13/23 and suggested diarrhea is likely due to shigella+ E.Coli. Also mentioned throat pain and thrush on exam may be candida for esophagitis.   - Viral Load is 76,468 obtained 11/11/23     Plan:  - hold off on HAART per ID recs 11/13/23   - hold off on bactrim for prophylaxis per ID recs   -c/w Ceftriaxone 2g IV daily and Fluconazole 400mg PO daily. Patient w/ HIV/AIDS, previously followed with Dr. Pacheco outpatient, who presents with rash, diarrhea, fatigue, and oral thrush. States noncompliance with HIV meds for 3 years in the setting of worsening depression. Prev on Descovy and Tivicay. Neg Kernig and Brudzinski signs on exam    - Obtained CD4 on 11/13/23 and results were ABS CD4: 374   - ID consulted on 11/13/23 and suggested diarrhea is likely due to shigella+ E.Coli. Also mentioned throat pain and thrush on exam may be candida for esophagitis.   - Viral Load is 76,468 obtained 11/11/23     Plan:  - Started Biktarvy PO QD per ID recs given negative crypto workup  - c/w Ceftriaxone 2g IV daily and Fluconazole 400mg PO daily.  - hold off on bactrim for prophylaxis per ID recs

## 2023-11-14 NOTE — PROGRESS NOTE ADULT - SUBJECTIVE AND OBJECTIVE BOX
INTERVAL HPI/OVERNIGHT EVENTS:    OVERNIGHT: No overnight events.  SUBJECTIVE: Patient seen and examined at bedside. Still complaining of diarrhea with 4-5 episodes a day. When talking this morning, states that he had one movement in the early morning loose in consistency. No facial pain more so complaining of itching. No pain, significant itching at both upper extremities Odynophagia has improved.     REVIEW OF SYSTEMS:    CONSTITUTIONAL: No weakness, fevers or chills  EYES/ENT: No visual changes;  No vertigo or throat pain   NECK: No pain or stiffness  RESPIRATORY: No cough, wheezing, hemoptysis; No shortness of breath  CARDIOVASCULAR: No chest pain or palpitations  GASTROINTESTINAL: Complaining of epigastric pain and continued loose stools. No nausea, vomiting, or hematemesis. No melena or hematochezia.  GENITOURINARY: No dysuria, frequency or hematuria  NEUROLOGICAL: No numbness or weakness  SKIN: Facial and upper extremity itching        Allergies    No Known Allergies    Intolerances        ANTIBIOTICS/RELEVANT:  antimicrobials  cefTRIAXone   IVPB      cefTRIAXone   IVPB 2000 milliGRAM(s) IV Intermittent every 24 hours  fluconAZOLE   Tablet 400 milliGRAM(s) Oral every 24 hours    immunologic:    OTHER:  acetaminophen     Tablet .. 650 milliGRAM(s) Oral every 6 hours PRN  albuterol/ipratropium for Nebulization 3 milliLiter(s) Nebulizer every 6 hours PRN  budesonide  80 MICROgram(s)/formoterol 4.5 MICROgram(s) Inhaler 2 Puff(s) Inhalation two times a day  enoxaparin Injectable 40 milliGRAM(s) SubCutaneous every 24 hours  escitalopram 10 milliGRAM(s) Oral daily  FIRST- Mouthwash  BLM 10 milliLiter(s) Swish and Spit every 4 hours  hydrocortisone 2.5% Lotion 1 Application(s) Topical two times a day  magnesium sulfate  IVPB 4 Gram(s) IV Intermittent once  melatonin 3 milliGRAM(s) Oral at bedtime PRN  ondansetron Injectable 4 milliGRAM(s) IV Push every 8 hours PRN      Objective:  Vital Signs Last 24 Hrs  T(C): 36.6 (14 Nov 2023 05:27), Max: 36.9 (13 Nov 2023 21:06)  T(F): 97.9 (14 Nov 2023 05:27), Max: 98.5 (13 Nov 2023 21:06)  HR: 95 (14 Nov 2023 05:27) (89 - 100)  BP: 137/88 (14 Nov 2023 05:27) (131/86 - 154/77)  BP(mean): --  RR: 18 (14 Nov 2023 05:27) (18 - 18)  SpO2: 97% (14 Nov 2023 05:27) (95% - 97%)    Parameters below as of 14 Nov 2023 05:27  Patient On (Oxygen Delivery Method): room air          PHYSICAL EXAM:    Constitutional: Resting comfortably in bed  Respiratory: CTA B/L; no W/R/R, no retractions  Cardiac: +S1/S2; RRR; no M/R/G; PMI non-displaced  Gastrointestinal: soft, NT/ND; no rebound or guarding; +BS, no hepatosplenomegaly  Extremities: No clubbing or cyanosis; no peripheral edema  Vascular: 2+ radial, PT pulses B/L  Skin: Follicular rash at b/l upper extremities and psoriatic findings on scalp  Neurologic: AAOx3; answers questions appropriately, follows commands, moves all extremities, CNII-XII grossly intact; no focal deficits        LABS:                        11.8   6.07  )-----------( 283      ( 14 Nov 2023 05:30 )             36.4     11-14    137  |  103  |  11  ----------------------------<  94  4.0   |  25  |  0.68    Ca    8.9      14 Nov 2023 05:30  Phos  3.6     11-14  Mg     1.5     11-14    TPro  7.8  /  Alb  3.4  /  TBili  0.2  /  DBili  x   /  AST  26  /  ALT  19  /  AlkPhos  73  11-14      Urinalysis Basic - ( 14 Nov 2023 05:30 )    Color: x / Appearance: x / SG: x / pH: x  Gluc: 94 mg/dL / Ketone: x  / Bili: x / Urobili: x   Blood: x / Protein: x / Nitrite: x   Leuk Esterase: x / RBC: x / WBC x   Sq Epi: x / Non Sq Epi: x / Bacteria: x        MICROBIOLOGY:            RECENT CULTURES:  11-11 @ 05:18  .Stool None  Shigella species  Shigella species  TIMOTEO    Numerous Shigella species  Result called to and read back byDaniel Stauffer RN ([id number])  11/14/2023 09:32:50  --      RADIOLOGY & ADDITIONAL STUDIES:

## 2023-11-14 NOTE — DIETITIAN INITIAL EVALUATION ADULT - PROBLEM SELECTOR PLAN 2
Patient presents with 2 different appearing rashes for the past 2 weeks. Rash on the face appears to be exacerbation of psoriasis in setting of HIV. Also has follicular rash on UE and chest. All possibly 2/2 unmanaged HIV/AIDS    Plan:  - c/w HAART  - f/u syphilis  - f/u ID recs

## 2023-11-14 NOTE — DIETITIAN INITIAL EVALUATION ADULT - ADD RECOMMEND
1. Continue with regular diet, recommend to add Banatrol BID to help with diarrhea. Continue magic mouthwash. Consider oral nutrition supplement if PO intake declines.  2. Encourage pt to meet nutritional needs as able   3. Monitor labs: electrolytes, cmp  4. Monitor weights   5. Pain and bowel regimen per team   6. Will continue to assess/honor food preferences as able  7. Monitor adherence to diet education

## 2023-11-14 NOTE — DIETITIAN INITIAL EVALUATION ADULT - PROBLEM SELECTOR PLAN 3
Patient presents with chronic diarrhea that comes and goes, last major episode 2 days ago. States bm's remain loose though. Was hospitalized in 08/2022 for enterocolitis with GI PCR+ for shigella, enteroinvasive e coli, giardia, EPEC. Currently not septic, holding off on empiric tx    Plan:  - f/u GI PCR

## 2023-11-14 NOTE — DIETITIAN INITIAL EVALUATION ADULT - PERTINENT LABORATORY DATA
11-14    137  |  103  |  11  ----------------------------<  94  4.0   |  25  |  0.68    Ca    8.9      14 Nov 2023 05:30  Phos  3.6     11-14  Mg     1.5     11-14    TPro  7.8  /  Alb  3.4  /  TBili  0.2  /  DBili  x   /  AST  26  /  ALT  19  /  AlkPhos  73  11-14

## 2023-11-14 NOTE — PROGRESS NOTE ADULT - PROBLEM SELECTOR PLAN 2
Patient presents with 2 different appearing rashes for the past 2 weeks. Rash on the face appears to be exacerbation of psoriasis in setting of HIV. Also has follicular rash on UE and chest. 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 They recommend topical clindamycin lotion BID and once discharged OTC benzoyl peroxide 10% wash for affected areas. They suggest if any vesicles form to swab with a viral culture to rule out any VZV.     Plan:  - f/u syphilis  - f/u ID recs  - consult derm for alternative to clindamycin lotion (not available in hospital)  - increase Hydrocortisone 1% to Hydrocortisone 2.5% BID per Derm recs Patient presents with 2 different appearing rashes for the past 2 weeks. Rash on the face appears to be exacerbation of psoriasis in setting of HIV. Also has follicular rash on UE and chest. 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 They recommend topical clindamycin lotion BID and once discharged OTC benzoyl peroxide 10% wash for affected areas. They suggest if any vesicles form to swab with a viral culture to rule out any VZV.     Plan:  - f/u ID recs  - consult derm for alternative to clindamycin lotion (not available in hospital)  - c/w Hydrocortisone 2.5% BID Patient presents with 2 different appearing rashes for the past 2 weeks. Rash on the face appears to be exacerbation of psoriasis in setting of HIV. Also has follicular rash on UE and chest. 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 They recommend topical clindamycin lotion BID and once discharged OTC benzoyl peroxide 10% wash for affected areas. They suggest if any vesicles form to swab with a viral culture to rule out any VZV.     Plan:  - f/u ID recs  - Started mupirocin 2% BID on affected areas; derm had rec clindamycin or erythromycin but both not available at [facility]. ID pharmacist Raffy consulted who recommended starting mupirocin.   - c/w Hydrocortisone 2.5% BID

## 2023-11-14 NOTE — DIETITIAN NUTRITION RISK NOTIFICATION - ADDITIONAL COMMENTS/DIETITIAN RECOMMENDATIONS
Continue with regular diet, recommend to add Banatrol BID to help with diarrhea. Continue magic mouthwash. Consider oral nutrition supplement if PO intake declines. Provided nutrition education.

## 2023-11-14 NOTE — DIETITIAN INITIAL EVALUATION ADULT - PROBLEM SELECTOR PLAN 1
Patient w/ HIV/AIDS, previously followed with Dr. Pacheco outpatient, who presents with rash, diarrhea, fatigue, and oral thrush. States noncompliance with HIV meds for 3 years in the setting of worsening depression. Prev on Descovy and Tivicay. Neg Kernig and Brudzinski signs on exam    Plan:  - c/w previous meds  - f/u VL and CD4  - start bactrim for prophylaxis  - ID consult in AM

## 2023-11-14 NOTE — DIETITIAN INITIAL EVALUATION ADULT - OTHER INFO
53 year old male with pmhx of HIV, not on meds for 3 years, HTN, Depression, Asthma, and CVA 5-6 years ago, worked up at [facility], who presents with diffuse rash, sore throat w/ new onset hoarse voice, diarrhea, and urinary frequency. Patient states he has been dealing with significant depression for the past few years since his  . For about 3 years now, has not taken any medications for any of his medical issues and has not had regular follow up with PCP. Of note, patient previously followed with Dr. Pacheco in clinic but since she left has not seen anyone. Patient states he developed psoriatic rash on his face for the first time about 2 weeks ago. Prior to that, had never had a rash below his hairline. Patient also presents with, what appears to be, folliculitis on his arms sparing the palms. Patient also endorses oral thrush which he feels is also extending to his throat. Today he woke up and had a hoarse voice and worsening weakness so he decided to come in. Patient also endorsing diarrhea which he states is chronic, comes and goes. Denies any pain with defecation, melena, or hematochezia. States he has over 5 bm's a day, small amount of loose stool. Last episode of pure watery stool was 2 days ago.     Patient seen at bedside for MST assessment. Current diet order: regular. No known food allergies. No difficulty chewing/swallowing reported. Patient has oral thrush and shigella associated diarrhea. Reports that neither symptoms have affected his appetite recently. Ordered for magic mouthwash and antibiotics, recommend to add Banatrol BID to help with diarrhea. Consumed avocado toast, fruit, and donut at breakfast. Patient reports generally eating less over the past year which he attributes to depression and sleeping a lot. Provided nutrition education discussing importance of adequate protein, food sources of protein. Dosing weight: 200 pounds, BMI 26.4, per Adrian TRIMBLE, patient weighed 250# in 2023. 50lb/20% weight loss x8 months - clinically significant. No pressure injuries documented. No edema documented. Magnesium low. Meds: antibiotics, magnesium sulfate, magic mouthwash. Observed with severe muscle wasting to temples and clavicles. Based on ASPEN guidelines, pt meets criteria for severe malnutrition. No cultural, ethnic, Yarsanism food preferences reported. See nutrition recommendations below.

## 2023-11-14 NOTE — PROGRESS NOTE ADULT - PROBLEM SELECTOR PLAN 4
Presents with oral thrush and new onset hoarseness of voice. Hx of thrush in the past.     Plan:  - c/w fluconazole 400mg PO

## 2023-11-14 NOTE — PROGRESS NOTE ADULT - PROBLEM SELECTOR PLAN 3
Patient presents with chronic diarrhea that comes and goes, last major episode 2 days ago. States bm's remain loose though. Was hospitalized in 08/2022 for enterocolitis with GI PCR+ for shigella, enteroinvasive e coli, giardia, EPEC. Currently not septic, holding off on empiric tx  - GI PCR obtained 11/11/23 is positive for EPEC and Shigella/ETEC  - C. Diff obtained 11/11/23 is negative    Plan:  -c/w Ceftriaxone 2000mg IV

## 2023-11-15 ENCOUNTER — TRANSCRIPTION ENCOUNTER (OUTPATIENT)
Age: 53
End: 2023-11-15

## 2023-11-15 VITALS
DIASTOLIC BLOOD PRESSURE: 77 MMHG | SYSTOLIC BLOOD PRESSURE: 125 MMHG | RESPIRATION RATE: 18 BRPM | HEART RATE: 86 BPM | OXYGEN SATURATION: 96 % | TEMPERATURE: 99 F

## 2023-11-15 LAB
ALBUMIN SERPL ELPH-MCNC: 3.1 G/DL — LOW (ref 3.3–5)
ALBUMIN SERPL ELPH-MCNC: 3.1 G/DL — LOW (ref 3.3–5)
ALP SERPL-CCNC: 82 U/L — SIGNIFICANT CHANGE UP (ref 40–120)
ALP SERPL-CCNC: 82 U/L — SIGNIFICANT CHANGE UP (ref 40–120)
ALT FLD-CCNC: 18 U/L — SIGNIFICANT CHANGE UP (ref 10–45)
ALT FLD-CCNC: 18 U/L — SIGNIFICANT CHANGE UP (ref 10–45)
ANION GAP SERPL CALC-SCNC: 9 MMOL/L — SIGNIFICANT CHANGE UP (ref 5–17)
ANION GAP SERPL CALC-SCNC: 9 MMOL/L — SIGNIFICANT CHANGE UP (ref 5–17)
AST SERPL-CCNC: 24 U/L — SIGNIFICANT CHANGE UP (ref 10–40)
AST SERPL-CCNC: 24 U/L — SIGNIFICANT CHANGE UP (ref 10–40)
BASOPHILS # BLD AUTO: 0.04 K/UL — SIGNIFICANT CHANGE UP (ref 0–0.2)
BASOPHILS # BLD AUTO: 0.04 K/UL — SIGNIFICANT CHANGE UP (ref 0–0.2)
BASOPHILS NFR BLD AUTO: 0.7 % — SIGNIFICANT CHANGE UP (ref 0–2)
BASOPHILS NFR BLD AUTO: 0.7 % — SIGNIFICANT CHANGE UP (ref 0–2)
BILIRUB SERPL-MCNC: 0.2 MG/DL — SIGNIFICANT CHANGE UP (ref 0.2–1.2)
BILIRUB SERPL-MCNC: 0.2 MG/DL — SIGNIFICANT CHANGE UP (ref 0.2–1.2)
BUN SERPL-MCNC: 12 MG/DL — SIGNIFICANT CHANGE UP (ref 7–23)
BUN SERPL-MCNC: 12 MG/DL — SIGNIFICANT CHANGE UP (ref 7–23)
C TRACH RRNA SPEC QL NAA+PROBE: SIGNIFICANT CHANGE UP
C TRACH RRNA SPEC QL NAA+PROBE: SIGNIFICANT CHANGE UP
CALCIUM SERPL-MCNC: 8.7 MG/DL — SIGNIFICANT CHANGE UP (ref 8.4–10.5)
CALCIUM SERPL-MCNC: 8.7 MG/DL — SIGNIFICANT CHANGE UP (ref 8.4–10.5)
CHLORIDE SERPL-SCNC: 106 MMOL/L — SIGNIFICANT CHANGE UP (ref 96–108)
CHLORIDE SERPL-SCNC: 106 MMOL/L — SIGNIFICANT CHANGE UP (ref 96–108)
CO2 SERPL-SCNC: 25 MMOL/L — SIGNIFICANT CHANGE UP (ref 22–31)
CO2 SERPL-SCNC: 25 MMOL/L — SIGNIFICANT CHANGE UP (ref 22–31)
CREAT SERPL-MCNC: 0.9 MG/DL — SIGNIFICANT CHANGE UP (ref 0.5–1.3)
CREAT SERPL-MCNC: 0.9 MG/DL — SIGNIFICANT CHANGE UP (ref 0.5–1.3)
EGFR: 103 ML/MIN/1.73M2 — SIGNIFICANT CHANGE UP
EGFR: 103 ML/MIN/1.73M2 — SIGNIFICANT CHANGE UP
EOSINOPHIL # BLD AUTO: 0.21 K/UL — SIGNIFICANT CHANGE UP (ref 0–0.5)
EOSINOPHIL # BLD AUTO: 0.21 K/UL — SIGNIFICANT CHANGE UP (ref 0–0.5)
EOSINOPHIL NFR BLD AUTO: 3.9 % — SIGNIFICANT CHANGE UP (ref 0–6)
EOSINOPHIL NFR BLD AUTO: 3.9 % — SIGNIFICANT CHANGE UP (ref 0–6)
GLUCOSE SERPL-MCNC: 117 MG/DL — HIGH (ref 70–99)
GLUCOSE SERPL-MCNC: 117 MG/DL — HIGH (ref 70–99)
HCT VFR BLD CALC: 35.5 % — LOW (ref 39–50)
HCT VFR BLD CALC: 35.5 % — LOW (ref 39–50)
HGB BLD-MCNC: 11.8 G/DL — LOW (ref 13–17)
HGB BLD-MCNC: 11.8 G/DL — LOW (ref 13–17)
IMM GRANULOCYTES NFR BLD AUTO: 0.4 % — SIGNIFICANT CHANGE UP (ref 0–0.9)
IMM GRANULOCYTES NFR BLD AUTO: 0.4 % — SIGNIFICANT CHANGE UP (ref 0–0.9)
LYMPHOCYTES # BLD AUTO: 1.88 K/UL — SIGNIFICANT CHANGE UP (ref 1–3.3)
LYMPHOCYTES # BLD AUTO: 1.88 K/UL — SIGNIFICANT CHANGE UP (ref 1–3.3)
LYMPHOCYTES # BLD AUTO: 35.1 % — SIGNIFICANT CHANGE UP (ref 13–44)
LYMPHOCYTES # BLD AUTO: 35.1 % — SIGNIFICANT CHANGE UP (ref 13–44)
MAGNESIUM SERPL-MCNC: 1.8 MG/DL — SIGNIFICANT CHANGE UP (ref 1.6–2.6)
MAGNESIUM SERPL-MCNC: 1.8 MG/DL — SIGNIFICANT CHANGE UP (ref 1.6–2.6)
MCHC RBC-ENTMCNC: 28.3 PG — SIGNIFICANT CHANGE UP (ref 27–34)
MCHC RBC-ENTMCNC: 28.3 PG — SIGNIFICANT CHANGE UP (ref 27–34)
MCHC RBC-ENTMCNC: 33.2 GM/DL — SIGNIFICANT CHANGE UP (ref 32–36)
MCHC RBC-ENTMCNC: 33.2 GM/DL — SIGNIFICANT CHANGE UP (ref 32–36)
MCV RBC AUTO: 85.1 FL — SIGNIFICANT CHANGE UP (ref 80–100)
MCV RBC AUTO: 85.1 FL — SIGNIFICANT CHANGE UP (ref 80–100)
MONOCYTES # BLD AUTO: 0.5 K/UL — SIGNIFICANT CHANGE UP (ref 0–0.9)
MONOCYTES # BLD AUTO: 0.5 K/UL — SIGNIFICANT CHANGE UP (ref 0–0.9)
MONOCYTES NFR BLD AUTO: 9.3 % — SIGNIFICANT CHANGE UP (ref 2–14)
MONOCYTES NFR BLD AUTO: 9.3 % — SIGNIFICANT CHANGE UP (ref 2–14)
N GONORRHOEA RRNA SPEC QL NAA+PROBE: SIGNIFICANT CHANGE UP
N GONORRHOEA RRNA SPEC QL NAA+PROBE: SIGNIFICANT CHANGE UP
NEUTROPHILS # BLD AUTO: 2.7 K/UL — SIGNIFICANT CHANGE UP (ref 1.8–7.4)
NEUTROPHILS # BLD AUTO: 2.7 K/UL — SIGNIFICANT CHANGE UP (ref 1.8–7.4)
NEUTROPHILS NFR BLD AUTO: 50.6 % — SIGNIFICANT CHANGE UP (ref 43–77)
NEUTROPHILS NFR BLD AUTO: 50.6 % — SIGNIFICANT CHANGE UP (ref 43–77)
NRBC # BLD: 0 /100 WBCS — SIGNIFICANT CHANGE UP (ref 0–0)
NRBC # BLD: 0 /100 WBCS — SIGNIFICANT CHANGE UP (ref 0–0)
PHOSPHATE SERPL-MCNC: 4.3 MG/DL — SIGNIFICANT CHANGE UP (ref 2.5–4.5)
PHOSPHATE SERPL-MCNC: 4.3 MG/DL — SIGNIFICANT CHANGE UP (ref 2.5–4.5)
PLATELET # BLD AUTO: 274 K/UL — SIGNIFICANT CHANGE UP (ref 150–400)
PLATELET # BLD AUTO: 274 K/UL — SIGNIFICANT CHANGE UP (ref 150–400)
POTASSIUM SERPL-MCNC: 3.8 MMOL/L — SIGNIFICANT CHANGE UP (ref 3.5–5.3)
POTASSIUM SERPL-MCNC: 3.8 MMOL/L — SIGNIFICANT CHANGE UP (ref 3.5–5.3)
POTASSIUM SERPL-SCNC: 3.8 MMOL/L — SIGNIFICANT CHANGE UP (ref 3.5–5.3)
POTASSIUM SERPL-SCNC: 3.8 MMOL/L — SIGNIFICANT CHANGE UP (ref 3.5–5.3)
PROT SERPL-MCNC: 7.5 G/DL — SIGNIFICANT CHANGE UP (ref 6–8.3)
PROT SERPL-MCNC: 7.5 G/DL — SIGNIFICANT CHANGE UP (ref 6–8.3)
RBC # BLD: 4.17 M/UL — LOW (ref 4.2–5.8)
RBC # BLD: 4.17 M/UL — LOW (ref 4.2–5.8)
RBC # FLD: 13.5 % — SIGNIFICANT CHANGE UP (ref 10.3–14.5)
RBC # FLD: 13.5 % — SIGNIFICANT CHANGE UP (ref 10.3–14.5)
SODIUM SERPL-SCNC: 140 MMOL/L — SIGNIFICANT CHANGE UP (ref 135–145)
SODIUM SERPL-SCNC: 140 MMOL/L — SIGNIFICANT CHANGE UP (ref 135–145)
WBC # BLD: 5.35 K/UL — SIGNIFICANT CHANGE UP (ref 3.8–10.5)
WBC # BLD: 5.35 K/UL — SIGNIFICANT CHANGE UP (ref 3.8–10.5)
WBC # FLD AUTO: 5.35 K/UL — SIGNIFICANT CHANGE UP (ref 3.8–10.5)
WBC # FLD AUTO: 5.35 K/UL — SIGNIFICANT CHANGE UP (ref 3.8–10.5)

## 2023-11-15 PROCEDURE — 83735 ASSAY OF MAGNESIUM: CPT

## 2023-11-15 PROCEDURE — 85610 PROTHROMBIN TIME: CPT

## 2023-11-15 PROCEDURE — 86359 T CELLS TOTAL COUNT: CPT

## 2023-11-15 PROCEDURE — 87491 CHLMYD TRACH DNA AMP PROBE: CPT

## 2023-11-15 PROCEDURE — 99232 SBSQ HOSP IP/OBS MODERATE 35: CPT

## 2023-11-15 PROCEDURE — 86901 BLOOD TYPING SEROLOGIC RH(D): CPT

## 2023-11-15 PROCEDURE — 87045 FECES CULTURE AEROBIC BACT: CPT

## 2023-11-15 PROCEDURE — 86780 TREPONEMA PALLIDUM: CPT

## 2023-11-15 PROCEDURE — 87591 N.GONORRHOEAE DNA AMP PROB: CPT

## 2023-11-15 PROCEDURE — 84100 ASSAY OF PHOSPHORUS: CPT

## 2023-11-15 PROCEDURE — 85025 COMPLETE CBC W/AUTO DIFF WBC: CPT

## 2023-11-15 PROCEDURE — 86803 HEPATITIS C AB TEST: CPT

## 2023-11-15 PROCEDURE — 99239 HOSP IP/OBS DSCHRG MGMT >30: CPT | Mod: GC

## 2023-11-15 PROCEDURE — 71045 X-RAY EXAM CHEST 1 VIEW: CPT

## 2023-11-15 PROCEDURE — 85730 THROMBOPLASTIN TIME PARTIAL: CPT

## 2023-11-15 PROCEDURE — 87324 CLOSTRIDIUM AG IA: CPT

## 2023-11-15 PROCEDURE — 80053 COMPREHEN METABOLIC PANEL: CPT

## 2023-11-15 PROCEDURE — 36415 COLL VENOUS BLD VENIPUNCTURE: CPT

## 2023-11-15 PROCEDURE — 86850 RBC ANTIBODY SCREEN: CPT

## 2023-11-15 PROCEDURE — 97161 PT EVAL LOW COMPLEX 20 MIN: CPT

## 2023-11-15 PROCEDURE — 99285 EMERGENCY DEPT VISIT HI MDM: CPT

## 2023-11-15 PROCEDURE — 87507 IADNA-DNA/RNA PROBE TQ 12-25: CPT

## 2023-11-15 PROCEDURE — 87186 SC STD MICRODIL/AGAR DIL: CPT

## 2023-11-15 PROCEDURE — 86360 T CELL ABSOLUTE COUNT/RATIO: CPT

## 2023-11-15 PROCEDURE — 86403 PARTICLE AGGLUT ANTBDY SCRN: CPT

## 2023-11-15 PROCEDURE — 81003 URINALYSIS AUTO W/O SCOPE: CPT

## 2023-11-15 PROCEDURE — 86900 BLOOD TYPING SEROLOGIC ABO: CPT

## 2023-11-15 PROCEDURE — 86644 CMV ANTIBODY: CPT

## 2023-11-15 PROCEDURE — 94640 AIRWAY INHALATION TREATMENT: CPT

## 2023-11-15 PROCEDURE — 93005 ELECTROCARDIOGRAM TRACING: CPT

## 2023-11-15 PROCEDURE — 87536 HIV-1 QUANT&REVRSE TRNSCRPJ: CPT

## 2023-11-15 PROCEDURE — 87449 NOS EACH ORGANISM AG IA: CPT

## 2023-11-15 PROCEDURE — 86777 TOXOPLASMA ANTIBODY: CPT

## 2023-11-15 PROCEDURE — 87521 HEPATITIS C PROBE&RVRS TRNSC: CPT

## 2023-11-15 RX ORDER — ALBUTEROL 90 UG/1
2 AEROSOL, METERED ORAL
Qty: 0 | Refills: 0 | DISCHARGE

## 2023-11-15 RX ORDER — HYDROCORTISONE 1 %
1 OINTMENT (GRAM) TOPICAL
Qty: 2 | Refills: 0
Start: 2023-11-15 | End: 2023-12-16

## 2023-11-15 RX ORDER — CEFPODOXIME PROXETIL 100 MG
1 TABLET ORAL
Qty: 2 | Refills: 0
Start: 2023-11-15 | End: 2023-11-15

## 2023-11-15 RX ORDER — MAGNESIUM SULFATE 500 MG/ML
2 VIAL (ML) INJECTION ONCE
Refills: 0 | Status: COMPLETED | OUTPATIENT
Start: 2023-11-15 | End: 2023-11-15

## 2023-11-15 RX ORDER — BICTEGRAVIR SODIUM, EMTRICITABINE, AND TENOFOVIR ALAFENAMIDE FUMARATE 30; 120; 15 MG/1; MG/1; MG/1
1 TABLET ORAL
Qty: 30 | Refills: 3
Start: 2023-11-15 | End: 2024-03-13

## 2023-11-15 RX ORDER — ALBUTEROL 90 UG/1
2 AEROSOL, METERED ORAL
Qty: 1 | Refills: 3
Start: 2023-11-15 | End: 2024-03-13

## 2023-11-15 RX ORDER — BUDESONIDE AND FORMOTEROL FUMARATE DIHYDRATE 160; 4.5 UG/1; UG/1
2 AEROSOL RESPIRATORY (INHALATION)
Qty: 0 | Refills: 0 | DISCHARGE

## 2023-11-15 RX ORDER — HYDROCORTISONE 1 %
1 OINTMENT (GRAM) TOPICAL
Qty: 2 | Refills: 0
Start: 2023-11-15 | End: 2023-12-14

## 2023-11-15 RX ORDER — MUPIROCIN 20 MG/G
1 OINTMENT TOPICAL
Qty: 2 | Refills: 0
Start: 2023-11-15 | End: 2023-12-14

## 2023-11-15 RX ORDER — FLUCONAZOLE 150 MG/1
2 TABLET ORAL
Qty: 18 | Refills: 0
Start: 2023-11-15 | End: 2023-11-23

## 2023-11-15 RX ORDER — BUDESONIDE AND FORMOTEROL FUMARATE DIHYDRATE 160; 4.5 UG/1; UG/1
2 AEROSOL RESPIRATORY (INHALATION)
Qty: 1 | Refills: 3
Start: 2023-11-15

## 2023-11-15 RX ADMIN — FLUCONAZOLE 400 MILLIGRAM(S): 150 TABLET ORAL at 06:18

## 2023-11-15 RX ADMIN — BICTEGRAVIR SODIUM, EMTRICITABINE, AND TENOFOVIR ALAFENAMIDE FUMARATE 1 TABLET(S): 30; 120; 15 TABLET ORAL at 11:39

## 2023-11-15 RX ADMIN — DIPHENHYDRAMINE HYDROCHLORIDE AND LIDOCAINE HYDROCHLORIDE AND ALUMINUM HYDROXIDE AND MAGNESIUM HYDRO 10 MILLILITER(S): KIT at 09:10

## 2023-11-15 RX ADMIN — MUPIROCIN 1 APPLICATION(S): 20 OINTMENT TOPICAL at 06:19

## 2023-11-15 RX ADMIN — Medication 1 APPLICATION(S): at 06:19

## 2023-11-15 RX ADMIN — BUDESONIDE AND FORMOTEROL FUMARATE DIHYDRATE 2 PUFF(S): 160; 4.5 AEROSOL RESPIRATORY (INHALATION) at 09:11

## 2023-11-15 RX ADMIN — CEFTRIAXONE 100 MILLIGRAM(S): 500 INJECTION, POWDER, FOR SOLUTION INTRAMUSCULAR; INTRAVENOUS at 11:39

## 2023-11-15 RX ADMIN — Medication 25 GRAM(S): at 13:06

## 2023-11-15 RX ADMIN — ESCITALOPRAM OXALATE 10 MILLIGRAM(S): 10 TABLET, FILM COATED ORAL at 11:39

## 2023-11-15 RX ADMIN — ENOXAPARIN SODIUM 40 MILLIGRAM(S): 100 INJECTION SUBCUTANEOUS at 06:18

## 2023-11-15 NOTE — PROGRESS NOTE ADULT - REASON FOR ADMISSION
Patient called office regarding UTI symptoms. Informed patient that provider does not prescribe anything without having patient come in for Nurse Visit for specimen collection to be sent for culture. Informed patient that she will have to go to the nearest urgent care by [place] of state. Patient verbalized understanding.  
rash + HIV management

## 2023-11-15 NOTE — DISCHARGE NOTE PROVIDER - NSDCFUADDAPPT_GEN_ALL_CORE_FT
Please follow up with dermatology Dr. Gricelda De Leon on 11/20/23 at 10:00am at [address]     Please see your primary care doctor on 11/21/23 at 12:30pm at [address].

## 2023-11-15 NOTE — PROGRESS NOTE ADULT - ATTENDING COMMENTS
A/P: 53 year old male with pmhx of HIV, not on meds for 3 years, HTN, Depression, Asthma, and CVA 5-6 years, ago worked up at [facility], p/w  diffuse rash, sore throat w/ new onset hoarse voice, diarrhea, and urinary frequency in the setting of unmanaged HIV and possible opportunistic infections.    #HIV  #Candida esophagitis   - ID consulted   -Will continue Fluconazole and Bactrim   -Will f/u CD4 count and viral load     #Facial rash   -Pt with facial rash; Pt stated that he has hx of psoriasis  - Dermatology consult in the AM   -Will start Hydrocortisone cream 1%  BID     #Questionable insect bites on b/l arm   -Will continue contact precaution  -Will start Hydrocortisone cream 1%  BID     #Diarrhea  -Pt has not had any further episodes of diarrhea since this morning     #Hypokalemia  #Hypomagnesemia   - Will monitor electrolytes and replete prn     #Depression   #HTN   -Pt has been not been taking his antihypertensive medications for several years  - Will f/u BP and restart antihypertensive if required     #DISPO  - BO for assistance with dispo once medically stable for discharge
Agree with above.  Given negative cryptococcal antigen, can restart Biktarvy 1 tab PO daily.  Shigella is sensitive to Ceftriaxone.  Therefore can continue that for now.  Continue Fluconazole for thrush.  Appreciate dermatology evaluation
Patient reports persistent diarrhea, no abdominal pain, no N.V. Throat pain improved, denies difficulty with swallowing. No other complaints or events reported    Labs, imaging reviewed    Appreciate ID, continue Ceftriaxone, Fluconazole. Monitor QTc.  Follow-up Shigella sensitivities  Dermatology evaluation   DVT ppx
Overall feeling better, still with watery diarrhea. No other complaints or events reported.  Labs, reviewed    Still with diarrhea, will discuss with ID if further recommendations. Remains on Ceftriaxone and Fluconazole. QTc ok  Replete electrolytes
Patient with clinical improvement, diarrhea improved, thrush resolving. No other complaints or events reported  Labs reviewed  Appreciate ID, will finish 5 days course of Cephalosporin, can transition to PO  Continue fluconazole fOR 14 days, QTc stable  PCP follow-up

## 2023-11-15 NOTE — DISCHARGE NOTE PROVIDER - CARE PROVIDER_API CALL
Celine Thibodeaux  Internal Medicine  [address]  [place] [address]  Phone: [phone]  Fax: [phone]  Established Patient  Scheduled Appointment: 11/21/2023 12:30 PM    Gricelda De Leon  Dermatology  [address] [address]  Phone: [phone]  Fax: [phone]  Scheduled Appointment: 11/20/2023 10:00 AM

## 2023-11-15 NOTE — DISCHARGE NOTE PROVIDER - NSDCCPCAREPLAN_GEN_ALL_CORE_FT
PRINCIPAL DISCHARGE DIAGNOSIS  Diagnosis: Diarrhea  Assessment and Plan of Treatment: You were admitted for symptoms of diarrhea. We performed stool studies and found that the bugs responsible for your diarrhea were called Shigella and E. coli. You received IV antibiotics and your symptoms improved. You can finish your course by taking one day of oral antibiotics after the day of discharge. You should follow up with infectious disease specialist Dr. Laguna in 1-2 weeks after discharge.      SECONDARY DISCHARGE DIAGNOSES  Diagnosis: Human immunodeficiency virus (HIV) infection or acquired immunodeficiency syndrome (AIDS)  Assessment and Plan of Treatment: You have HIV which was not well controlled, as seen on admission. We urge you to continue taking your medications daily for better control of your disease and follow up strictly with your primary care doctor at [facility] on [address] [place] [address] within 1 week of discharge.   - Continue Biktarvy 1 tablet daily  - Continue Bactrim 1 tablet daily  - Follow up at [address] [place] [address], 2nd floor, on 11/21/23 at 12:30pm.    Diagnosis: Oral thrush  Assessment and Plan of Treatment: You were found to have a fungal infection in your throat and esophagus. Your uncontrolled HIV disease can make you prone to infections such as the one you have from a fungus. We started Fluconazole 400mg (2 tablets) daily in the hospital which has helped improve your symptoms. Please continue taking this medication for another 9 days (total 14 day course).    Diagnosis: Rash  Assessment and Plan of Treatment: You were found to have a rash on admission. You were seen by dermatology and they recommended applying Mupirocin and Hydrocortisone creams to your rashes for resolution. Please continue doing this and follow up with a dermatology Dr. De Leon on 11/20/23 at 10:00am on [address] [place] [address].

## 2023-11-15 NOTE — PROGRESS NOTE ADULT - PROBLEM SELECTOR PROBLEM 1
Human immunodeficiency virus (HIV) infection or acquired immunodeficiency syndrome (AIDS)

## 2023-11-15 NOTE — DISCHARGE NOTE PROVIDER - NSDCMRMEDTOKEN_GEN_ALL_CORE_FT
Biktarvy 50 mg-200 mg-25 mg oral tablet: 1 tab(s) orally once a day  cefpodoxime 200 mg oral tablet: 1 tab(s) orally 2 times a day Take 200mg in the AM and PM on 11/16/23 to complete your course.  fluconazole 200 mg oral tablet: 2 tab(s) orally every 24 hours  hydrocortisone 2.5% topical lotion: Apply topically to affected area 2 times a day  Lexapro 10 mg oral tablet: 1 tab(s) orally once a day  mupirocin 2% topical ointment: Apply topically to affected area 2 times a day  ProAir HFA 90 mcg/inh inhalation aerosol: 2 puff(s) inhaled 4 times a day as needed for  bronchospasm  Symbicort 80 mcg-4.5 mcg/inh inhalation aerosol: 2 puff(s) inhaled 2 times a day 2 puffs twice daily

## 2023-11-15 NOTE — DISCHARGE NOTE PROVIDER - NSDCFUSCHEDAPPT_GEN_ALL_CORE_FT
Gricelda De Leon  [facility]  DERM [address]  Scheduled Appointment: 11/20/2023    [facility]  INTMED [address]  Scheduled Appointment: 11/21/2023

## 2023-11-15 NOTE — PROGRESS NOTE ADULT - PROBLEM SELECTOR PLAN 4
Presents with oral thrush and new onset hoarseness of voice. Hx of thrush in the past.     Plan:  - c/w fluconazole 400mg PO. Patient presents with 2 different appearing rashes for the past 2 weeks. Rash on the face appears to be exacerbation of psoriasis in setting of HIV. Also has follicular rash on UE and chest. 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 They recommend topical clindamycin lotion BID and once discharged OTC benzoyl peroxide 10% wash for affected areas. They suggest if any vesicles form to swab with a viral culture to rule out any VZV.   - Started mupirocin 2% BID on affected areas; derm had rec clindamycin or erythromycin but both not available at [facility]. ID pharmacist Raffy consulted who recommended starting mupirocin.     Plan:  - f/u ID recs  -c/w mupirocin 2% BID on affected areas   - c/w Hydrocortisone 2.5% BID.

## 2023-11-15 NOTE — PROGRESS NOTE ADULT - TIME BILLING
diarrhea and odynophagia in AIDS patient
infectious diarrhea, esophagitis in HIV patient
evaluation for opportunistic infection in HIV patient
treatment of multiple AIDS related illnesses

## 2023-11-15 NOTE — DISCHARGE NOTE PROVIDER - HOSPITAL COURSE
#Discharge: do not delete    53 year old male with pmhx of HIV, not on meds for 3 years, HTN, Depression, Asthma, and CVA 5-6 years, ago worked up at [facility], who presents with diffuse rash, sore throat w/ new onset hoarse voice, diarrhea, and urinary frequency in the setting of unmanaged HIV and possible opportunistic infections    #Human immunodeficiency virus (HIV) infection or acquired immunodeficiency syndrome (AIDS).   Patient w/ HIV/AIDS, previously followed with Dr. Pacheco outpatient, who presents with rash, diarrhea, fatigue, and oral thrush. States noncompliance with HIV meds for 3 years in the setting of worsening depression. Prev on Descovy and Tivicay. Neg Kernig and Brudzinski signs on exam. Viral Load is 76,468 and CD4 374 obtained 11/11/23. ID consulted on 11/13/23 and suggested diarrhea is likely due to shigella+ E.Coli. Also mentioned throat pain and thrush on exam may be candida for esophagitis.   - Started Biktarvy PO QD per ID recs given negative crypto workup  - c/w Ceftriaxone 2g IV daily and Fluconazole 400mg PO daily.    #Skin rash associated with AIDS.   Patient presents with 2 different appearing rashes for the past 2 weeks. Rash on the face appears to be exacerbation of psoriasis in setting of HIV. Also has follicular rash on UE and chest. All possibly 2/2 unmanaged HIV/AIDS. Syphilis Immunology Treponema Pallidum Antibody Interpretation is negative obtained 11/11/23   -Derm consulted on 11/13/23. 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 They recommend topical clindamycin lotion BID and once discharged OTC benzoyl peroxide 10% wash for affected areas. They suggest if any vesicles form to swab with a viral culture to rule out any VZV.   -Started mupirocin 2% BID on affected areas; derm had rec clindamycin or erythromycin but both not available at [facility]. ID pharmacist Raffy consulted who recommended starting mupirocin.   -c/w Hydrocortisone 2.5% BID.    #Diarrhea  Patient presents with chronic diarrhea that comes and goes, last major episode 2 days ago. States bm's remain loose though. Was hospitalized in 08/2022 for enterocolitis with GI PCR+ for shigella, enteroinvasive e coli, giardia, EPEC. GI PCR obtained 11/11/23 is positive for EPEC and Shigella/ETEC. C. Diff obtained 11/11/23 is negative.   -c/w Ceftriaxone 2000mg IV until 11/16; transition to Cefpodoxime for last day    #Oral thrush vs Esophageal Candidiasis   Presents with oral thrush and new onset hoarseness of voice. Hx of thrush in the past.   - c/w fluconazole 400mg PO qd until 11/24/23 (14 day total course)    #Major depression.   Previously on lexapro 10 mg but has not taken in a while. Endorses depression led to him not taking his medications after his  passed. Requesting psych consult.  - c/w lexapro  - F/u outpatient with all providers; will provide patient with new PCP    #Urinary frequency.   Endorses urinary frequency with sensation of incomplete emptying of bladder. Denies any dysuria. UA neg. No known hx of urinary retention or BPH  -continue to monitor.    #HTN (hypertension).   Previously told he was hypertensive but never started any medications that he can recall. Presents with elevated BP of 161/104.   - consider starting losartan 25mg if pt remains hypertensive.    # History of asthma.   Previously on Symbicort and albuterol rescue inhaler. Denies any recent exacerbations.  - c/w Symbicort  - c/w PRN duonebs.      Discharged to: HOME    New medications: Fluconazole 400mg qd (until 11/24/23), Cefpodoxime (until 11/16), Biktarvy qd, Bactrim qd    Doctors to follow up with: PCP, infectious disease    Labs to be followed outpatient: CBC, CMP    Exam to be followed outpatient:   Constitutional: NAD, comfortable in bed.  HEENT:No scleral icterus, MMM  Neck: Supple, no thyromegaly   Respiratory: CTA B/L. No w/r/r.   Cardiovascular: RRR, normal S1 and S2, no murmurs   Gastrointestinal: +BS, soft non-distended, TTP in RUQ and LUQ, no palpable masses   Extremities: wwp; no pitting edema.   Vascular: Pulses +2 equal and strong throughout.   Neurological: AAOx3  Skin: Rash present on face, bilateral upper and lower extremities and chest.

## 2023-11-15 NOTE — DISCHARGE NOTE PROVIDER - DETAILS OF MALNUTRITION DIAGNOSIS/DIAGNOSES
----- Message from Iram Walter sent at 2/22/2019 10:07 AM EST -----  PA Approved  Novolin N 100 unit/ml vial   11- to 02-  [id number]  02-    SCRIPT SENT     This patient has been assessed with a concern for Malnutrition and was treated during this hospitalization for the following Nutrition diagnosis/diagnoses:     -  11/14/2023: Severe protein-calorie malnutrition

## 2023-11-15 NOTE — DISCHARGE NOTE PROVIDER - PROVIDER TOKENS
PROVIDER:[TOKEN:[[id number]],SCHEDULEDAPPT:[11/21/2023],SCHEDULEDAPPTTIME:[12:30 PM],ESTABLISHEDPATIENT:[T]],PROVIDER:[TOKEN:[[id number]],SCHEDULEDAPPT:[11/20/2023],SCHEDULEDAPPTTIME:[10:00 AM]]

## 2023-11-15 NOTE — PROGRESS NOTE ADULT - PROBLEM SELECTOR PLAN 7
Previously told he was hypertensive but never started any medications that he can recall. Presents with elevated BP of 161/104.  Most recent 141/82    Plan:  - consider starting losartan 25mg if pt remains hypertensive
Previously told he was hypertensive but never started any medications that he can recall. Presents with elevated BP of 161/104.  Most recent 141/82    Plan:  - consider starting losartan 25mg if pt remains hypertensive
Previously told he was hypertensive but never started any medications that he can recall. Presents with elevated BP of 161/104.     Plan:  - consider starting losartan 25mg if pt remains hypertensive
Previously told he was hypertensive but never started any medications that he can recall. Presents with elevated BP of 161/104.  Most recent 141/82    Plan:  - consider starting losartan 25mg if pt remains hypertensive

## 2023-11-15 NOTE — PROGRESS NOTE ADULT - PROBLEM SELECTOR PLAN 6
Endorses urinary frequency with sensation of incomplete emptying of bladder. Denies any dysuria. UA neg. No known hx of urinary retention or BPH    Plan:  - continue to monitor
Endorses urinary frequency with sensation of incomplete emptying of bladder. Denies any dysuria. UA neg. No known hx of urinary retention or BPH    Plan:  -continue to monitor
Endorses urinary frequency with sensation of incomplete emptying of bladder. Denies any dysuria. UA neg. No known hx of urinary retention or BPH    Plan:  - continue to monitor
Endorses urinary frequency with sensation of incomplete emptying of bladder. Denies any dysuria. UA neg. No known hx of urinary retention or BPH    Plan:  - continue to monitor

## 2023-11-15 NOTE — PROGRESS NOTE ADULT - SUBJECTIVE AND OBJECTIVE BOX
INTERVAL HPI/OVERNIGHT EVENTS:    Patient was seen and examined at bedside.  Diarrhea loose but overall improved.  Odynophagia improved    CONSTITUTIONAL:  Negative fever or chills, feels well, good appetite  EYES:  Negative  blurry vision or double vision  CARDIOVASCULAR:  Negative for chest pain or palpitations  RESPIRATORY:  Negative for cough, wheezing, or SOB   GASTROINTESTINAL:  Negative for nausea, vomiting, diarrhea, constipation, or abdominal pain  GENITOURINARY:  Negative frequency, urgency or dysuria  NEUROLOGIC:  No headache, confusion, dizziness, lightheadedness      ANTIBIOTICS/RELEVANT:    MEDICATIONS  (STANDING):  bictegravir 50 mG/emtricitabine 200 mG/tenofovir alafenamide 25 mG (BIKTARVY) 1 Tablet(s) Oral daily  budesonide  80 MICROgram(s)/formoterol 4.5 MICROgram(s) Inhaler 2 Puff(s) Inhalation two times a day  cefTRIAXone   IVPB      cefTRIAXone   IVPB 2000 milliGRAM(s) IV Intermittent every 24 hours  enoxaparin Injectable 40 milliGRAM(s) SubCutaneous every 24 hours  escitalopram 10 milliGRAM(s) Oral daily  FIRST- Mouthwash  BLM 10 milliLiter(s) Swish and Spit every 4 hours  fluconAZOLE   Tablet 400 milliGRAM(s) Oral every 24 hours  hydrocortisone 2.5% Lotion 1 Application(s) Topical two times a day  magnesium sulfate  IVPB 2 Gram(s) IV Intermittent once  mupirocin 2% Ointment 1 Application(s) Topical two times a day    MEDICATIONS  (PRN):  acetaminophen     Tablet .. 650 milliGRAM(s) Oral every 6 hours PRN Temp greater or equal to 38C (100.4F), Mild Pain (1 - 3)  albuterol/ipratropium for Nebulization 3 milliLiter(s) Nebulizer every 6 hours PRN Shortness of Breath and/or Wheezing  melatonin 3 milliGRAM(s) Oral at bedtime PRN Insomnia  ondansetron Injectable 4 milliGRAM(s) IV Push every 8 hours PRN Nausea and/or Vomiting        Vital Signs Last 24 Hrs  T(C): 36.4 (15 Nov 2023 09:09), Max: 36.7 (14 Nov 2023 15:05)  T(F): 97.5 (15 Nov 2023 09:09), Max: 98 (14 Nov 2023 15:05)  HR: 91 (15 Nov 2023 09:09) (86 - 98)  BP: 143/94 (15 Nov 2023 09:09) (122/74 - 143/94)  BP(mean): 111 (15 Nov 2023 09:09) (111 - 111)  RR: 16 (15 Nov 2023 09:09) (16 - 18)  SpO2: 98% (15 Nov 2023 09:09) (96% - 98%)    Parameters below as of 15 Nov 2023 09:09  Patient On (Oxygen Delivery Method): room air        PHYSICAL EXAM:  Constitutional: non-toxic, no distress  Eyes:GOSIA, EOMI  Ear/Nose/Throat: no oral lesion, no sinus tenderness on percussion	  Neck:  supple  Respiratory: CTA lesly  Cardiovascular: S1S2 RRR, no murmurs  Gastrointestinal:soft, (+) BS, no HSM  Extremities:no e/e/c  Vascular: DP Pulse:	right normal; left normal      LABS:                        11.8   5.35  )-----------( 274      ( 15 Nov 2023 05:30 )             35.5     11-15    140  |  106  |  12  ----------------------------<  117<H>  3.8   |  25  |  0.90    Ca    8.7      15 Nov 2023 05:30  Phos  4.3     11-15  Mg     1.8     11-15    TPro  7.5  /  Alb  3.1<L>  /  TBili  0.2  /  DBili  x   /  AST  24  /  ALT  18  /  AlkPhos  82  11-15      Urinalysis Basic - ( 15 Nov 2023 05:30 )    Color: x / Appearance: x / SG: x / pH: x  Gluc: 117 mg/dL / Ketone: x  / Bili: x / Urobili: x   Blood: x / Protein: x / Nitrite: x   Leuk Esterase: x / RBC: x / WBC x   Sq Epi: x / Non Sq Epi: x / Bacteria: x        MICROBIOLOGY:    RADIOLOGY & ADDITIONAL STUDIES:

## 2023-11-15 NOTE — PROGRESS NOTE ADULT - PROVIDER SPECIALTY LIST ADULT
Hospitalist
Internal Medicine
Internal Medicine
Infectious Disease
Infectious Disease
Internal Medicine
Infectious Disease
Infectious Disease
Internal Medicine

## 2023-11-15 NOTE — PROGRESS NOTE ADULT - PROBLEM SELECTOR PLAN 9
F: s/p 1L NS, will give additional 1L  E: replete as needed  N: DASH    DVT: lovenox F: None  E: replete as needed  N: Regular  DVT: lovenox  Code status: Full code

## 2023-11-15 NOTE — PROGRESS NOTE ADULT - PROBLEM SELECTOR PLAN 1
Patient w/ HIV/AIDS, previously followed with Dr. Pacheco outpatient, who presents with rash, diarrhea, fatigue, and oral thrush. States noncompliance with HIV meds for 3 years in the setting of worsening depression. Prev on Descovy and Tivicay. Neg Kernig and Brudzinski signs on exam  - Obtained CD4 on 11/13/23 and results were ABS CD4: 374   - ID consulted on 11/13/23 and suggested diarrhea is likely due to shigella+ E.Coli. Also mentioned throat pain and thrush on exam may be candida for esophagitis.   - Viral Load is 76,468 obtained 11/11/23     Plan:  - Started Biktarvy PO QD per ID recs given negative crypto workup  - c/w Ceftriaxone 2g IV daily and Fluconazole 400mg PO daily.  - hold off on bactrim for prophylaxis per ID recs. Patient w/ HIV/AIDS, previously followed with Dr. Pacheco outpatient, who presents with rash, diarrhea, fatigue, and oral thrush. States noncompliance with HIV meds for 3 years in the setting of worsening depression. Prev on Descovy and Tivicay. Neg Kernig and Brudzinski signs on exam  -CD4 374, Viral Load is 76,468  (on 11/13/23)  - ID consulted on 11/13/23 and suggested diarrhea is likely due to shigella+ E.Coli. Also mentioned throat pain and thrush on exam may be candida for esophagitis.     Plan:  - Started Biktarvy PO QD per ID recs given negative crypto workup  - c/w Ceftriaxone 2g IV daily and Fluconazole 400mg PO daily.  - hold off on bactrim for prophylaxis per ID recs.

## 2023-11-15 NOTE — PROGRESS NOTE ADULT - PROBLEM SELECTOR PLAN 3
Patient presents with chronic diarrhea that comes and goes, last major episode 2 days ago. States bm's remain loose though. Was hospitalized in 08/2022 for enterocolitis with GI PCR+ for shigella, enteroinvasive e coli, giardia, EPEC. Currently not septic, holding off on empiric tx  - GI PCR obtained 11/11/23 is positive for EPEC and Shigella/ETEC  - C. Diff obtained 11/11/23 is negative    Plan:  -c/w Ceftriaxone 2000mg IV Presents with oral thrush and new onset hoarseness of voice. Hx of thrush in the past.     Plan:  - c/w fluconazole 400mg PO.

## 2023-11-15 NOTE — PROGRESS NOTE ADULT - ASSESSMENT
53 year old male with pmhx of HIV, not on meds for 3 years, HTN, Depression, Asthma, and CVA 5-6 years, ago worked up at [facility], who presents with diffuse rash, sore throat w/ new onset hoarse voice, diarrhea, and urinary frequency in the setting of unmanaged HIV and possible opportunistic infections 53 year old male with pmhx of HIV, not on meds for 3 years, HTN, Depression, Asthma, and CVA 5-6 years, ago worked up at [facility], who presents with diarrhea, diffuse rash, sore throat w/ new onset hoarse voice,, and urinary frequency found to have GIPCR positive for EPEC and Shigella/ETEC admitted for treatment of infectious diarrhea with anitbiotics and further monitoring.

## 2023-11-15 NOTE — PROGRESS NOTE ADULT - PROBLEM SELECTOR PLAN 5
Previously on lexapro 10 mg but has not taken in a while. Endorses depression led to him not taking his medications after his  passed. Requesting psych consult.    Plan:  - c/w lexapro  - Psych consult in AM Previously on lexapro 10 mg but has not taken in a while. Endorses depression led to him not taking his medications after his  passed. Requesting psych consult.    Plan:  - c/w lexapro

## 2023-11-15 NOTE — DISCHARGE NOTE NURSING/CASE MANAGEMENT/SOCIAL WORK - PATIENT PORTAL LINK FT
You can access the FollowMyHealth Patient Portal offered by [facility] by registering at the following website: [email or web address]. By joining [facility]’s FollowMyHealth portal, you will also be able to view your health information using other applications (apps) compatible with our system.

## 2023-11-15 NOTE — PROGRESS NOTE ADULT - PROBLEM SELECTOR PLAN 2
Patient presents with 2 different appearing rashes for the past 2 weeks. Rash on the face appears to be exacerbation of psoriasis in setting of HIV. Also has follicular rash on UE and chest. 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 They recommend topical clindamycin lotion BID and once discharged OTC benzoyl peroxide 10% wash for affected areas. They suggest if any vesicles form to swab with a viral culture to rule out any VZV.   - Started mupirocin 2% BID on affected areas; derm had rec clindamycin or erythromycin but both not available at [facility]. ID pharmacist Raffy consulted who recommended starting mupirocin.     Plan:  - f/u ID recs  -c/w mupirocin 2% BID on affected areas   - c/w Hydrocortisone 2.5% BID. Patient presents with chronic diarrhea that comes and goes, last major episode 2 days ago. States bm's remain loose though. Was hospitalized in 08/2022 for enterocolitis with GI PCR+ for shigella, enteroinvasive e coli, giardia, EPEC. Currently not septic, holding off on empiric tx  - GI PCR obtained 11/11/23 is positive for EPEC and Shigella/ETEC  - C. Diff obtained 11/11/23 is negative    Plan:  -c/w Ceftriaxone 2000mg IV

## 2023-11-15 NOTE — PROGRESS NOTE ADULT - ASSESSMENT
IMPRESSION:  Uncontrolled HIV, not on ART.  Presence of thrush suggests low CD4 count, placing patient at risk for AIDS-defining illnesses/Opportunistic infections.  Rash on his head is likely due to psoriasis as per patient.  He states the rash on his arms is different.  It could be folliculitis or disseminated disease from an OI.     Diarrhea is likely due to Shigella + E. Coli    Given throat pain and thrush on exam there is concern for candida esophagitis    CD4 count is > 300.  No indication for Bactrim PPX    Recommend:  1.  Continue Biktarvy 1 tab PO daily  2.  Continue Ceftriaxone 2 grams IV daily for infectious diarrhea x 5 days total. Last dose is 11/16/23  3.  Continue Fluconazole at 400 mg PO daily x 14 days total  4.  Appreciate dermatology evaluation  5.  Ok to discharge from ID standpoint  6.  he can follow up with his HIV provider on discharge     ID team 1 will sign off.  Reconsult as needed

## 2023-11-15 NOTE — DISCHARGE NOTE NURSING/CASE MANAGEMENT/SOCIAL WORK - NSDCFUADDAPPT_GEN_ALL_CORE_FT
Please follow up with dermatology Dr. Gricelda De Leon on 11/20/23 at 10:00am at [address]     Please see your primary care doctor on 11/21/23 at 12:30pm at [address].

## 2023-11-15 NOTE — PROGRESS NOTE ADULT - SUBJECTIVE AND OBJECTIVE BOX
**INCOMPLETE NOTE    OVERNIGHT EVENTS:    SUBJECTIVE:  Patient seen and examined at bedside.    Vital Signs Last 12 Hrs  T(F): 97.9 (11-15-23 @ 06:19), Max: 97.9 (11-15-23 @ 06:19)  HR: 98 (11-15-23 @ 06:19) (98 - 98)  BP: 122/74 (11-15-23 @ 06:19) (122/74 - 122/74)  BP(mean): --  RR: 16 (11-15-23 @ 06:19) (16 - 16)  SpO2: 96% (11-15-23 @ 06:19) (96% - 96%)  I&O's Summary      PHYSICAL EXAM:  Constitutional: NAD, comfortable in bed.  HEENT: NC/AT, PERRLA, EOMI, no conjunctival pallor or scleral icterus, MMM  Neck: Supple, no JVD  Respiratory: CTA B/L. No w/r/r.   Cardiovascular: RRR, normal S1 and S2, no m/r/g.   Gastrointestinal: +BS, soft NTND, no guarding or rebound tenderness, no palpable masses   Extremities: wwp; no cyanosis, clubbing or edema.   Vascular: Pulses equal and strong throughout.   Neurological: AAOx3, no CN deficits, strength and sensation intact throughout.   Skin: No gross skin abnormalities or rashes        LABS:                        11.8   5.35  )-----------( 274      ( 15 Nov 2023 05:30 )             35.5     11-15    140  |  106  |  12  ----------------------------<  117<H>  3.8   |  25  |  0.90    Ca    8.7      15 Nov 2023 05:30  Phos  4.3     11-15  Mg     1.8     11-15    TPro  7.5  /  Alb  3.1<L>  /  TBili  0.2  /  DBili  x   /  AST  24  /  ALT  18  /  AlkPhos  82  11-15      Urinalysis Basic - ( 15 Nov 2023 05:30 )    Color: x / Appearance: x / SG: x / pH: x  Gluc: 117 mg/dL / Ketone: x  / Bili: x / Urobili: x   Blood: x / Protein: x / Nitrite: x   Leuk Esterase: x / RBC: x / WBC x   Sq Epi: x / Non Sq Epi: x / Bacteria: x          RADIOLOGY & ADDITIONAL TESTS:    MEDICATIONS  (STANDING):  bictegravir 50 mG/emtricitabine 200 mG/tenofovir alafenamide 25 mG (BIKTARVY) 1 Tablet(s) Oral daily  budesonide  80 MICROgram(s)/formoterol 4.5 MICROgram(s) Inhaler 2 Puff(s) Inhalation two times a day  cefTRIAXone   IVPB      cefTRIAXone   IVPB 2000 milliGRAM(s) IV Intermittent every 24 hours  enoxaparin Injectable 40 milliGRAM(s) SubCutaneous every 24 hours  escitalopram 10 milliGRAM(s) Oral daily  FIRST- Mouthwash  BLM 10 milliLiter(s) Swish and Spit every 4 hours  fluconAZOLE   Tablet 400 milliGRAM(s) Oral every 24 hours  hydrocortisone 2.5% Lotion 1 Application(s) Topical two times a day  magnesium sulfate  IVPB 2 Gram(s) IV Intermittent once  mupirocin 2% Ointment 1 Application(s) Topical two times a day    MEDICATIONS  (PRN):  acetaminophen     Tablet .. 650 milliGRAM(s) Oral every 6 hours PRN Temp greater or equal to 38C (100.4F), Mild Pain (1 - 3)  albuterol/ipratropium for Nebulization 3 milliLiter(s) Nebulizer every 6 hours PRN Shortness of Breath and/or Wheezing  melatonin 3 milliGRAM(s) Oral at bedtime PRN Insomnia  ondansetron Injectable 4 milliGRAM(s) IV Push every 8 hours PRN Nausea and/or Vomiting   **INCOMPLETE NOTE    OVERNIGHT EVENTS: PHILIPPE    SUBJECTIVE:  Patient seen and examined at bedside. Patient feels he is improving and states his rash is no longer as pruritic. His face rash and diarrhea are also improving as well. He denies any SOB, cough, or hematochezia. He has no acute complaints at this time.     Vital Signs Last 12 Hrs  T(F): 97.9 (11-15-23 @ 06:19), Max: 97.9 (11-15-23 @ 06:19)  HR: 98 (11-15-23 @ 06:19) (98 - 98)  BP: 122/74 (11-15-23 @ 06:19) (122/74 - 122/74)  BP(mean): --  RR: 16 (11-15-23 @ 06:19) (16 - 16)  SpO2: 96% (11-15-23 @ 06:19) (96% - 96%)  I&O's Summary      PHYSICAL EXAM:  Constitutional: NAD, comfortable in bed.  HEENT: No scleral icterus  Neck: Supple, no thyromegaly   Respiratory: CTA B/L. No w/r/r.   Cardiovascular: RRR, normal S1 and S2, no murmurs  Gastrointestinal: +BS, soft, non-distended, TTP in RUQ and LUQ; normoactive bowel sounds; no palpable masses   Extremities: wwp; no pitting edema.   Vascular: Pulses equal and strong throughout.   Neurological: AAOx3,strength and sensation intact throughout.   Skin: Rash present on face, chest, and bilateral lower and upper extremities. Rash is scabbing and slowly improving. No new presence of rash or purulent discharge present.      LABS:                        11.8   5.35  )-----------( 274      ( 15 Nov 2023 05:30 )             35.5     11-15    140  |  106  |  12  ----------------------------<  117<H>  3.8   |  25  |  0.90    Ca    8.7      15 Nov 2023 05:30  Phos  4.3     11-15  Mg     1.8     11-15    TPro  7.5  /  Alb  3.1<L>  /  TBili  0.2  /  DBili  x   /  AST  24  /  ALT  18  /  AlkPhos  82  11-15      Urinalysis Basic - ( 15 Nov 2023 05:30 )    Color: x / Appearance: x / SG: x / pH: x  Gluc: 117 mg/dL / Ketone: x  / Bili: x / Urobili: x   Blood: x / Protein: x / Nitrite: x   Leuk Esterase: x / RBC: x / WBC x   Sq Epi: x / Non Sq Epi: x / Bacteria: x          RADIOLOGY & ADDITIONAL TESTS:    MEDICATIONS  (STANDING):  bictegravir 50 mG/emtricitabine 200 mG/tenofovir alafenamide 25 mG (BIKTARVY) 1 Tablet(s) Oral daily  budesonide  80 MICROgram(s)/formoterol 4.5 MICROgram(s) Inhaler 2 Puff(s) Inhalation two times a day  cefTRIAXone   IVPB      cefTRIAXone   IVPB 2000 milliGRAM(s) IV Intermittent every 24 hours  enoxaparin Injectable 40 milliGRAM(s) SubCutaneous every 24 hours  escitalopram 10 milliGRAM(s) Oral daily  FIRST- Mouthwash  BLM 10 milliLiter(s) Swish and Spit every 4 hours  fluconAZOLE   Tablet 400 milliGRAM(s) Oral every 24 hours  hydrocortisone 2.5% Lotion 1 Application(s) Topical two times a day  magnesium sulfate  IVPB 2 Gram(s) IV Intermittent once  mupirocin 2% Ointment 1 Application(s) Topical two times a day    MEDICATIONS  (PRN):  acetaminophen     Tablet .. 650 milliGRAM(s) Oral every 6 hours PRN Temp greater or equal to 38C (100.4F), Mild Pain (1 - 3)  albuterol/ipratropium for Nebulization 3 milliLiter(s) Nebulizer every 6 hours PRN Shortness of Breath and/or Wheezing  melatonin 3 milliGRAM(s) Oral at bedtime PRN Insomnia  ondansetron Injectable 4 milliGRAM(s) IV Push every 8 hours PRN Nausea and/or Vomiting   OVERNIGHT EVENTS: PHILIPPE    SUBJECTIVE:  Patient seen and examined at bedside. Patient feels he is improving and states his rash is no longer as pruritic. His face rash and diarrhea are also improving as well. He reports he had 3-4 episodes of loose stools overnight (non-bloody). On ROS, denies fever, chills, n/v, dysuria, hematuria.       Vital Signs Last 12 Hrs  T(F): 97.9 (11-15-23 @ 06:19), Max: 97.9 (11-15-23 @ 06:19)  HR: 98 (11-15-23 @ 06:19) (98 - 98)  BP: 122/74 (11-15-23 @ 06:19) (122/74 - 122/74)  BP(mean): --  RR: 16 (11-15-23 @ 06:19) (16 - 16)  SpO2: 96% (11-15-23 @ 06:19) (96% - 96%)  I&O's Summary      PHYSICAL EXAM:  Constitutional: NAD, comfortable in bed.  HEENT: No scleral icterus. EOMI intact   Neck: Supple  Respiratory: Normal respiratory effort. CTA B/L. No w/r/r.   Cardiovascular: RRR, normal S1 and S2, no murmurs  Gastrointestinal: +BS, soft, non-distended, TTP in RUQ and LUQ; normoactive bowel sounds; no palpable masses   Extremities: wwp; no pitting edema.   Neurological: AAOx3, moves all extremities spontaneously   Skin: Rash present on face, chest, and bilateral lower and upper extremities. Rash is scabbing and slowly improving. No new presence of rash or purulent discharge present.      LABS:                        11.8   5.35  )-----------( 274      ( 15 Nov 2023 05:30 )             35.5     11-15    140  |  106  |  12  ----------------------------<  117<H>  3.8   |  25  |  0.90    Ca    8.7      15 Nov 2023 05:30  Phos  4.3     11-15  Mg     1.8     11-15    TPro  7.5  /  Alb  3.1<L>  /  TBili  0.2  /  DBili  x   /  AST  24  /  ALT  18  /  AlkPhos  82  11-15      Urinalysis Basic - ( 15 Nov 2023 05:30 )    Color: x / Appearance: x / SG: x / pH: x  Gluc: 117 mg/dL / Ketone: x  / Bili: x / Urobili: x   Blood: x / Protein: x / Nitrite: x   Leuk Esterase: x / RBC: x / WBC x   Sq Epi: x / Non Sq Epi: x / Bacteria: x          RADIOLOGY & ADDITIONAL TESTS:    MEDICATIONS  (STANDING):  bictegravir 50 mG/emtricitabine 200 mG/tenofovir alafenamide 25 mG (BIKTARVY) 1 Tablet(s) Oral daily  budesonide  80 MICROgram(s)/formoterol 4.5 MICROgram(s) Inhaler 2 Puff(s) Inhalation two times a day  cefTRIAXone   IVPB      cefTRIAXone   IVPB 2000 milliGRAM(s) IV Intermittent every 24 hours  enoxaparin Injectable 40 milliGRAM(s) SubCutaneous every 24 hours  escitalopram 10 milliGRAM(s) Oral daily  FIRST- Mouthwash  BLM 10 milliLiter(s) Swish and Spit every 4 hours  fluconAZOLE   Tablet 400 milliGRAM(s) Oral every 24 hours  hydrocortisone 2.5% Lotion 1 Application(s) Topical two times a day  magnesium sulfate  IVPB 2 Gram(s) IV Intermittent once  mupirocin 2% Ointment 1 Application(s) Topical two times a day    MEDICATIONS  (PRN):  acetaminophen     Tablet .. 650 milliGRAM(s) Oral every 6 hours PRN Temp greater or equal to 38C (100.4F), Mild Pain (1 - 3)  albuterol/ipratropium for Nebulization 3 milliLiter(s) Nebulizer every 6 hours PRN Shortness of Breath and/or Wheezing  melatonin 3 milliGRAM(s) Oral at bedtime PRN Insomnia  ondansetron Injectable 4 milliGRAM(s) IV Push every 8 hours PRN Nausea and/or Vomiting

## 2023-11-15 NOTE — PROGRESS NOTE ADULT - NUTRITIONAL ASSESSMENT
This patient has been assessed with a concern for Malnutrition and has been determined to have a diagnosis/diagnoses of Severe protein-calorie malnutrition.    This patient is being managed with:   Diet Regular-  Entered: Nov 13 2023 11:53AM

## 2023-11-20 ENCOUNTER — APPOINTMENT (OUTPATIENT)
Dept: DERMATOLOGY | Facility: CLINIC | Age: 53
End: 2023-11-20

## 2023-11-21 ENCOUNTER — APPOINTMENT (OUTPATIENT)
Dept: INTERNAL MEDICINE | Facility: CLINIC | Age: 53
End: 2023-11-21

## 2023-11-25 LAB
CULTURE RESULTS: ABNORMAL
CULTURE RESULTS: ABNORMAL
ORGANISM # SPEC MICROSCOPIC CNT: ABNORMAL
ORGANISM # SPEC MICROSCOPIC CNT: ABNORMAL
ORGANISM # SPEC MICROSCOPIC CNT: SIGNIFICANT CHANGE UP
ORGANISM # SPEC MICROSCOPIC CNT: SIGNIFICANT CHANGE UP

## 2024-02-22 VITALS
OXYGEN SATURATION: 99 % | HEART RATE: 123 BPM | RESPIRATION RATE: 20 BRPM | SYSTOLIC BLOOD PRESSURE: 147 MMHG | HEIGHT: 73 IN | TEMPERATURE: 98 F | DIASTOLIC BLOOD PRESSURE: 81 MMHG | WEIGHT: 160.06 LBS

## 2024-02-22 PROCEDURE — 99285 EMERGENCY DEPT VISIT HI MDM: CPT | Mod: 25

## 2024-02-22 NOTE — ED ADULT TRIAGE NOTE - CHIEF COMPLAINT QUOTE
Pt presents to the ED with complaints of difficulty breathing that started 1 hour prior to arrival. Pt has a history of COPD and asthma, no prior intubation. 1 neb treatment given to pt by EMS prior to arrival.

## 2024-02-23 ENCOUNTER — INPATIENT (INPATIENT)
Facility: HOSPITAL | Age: 54
LOS: 3 days | Discharge: HOME HEALTH | DRG: 193 | End: 2024-02-27
Attending: STUDENT IN AN ORGANIZED HEALTH CARE EDUCATION/TRAINING PROGRAM | Admitting: STUDENT IN AN ORGANIZED HEALTH CARE EDUCATION/TRAINING PROGRAM
Payer: MEDICAID

## 2024-02-23 DIAGNOSIS — I63.9 CEREBRAL INFARCTION, UNSPECIFIED: ICD-10-CM

## 2024-02-23 DIAGNOSIS — B34.8 OTHER VIRAL INFECTIONS OF UNSPECIFIED SITE: ICD-10-CM

## 2024-02-23 DIAGNOSIS — I10 ESSENTIAL (PRIMARY) HYPERTENSION: ICD-10-CM

## 2024-02-23 DIAGNOSIS — N41.9 INFLAMMATORY DISEASE OF PROSTATE, UNSPECIFIED: ICD-10-CM

## 2024-02-23 DIAGNOSIS — F41.9 ANXIETY DISORDER, UNSPECIFIED: ICD-10-CM

## 2024-02-23 DIAGNOSIS — D64.9 ANEMIA, UNSPECIFIED: ICD-10-CM

## 2024-02-23 DIAGNOSIS — Z29.9 ENCOUNTER FOR PROPHYLACTIC MEASURES, UNSPECIFIED: ICD-10-CM

## 2024-02-23 DIAGNOSIS — B19.20 UNSPECIFIED VIRAL HEPATITIS C WITHOUT HEPATIC COMA: ICD-10-CM

## 2024-02-23 DIAGNOSIS — N39.0 URINARY TRACT INFECTION, SITE NOT SPECIFIED: ICD-10-CM

## 2024-02-23 DIAGNOSIS — B20 HUMAN IMMUNODEFICIENCY VIRUS [HIV] DISEASE: ICD-10-CM

## 2024-02-23 DIAGNOSIS — L40.9 PSORIASIS, UNSPECIFIED: ICD-10-CM

## 2024-02-23 DIAGNOSIS — A41.9 SEPSIS, UNSPECIFIED ORGANISM: ICD-10-CM

## 2024-02-23 DIAGNOSIS — J44.89 OTHER SPECIFIED CHRONIC OBSTRUCTIVE PULMONARY DISEASE: ICD-10-CM

## 2024-02-23 DIAGNOSIS — Z98.890 OTHER SPECIFIED POSTPROCEDURAL STATES: Chronic | ICD-10-CM

## 2024-02-23 LAB
4/8 RATIO: 0.62 RATIO — LOW (ref 0.9–3.6)
ABS CD8: 486 CELLS/UL — SIGNIFICANT CHANGE UP (ref 142–740)
ADD ON TEST-SPECIMEN IN LAB: SIGNIFICANT CHANGE UP
ANION GAP SERPL CALC-SCNC: 11 MMOL/L — SIGNIFICANT CHANGE UP (ref 5–17)
ANION GAP SERPL CALC-SCNC: 9 MMOL/L — SIGNIFICANT CHANGE UP (ref 5–17)
APPEARANCE UR: ABNORMAL
BACTERIA # UR AUTO: ABNORMAL /HPF
BASE EXCESS BLDV CALC-SCNC: 0.1 MMOL/L — SIGNIFICANT CHANGE UP (ref -2–3)
BASOPHILS # BLD AUTO: 0.03 K/UL — SIGNIFICANT CHANGE UP (ref 0–0.2)
BASOPHILS NFR BLD AUTO: 0.4 % — SIGNIFICANT CHANGE UP (ref 0–2)
BILIRUB UR-MCNC: NEGATIVE — SIGNIFICANT CHANGE UP
BUN SERPL-MCNC: 14 MG/DL — SIGNIFICANT CHANGE UP (ref 7–23)
BUN SERPL-MCNC: 20 MG/DL — SIGNIFICANT CHANGE UP (ref 7–23)
CA-I SERPL-SCNC: 1.13 MMOL/L — LOW (ref 1.15–1.33)
CALCIUM SERPL-MCNC: 8.4 MG/DL — SIGNIFICANT CHANGE UP (ref 8.4–10.5)
CALCIUM SERPL-MCNC: 8.6 MG/DL — SIGNIFICANT CHANGE UP (ref 8.4–10.5)
CD16+CD56+ CELLS NFR BLD: 4 % — LOW (ref 5–23)
CD16+CD56+ CELLS NFR SPEC: 39 CELLS/UL — LOW (ref 71–410)
CD19 BLASTS SPEC-ACNC: 108 CELLS/UL — SIGNIFICANT CHANGE UP (ref 84–469)
CD19 BLASTS SPEC-ACNC: 11 % — SIGNIFICANT CHANGE UP (ref 6–24)
CD3 BLASTS SPEC-ACNC: 800 CELLS/UL — SIGNIFICANT CHANGE UP (ref 672–1870)
CD3 BLASTS SPEC-ACNC: 84 % — HIGH (ref 59–83)
CD4 %: 32 % — SIGNIFICANT CHANGE UP (ref 30–62)
CD8 %: 52 % — HIGH (ref 12–36)
CHLORIDE SERPL-SCNC: 104 MMOL/L — SIGNIFICANT CHANGE UP (ref 96–108)
CHLORIDE SERPL-SCNC: 99 MMOL/L — SIGNIFICANT CHANGE UP (ref 96–108)
CO2 BLDV-SCNC: 25.9 MMOL/L — SIGNIFICANT CHANGE UP (ref 22–26)
CO2 SERPL-SCNC: 23 MMOL/L — SIGNIFICANT CHANGE UP (ref 22–31)
CO2 SERPL-SCNC: 24 MMOL/L — SIGNIFICANT CHANGE UP (ref 22–31)
COLOR SPEC: YELLOW — SIGNIFICANT CHANGE UP
CREAT SERPL-MCNC: 0.75 MG/DL — SIGNIFICANT CHANGE UP (ref 0.5–1.3)
CREAT SERPL-MCNC: 0.77 MG/DL — SIGNIFICANT CHANGE UP (ref 0.5–1.3)
DIFF PNL FLD: ABNORMAL
EGFR: 108 ML/MIN/1.73M2 — SIGNIFICANT CHANGE UP
EGFR: 109 ML/MIN/1.73M2 — SIGNIFICANT CHANGE UP
EOSINOPHIL # BLD AUTO: 0.44 K/UL — SIGNIFICANT CHANGE UP (ref 0–0.5)
EOSINOPHIL NFR BLD AUTO: 6 % — SIGNIFICANT CHANGE UP (ref 0–6)
GAS PNL BLDV: 134 MMOL/L — LOW (ref 136–145)
GAS PNL BLDV: SIGNIFICANT CHANGE UP
GAS PNL BLDV: SIGNIFICANT CHANGE UP
GLUCOSE SERPL-MCNC: 110 MG/DL — HIGH (ref 70–99)
GLUCOSE SERPL-MCNC: 97 MG/DL — SIGNIFICANT CHANGE UP (ref 70–99)
GLUCOSE UR QL: NEGATIVE MG/DL — SIGNIFICANT CHANGE UP
HAV IGM SER-ACNC: SIGNIFICANT CHANGE UP
HBV CORE AB SER-ACNC: SIGNIFICANT CHANGE UP
HBV CORE IGM SER-ACNC: SIGNIFICANT CHANGE UP
HBV SURFACE AB SER-ACNC: SIGNIFICANT CHANGE UP
HBV SURFACE AG SER-ACNC: SIGNIFICANT CHANGE UP
HCO3 BLDV-SCNC: 25 MMOL/L — SIGNIFICANT CHANGE UP (ref 22–29)
HCT VFR BLD CALC: 33.3 % — LOW (ref 39–50)
HCT VFR BLD CALC: 34.3 % — LOW (ref 39–50)
HCV AB S/CO SERPL IA: 132.5 S/CO — HIGH
HCV AB SERPL-IMP: REACTIVE
HGB BLD-MCNC: 10.9 G/DL — LOW (ref 13–17)
HGB BLD-MCNC: 11.4 G/DL — LOW (ref 13–17)
HPIV3 RNA SPEC QL NAA+PROBE: DETECTED
IMM GRANULOCYTES NFR BLD AUTO: 0.7 % — SIGNIFICANT CHANGE UP (ref 0–0.9)
KETONES UR-MCNC: NEGATIVE MG/DL — SIGNIFICANT CHANGE UP
LACTATE SERPL-SCNC: 0.9 MMOL/L — SIGNIFICANT CHANGE UP (ref 0.5–2)
LACTATE SERPL-SCNC: 2.5 MMOL/L — HIGH (ref 0.5–2)
LEUKOCYTE ESTERASE UR-ACNC: ABNORMAL
LYMPHOCYTES # BLD AUTO: 0.92 K/UL — LOW (ref 1–3.3)
LYMPHOCYTES # BLD AUTO: 12.6 % — LOW (ref 13–44)
MCHC RBC-ENTMCNC: 27.6 PG — SIGNIFICANT CHANGE UP (ref 27–34)
MCHC RBC-ENTMCNC: 27.7 PG — SIGNIFICANT CHANGE UP (ref 27–34)
MCHC RBC-ENTMCNC: 32.7 GM/DL — SIGNIFICANT CHANGE UP (ref 32–36)
MCHC RBC-ENTMCNC: 33.2 GM/DL — SIGNIFICANT CHANGE UP (ref 32–36)
MCV RBC AUTO: 83.1 FL — SIGNIFICANT CHANGE UP (ref 80–100)
MCV RBC AUTO: 84.5 FL — SIGNIFICANT CHANGE UP (ref 80–100)
MONOCYTES # BLD AUTO: 0.52 K/UL — SIGNIFICANT CHANGE UP (ref 0–0.9)
MONOCYTES NFR BLD AUTO: 7.1 % — SIGNIFICANT CHANGE UP (ref 2–14)
NEUTROPHILS # BLD AUTO: 5.32 K/UL — SIGNIFICANT CHANGE UP (ref 1.8–7.4)
NEUTROPHILS NFR BLD AUTO: 73.2 % — SIGNIFICANT CHANGE UP (ref 43–77)
NITRITE UR-MCNC: POSITIVE
NRBC # BLD: 0 /100 WBCS — SIGNIFICANT CHANGE UP (ref 0–0)
NRBC # BLD: 0 /100 WBCS — SIGNIFICANT CHANGE UP (ref 0–0)
PCO2 BLDV: 39 MMHG — LOW (ref 42–55)
PH BLDV: 7.41 — SIGNIFICANT CHANGE UP (ref 7.32–7.43)
PH UR: 7 — SIGNIFICANT CHANGE UP (ref 5–8)
PLATELET # BLD AUTO: 316 K/UL — SIGNIFICANT CHANGE UP (ref 150–400)
PLATELET # BLD AUTO: 376 K/UL — SIGNIFICANT CHANGE UP (ref 150–400)
PO2 BLDV: 36 MMHG — SIGNIFICANT CHANGE UP (ref 25–45)
POTASSIUM BLDV-SCNC: 3.5 MMOL/L — SIGNIFICANT CHANGE UP (ref 3.5–5.1)
POTASSIUM SERPL-MCNC: 3.5 MMOL/L — SIGNIFICANT CHANGE UP (ref 3.5–5.3)
POTASSIUM SERPL-MCNC: 3.5 MMOL/L — SIGNIFICANT CHANGE UP (ref 3.5–5.3)
POTASSIUM SERPL-SCNC: 3.5 MMOL/L — SIGNIFICANT CHANGE UP (ref 3.5–5.3)
POTASSIUM SERPL-SCNC: 3.5 MMOL/L — SIGNIFICANT CHANGE UP (ref 3.5–5.3)
PROCALCITONIN SERPL-MCNC: 0.05 NG/ML — SIGNIFICANT CHANGE UP (ref 0.02–0.1)
PROT UR-MCNC: 100 MG/DL
RAPID RVP RESULT: DETECTED
RBC # BLD: 3.94 M/UL — LOW (ref 4.2–5.8)
RBC # BLD: 4.13 M/UL — LOW (ref 4.2–5.8)
RBC # FLD: 14.2 % — SIGNIFICANT CHANGE UP (ref 10.3–14.5)
RBC # FLD: 14.4 % — SIGNIFICANT CHANGE UP (ref 10.3–14.5)
RBC CASTS # UR COMP ASSIST: 20 /HPF — HIGH (ref 0–4)
SAO2 % BLDV: 61 % — LOW (ref 67–88)
SARS-COV-2 RNA SPEC QL NAA+PROBE: SIGNIFICANT CHANGE UP
SODIUM SERPL-SCNC: 133 MMOL/L — LOW (ref 135–145)
SODIUM SERPL-SCNC: 137 MMOL/L — SIGNIFICANT CHANGE UP (ref 135–145)
SP GR SPEC: 1.02 — SIGNIFICANT CHANGE UP (ref 1–1.03)
SQUAMOUS # UR AUTO: 1 /HPF — SIGNIFICANT CHANGE UP (ref 0–5)
T-CELL CD4 SUBSET PNL BLD: 302 CELLS/UL — LOW (ref 489–1457)
UROBILINOGEN FLD QL: 1 MG/DL — SIGNIFICANT CHANGE UP (ref 0.2–1)
WBC # BLD: 5.05 K/UL — SIGNIFICANT CHANGE UP (ref 3.8–10.5)
WBC # BLD: 7.28 K/UL — SIGNIFICANT CHANGE UP (ref 3.8–10.5)
WBC # FLD AUTO: 5.05 K/UL — SIGNIFICANT CHANGE UP (ref 3.8–10.5)
WBC # FLD AUTO: 7.28 K/UL — SIGNIFICANT CHANGE UP (ref 3.8–10.5)
WBC UR QL: >998 /HPF — HIGH (ref 0–5)

## 2024-02-23 PROCEDURE — 93010 ELECTROCARDIOGRAM REPORT: CPT

## 2024-02-23 PROCEDURE — 74177 CT ABD & PELVIS W/CONTRAST: CPT | Mod: 26,MC

## 2024-02-23 PROCEDURE — 71045 X-RAY EXAM CHEST 1 VIEW: CPT | Mod: 26

## 2024-02-23 PROCEDURE — 99223 1ST HOSP IP/OBS HIGH 75: CPT | Mod: GC

## 2024-02-23 RX ORDER — IPRATROPIUM/ALBUTEROL SULFATE 18-103MCG
3 AEROSOL WITH ADAPTER (GRAM) INHALATION ONCE
Refills: 0 | Status: COMPLETED | OUTPATIENT
Start: 2024-02-23 | End: 2024-02-23

## 2024-02-23 RX ORDER — ACETAMINOPHEN 500 MG
650 TABLET ORAL EVERY 6 HOURS
Refills: 0 | Status: DISCONTINUED | OUTPATIENT
Start: 2024-02-23 | End: 2024-02-27

## 2024-02-23 RX ORDER — SODIUM CHLORIDE 9 MG/ML
1000 INJECTION, SOLUTION INTRAVENOUS ONCE
Refills: 0 | Status: COMPLETED | OUTPATIENT
Start: 2024-02-23 | End: 2024-02-23

## 2024-02-23 RX ORDER — SODIUM CHLORIDE 9 MG/ML
1000 INJECTION INTRAMUSCULAR; INTRAVENOUS; SUBCUTANEOUS ONCE
Refills: 0 | Status: DISCONTINUED | OUTPATIENT
Start: 2024-02-23 | End: 2024-02-23

## 2024-02-23 RX ORDER — ESCITALOPRAM OXALATE 10 MG/1
10 TABLET, FILM COATED ORAL DAILY
Refills: 0 | Status: DISCONTINUED | OUTPATIENT
Start: 2024-02-23 | End: 2024-02-27

## 2024-02-23 RX ORDER — LEVOFLOXACIN 5 MG/ML
1 INJECTION, SOLUTION INTRAVENOUS
Qty: 41 | Refills: 0
Start: 2024-02-23 | End: 2024-04-03

## 2024-02-23 RX ORDER — TAMSULOSIN HYDROCHLORIDE 0.4 MG/1
0.4 CAPSULE ORAL AT BEDTIME
Refills: 0 | Status: DISCONTINUED | OUTPATIENT
Start: 2024-02-23 | End: 2024-02-27

## 2024-02-23 RX ORDER — IPRATROPIUM/ALBUTEROL SULFATE 18-103MCG
3 AEROSOL WITH ADAPTER (GRAM) INHALATION EVERY 6 HOURS
Refills: 0 | Status: DISCONTINUED | OUTPATIENT
Start: 2024-02-23 | End: 2024-02-24

## 2024-02-23 RX ORDER — SODIUM CHLORIDE 9 MG/ML
500 INJECTION INTRAMUSCULAR; INTRAVENOUS; SUBCUTANEOUS ONCE
Refills: 0 | Status: COMPLETED | OUTPATIENT
Start: 2024-02-23 | End: 2024-02-23

## 2024-02-23 RX ORDER — BICTEGRAVIR SODIUM, EMTRICITABINE, AND TENOFOVIR ALAFENAMIDE FUMARATE 30; 120; 15 MG/1; MG/1; MG/1
1 TABLET ORAL DAILY
Refills: 0 | Status: DISCONTINUED | OUTPATIENT
Start: 2024-02-23 | End: 2024-02-27

## 2024-02-23 RX ORDER — ACETAMINOPHEN 500 MG
650 TABLET ORAL ONCE
Refills: 0 | Status: COMPLETED | OUTPATIENT
Start: 2024-02-23 | End: 2024-02-23

## 2024-02-23 RX ADMIN — BICTEGRAVIR SODIUM, EMTRICITABINE, AND TENOFOVIR ALAFENAMIDE FUMARATE 1 TABLET(S): 30; 120; 15 TABLET ORAL at 12:04

## 2024-02-23 RX ADMIN — Medication 650 MILLIGRAM(S): at 03:20

## 2024-02-23 RX ADMIN — Medication 650 MILLIGRAM(S): at 04:00

## 2024-02-23 RX ADMIN — ESCITALOPRAM OXALATE 10 MILLIGRAM(S): 10 TABLET, FILM COATED ORAL at 12:04

## 2024-02-23 RX ADMIN — SODIUM CHLORIDE 1000 MILLILITER(S): 9 INJECTION, SOLUTION INTRAVENOUS at 01:54

## 2024-02-23 RX ADMIN — SODIUM CHLORIDE 1000 MILLILITER(S): 9 INJECTION INTRAMUSCULAR; INTRAVENOUS; SUBCUTANEOUS at 06:26

## 2024-02-23 RX ADMIN — SODIUM CHLORIDE 1000 MILLILITER(S): 9 INJECTION, SOLUTION INTRAVENOUS at 01:02

## 2024-02-23 RX ADMIN — TAMSULOSIN HYDROCHLORIDE 0.4 MILLIGRAM(S): 0.4 CAPSULE ORAL at 22:13

## 2024-02-23 RX ADMIN — Medication 3 MILLILITER(S): at 01:02

## 2024-02-23 RX ADMIN — Medication 3 MILLILITER(S): at 22:13

## 2024-02-23 RX ADMIN — Medication 3 MILLILITER(S): at 15:33

## 2024-02-23 NOTE — H&P ADULT - NSHPPHYSICALEXAM_GEN_ALL_CORE
.  VITAL SIGNS:  T(C): 36.6 (02-23-24 @ 06:54), Max: 37.6 (02-23-24 @ 03:07)  T(F): 97.9 (02-23-24 @ 06:54), Max: 99.7 (02-23-24 @ 03:07)  HR: 101 (02-23-24 @ 06:54) (101 - 123)  BP: 120/71 (02-23-24 @ 06:54) (118/63 - 147/81)  BP(mean): --  RR: 20 (02-23-24 @ 06:54) (20 - 20)  SpO2: 95% (02-23-24 @ 06:54) (95% - 99%)  Wt(kg): --    PHYSICAL EXAM:    Constitutional: WDWN resting comfortably in bed; NAD  Head: NC/AT  Eyes: PERRL, EOMI, anicteric sclera  ENT: no nasal discharge; uvula midline, no oropharyngeal erythema or exudates; MMM  Neck: supple; no JVD or thyromegaly  Respiratory: CTA B/L; no W/R/R, no retractions  Cardiac: +S1/S2; RRR; no M/R/G; PMI non-displaced  Gastrointestinal: abdomen soft, NT/ND; no rebound or guarding; +BSx4  Back: spine midline, no bony tenderness or step-offs; no CVAT B/L  Extremities: WWP, no clubbing or cyanosis; no peripheral edema  Musculoskeletal: NROM x4; no joint swelling, tenderness or erythema  Vascular: 2+ radial, femoral, DP/PT pulses B/L  Dermatologic: skin warm, dry and intact; no rashes, wounds, or scars  Lymphatic: no submandibular or cervical LAD  Neurologic: AAOx3; CNII-XII grossly intact; no focal deficits  Psychiatric: affect and characteristics of appearance, verbalizations, behaviors are appropriate .  VITAL SIGNS:  T(C): 36.6 (02-23-24 @ 06:54), Max: 37.6 (02-23-24 @ 03:07)  T(F): 97.9 (02-23-24 @ 06:54), Max: 99.7 (02-23-24 @ 03:07)  HR: 101 (02-23-24 @ 06:54) (101 - 123)  BP: 120/71 (02-23-24 @ 06:54) (118/63 - 147/81)  BP(mean): --  RR: 20 (02-23-24 @ 06:54) (20 - 20)  SpO2: 95% (02-23-24 @ 06:54) (95% - 99%)  Wt(kg): --    PHYSICAL EXAM:    Constitutional: Resting comfortably in bed; NAD  Head: NC/AT  Eyes: PERRL, EOMI, anicteric sclera  ENT: no nasal discharge; MMM  Neck: supple; no JVD  Respiratory: Mild diffuse wheezing, no retractions or accessory muscle use, no iWOB  Cardiac: +S1/S2; RRR; no M/R/G  Gastrointestinal: Lower abdomen palpation with feeling of pressure in bladder, abdomen soft, NT/ND; no rebound or guarding; +BSx4  Back: no CVAT B/L  Extremities: WWP, , no peripheral edema  Musculoskeletal: no joint swelling, tenderness or erythema  Vascular: 2+ radial, DP/PT pulses B/L  Dermatologic: Forehead, abdominal and b/l EU plaque psoriasis,   Lymphatic: no submandibular or cervical LAD  Neurologic: AAOx3  Psychiatric: affect and characteristics of appearance, verbalizations, behaviors are appropriate

## 2024-02-23 NOTE — H&P ADULT - PROBLEM SELECTOR PLAN 8
Pt with PMHx of Plaque Psoriasis on forehead, b/l arms and abdomen.     Plan    - c/w Hydrocortisone 2.5% topical lotion: Apply topically to affected area 2 times a day  - c/w Mupirocin 2% topical ointment: Apply topically to affected area 2 times a day Pt with Hg of 11~ which is baseline for pt. Pt with no signs or symptoms of bleeding. Will continue to monitor.     Plan  - CTM

## 2024-02-23 NOTE — H&P ADULT - PROBLEM SELECTOR PLAN 1
Pt reports 6 months of dysuria urinary frequency and pain with urination as well as a dull pressure in lower abdomen/ perineum. Pt is not currently sexually active and has not been able to get an erection since the start of symptoms 6 months ago. Pt denies and discharge or rash around perineum. CT findings suggestive of prostatitis with cystitis with periprostatic phlegmonous change. No fluid collections to suggest abscess. Etiology prostatitis vs UTI vs Chronic pelvic pain syndrome (CPPS). Unlikely, CPPS as pt with signs and symptoms of active UTI. Most likely Lower UTI with concomitance chronic prostatitis given erectile dysfunction and no reports constitutional symptoms and + UA. Urine and blood cultures obtained, will f/u results. Will continue with Abx     Plan   - c/w Levofloxacin 500mg PO q24  - f/u cultures On admission pt meeting 2/4 SIRS (, WBC 4.13) with possible sources (PNA, Prostatitis, UTI) and signs of end organ dysfunction ( Lactate of 2.5). Pt received 2.5L IVF in ED, Pt with good UOP and mentating well. Lactate cleared (0.9)Tachycardia improving (123->101), likely 2/2 to hypovolemia. Blood and urine culture obtained, will follow up results.     Plan   - Monitor UOP, and VS  - see below for treatment of prostatitis/uti/parainfluenza On admission pt meeting 2/4 SIRS (, VBG CO2 39 [-5 =ABG CO2 of 32?) with possible sources (PNA, Prostatitis, UTI) and signs of end organ dysfunction ( Lactate of 2.5). Pt received 2.5L IVF in ED, Pt with good UOP and mentating well. Lactate cleared (0.9)Tachycardia improving (123->101), likely 2/2 to hypovolemia. Blood and urine culture obtained, will follow up results.     Plan   - Monitor UOP, and VS  - see below for treatment of prostatitis/uti/parainfluenza

## 2024-02-23 NOTE — ED PROVIDER NOTE - CARE PLAN
1 Principal Discharge DX:	Prostatitis  Secondary Diagnosis:	Parainfluenza  Secondary Diagnosis:	Pneumonia

## 2024-02-23 NOTE — ED PROVIDER NOTE - CLINICAL SUMMARY MEDICAL DECISION MAKING FREE TEXT BOX
VS w/ tachycardia, likely 2/2 dehydration (appears dry) and got an albuterol neb w/ EMS  no resp distress and lungs clear  pt high risk as he has HIV and poor compliance  r/o pneumonia, prostatitis  plan for labs, cultures, UA, CXR, fluids    --->no leukocytosis, +elevated lactate, will repeat after fluids. +UA, will get CTAP to eval for prostatitis/prostatic abscess. CXR clear no infiltrate. Will give levaquin to cover urine. trial of duoneb as pt said it helped him. signed out to night team pending CTr, repeat lactate, repeat HR after fluids.

## 2024-02-23 NOTE — H&P ADULT - ASSESSMENT
Pt is a 53yM with PMHx of asthma/COPD, Current  smoker, HIV on biktarvy (questionable compliance), HCV, HTN, CVA 5-6 years ago, Depression, presenting with 1 day of SOB and and fatigue and 6 months of dysuria, found to Parainfluenza + with UTI and prostatitis admitted for Abx treatment.

## 2024-02-23 NOTE — H&P ADULT - PROBLEM SELECTOR PLAN 11
Pt with PMHx of CVA 6-8 years ago, not on any medications.     Plan   HERIBERTO Pt with documented PMHx of HTN however was never prescribed antihypertensive medications. Since admission pt found to be normotensive. Will CTM     Plan   - CTM

## 2024-02-23 NOTE — H&P ADULT - PROBLEM SELECTOR PLAN 6
Pt with PMHx of HCV on prior admission. Per pt he is not sure if he ever followed up with treatment.     Plan   - f/u f/u Hep panel Pt with PMHx of HIV intermittently compliant with Biktarvy (CD4 374, VL 72,000 11/11/23) Pt has not seen a HIV specialist since before COVID. Pt was following at [facility] however his insurance dose not cover [facility]. Pt would like to speak with SW to help set up RDC with his insurance.     Plan   - f/u CD4 and Viral load   - c/w Biktarvy [id number] daily   - Refill meds upon discharge   - SW consult to help pt set up apts at [facility]

## 2024-02-23 NOTE — CONSULT NOTE ADULT - ASSESSMENT
Patient is a 53M w/ possible prostatitis and UTI.  Patient VSS, HDS, and afebrile.  He is on-toxic appearing.   Patient is a 53M w/ possible prostatitis and UTI.  Patient VSS, HDS, and afebrile.  He is on-toxic appearing.  Gu team placed a 16fr chaney catheter 2/2 to urinary retention (voided 200cc at bedside, PVR was >350cc). Once chaney was placed drained approx 500cc yellow/cloudy output. Digital rectal exam was non tender prostate though feels enlarged, non boggy and no noted discharge from rectum.     Recs:  -Start Flomax 0.4mg  -Abx per primary team okay for oral vs IV abx.   -Recommend Infectious Disease Consult due to 2 sites of infection.  -Continue chaney catheter, can TOV after 3-4 days of Abx and Flomax.  - team will follow.  Patient is a 53M w/ possible prostatitis and UTI.  Patient VSS, HDS, and afebrile.  He is on-toxic appearing.  Gu team placed a 16fr chaney catheter 2/2 to urinary retention (voided 200cc at bedside, PVR was >350cc). Once chaney was placed drained approx 500cc yellow/cloudy output. Digital rectal exam was non tender prostate though feels enlarged, non boggy and no noted discharge from rectum. UA - large LE, positive nitrite, many WBC, RBC.    Recommendations:  -Start Flomax 0.4mg  -Continue broad spectrum abx per primary team  -ID consult  -F/u UCx  -Continue chaney catheter, can TOV after 3-4 days of Abx and Flomax  -Please have patient follow up with the Urology Clinic within 1-2 weeks of discharge. The office is located at [address] [place] [address], [place] [address]. The office phone number is [phone]. Will coordinate follow up upon discharge.  -Appreciate excellent care per primary team  -Discussed with attending

## 2024-02-23 NOTE — H&P ADULT - PROBLEM SELECTOR PLAN 10
Pt with documented PMHx of HTN however was never prescribed antihypertensive medications. Since admission pt found to be normotensive. Will CTM     Plan   - CTM Pt with PMHx of Anxiety/Depression on home Lexapro 10 mg oral tablet: 1 tab(s) orally once a day    Plan   - c/w Lexapro 10mg PO daily

## 2024-02-23 NOTE — H&P ADULT - ATTENDING COMMENTS
No Pt is a 53yM with PMHx of asthma/COPD, Current  smoker, HIV on biktarvy (questionable compliance), HCV, HTN, CVA 5-6 years ago, Depression, presenting with 1 day of SOB and and fatigue and 6 months of dysuria, found to Parainfluenza + with UTI and prostatitis admitted for Abx treatment.     Labs and imaging reviewed    Problem List  #Severe Sepsis POA  #Prostatitis  #Acute Cystitis  #HIV, chronic  #Parainfluenza     Plan  -Start Levaquin PO  -Can send urine for GC/Chlamydia testing  -Supportive care  -Urology consulted, appreciate recs  -Anticipate D/C w/n 24h

## 2024-02-23 NOTE — H&P ADULT - PROBLEM SELECTOR PLAN 9
Pt with PMHx of Anxiety/Depression on home Lexapro 10 mg oral tablet: 1 tab(s) orally once a day    Plan   - c/w Lexapro 10mg PO daily Pt with PMHx of Plaque Psoriasis on forehead, b/l arms and abdomen.     Plan    - c/w Hydrocortisone 2.5% topical lotion: Apply topically to affected area 2 times a day  - c/w Mupirocin 2% topical ointment: Apply topically to affected area 2 times a day Pt with PMHx of Plaque Psoriasis on forehead, b/l arms and abdomen.     Plan   - c/w Hydrocortisone 2.5% topical lotion: Apply topically to affected area 2 times a day  - c/w Mupirocin 2% topical ointment: Apply topically to affected area 2 times a day  - Outpatient dermatology f/u

## 2024-02-23 NOTE — H&P ADULT - PROBLEM SELECTOR PLAN 7
Pt with Hg of 11~ which is baseline for pt. Pt with no signs or symptoms of bleeding. Will continue to monitor.     Plan  - CTM Pt with PMHx of HCV on prior admission. Per pt he is not sure if he ever followed up with treatment.     Plan   - f/u f/u Hep panel

## 2024-02-23 NOTE — H&P ADULT - NSHPLABSRESULTS_GEN_ALL_CORE
Lactate, Blood: 0.9 mmol/LBlood Gas Profile - Venous (02.23.24 @ 01:03)   pH, Venous: 7.41  pCO2, Venous: 39 mmHg  pO2, Venous: 36 mmHg  HCO3, Venous: 25 mmol/L  Base Excess, Venous: 0.1 mmol/L  Oxygen Saturation, Venous: 61.0 %  Total CO2, Venous: 25.9 mmol/L  Blood Gas Source Venous: VenousEpithelial Cells: 1 /HPF  Red Blood Cell - Urine: 20 /HPF  White Blood Cell - Urine: >998 /HPF  Bacteria: Many /HPFUrine Appearance: Turbid  Color: Yellow  Specific Gravity: 1.018  pH Urine: 7.0  Protein, Urine: 100 mg/dL  Glucose Qualitative, Urine: Negative mg/dL  Ketone - Urine: Negative mg/dL  Blood, Urine: Moderate  Bilirubin: Negative  Urobilinogen: 1.0 mg/dL  Leukocyte Esterase Concentration: Large  Nitrite: PositiveRespiratory Viral Panel with COVID-19 by SHERITA (02.23.24 @ 00:43)   Rapid RVP Result: Detected  Lactate, Blood (02.23.24 @ 00:43)   Lactate, Blood: 2.5 mmol/LWBC Count: 7.28 K/uL  RBC Count: 4.13 M/uL  Hemoglobin: 11.4 g/dL  Hematocrit: 34.3 %  Mean Cell Volume: 83.1 fl  Mean Cell Hemoglobin: 27.6 pg  Mean Cell Hemoglobin Conc: 33.2 gm/dL  Red Cell Distrib Width: 14.4 %  Platelet Count - Automated: 376 K/uL  Auto Neutrophil #: 5.32 K/uL  Auto Lymphocyte #: 0.92 K/uL  Auto Monocyte #: 0.52 K/uL  Auto Eosinophil #: 0.44 K/uL  Auto Basophil #: 0.03 K/uL  Auto Neutrophil %: 73.2: Differential percentages must be correlated with absolute numbers for Sodium: 133 mmol/L  Potassium: 3.5 mmol/L  Chloride: 99 mmol/L  Carbon Dioxide: 23 mmol/L  Anion Gap: 11 mmol/L  Blood Urea Nitrogen: 20 mg/dL  Creatinine: 0.75 mg/dL  Glucose: 97 mg/dL  Calcium: 8.6 mg/dL  eGFR: 109: The estimated glomerular filtration rate (eGFR) is calculated using the < from: CT Abdomen and Pelvis w/ IV Cont (02.23.24 @ 01:56) >      IMPRESSION:  1.  Findings suggestive of prostatitis with cystitis with periprostatic   phlegmonous change. No fluid collections to suggest abscess.  2.  Right basilar opacity could represent pneumonia.    --- End of Report ---    < end of copied text >

## 2024-02-23 NOTE — PROVIDER CONTACT NOTE (CRITICAL VALUE NOTIFICATION) - NAME OF MD/NP/PA/DO NOTIFIED:
May [No Edema] : there was no peripheral edema [No Rash] : no rash [Normal] : affect was normal and insight and judgment were intact [de-identified] : small stype on R upper lid [de-identified] : hand exam normal

## 2024-02-23 NOTE — H&P ADULT - NSHPREVIEWOFSYSTEMS_GEN_ALL_CORE
REVIEW OF SYSTEMS:  CONSTITUTIONAL: No weakness, fevers or chills  EYES/ENT: No visual changes;  No vertigo or throat pain   NECK: No pain or stiffness  RESPIRATORY: No cough, wheezing, hemoptysis; No shortness of breath  CARDIOVASCULAR: No chest pain or palpitations  GASTROINTESTINAL: No abdominal or epigastric pain. No nausea, vomiting, or hematemesis; No diarrhea or constipation. No melena or hematochezia.  GENITOURINARY: No dysuria, frequency or hematuria  NEUROLOGICAL: No numbness or weakness  SKIN: No itching, rashes REVIEW OF SYSTEMS:  CONSTITUTIONAL: + fatigue, no fevers or chills  RESPIRATORY: + chronic cough non productive, + shortness of breath  CARDIOVASCULAR: No chest pain or palpitations  GASTROINTESTINAL: No abdominal or epigastric pain. No nausea, vomiting, or hematemesis; No diarrhea or constipation. No melena or hematochezia.  GENITOURINARY: + dysuria, frequency, No hematuria  SKIN: +  rashes face, b/l arm, abd (psoriasis)
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
## 2024-02-23 NOTE — H&P ADULT - PROBLEM SELECTOR PLAN 5
Pt with PMHx of HIV intermittently compliant with Biktarvy (CD4 374, VL 72,000 11/11/23) Pt has not seen a HIV specialist since before COVID. Pt was following at [facility] however his insurance dose not cover [facility]. Pt would like to speak with SW to help set up RDC with his insurance.     Plan   - f/u CD4 and Viral load   - c/w Biktarvy [id number] daily   - Refill meds upon discharge   - SW consult to help pt set up apts at [facility] Pt with PMHx of Asthma/COPD on home meds ProAir HFA 90mcg PRN, Symbicort 80mcg-4.5mcg 2 puffs BID. However pt ran out of medication. Last night pt experienced SOB and called EMS, after receiving Duonebs pt felt better.No new cough or increased sputum production. On exam pt with no iWOB, with mild wheezing on ascultation.      Plan   - c/w doudebs q6 PRN   - refill medications upon discharge and set up PCP Pt with PMHx of Asthma/COPD on home meds ProAir HFA 90mcg PRN, Symbicort 80mcg-4.5mcg 2 puffs BID. However pt ran out of medication. Last night pt experienced SOB and called EMS, after receiving Duonebs pt felt better.No new cough or increased sputum production. On exam pt with no iWOB, with mild wheezing on ascultation.      Plan   - c/w doudebs q6   - refill medications upon discharge and set up PCP

## 2024-02-23 NOTE — PATIENT PROFILE ADULT - FALL HARM RISK - RISK INTERVENTIONS
Assistance with ambulation/Communicate Fall Risk and Risk Factors to all staff, patient, and family/Reinforce activity limits and safety measures with patient and family/Visual Cue: Yellow wristband/Bed in lowest position, wheels locked, appropriate side rails in place/Call bell, personal items and telephone in reach/Instruct patient to call for assistance before getting out of bed or chair/Non-slip footwear when patient is out of bed/[place] to call system/Physically safe environment - no spills, clutter or unnecessary equipment/Purposeful Proactive Rounding/Room/bathroom lighting operational, light cord in reach

## 2024-02-23 NOTE — PHARMACY COMMUNICATION NOTE - COMMENTS
Admission medication  -	Biktarvy -25mg daily  -	Hydrocortisone 2.5% topical lotion apply topically to affected area BID  -	Levofloxacin 500mg daily  -	Lexapro 10mg daily  -	Mupirocin 2% ointment apply topically to affected area BID  -	ProAir HFA 90 mcg/inh 2 puffs QID prn for bronchospasm  -	Symbicort 80-4.5mcg/inh 2 puffs BID   Spoke to patient to confirm home medication
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
## 2024-02-23 NOTE — H&P ADULT - PROBLEM SELECTOR PLAN 12
F - s/p 2.5 L IVF in ED   E - PRN   N - Reg   D - Lovenox 40 sq  D - RMF F - s/p 2.5 L IVF in ED   E - PRN   N - Reg   D - SCDs  D - RMF

## 2024-02-23 NOTE — H&P ADULT - PROBLEM SELECTOR PLAN 3
Pt found to Parainfluenza positive on admission. Pt reports mild non productive chronic cough that has not changed. No reported fevers at home. This may have contributed to a possible asthma exacerbation, which improved with Duonebs. CT finding for right basilar opacity. VSS since admission saturating well on RA. Given pts unclear HIV status, pt is at increased risk secondary bact/fungal superinfection. Will continue to monitor respiratory status with low threshold for empiric Abx in respiratory status worsens, although pt currently on Levofloxacin.     Plan  - CTM respiratory and O2 status   - c/w droplet precautions   - c/w symptomatic management Pt reports 6 months of dysuria urinary frequency and pain with urination as well as a dull pressure in lower abdomen/ perineum. Pt is not currently sexually active  Pt denies and discharge or rash around perineum. CT findings suggestive of prostatitis with cystitis. Etiology Lower UTI vs Upper UTI vs Urethritis/STI. Unlikely upper UTI as pt without CVAT , flank pain or fevers. Unlikely urethritis/STI as pt not sexually active with no rashes or discharge. Likely lower UTI given dysuria CT findings and UA +. Urine Cultures obtained will follow up results and continue with Abx    Plan   - c/w Abx as above   - f/u Ucx

## 2024-02-23 NOTE — H&P ADULT - PROBLEM SELECTOR PLAN 4
Pt with PMHx of Asthma/COPD on home meds ProAir HFA 90mcg PRN, Symbicort 80mcg-4.5mcg 2 puffs BID. However pt ran out of medication. Last night pt experienced SOB and called EMS, after receiving Duonebs pt felt better.No new cough or increased sputum production. On exam pt with no iWOB, with mild wheezing on ascultation.      Plan   - c/w doudebs q6 PRN   - refill medications upon discharge and set up PCP Pt found to Parainfluenza positive on admission. Pt reports mild non productive chronic cough that has not changed. No reported fevers at home. This may have contributed to a possible asthma exacerbation, which improved with Duonebs. CT finding for right basilar opacity. VSS since admission saturating well on RA. Given pts unclear HIV status, pt is at increased risk secondary bact/fungal superinfection. Will continue to monitor respiratory status with low threshold for empiric Abx in respiratory status worsens, although pt currently on Levofloxacin.     Plan  - f/u CXR   - CTM respiratory and O2 status   - c/w droplet precautions   - c/w symptomatic management Pt found to Parainfluenza positive on admission. Pt reports mild non productive chronic cough that has not changed. No reported fevers at home. This may have contributed to a possible asthma exacerbation, which improved with Duonebs. CT finding for right basilar opacity. VSS since admission saturating well on RA. Given pts unclear HIV status, pt is at increased risk secondary bact/fungal superinfection. Will continue to monitor respiratory status with low threshold for empiric Abx in respiratory status worsens, although pt currently on Levofloxacin.     Plan  - CTM respiratory and O2 status   - c/w droplet precautions   - c/w symptomatic management

## 2024-02-23 NOTE — ED ADULT NURSE NOTE - TEMPLATE LIST FOR HEAD TO TOE ASSESSMENT
1/17-LVM to reschedule. 8:30 am hearing aid slot with Linda held on same day if patient wants it/CR   VIEW ALL

## 2024-02-23 NOTE — H&P ADULT - PROBLEM SELECTOR PLAN 2
Pt reports 6 months of dysuria urinary frequency and pain with urination as well as a dull pressure in lower abdomen/ perineum. Pt is not currently sexually active  Pt denies and discharge or rash around perineum. CT findings suggestive of prostatitis with cystitis. Etiology Lower UTI vs Upper UTI vs Urethritis/STI. Unlikely upper UTI as pt without CVAT , flank pain or fevers. Unlikely urethritis/STI as pt not sexually active with no rashes or discharge. Likely lower UTI given dysuria CT findings and UA +. Urine Cultures obtained will follow up results and continue with Abx    Plan   - c/w Abx as above   - f/u Ucx Pt reports 6 months of dysuria urinary frequency and pain with urination as well as a dull pressure in lower abdomen/ perineum. Pt is not currently sexually active and has not been able to get an erection since the start of symptoms 6 months ago. Pt denies and discharge or rash around perineum. CT findings suggestive of prostatitis with cystitis with periprostatic phlegmonous change. No fluid collections to suggest abscess. Etiology prostatitis vs UTI vs Chronic pelvic pain syndrome (CPPS). Unlikely, CPPS as pt with signs and symptoms of active UTI. Most likely Lower UTI with concomitance chronic prostatitis given erectile dysfunction and no reports constitutional symptoms and + UA. Urine and blood cultures obtained, will f/u results. Will continue with Abx     Plan   - c/w Levofloxacin 500mg PO q24  - f/u cultures Pt reports 6 months of dysuria urinary frequency and pain with urination as well as a dull pressure in lower abdomen/ perineum. Pt is not currently sexually active and has not been able to get an erection since the start of symptoms 6 months ago. Pt denies and discharge or rash around perineum. CT findings suggestive of prostatitis with cystitis with periprostatic phlegmonous change. No fluid collections to suggest abscess. Etiology prostatitis vs UTI vs Chronic pelvic pain syndrome (CPPS). Unlikely, CPPS as pt with signs and symptoms of active UTI. Most likely Lower UTI with concomitance chronic prostatitis given erectile dysfunction and no reports constitutional symptoms and + UA. Urine and blood cultures obtained, will f/u results. Will continue with Abx     Plan   - c/w Levofloxacin 500mg IV q24  - f/u cultures Pt reports 6 months of dysuria urinary frequency and pain with urination as well as a dull pressure in lower abdomen/ perineum. Pt is not currently sexually active and has not been able to get an erection since the start of symptoms 6 months ago. Pt denies and discharge or rash around perineum. CT findings suggestive of prostatitis with cystitis with periprostatic phlegmonous change. No fluid collections to suggest abscess. Etiology prostatitis vs UTI vs Chronic pelvic pain syndrome (CPPS). Unlikely, CPPS as pt with signs and symptoms of active UTI. Most likely Lower UTI with concomitance chronic prostatitis given erectile dysfunction and no reports constitutional symptoms and + UA. Urine and blood cultures obtained, will f/u results. Will continue with Abx     Plan   - c/w Levofloxacin 750mg IV q24 (x5 days 2/23-2/27(UTI dosing), then 500mg PO x 4-6 weeks(chronic prostatitis dosing)  - f/u cultures Pt reports 6 months of dysuria urinary frequency and pain with urination as well as a dull pressure in lower abdomen/ perineum. Pt is not currently sexually active and has not been able to get an erection since the start of symptoms 6 months ago. Pt denies and discharge or rash around perineum. CT findings suggestive of prostatitis with cystitis with periprostatic phlegmonous change. No fluid collections to suggest abscess. Etiology prostatitis vs UTI vs Chronic pelvic pain syndrome (CPPS). Unlikely, CPPS as pt with signs and symptoms of active UTI. Most likely Lower UTI with concomitance chronic prostatitis given erectile dysfunction and no reports constitutional symptoms and + UA. Urine and blood cultures obtained, will f/u results. Will continue with Abx. Urology consulted will appreciate recs     Plan   - f/u Urology recs   - c/w Levofloxacin 750mg IV q24 (x5 days 2/23-2/27(UTI dosing), then 500mg PO x 4-6 weeks(chronic prostatitis dosing)  - f/u cultures

## 2024-02-24 LAB
HIV-1 VIRAL LOAD RESULT: ABNORMAL
HIV1 RNA # SERPL NAA+PROBE: SIGNIFICANT CHANGE UP
HIV1 RNA SER-IMP: SIGNIFICANT CHANGE UP
HIV1 RNA SERPL NAA+PROBE-ACNC: ABNORMAL
HIV1 RNA SERPL NAA+PROBE-LOG#: 4.22 — SIGNIFICANT CHANGE UP

## 2024-02-24 PROCEDURE — 99233 SBSQ HOSP IP/OBS HIGH 50: CPT | Mod: GC

## 2024-02-24 RX ORDER — PIPERACILLIN AND TAZOBACTAM 4; .5 G/20ML; G/20ML
4.5 INJECTION, POWDER, LYOPHILIZED, FOR SOLUTION INTRAVENOUS EVERY 8 HOURS
Refills: 0 | Status: DISCONTINUED | OUTPATIENT
Start: 2024-02-24 | End: 2024-02-24

## 2024-02-24 RX ORDER — HYDROCORTISONE 1 %
1 OINTMENT (GRAM) TOPICAL
Refills: 0 | Status: DISCONTINUED | OUTPATIENT
Start: 2024-02-24 | End: 2024-02-27

## 2024-02-24 RX ORDER — BUDESONIDE AND FORMOTEROL FUMARATE DIHYDRATE 160; 4.5 UG/1; UG/1
2 AEROSOL RESPIRATORY (INHALATION)
Refills: 0 | Status: DISCONTINUED | OUTPATIENT
Start: 2024-02-24 | End: 2024-02-27

## 2024-02-24 RX ORDER — PIPERACILLIN AND TAZOBACTAM 4; .5 G/20ML; G/20ML
4.5 INJECTION, POWDER, LYOPHILIZED, FOR SOLUTION INTRAVENOUS ONCE
Refills: 0 | Status: COMPLETED | OUTPATIENT
Start: 2024-02-24 | End: 2024-02-24

## 2024-02-24 RX ORDER — IPRATROPIUM/ALBUTEROL SULFATE 18-103MCG
3 AEROSOL WITH ADAPTER (GRAM) INHALATION EVERY 6 HOURS
Refills: 0 | Status: DISCONTINUED | OUTPATIENT
Start: 2024-02-24 | End: 2024-02-27

## 2024-02-24 RX ORDER — PIPERACILLIN AND TAZOBACTAM 4; .5 G/20ML; G/20ML
4.5 INJECTION, POWDER, LYOPHILIZED, FOR SOLUTION INTRAVENOUS EVERY 8 HOURS
Refills: 0 | Status: DISCONTINUED | OUTPATIENT
Start: 2024-02-25 | End: 2024-02-25

## 2024-02-24 RX ADMIN — PIPERACILLIN AND TAZOBACTAM 200 GRAM(S): 4; .5 INJECTION, POWDER, LYOPHILIZED, FOR SOLUTION INTRAVENOUS at 11:52

## 2024-02-24 RX ADMIN — PIPERACILLIN AND TAZOBACTAM 25 GRAM(S): 4; .5 INJECTION, POWDER, LYOPHILIZED, FOR SOLUTION INTRAVENOUS at 23:15

## 2024-02-24 RX ADMIN — Medication 3 MILLILITER(S): at 21:30

## 2024-02-24 RX ADMIN — Medication 1 APPLICATION(S): at 18:10

## 2024-02-24 RX ADMIN — BICTEGRAVIR SODIUM, EMTRICITABINE, AND TENOFOVIR ALAFENAMIDE FUMARATE 1 TABLET(S): 30; 120; 15 TABLET ORAL at 11:51

## 2024-02-24 RX ADMIN — TAMSULOSIN HYDROCHLORIDE 0.4 MILLIGRAM(S): 0.4 CAPSULE ORAL at 21:30

## 2024-02-24 RX ADMIN — Medication 3 MILLILITER(S): at 05:12

## 2024-02-24 RX ADMIN — ESCITALOPRAM OXALATE 10 MILLIGRAM(S): 10 TABLET, FILM COATED ORAL at 11:51

## 2024-02-24 RX ADMIN — Medication 1 APPLICATION(S): at 18:09

## 2024-02-24 RX ADMIN — PIPERACILLIN AND TAZOBACTAM 25 GRAM(S): 4; .5 INJECTION, POWDER, LYOPHILIZED, FOR SOLUTION INTRAVENOUS at 15:57

## 2024-02-24 NOTE — PROGRESS NOTE ADULT - PROBLEM SELECTOR PLAN 6
Pt with PMHx of HIV intermittently compliant with Biktarvy (CD4 374, VL 72,000 11/11/23) Pt has not seen a HIV specialist since before COVID. Pt was following at [facility] however his insurance dose not cover [facility]. Pt would like to speak with SW to help set up RDC with his insurance.     Plan   - f/u CD4 and Viral load   - c/w Biktarvy [id number] daily   - Refill meds upon discharge   - SW consult to help pt set up apts at [facility]

## 2024-02-24 NOTE — PROGRESS NOTE ADULT - PROBLEM SELECTOR PLAN 2
Pt reports 6 months of dysuria urinary frequency and pain with urination as well as a dull pressure in lower abdomen/ perineum. Pt is not currently sexually active and has not been able to get an erection since the start of symptoms 6 months ago. Pt denies and discharge or rash around perineum. CT findings suggestive of prostatitis with cystitis with periprostatic phlegmonous change. No fluid collections to suggest abscess. Etiology prostatitis vs UTI vs Chronic pelvic pain syndrome (CPPS). Unlikely, CPPS as pt with signs and symptoms of active UTI. Most likely Lower UTI with concomitance chronic prostatitis given erectile dysfunction and no reports constitutional symptoms and + UA. Urine and blood cultures obtained, will f/u results. Will continue with Abx. Urology consulted will appreciate recs     Plan   - f/u Urology recs   - Would favor IV Zosyn at this time, can transition to PO at time of dc  - f/u cultures

## 2024-02-24 NOTE — PROGRESS NOTE ADULT - PROBLEM SELECTOR PLAN 9
Pt with PMHx of Plaque Psoriasis on forehead, b/l arms and abdomen.     Plan   - c/w Hydrocortisone 2.5% topical lotion: Apply topically to affected area 2 times a day  - c/w Mupirocin 2% topical ointment: Apply topically to affected area 2 times a day  - Consult dermatology as patient has had difficulty seeing Dermatology f/u

## 2024-02-24 NOTE — PROGRESS NOTE ADULT - NSPROGADDITIONALINFOA_GEN_ALL_CORE
Derm  Zosyn  f/u cultures  Cough suppressant  Start Symbicort  Check GC/Chlamydia  Send medications to [facility]

## 2024-02-24 NOTE — PROGRESS NOTE ADULT - PROBLEM SELECTOR PLAN 7
Pt with PMHx of HCV on prior admission. Per pt he is not sure if he ever followed up with treatment.     Plan   - f/u f/u Hep panel

## 2024-02-24 NOTE — CHART NOTE - NSCHARTNOTEFT_GEN_A_CORE
Dermatology Chart Note    Consulted for assistance in management of long-standing psoriasis in patient with currently uncontrolled HIV. Previously on Stelara.       PHYSICAL EXAM (based on photos provided by primary team):  Skin exam notable for:  Pink plaques over forehead, neck/upper back, forearms    ASSESSMENT/PLAN:  1) Psoriasis in uncontrolled HIV patient  - Given HIV status, would recommend topical treatments for now  For face: hydrocortisone 2.5% oint to AA bid as needed for up to 2 weeks at a time  For body: triamcinolone 0.1% oint to AA bid as needed for up to 2 weeks at a time   If scalp involved: clobetasol 0.05% soln to AA bid as needed for up to 2 weeks at a time. Not to use on face  If inadequate control with above, could consider Acitretin or outpatient phototherapy     Discussed with primary team.    Gricelda De Leon MD  [facility]

## 2024-02-25 ENCOUNTER — TRANSCRIPTION ENCOUNTER (OUTPATIENT)
Age: 54
End: 2024-02-25

## 2024-02-25 LAB
-  LINEZOLID: SIGNIFICANT CHANGE UP
-  NITROFURANTOIN: SIGNIFICANT CHANGE UP
-  OXACILLIN: SIGNIFICANT CHANGE UP
-  RIFAMPIN: SIGNIFICANT CHANGE UP
-  TETRACYCLINE: SIGNIFICANT CHANGE UP
-  TRIMETHOPRIM/SULFAMETHOXAZOLE: SIGNIFICANT CHANGE UP
-  VANCOMYCIN: SIGNIFICANT CHANGE UP
ANION GAP SERPL CALC-SCNC: 13 MMOL/L — SIGNIFICANT CHANGE UP (ref 5–17)
BASOPHILS # BLD AUTO: 0.03 K/UL — SIGNIFICANT CHANGE UP (ref 0–0.2)
BASOPHILS NFR BLD AUTO: 0.4 % — SIGNIFICANT CHANGE UP (ref 0–2)
BILIRUB SERPL-MCNC: 0.2 MG/DL — SIGNIFICANT CHANGE UP (ref 0.2–1.2)
BUN SERPL-MCNC: 18 MG/DL — SIGNIFICANT CHANGE UP (ref 7–23)
CALCIUM SERPL-MCNC: 8.6 MG/DL — SIGNIFICANT CHANGE UP (ref 8.4–10.5)
CHLORIDE SERPL-SCNC: 101 MMOL/L — SIGNIFICANT CHANGE UP (ref 96–108)
CO2 SERPL-SCNC: 24 MMOL/L — SIGNIFICANT CHANGE UP (ref 22–31)
CREAT SERPL-MCNC: 1.06 MG/DL — SIGNIFICANT CHANGE UP (ref 0.5–1.3)
CULTURE RESULTS: ABNORMAL
EGFR: 84 ML/MIN/1.73M2 — SIGNIFICANT CHANGE UP
EOSINOPHIL # BLD AUTO: 0.54 K/UL — HIGH (ref 0–0.5)
EOSINOPHIL NFR BLD AUTO: 7.9 % — HIGH (ref 0–6)
GLUCOSE SERPL-MCNC: 100 MG/DL — HIGH (ref 70–99)
HCT VFR BLD CALC: 33.9 % — LOW (ref 39–50)
HGB BLD-MCNC: 11.1 G/DL — LOW (ref 13–17)
IMM GRANULOCYTES NFR BLD AUTO: 0.3 % — SIGNIFICANT CHANGE UP (ref 0–0.9)
INR BLD: 1.05 — SIGNIFICANT CHANGE UP (ref 0.85–1.18)
LYMPHOCYTES # BLD AUTO: 1.59 K/UL — SIGNIFICANT CHANGE UP (ref 1–3.3)
LYMPHOCYTES # BLD AUTO: 23.2 % — SIGNIFICANT CHANGE UP (ref 13–44)
MAGNESIUM SERPL-MCNC: 1.8 MG/DL — SIGNIFICANT CHANGE UP (ref 1.6–2.6)
MCHC RBC-ENTMCNC: 27.5 PG — SIGNIFICANT CHANGE UP (ref 27–34)
MCHC RBC-ENTMCNC: 32.7 GM/DL — SIGNIFICANT CHANGE UP (ref 32–36)
MCV RBC AUTO: 84.1 FL — SIGNIFICANT CHANGE UP (ref 80–100)
MELD SCORE WITH DIALYSIS: 20 POINTS — SIGNIFICANT CHANGE UP
MELD SCORE WITHOUT DIALYSIS: 8 POINTS — SIGNIFICANT CHANGE UP
METHOD TYPE: SIGNIFICANT CHANGE UP
MONOCYTES # BLD AUTO: 0.6 K/UL — SIGNIFICANT CHANGE UP (ref 0–0.9)
MONOCYTES NFR BLD AUTO: 8.7 % — SIGNIFICANT CHANGE UP (ref 2–14)
NEUTROPHILS # BLD AUTO: 4.08 K/UL — SIGNIFICANT CHANGE UP (ref 1.8–7.4)
NEUTROPHILS NFR BLD AUTO: 59.5 % — SIGNIFICANT CHANGE UP (ref 43–77)
NRBC # BLD: 0 /100 WBCS — SIGNIFICANT CHANGE UP (ref 0–0)
ORGANISM # SPEC MICROSCOPIC CNT: ABNORMAL
ORGANISM # SPEC MICROSCOPIC CNT: SIGNIFICANT CHANGE UP
PHOSPHATE SERPL-MCNC: 3.6 MG/DL — SIGNIFICANT CHANGE UP (ref 2.5–4.5)
PLATELET # BLD AUTO: 326 K/UL — SIGNIFICANT CHANGE UP (ref 150–400)
POTASSIUM SERPL-MCNC: 4.2 MMOL/L — SIGNIFICANT CHANGE UP (ref 3.5–5.3)
POTASSIUM SERPL-SCNC: 4.2 MMOL/L — SIGNIFICANT CHANGE UP (ref 3.5–5.3)
PROTHROM AB SERPL-ACNC: 12 SEC — SIGNIFICANT CHANGE UP (ref 9.5–13)
RBC # BLD: 4.03 M/UL — LOW (ref 4.2–5.8)
RBC # FLD: 14.1 % — SIGNIFICANT CHANGE UP (ref 10.3–14.5)
SODIUM SERPL-SCNC: 138 MMOL/L — SIGNIFICANT CHANGE UP (ref 135–145)
SPECIMEN SOURCE: SIGNIFICANT CHANGE UP
WBC # BLD: 6.86 K/UL — SIGNIFICANT CHANGE UP (ref 3.8–10.5)
WBC # FLD AUTO: 6.86 K/UL — SIGNIFICANT CHANGE UP (ref 3.8–10.5)

## 2024-02-25 PROCEDURE — 99223 1ST HOSP IP/OBS HIGH 75: CPT

## 2024-02-25 PROCEDURE — 99232 SBSQ HOSP IP/OBS MODERATE 35: CPT | Mod: GC

## 2024-02-25 RX ORDER — HYDROCORTISONE 1 %
1 OINTMENT (GRAM) TOPICAL
Qty: 1 | Refills: 1
Start: 2024-02-25

## 2024-02-25 RX ORDER — CEFAZOLIN SODIUM 1 G
2000 VIAL (EA) INJECTION EVERY 8 HOURS
Refills: 0 | Status: DISCONTINUED | OUTPATIENT
Start: 2024-02-25 | End: 2024-02-27

## 2024-02-25 RX ORDER — BENZOCAINE AND MENTHOL 5; 1 G/100ML; G/100ML
1 LIQUID ORAL EVERY 4 HOURS
Refills: 0 | Status: DISCONTINUED | OUTPATIENT
Start: 2024-02-25 | End: 2024-02-27

## 2024-02-25 RX ORDER — TAMSULOSIN HYDROCHLORIDE 0.4 MG/1
1 CAPSULE ORAL
Qty: 30 | Refills: 1
Start: 2024-02-25 | End: 2024-04-24

## 2024-02-25 RX ORDER — BICTEGRAVIR SODIUM, EMTRICITABINE, AND TENOFOVIR ALAFENAMIDE FUMARATE 30; 120; 15 MG/1; MG/1; MG/1
1 TABLET ORAL
Qty: 30 | Refills: 1
Start: 2024-02-25 | End: 2024-04-24

## 2024-02-25 RX ORDER — BUDESONIDE AND FORMOTEROL FUMARATE DIHYDRATE 160; 4.5 UG/1; UG/1
2 AEROSOL RESPIRATORY (INHALATION)
Qty: 1 | Refills: 1
Start: 2024-02-25 | End: 2024-04-24

## 2024-02-25 RX ORDER — ALBUTEROL 90 UG/1
2 AEROSOL, METERED ORAL
Qty: 1 | Refills: 3
Start: 2024-02-25 | End: 2024-06-23

## 2024-02-25 RX ADMIN — ESCITALOPRAM OXALATE 10 MILLIGRAM(S): 10 TABLET, FILM COATED ORAL at 11:47

## 2024-02-25 RX ADMIN — PIPERACILLIN AND TAZOBACTAM 25 GRAM(S): 4; .5 INJECTION, POWDER, LYOPHILIZED, FOR SOLUTION INTRAVENOUS at 16:07

## 2024-02-25 RX ADMIN — Medication 1 APPLICATION(S): at 06:03

## 2024-02-25 RX ADMIN — PIPERACILLIN AND TAZOBACTAM 25 GRAM(S): 4; .5 INJECTION, POWDER, LYOPHILIZED, FOR SOLUTION INTRAVENOUS at 07:22

## 2024-02-25 RX ADMIN — Medication 100 MILLIGRAM(S): at 23:15

## 2024-02-25 RX ADMIN — Medication 1 APPLICATION(S): at 17:56

## 2024-02-25 RX ADMIN — BENZOCAINE AND MENTHOL 1 LOZENGE: 5; 1 LIQUID ORAL at 09:37

## 2024-02-25 RX ADMIN — Medication 1 APPLICATION(S): at 17:55

## 2024-02-25 RX ADMIN — TAMSULOSIN HYDROCHLORIDE 0.4 MILLIGRAM(S): 0.4 CAPSULE ORAL at 22:31

## 2024-02-25 RX ADMIN — BICTEGRAVIR SODIUM, EMTRICITABINE, AND TENOFOVIR ALAFENAMIDE FUMARATE 1 TABLET(S): 30; 120; 15 TABLET ORAL at 11:47

## 2024-02-25 NOTE — DISCHARGE NOTE PROVIDER - HOSPITAL COURSE
#Discharge: do not delete    Pt is a 53yM with PMHx of asthma/COPD, Current  smoker, HIV on biktarvy (questionable compliance), HCV, HTN, CVA 5-6 years ago, Depression, presenting with 1 day of SOB and and fatigue and 6 months of dysuria, found to Parainfluenza + with UTI and prostatitis admitted for Abx treatment.     Hospital course (by problem):   #Severe sepsis 2/2 parainfluenza and protatitis/UTI.  On admission pt meeting 2/4 SIRS (, VBG CO2 39 [-5 =ABG CO2 of 32?) with possible sources (PNA, Prostatitis, UTI) and signs of end organ dysfunction ( Lactate of 2.5). Pt received 2.5L IVF in ED, Pt with good UOP and mentating well. Lactate cleared (0.9)Tachycardia improving (123->101), likely 2/2 to hypovolemia. Bcx NGTD.     #Dysuria 2/2 prostatitis and UTI. 6 months of dysuria urinary frequency and pain with urination as well as a dull pressure in lower abdomen/ perineum. Pt is not currently sexually active and has not been able to get an erection since the start of symptoms 6 months ago. Pt denies and discharge or rash around perineum. CT findings suggestive of prostatitis with cystitis with periprostatic phlegmonous change. No fluid collections to suggest abscess. Etiology prostatitis vs UTI vs Chronic pelvic pain syndrome (CPPS). Unlikely, CPPS as pt with signs and symptoms of active UTI. Most likely Lower UTI with concomitance chronic prostatitis given erectile dysfunction and no reports constitutional symptoms and + UA. Urine and blood cultures obtained, will f/u results. Will continue with Abx. Urology consulted will appreciate recs   Ucx growing >100k CFU of MSSA.  - Massey placed 2/23 by urology  - C/w flomax and abx  - F/u outpatient for TOV week of 2/26/24  - ID consulted for abx regimen for UCX +MSSA with evidence of prostatitis  - C/w abx _____    #Parainfluenza. On RA.  - c/w droplet precautions   - c/w symptomatic management  - start cough suppressant.    #Asthma with COPD.   ·  Plan: Pt with PMHx of Asthma/COPD on home meds ProAir HFA 90mcg PRN, Symbicort 80mcg-4.5mcg 2 puffs BID. However pt ran out of medication. Last night pt experienced SOB and called EMS, after receiving Duonebs pt felt better  - Refilled meds on discharge    #HIV. Will attempt to get insurance to cover RDC appointment as patient previously seen by Dr. Pacheco at [facility].  CD4 303, VL 16k.   - C/w biktarvy  - RDC information provided on discharge    #Hep C. Pt with PMHx of HCV on prior admission. Per pt he is not sure if he ever followed up with treatment.   Hep panel with reactive hep C virus and elevated S/CO ratio of 132.5.    #Psoriasis. Seen by derm.  - c/w Hydrocortisone 2.5% topical lotion: Apply topically to affected area 2 times a day  - c/w  triamcinolone 0.1% oint to AA bid as needed for up to 2 weeks at a time     #Anxiety and depression.  - C/w lexapro 10mg qd PO    Patient was discharged to: home    New medications: sent new scripts for flomax and antibiotics and steroid topicals  Changes to old medications: refilled medications including: biktarvy, lexapro, symbicort, albuterol  Medications that were stopped:    Items to follow up as outpatient:  - HIV management  - hep C management  - parainfluenza resolution  - prostatitis infection management and trial of void    Physical exam at the time of discharge:  Gen: Reclining in bed at time of exam, appears stated age  HEENT: NCAT, MMM, clear OP  Neck: supple, trachea at midline  CV: RRR, +S1/S2  Pulm: adequate respiratory effort, no increase in work of breathing  Abd: soft, NTND  Skin: warm and dry, extensive macula erythematous rash on face, shoulders  Ext: WWP, no LE edema  Neuro: AOx3, no gross focal neurological deficits  Psych: affect and behavior appropriate, pleasant at time of interview  : + FC   #Discharge: do not delete    Pt is a 53yM with PMHx of asthma/COPD, Current  smoker, HIV on biktarvy (questionable compliance), HCV, HTN, CVA 5-6 years ago, Depression, presenting with 1 day of SOB and and fatigue and 6 months of dysuria, found to Parainfluenza + with UTI and prostatitis admitted for Abx treatment.     Hospital course (by problem):   #Severe sepsis 2/2 parainfluenza and prostatitis/UTI.  On admission pt meeting 2/4 SIRS (, VBG CO2 39 [-5 =ABG CO2 of 32?) with possible sources (PNA, Prostatitis, UTI) and signs of end organ dysfunction ( Lactate of 2.5). Pt received 2.5L IVF in ED, Pt with good UOP and mentating well. Lactate cleared (0.9)Tachycardia improving (123->101), likely 2/2 to hypovolemia. Bcx NGTD.     #Dysuria 2/2 prostatitis and UTI. 6 months of dysuria urinary frequency and pain with urination as well as a dull pressure in lower abdomen/ perineum. Pt is not currently sexually active and has not been able to get an erection since the start of symptoms 6 months ago. Pt denies and discharge or rash around perineum. CT findings suggestive of prostatitis with cystitis with periprostatic phlegmonous change. No fluid collections to suggest abscess. Etiology prostatitis vs UTI vs Chronic pelvic pain syndrome (CPPS). Unlikely, CPPS as pt with signs and symptoms of active UTI. Most likely Lower UTI with concomitance chronic prostatitis given erectile dysfunction and no reports constitutional symptoms and + UA. Urine and blood cultures obtained, will f/u results. Will continue with Abx. Urology consulted will appreciate recs   Ucx growing >100k CFU of MSSA.  - Massey placed 2/23 by urology  - C/w flomax and abx  - F/u outpatient for TOV week of 2/26/24, the office will call the patient about the specifics of the appointment  - ID consulted for abx regimen for UCX +MSSA with evidence of prostatitis  - C/w abx _____    #Parainfluenza. On RA.  - c/w droplet precautions   - c/w symptomatic management  - start cough suppressant.    #Asthma with COPD.   ·  Plan: Pt with PMHx of Asthma/COPD on home meds ProAir HFA 90mcg PRN, Symbicort 80mcg-4.5mcg 2 puffs BID. However pt ran out of medication. Last night pt experienced SOB and called EMS, after receiving Duonebs pt felt better  - Refilled meds on discharge    #HIV. Will attempt to get insurance to cover RDC appointment as patient previously seen by Dr. Pacheco at [facility].  CD4 303, VL 16k.   - C/w biktarvy  - RDC information provided on discharge    #Hep C. Pt with PMHx of HCV on prior admission. Per pt he is not sure if he ever followed up with treatment.   Hep panel with reactive hep C virus and elevated S/CO ratio of 132.5.    #Psoriasis. Seen by derm.  - c/w Hydrocortisone 2.5% topical lotion: Apply topically to affected area 2 times a day  - c/w  triamcinolone 0.1% oint to AA bid as needed for up to 2 weeks at a time     #Anxiety and depression.  - C/w lexapro 10mg qd PO    Patient was discharged to: home    New medications: sent new scripts for flomax and antibiotics and steroid topicals  Changes to old medications: refilled medications including: biktarvy, lexapro, symbicort, albuterol  Medications that were stopped:    Items to follow up as outpatient:  - HIV management  - hep C management  - parainfluenza resolution  - prostatitis infection management and trial of void    Physical exam at the time of discharge:  Gen: Reclining in bed at time of exam, appears stated age  HEENT: NCAT, MMM, clear OP  Neck: supple, trachea at midline  CV: RRR, +S1/S2  Pulm: adequate respiratory effort, no increase in work of breathing  Abd: soft, NTND  Skin: warm and dry, extensive macula erythematous rash on face, shoulders  Ext: WWP, no LE edema  Neuro: AOx3, no gross focal neurological deficits  Psych: affect and behavior appropriate, pleasant at time of interview  : + FC   Pt is a 53yM with PMHx of asthma/COPD, Current  smoker, HIV on biktarvy (questionable compliance), HCV, HTN, CVA 5-6 years ago, Depression, presenting with 1 day of SOB and and fatigue and 6 months of dysuria, found to Parainfluenza + with UTI and prostatitis admitted for Abx treatment.     Hospital course (by problem):   #Severe sepsis 2/2 parainfluenza and prostatitis/UTI.  On admission pt meeting 2/4 SIRS (, VBG CO2 39 [-5 =ABG CO2 of 32?) with possible sources (PNA, Prostatitis, UTI) and signs of end organ dysfunction ( Lactate of 2.5). Pt received 2.5L IVF in ED, Pt with good UOP and mentating well. Lactate cleared (0.9)Tachycardia improving (123->101), likely 2/2 to hypovolemia. Bcx NGTD.     #Dysuria 2/2 prostatitis and UTI. 6 months of dysuria urinary frequency and pain with urination as well as a dull pressure in lower abdomen/ perineum. Pt is not currently sexually active and has not been able to get an erection since the start of symptoms 6 months ago. Pt denies and discharge or rash around perineum. CT findings suggestive of prostatitis with cystitis with periprostatic phlegmonous change. No fluid collections to suggest abscess. Etiology prostatitis vs UTI vs Chronic pelvic pain syndrome (CPPS). Unlikely, CPPS as pt with signs and symptoms of active UTI. Most likely Lower UTI with concomitance chronic prostatitis given erectile dysfunction and no reports constitutional symptoms and + UA. Urine and blood cultures obtained, will f/u results. Will continue with Abx. Urology consulted will appreciate recs   Ucx growing >100k CFU of MSSA.  - Massey placed 2/23 by urology  - C/w flomax and abx  - F/u outpatient for TOV, the office will call the patient about the specifics of the appointment  - f/u Chlamydia, Gonorrhea, Syphilis screening  - ID consulted for abx regimen for UCX +MSSA with evidence of prostatitis  - C/w Doxycycline DS BID for 4 weeks     #Parainfluenza. On RA.  - c/w droplet precautions   - c/w symptomatic management  - start cough suppressant.    #Asthma with COPD.   ·  Plan: Pt with PMHx of Asthma/COPD on home meds ProAir HFA 90mcg PRN, Symbicort 80mcg-4.5mcg 2 puffs BID. However pt ran out of medication. Last night pt experienced SOB and called EMS, after receiving Duonebs pt felt better  - Refilled meds on discharge    #HIV. Will attempt to get insurance to cover RDC appointment as patient previously seen by Dr. Pacheco at [facility].  CD4 303, VL 16k.   - C/w biktarvy  - RDC information provided on discharge    #Hep C. Pt with PMHx of HCV on prior admission. Per pt he is not sure if he ever followed up with treatment.   Hep panel with reactive hep C virus and elevated S/CO ratio of 132.5.    #Psoriasis. Seen by derm.  - c/w Hydrocortisone 2.5% topical lotion: Apply topically to affected area 2 times a day  - c/w  triamcinolone 0.1% oint to AA bid as needed for up to 2 weeks at a time     #Anxiety and depression.  - C/w lexapro 10mg qd PO    Patient was discharged to: home    New medications: sent new scripts for flomax and antibiotics and steroid topicals  Changes to old medications: refilled medications including: biktarvy, lexapro, symbicort, albuterol  Medications that were stopped:    Items to follow up as outpatient:  - HIV management  - hep C management  - parainfluenza resolution  - prostatitis infection management and trial of void    Physical exam at the time of discharge:  Gen: Reclining in bed at time of exam, appears stated age  HEENT: NCAT, MMM, clear OP  Neck: supple, trachea at midline  CV: RRR, +S1/S2  Pulm: adequate respiratory effort, no increase in work of breathing  Abd: soft, NTND  Skin: warm and dry, extensive macula erythematous rash on face, shoulders  Ext: WWP, no LE edema  Neuro: AOx3, no gross focal neurological deficits  Psych: affect and behavior appropriate, pleasant at time of interview  : + FC   Pt is a 53yM with PMHx of asthma/COPD, Current  smoker, HIV on biktarvy (questionable compliance), HCV, HTN, CVA 5-6 years ago, Depression, presenting with 1 day of SOB and and fatigue and 6 months of dysuria, found to Parainfluenza + with UTI and prostatitis admitted for Abx treatment.     Hospital course (by problem):   #Severe sepsis 2/2 parainfluenza and prostatitis/UTI.  On admission pt meeting 2/4 SIRS (, VBG CO2 39 [-5 =ABG CO2 of 32?) with possible sources (PNA, Prostatitis, UTI) and signs of end organ dysfunction ( Lactate of 2.5). Pt received 2.5L IVF in ED, Pt with good UOP and mentating well. Lactate cleared (0.9)Tachycardia improving (123->101), likely 2/2 to hypovolemia. Bcx NGTD.     #Dysuria 2/2 prostatitis and UTI. 6 months of dysuria urinary frequency and pain with urination as well as a dull pressure in lower abdomen/ perineum. Pt is not currently sexually active and has not been able to get an erection since the start of symptoms 6 months ago. Pt denies and discharge or rash around perineum. CT findings suggestive of prostatitis with cystitis with periprostatic phlegmonous change. No fluid collections to suggest abscess. Etiology prostatitis vs UTI vs Chronic pelvic pain syndrome (CPPS). Unlikely, CPPS as pt with signs and symptoms of active UTI. Most likely Lower UTI with concomitance chronic prostatitis given erectile dysfunction and no reports constitutional symptoms and + UA. Urine and blood cultures obtained, will f/u results. Will continue with Abx. Urology consulted will appreciate recs   Ucx growing >100k CFU of MSSA.  - Massey placed 2/23 by urology  - C/w flomax and abx  - F/u outpatient for TOV, the office will call the patient about the specifics of the appointment  - f/u Chlamydia, Gonorrhea, Syphilis screening  - ID consulted for abx regimen for UCX +MSSA with evidence of prostatitis  -  if TTE negative- dc 6 weeks of bactrim 1 DS tab BID      #Parainfluenza. On RA.  - c/w droplet precautions   - c/w symptomatic management  - start cough suppressant.    #Asthma with COPD.   ·  Plan: Pt with PMHx of Asthma/COPD on home meds ProAir HFA 90mcg PRN, Symbicort 80mcg-4.5mcg 2 puffs BID. However pt ran out of medication. Last night pt experienced SOB and called EMS, after receiving Duonebs pt felt better  - Refilled meds on discharge    #HIV. Will attempt to get insurance to cover RDC appointment as patient previously seen by Dr. Pacheco at [facility].  CD4 303, VL 16k.   - C/w biktarvy  - RDC information provided on discharge    #Hep C. Pt with PMHx of HCV on prior admission. Per pt he is not sure if he ever followed up with treatment.   Hep panel with reactive hep C virus and elevated S/CO ratio of 132.5.    #Psoriasis. Seen by derm.  - c/w Hydrocortisone 2.5% topical lotion: Apply topically to affected area 2 times a day  - c/w  triamcinolone 0.1% oint to AA bid as needed for up to 2 weeks at a time     #Anxiety and depression.  - C/w lexapro 10mg qd PO    Patient was discharged to: home    New medications: sent new scripts for flomax and antibiotics and steroid topicals  Changes to old medications: refilled medications including: biktarvy, lexapro, symbicort, albuterol  Medications that were stopped:    Items to follow up as outpatient:  - HIV management  - hep C management  - parainfluenza resolution  - prostatitis infection management and trial of void    Physical exam at the time of discharge:  Gen: Reclining in bed at time of exam, appears stated age  HEENT: NCAT, MMM, clear OP  Neck: supple, trachea at midline  CV: RRR, +S1/S2  Pulm: adequate respiratory effort, no increase in work of breathing  Abd: soft, NTND  Skin: warm and dry, extensive macula erythematous rash on face, shoulders  Ext: WWP, no LE edema  Neuro: AOx3, no gross focal neurological deficits  Psych: affect and behavior appropriate, pleasant at time of interview  : + FC   Pt is a 53yM with PMHx of asthma/COPD, Current  smoker, HIV on biktarvy (questionable compliance), HCV, HTN, CVA 5-6 years ago, Depression, presenting with 1 day of SOB and and fatigue and 6 months of dysuria, found to Parainfluenza + with UTI and prostatitis admitted for Abx treatment.     Hospital course (by problem):   #Severe sepsis 2/2 parainfluenza and prostatitis/UTI.  On admission pt meeting 2/4 SIRS (, VBG CO2 39 [-5 =ABG CO2 of 32?) with possible sources (PNA, Prostatitis, UTI) and signs of end organ dysfunction ( Lactate of 2.5). Pt received 2.5L IVF in ED, Pt with good UOP and mentating well. Lactate cleared (0.9)Tachycardia improving (123->101), likely 2/2 to hypovolemia. Bcx NGTD.     #Dysuria 2/2 prostatitis and UTI. 6 months of dysuria urinary frequency and pain with urination as well as a dull pressure in lower abdomen/ perineum. Pt is not currently sexually active and has not been able to get an erection since the start of symptoms 6 months ago. Pt denies and discharge or rash around perineum. CT findings suggestive of prostatitis with cystitis with periprostatic phlegmonous change. No fluid collections to suggest abscess. Etiology prostatitis vs UTI vs Chronic pelvic pain syndrome (CPPS). Unlikely, CPPS as pt with signs and symptoms of active UTI. Most likely Lower UTI with concomitance chronic prostatitis given erectile dysfunction and no reports constitutional symptoms and + UA. Massey placed 2/23 by urology. Urine and blood cultures obtained, will f/u results. Will continue with Abx. Urology consulted will appreciate recs. Chlamydia, Gonorrhea, Syphilis screening negative. TTE was done and was negative for vegetations.   Ucx growing >100k CFU of MSSA.  Plan  - F/u outpatient for TOV, the office will call the patient about the specifics of the appointment  - Patient to complete 6 weeks of bactrim 1 DS tab BID    #Parainfluenza. On RA.  - c/w droplet precautions   - c/w symptomatic management  - start cough suppressant.    #Asthma with COPD.   ·  Plan: Pt with PMHx of Asthma/COPD on home meds ProAir HFA 90mcg PRN, Symbicort 80mcg-4.5mcg 2 puffs BID. However pt ran out of medication. Last night pt experienced SOB and called EMS, after receiving Duonebs pt felt better  - Refilled meds on discharge    #HIV. Will attempt to get insurance to cover RDC appointment as patient previously seen by Dr. Pacheco at [facility].  CD4 303, VL 16k.   - C/w biktarvy  - RDC information provided on discharge and office called, currently an appointment is requested and the office will call the patient to finalize details.      #Hep C. Pt with PMHx of HCV on prior admission. Per pt he is not sure if he ever followed up with treatment.   Hep panel with reactive hep C virus and elevated S/CO ratio of 132.5.    #Psoriasis. Seen by derm.  - c/w Hydrocortisone 2.5% topical lotion: Apply topically to affected area 2 times a day  - c/w  triamcinolone 0.1% oint to AA bid as needed for up to 2 weeks at a time     #Anxiety and depression.  - C/w lexapro 10mg qd PO    Patient was discharged to: home    New medications: sent new scripts for flomax and antibiotics and steroid topicals  Changes to old medications: refilled medications including: biktarvy, lexapro, symbicort, albuterol  Medications that were stopped:    Items to follow up as outpatient:  - HIV management  - hep C management  - parainfluenza resolution  - prostatitis infection management and trial of void    Physical exam at the time of discharge:  Gen: Reclining in bed at time of exam, appears stated age  HEENT: NCAT, MMM, clear OP  Neck: supple, trachea at midline  CV: RRR, +S1/S2  Pulm: adequate respiratory effort, no increase in work of breathing  Abd: soft, NTND  Skin: warm and dry, extensive macula erythematous rash on face, shoulders  Ext: WWP, no LE edema  Neuro: AOx3, no gross focal neurological deficits  Psych: affect and behavior appropriate, pleasant at time of interview  : + FC

## 2024-02-25 NOTE — DISCHARGE NOTE PROVIDER - NSFOLLOWUPCLINICS_GEN_ALL_ED_FT
[facility]  Urology  [address], [address]  [place], [place] [address]  Phone: [phone]  Fax:

## 2024-02-25 NOTE — PROGRESS NOTE ADULT - ATTENDING COMMENTS
Patient was seen and examined at bedside on 2/24/2024 at 1130 am. Patient has no acute complaints. Denies SOB, CP, N/V. ROS is otherwise negative. Vitals, labwork and pertinent imaging reviewed. Exam - NAD, AAO x 4, PERRLA, EOMI, MMM, supple neck, chest - CTA b/l, CV - rrr, s1s2, no m/r/g, abd - soft, NTND, + BS,  - + FC, ext - wwp, psych - normal affect, skin - rash throughout - c/w psoriasis    Plan:  -ID consult given prostatitis and urine culture + for MSSA   -BCX - NGTD  -Check Echo

## 2024-02-25 NOTE — CONSULT NOTE ADULT - ASSESSMENT
# MSSA prostatitis  - BCx ngtd, but query a transient bacteremia due to skin breakdown from psoriasis, resulting in seeding of the prostate.  - Recommend stop zosyn, and start ancef 2g IV q8h  - Follow pending blood cultures, and check TTE  - Check syphilis screen, pharynx/urine GC/chlam, and quantiferon  - If above w/u negative will plan to treat with 6 weeks of bactrim 1 DS tab BID    # HIV, with non-adherence to Biktarvy (VL 16k, CD4 302/32%)  - OK to resume Biktarvy. Patient previously follows with Dr. Pacheco and he would like to follow with her again. Please see if an appointment can be arranged at [facility]  - Patient cites depression as key barrier to his adherence (his  recently  of [place]). Consider psychiatry consultation if patient is amenable to this.  - Uncontrolled HIV is likely the cause of patient's severe psoriasis    ID Team 2 will follow

## 2024-02-25 NOTE — DISCHARGE NOTE PROVIDER - NSDCCPCAREPLAN_GEN_ALL_CORE_FT
PRINCIPAL DISCHARGE DIAGNOSIS  Diagnosis: Prostatitis  Assessment and Plan of Treatment: You were found to have prostatitis, an infection involving your prostate gland. You were evaluated by the urology doctors and given a chaney. See chaney care instructions below. Please follow-up with the urology doctors within the week for a trial of void. You were initially treated with intravenous antibiotics and your urine is growing methicillin susceptible staph. aureus. Please continue taking _____. A refill of your home medicatiosn including biktarvy, lexapro, albuterol, symbicort were sent to [facility]. New scripts for hydrocortisone (twice a day to your face as needed, up to two weeks at a time) and triamcinolone (twice a jorge to body as needed, up to two weeks at a time) were also sent.      SECONDARY DISCHARGE DIAGNOSES  Diagnosis: Parainfluenza  Assessment and Plan of Treatment: Your cough and shortness of breath is likely related to a diagnosis of parainfluenza, a viral infection. Treatment is supportive, so you may take over the counter tylenol and cough medicine (eg. robitussin) for your symptoms. If you develop fever>103, difficulty breathing, lightheadedness/chest pain, please return to the emergency room.    Diagnosis: Chaney catheter status  Assessment and Plan of Treatment: Clean your catheter every day.  Change your drainage bags. ...  Replace your drainage bags with new bags once a week. ...  Wash your drainage bags every day.  Drink 1 to 2 glasses of liquids every 2 hours while you're awake to keep you hydrated.     PRINCIPAL DISCHARGE DIAGNOSIS  Diagnosis: Prostatitis  Assessment and Plan of Treatment: You were found to have prostatitis, an infection involving your prostate gland. You were evaluated by the urology doctors and given a chaney. See chaney care instructions below. Please follow-up with the urology doctors within the week for a trial of void. You were initially treated with intravenous antibiotics and your urine is growing methicillin susceptible staph. aureus. Please continue taking Bactrim DS twice per day for a total of 4 weeks (Needs to receive before leaving the hospital). A refill of your home medicatiosn including biktarvy, lexapro, albuterol, symbicort were sent to [facility]. New scripts for hydrocortisone (twice a day to your face as needed, up to two weeks at a time) and triamcinolone (twice a jorge to body as needed, up to two weeks at a time) were also sent.      SECONDARY DISCHARGE DIAGNOSES  Diagnosis: Parainfluenza  Assessment and Plan of Treatment: Your cough and shortness of breath is likely related to a diagnosis of parainfluenza, a viral infection. Treatment is supportive, so you may take over the counter tylenol and cough medicine (eg. robitussin) for your symptoms. If you develop fever>103, difficulty breathing, lightheadedness/chest pain, please return to the emergency room.    Diagnosis: HIV disease  Assessment and Plan of Treatment: You can continue taking your Biktarvy as prior to arrival. We recommend that you follow up with our HIV specialists at [address] [place] [address]. You can call the office at [phone].    Diagnosis: Chaney catheter status  Assessment and Plan of Treatment: Clean your catheter every day.  Change your drainage bags. ...  Replace your drainage bags with new bags once a week. ...  Wash your drainage bags every day.  Drink 1 to 2 glasses of liquids every 2 hours while you're awake to keep you hydrated.     PRINCIPAL DISCHARGE DIAGNOSIS  Diagnosis: Prostatitis  Assessment and Plan of Treatment: You were found to have prostatitis, an infection involving your prostate gland. You were evaluated by the urology doctors and given a chaney. See chaney care instructions below. Please follow-up with the urology doctors within the week for a trial of void. You were initially treated with intravenous antibiotics and your urine is growing methicillin susceptible staph. aureus. Please continue taking Bactrim DS twice per day for a total of 6 weeks (Needs to receive before leaving the hospital). A refill of your home medicatiosn including biktarvy, lexapro, albuterol, symbicort were sent to [facility]. New scripts for hydrocortisone (twice a day to your face as needed, up to two weeks at a time) and triamcinolone (twice a jorge to body as needed, up to two weeks at a time) were also sent.      SECONDARY DISCHARGE DIAGNOSES  Diagnosis: Parainfluenza  Assessment and Plan of Treatment: Your cough and shortness of breath is likely related to a diagnosis of parainfluenza, a viral infection. Treatment is supportive, so you may take over the counter tylenol and cough medicine (eg. robitussin) for your symptoms. If you develop fever>103, difficulty breathing, lightheadedness/chest pain, please return to the emergency room.    Diagnosis: HIV disease  Assessment and Plan of Treatment: You can continue taking your Biktarvy as prior to arrival. We recommend that you follow up with our HIV specialists at [address] [place] [address]. You can call the office at [phone].    Diagnosis: Chaney catheter status  Assessment and Plan of Treatment: Clean your catheter every day.  Change your drainage bags. ...  Replace your drainage bags with new bags once a week. ...  Wash your drainage bags every day.  Drink 1 to 2 glasses of liquids every 2 hours while you're awake to keep you hydrated.

## 2024-02-25 NOTE — DISCHARGE NOTE PROVIDER - NSDCMRMEDTOKEN_GEN_ALL_CORE_FT
Biktarvy 50 mg-200 mg-25 mg oral tablet: 1 tab(s) orally once a day  hydrocortisone 2.5% topical lotion: Apply topically to affected area 2 times a day  levoFLOXacin 500 mg oral tablet: 1 tab(s) orally once a day  Lexapro 10 mg oral tablet: 1 tab(s) orally once a day  mupirocin 2% topical ointment: Apply topically to affected area 2 times a day  ProAir HFA 90 mcg/inh inhalation aerosol: 2 puff(s) inhaled 4 times a day as needed for  bronchospasm  Symbicort 80 mcg-4.5 mcg/inh inhalation aerosol: 2 puff(s) inhaled 2 times a day 2 puffs twice daily   bictegravir/emtricitabine/tenofovir 50 mg-200 mg-25 mg oral tablet: 1 tab(s) orally once a day  budesonide-formoterol 160 mcg-4.5 mcg/inh inhalation aerosol: 2 puff(s) inhaled 2 times a day  hydrocortisone 2.5% topical ointment: Apply topically to affected area 2 times a day as needed for  itching 1 Apply topically to affected area 2 times a day (to face)  Lexapro 10 mg oral tablet: 1 tab(s) orally once a day  ProAir HFA 90 mcg/inh inhalation aerosol: 2 puff(s) inhaled 4 times a day as needed for  bronchospasm  tamsulosin 0.4 mg oral capsule: 1 cap(s) orally once a day (at bedtime)  triamcinolone 0.1% topical ointment: Apply topically to affected area 2 times a day as needed for  itching 1 Apply topically to affected area 2 times a day   Bactrim  mg-160 mg oral tablet: 1 tab(s) orally every 12 hours  bictegravir/emtricitabine/tenofovir 50 mg-200 mg-25 mg oral tablet: 1 tab(s) orally once a day  budesonide-formoterol 160 mcg-4.5 mcg/inh inhalation aerosol: 2 puff(s) inhaled 2 times a day  hydrocortisone 2.5% topical ointment: Apply topically to affected area 2 times a day as needed for  itching 1 Apply topically to affected area 2 times a day (to face)  Lexapro 10 mg oral tablet: 1 tab(s) orally once a day  ProAir HFA 90 mcg/inh inhalation aerosol: 2 puff(s) inhaled 4 times a day as needed for  bronchospasm  tamsulosin 0.4 mg oral capsule: 1 cap(s) orally once a day (at bedtime)  triamcinolone 0.1% topical ointment: Apply topically to affected area 2 times a day as needed for  itching 1 Apply topically to affected area 2 times a day

## 2024-02-25 NOTE — DISCHARGE NOTE PROVIDER - NSDCFUSCHEDAPPT_GEN_ALL_CORE_FT
Too Russo  [facility] Physician [facility]  UROLOGY [address]  Scheduled Appointment: 03/19/2024

## 2024-02-25 NOTE — DISCHARGE NOTE PROVIDER - NSDCCPTREATMENT_GEN_ALL_CORE_FT
PRINCIPAL PROCEDURE  Procedure: CT abdomen  Findings and Treatment: < end of copied text >  IMPRESSION:  1.  Findings suggestive of prostatitis with cystitis with periprostatic   phlegmonous change. No fluid collections to suggest abscess.  2.  Right basilar opacity could represent pneumonia.  --- End of Report ---< from: CT Abdomen and Pelvis w/ IV Cont (02.23.24 @ 01:56) >

## 2024-02-25 NOTE — PROGRESS NOTE ADULT - PROBLEM SELECTOR PLAN 2
Pt reports 6 months of dysuria urinary frequency and pain with urination as well as a dull pressure in lower abdomen/ perineum. Pt is not currently sexually active and has not been able to get an erection since the start of symptoms 6 months ago. Pt denies and discharge or rash around perineum. CT findings suggestive of prostatitis with cystitis with periprostatic phlegmonous change. No fluid collections to suggest abscess. Etiology prostatitis vs UTI vs Chronic pelvic pain syndrome (CPPS). Unlikely, CPPS as pt with signs and symptoms of active UTI. Most likely Lower UTI with concomitance chronic prostatitis given erectile dysfunction and no reports constitutional symptoms and + UA. Urine and blood cultures obtained, will f/u results. Will continue with Abx. Urology consulted will appreciate recs   Ucx growing >100k CFU of staph aureus    Plan   - ID consulted  - Urology consulted  - Switch from IV zosyn to ancef 2g q8  - C/w flomax and chaney for now, TOV later  - F/u bcx  - F/u TTE

## 2024-02-25 NOTE — PROGRESS NOTE ADULT - PROBLEM SELECTOR PLAN 9
Pt with PMHx of Plaque Psoriasis on forehead, b/l arms and abdomen.     Plan   - Seen by derm  - Hydrocortisone 2.5% and triamcinolone 0.1% prn to face and body respective prn

## 2024-02-25 NOTE — PROGRESS NOTE ADULT - PROBLEM SELECTOR PLAN 6
Pt with PMHx of HIV intermittently compliant with Biktarvy (CD4 374, VL 72,000 11/11/23) Pt has not seen a HIV specialist since before COVID. Pt was following at [facility] however his insurance dose not cover [facility]. Pt would like to speak with SW to help set up [facility] with his insurance.   CD4 302, VL 16k.    Plan   - c/w Biktarvy [id number] daily   - Refill meds upon discharge   - Try to obtain [facility] follow-up  - F/u GC throat, GC urine, RPR, quantiferon

## 2024-02-25 NOTE — DISCHARGE NOTE PROVIDER - ATTENDING DISCHARGE PHYSICAL EXAMINATION:
Physical exam:  GENERAL: NAD, lying in bed comfortably  HEAD:  Atraumatic, Normocephalic  EYES: EOMI, PERRLA, conjunctiva and sclera clear  ENT: Moist mucous membranes  NECK: Supple, No JVD  CHEST/LUNG: Clear to auscultation bilaterally; No rales, rhonchi, wheezing, or rubs.  HEART: Regular rate and rhythm; S1+ S2+   ABDOMEN: Bowel sounds present; Soft, suprapubic tenderness, Nondistended   : chaney in place   EXTREMITIES:  2+ Peripheral Pulses, brisk capillary refill. No clubbing, cyanosis, or edema  NERVOUS SYSTEM:  Alert & Oriented , speech clear   MSK: FROM all 4 extremities, full and equal strength  SKIN: No rashes or lesions  Severe sepsis 2/2 parainfluenza and mssa prostatitis   acute urinary retention   if TTE is negatiive - will discharge on 6 weeks of bactrim   new chaney - will discharge with outpt follow up   ID recs appreciate  follow up with the Urology Clinic within 1-2 weeks of discharge.   office is located at [address] [place] [address], [place] [address]. phone number is [phone].   rest as detailed in the summary

## 2024-02-25 NOTE — DISCHARGE NOTE PROVIDER - CARE PROVIDER_API CALL
Desiree Solomon)  Internal Medicine  [address]  [place] [address]  Phone: [phone]  Fax: [phone]  Follow Up Time:

## 2024-02-25 NOTE — CONSULT NOTE ADULT - SUBJECTIVE AND OBJECTIVE BOX
Patient is a 53y old  Male who presents with a chief complaint of Influenza and prostatitis (23 Feb 2024 07:14)      HPI:  Pt is a 53yM with PMHx of asthma/COPD, Current  smoker, HIV on biktarvy (questionable compliance), HCV, HTN, CVA 5-6 years ago, Depression, presenting with 1 day of SOB and and fatigue and 6 months of dysuria. Last night pt began to to feel short of breath with no identifiable trigger, pt believes it was a "asthma attack or panic attack. Pt was out of his inhalers and call EMS. After receiving Duoneds in the abundance pt began to feel better. Pt reports 6 months of dysuria urinary frequency and pain with urination as well as a dull pressure in lower abdomen/ perineum. Pt is not currently sexually active and has not been able to get an erection since the start of symptoms 6 months ago. Pt denies and discharge or rash around perineum. Of note pt was tased 3 times by [facility] on 2/22/24 after resisting entry into home without warrant.  Pt did not lose consciousness during event, however, reports feeling fatigued since. Pt was admitted in Dec 2023 with EPEC and Shigella/ETEC diarrhea. Pt did not see a doctor outpatient since before COVID, he is unsure if he was ever treated for HCV, his last medication refill was upon d/c in Dec 2023 . Pt denies cp, fever, N/V/D.       ED course   Vitals: T 98, , /70, RR 20, SpO2 96% RA  Labs: WBC 7.28, Hg 11.4, Hct 34, Lactate 2.5, Na133, VBG pH 7.4, CO2 39, O2 sat 61, UA +RBC, WBC, Bact, LE, Parainfluenza Positive, Covid neg  EKG  Images: CT AP:   -Findings suggestive of prostatitis with cystitis with periprostatic   phlegmonous change. No fluid collections to suggest abscess.  -Right basilar opacity could represent pneumonia.  Interventions: Levofloxacin 750mg IVP x1,  Duoneb x1, Tylenol 650mg x1, LR 2L IVF, NS 500cc.   Consults: None (23 Feb 2024 07:14)     <<<<<<<<<  CONSULT NOTE >>>>>>>>>   Patient is as stated above,  team consulted for prostatitis incidental finding on Imaging w/ prostate larger vs 2022 imaging.  Patient states he has had prostatitis in the past.  Patient endorsing weakened urine output, mild dysuria w/ rectal discomfort when voiding, and hesitancy, as well as feeling of incomplete emptying.  Patient at bedside voided yellow/clear urine,  team was able to do a post void residual of >350cc.   team recommended chaney catheter placement to patient who agreed to chaney catheter placement.   team using sterile technique placed a 16fr catheter draining approx 500cc cloudy yellow output.  Patient denies n/v/f/c, hematuria, abdominal pain, flank pain, hematochezia.       Vital Signs Last 24 Hrs  T(C): 37 (23 Feb 2024 10:43), Max: 37.6 (23 Feb 2024 03:07)  T(F): 98.6 (23 Feb 2024 10:43), Max: 99.7 (23 Feb 2024 03:07)  HR: 97 (23 Feb 2024 10:43) (97 - 123)  BP: 144/74 (23 Feb 2024 10:43) (118/63 - 147/81)  BP(mean): --  RR: 18 (23 Feb 2024 10:43) (18 - 20)  SpO2: 95% (23 Feb 2024 10:43) (95% - 99%)    Parameters below as of 23 Feb 2024 10:43  Patient On (Oxygen Delivery Method): room air      I&O's Summary      PE:  Gen: NAD, alert and oriented x 3   Abd: soft nt/nd. Negative CVAT B/L  : Prior to chaney placement palpable bladder, mildly distended. 16fr Chaney catheter placed using sterile technique, approx 500cc yellow/cloudy output drained.  ANA: Non-tender prostate, feels enlarged non-boggy, unable to palpate median lobe. No noted discharge from rectum/anus.        LABS:                        10.9   5.05  )-----------( 316      ( 23 Feb 2024 10:10 )             33.3     02-23    137  |  104  |  14  ----------------------------<  110<H>  3.5   |  24  |  0.77    Ca    8.4      23 Feb 2024 10:10        Imaging:     ACC: [id number] EXAM: CT ABDOMEN AND PELVIS IC ORDERED BY: RICHELLE NORRIS    PROCEDURE DATE: 02/23/2024        INTERPRETATION: CLINICAL INFORMATION: Dysuria. History of prostatitis. Assessment for prostatitis versus prostatic abscess.    COMPARISON: CT abdomen and pelvis 8/21/2022, 8/17/2019 and 7/2/2019.    CONTRAST/COMPLICATIONS:  IV Contrast: Isovue 370 95 cc administered 5 cc discarded  Oral Contrast: NONE  Complications: None reported at time of study completion    PROCEDURE:  CT of the Abdomen and Pelvis was performed.  Sagittal and coronal reformats were performed.    FINDINGS:  LOWER CHEST: Right basilar opacity.    LIVER: Within normal limits.  BILE DUCTS: Normal caliber.  GALLBLADDER: Within normal limits.  SPLEEN: Splenomegaly.  PANCREAS: Within normal limits.  ADRENALS: Thickening left adrenal gland .  KIDNEYS/URETERS: Within normal limits.    BLADDER: Wall thickening and enhancement.  REPRODUCTIVE ORGANS: Prostate measures is increased in size compared to 8/21/2022, measuring 4.9 cm which is borderline enlarged. There is adjacent phlegmonous change and fat stranding without fluid collection to suggest abscess formation.    BOWEL: Evaluation of the gastrointestinal tract is limited without enteric contrast. No bowel obstruction. Appendix is not visualized. No evidence of inflammation in the pericecal region.  PERITONEUM: No ascites.  VESSELS: Within normal limits.  RETROPERITONEUM/LYMPH NODES: No lymphadenopathy.  ABDOMINAL WALL: Increased number inguinal nodes bilaterally.  BONES: Degenerative changes.    IMPRESSION:  1. Findings suggestive of prostatitis with cystitis with periprostatic phlegmonous change. No fluid collections to suggest abscess.  2. Right basilar opacity could represent pneumonia.    --- End of Report ---          PONCE CLEVELAND MD; Resident Radiologist  This document has been electronically signed.  DEJA MCCAIN MD; Attending Radiologist  This document has been electronically signed. Feb 23 2024 4:28AM
INFECTIOUS DISEASES INITIAL CONSULT NOTE    HPI:  53M w/ hx HIV with non-adherence to Biktarvy (VL 16k, CD4 302/32%), untreated HCV, HTN, CVA, TUD with asthma/COPD, and mood disorder, with recent admission 11/2023 where he was treated for esophageal candidiasis and diarrhea with GI PCR +EPEC and Shigella/EIEC, stool cx with shigella (these sx have all resolved) who presented to [facility] ED on 2/22 with wheezing/SOB x 1 day, and 6 week history of dysuria, urinary frequency and cloudy urine. No pain with BMs. No fever/chills/sweats, no chest pain, no joint pains or back pain. He does have recent L forearm redness/tenderness superimposed on an area of psoriasis. The patient also has a prior history of prostatitis many years ago which resolved with a long course of antibiotics.     He was afebrile on presentation, with normal WBC. Found to have parainfluenzae+ on RVP, likely explanation for exacerbation of pulmonary symptoms. UA inflammatory, UCx >100k CFU MSSA (without hx of any recent instrumentation/catheterization), BCx x2 ngtd, CTAP with bladder wall thickening and enhancement, and increased size of prostate with adjacent fat stranding/phlegmonous change concerning for cystitis and prostatitis. He was given levaquin -> zosyn. Urology evaluated patient, ANA with enlarged but firm and non-tender prostate. They placed chaney on 2/23 for urinary retention. ID consulted to assist with management.        PAST MEDICAL & SURGICAL HISTORY:  COPD (chronic obstructive pulmonary disease)      Asthma      HIV (human immunodeficiency virus infection)      Psoriasis      Intracerebral hemorrhage      Depression      S/P tendon repair  R hand tendon repair        Review of Systems:   Constitutional, eyes, ENT, cardiovascular, respiratory, gastrointestinal, genitourinary, integumentary, neurological, psychiatric and heme/lymph are otherwise negative other than noted above       ANTIBIOTICS:  MEDICATIONS  (STANDING):  bictegravir 50 mG/emtricitabine 200 mG/tenofovir alafenamide 25 mG (BIKTARVY) 1 Tablet(s) Oral daily  budesonide 160 MICROgram(s)/formoterol 4.5 MICROgram(s) Inhaler 2 Puff(s) Inhalation two times a day  ceFAZolin   IVPB 2000 milliGRAM(s) IV Intermittent every 8 hours  escitalopram 10 milliGRAM(s) Oral daily  hydrocortisone 2.5% Ointment 1 Application(s) Topical two times a day  tamsulosin 0.4 milliGRAM(s) Oral at bedtime  triamcinolone 0.1% Ointment 1 Application(s) Topical two times a day    MEDICATIONS  (PRN):  acetaminophen     Tablet .. 650 milliGRAM(s) Oral every 6 hours PRN Temp greater or equal to 38C (100.4F), Mild Pain (1 - 3)  albuterol/ipratropium for Nebulization 3 milliLiter(s) Nebulizer every 6 hours PRN Shortness of Breath and/or Wheezing  benzocaine/menthol Lozenge 1 Lozenge Oral every 4 hours PRN Sore Throat  benzonatate 100 milliGRAM(s) Oral every 8 hours PRN Cough  hydrocodone/homatropine Syrup 5 milliLiter(s) Oral every 8 hours PRN Cough      Allergies    No Known Allergies    Intolerances        SOCIAL HISTORY:  Lives in [place] alone  No pets  No EtOH. Smokes 2-3 cigs/week. Remote IVDU, none in many years  MSM, side. 2 partners in past year    FAMILY HISTORY:  Family history of diabetes mellitus (Father)    Family history of glaucoma (Father)    Family history of other heart disease (Father)    Family history of cardiac disorder in mother     no FH leading to current infection    Vital Signs Last 24 Hrs  T(C): 36.8 (25 Feb 2024 13:01), Max: 36.8 (25 Feb 2024 06:20)  T(F): 98.2 (25 Feb 2024 13:01), Max: 98.3 (25 Feb 2024 06:20)  HR: 107 (25 Feb 2024 13:01) (100 - 107)  BP: 119/65 (25 Feb 2024 13:01) (101/67 - 123/72)  BP(mean): --  RR: 17 (25 Feb 2024 13:01) (17 - 20)  SpO2: 94% (25 Feb 2024 13:01) (94% - 94%)    Parameters below as of 25 Feb 2024 13:01  Patient On (Oxygen Delivery Method): room air          PHYSICAL EXAM:  Constitutional: alert, NAD  Eyes: the sclera and conjunctiva were normal.   ENT: the ears and nose were normal in appearance.   Neck: the appearance of the neck was normal and the neck was supple.   Pulmonary: no respiratory distress and lungs were clear to auscultation bilaterally.   Heart: heart rate was normal and rhythm regular, no murmur  Abdomen: normal bowel sounds, soft, non-tender  Neurological: no focal deficits.   Psychiatric: the affect was normal  MSK: No joint swelling/tenderness. No midline spine tenderness  Skin: Severe psoriasis with area of erythema and tenderness affecting L forearm. No stigmata of endocarditis      LABS:                        11.1   6.86  )-----------( 326      ( 25 Feb 2024 08:33 )             33.9     02-25    138  |  101  |  18  ----------------------------<  100<H>  4.2   |  24  |  1.06    Ca    8.6      25 Feb 2024 08:33  Phos  3.6     02-25  Mg     1.8     02-25    TPro  x   /  Alb  x   /  TBili  0.2  /  DBili  x   /  AST  x   /  ALT  x   /  AlkPhos  x   02-25    PT/INR - ( 25 Feb 2024 08:33 )   PT: 12.0 sec;   INR: 1.05            Urinalysis Basic - ( 25 Feb 2024 08:33 )    Color: x / Appearance: x / SG: x / pH: x  Gluc: 100 mg/dL / Ketone: x  / Bili: x / Urobili: x   Blood: x / Protein: x / Nitrite: x   Leuk Esterase: x / RBC: x / WBC x   Sq Epi: x / Non Sq Epi: x / Bacteria: x        MICROBIOLOGY: Reviewed    RADIOLOGY & ADDITIONAL STUDIES: Reviewed

## 2024-02-25 NOTE — PROGRESS NOTE ADULT - PROBLEM SELECTOR PLAN 7
Pt with PMHx of HCV on prior admission. Per pt he is not sure if he ever followed up with treatment.   Hep C reactive    - F/u HCV RNA

## 2024-02-25 NOTE — DISCHARGE NOTE PROVIDER - NSDCFUADDAPPT_GEN_ALL_CORE_FT
Urology Clinic within 1-2 weeks of discharge. The office is located at [address] [place] [address]. The office phone number is [phone] Urology Clinic within 1-2 weeks of discharge. The office is located at [address] [place] [address], [place] [address]. The office phone number is [phone]. The appointment was made for March 19th, however, the clinic will call you to arrange for a better time.     You can follow with our [facility] specialists at [address] [place] [address], [address] [place] [address], [phone].   Urology Clinic within 1-2 weeks of discharge. The office is located at [address] [place] [address], [place] [address]. The office phone number is [phone]. The appointment was made for March 19th, however, the clinic will call you to arrange for a better time.     You can follow with our [facility] specialists at [address] [place] [address], [address] [place] [address], at your scheduled appointment on March 6th (Wed) at 10am with Dr Pacheco. Please call the clinic at [phone] if you would like to change your appointment.

## 2024-02-26 LAB
ALBUMIN SERPL ELPH-MCNC: 3.3 G/DL — SIGNIFICANT CHANGE UP (ref 3.3–5)
ALP SERPL-CCNC: 70 U/L — SIGNIFICANT CHANGE UP (ref 40–120)
ALT FLD-CCNC: 23 U/L — SIGNIFICANT CHANGE UP (ref 10–45)
ANION GAP SERPL CALC-SCNC: 11 MMOL/L — SIGNIFICANT CHANGE UP (ref 5–17)
ANISOCYTOSIS BLD QL: SLIGHT — SIGNIFICANT CHANGE UP
AST SERPL-CCNC: 41 U/L — HIGH (ref 10–40)
BASOPHILS # BLD AUTO: 0 K/UL — SIGNIFICANT CHANGE UP (ref 0–0.2)
BASOPHILS NFR BLD AUTO: 0 % — SIGNIFICANT CHANGE UP (ref 0–2)
BILIRUB SERPL-MCNC: 0.3 MG/DL — SIGNIFICANT CHANGE UP (ref 0.2–1.2)
BUN SERPL-MCNC: 18 MG/DL — SIGNIFICANT CHANGE UP (ref 7–23)
C TRACH RRNA SPEC QL NAA+PROBE: SIGNIFICANT CHANGE UP
CALCIUM SERPL-MCNC: 8.9 MG/DL — SIGNIFICANT CHANGE UP (ref 8.4–10.5)
CHLORIDE SERPL-SCNC: 100 MMOL/L — SIGNIFICANT CHANGE UP (ref 96–108)
CO2 SERPL-SCNC: 25 MMOL/L — SIGNIFICANT CHANGE UP (ref 22–31)
CREAT SERPL-MCNC: 0.98 MG/DL — SIGNIFICANT CHANGE UP (ref 0.5–1.3)
DACRYOCYTES BLD QL SMEAR: SLIGHT — SIGNIFICANT CHANGE UP
EGFR: 93 ML/MIN/1.73M2 — SIGNIFICANT CHANGE UP
EOSINOPHIL # BLD AUTO: 0.54 K/UL — HIGH (ref 0–0.5)
EOSINOPHIL NFR BLD AUTO: 9.7 % — HIGH (ref 0–6)
GLUCOSE SERPL-MCNC: 93 MG/DL — SIGNIFICANT CHANGE UP (ref 70–99)
HCT VFR BLD CALC: 36.8 % — LOW (ref 39–50)
HCV RNA FLD QL NAA+PROBE: SIGNIFICANT CHANGE UP
HCV RNA SPEC QL PROBE+SIG AMP: DETECTED
HGB BLD-MCNC: 11.7 G/DL — LOW (ref 13–17)
LYMPHOCYTES # BLD AUTO: 1.44 K/UL — SIGNIFICANT CHANGE UP (ref 1–3.3)
LYMPHOCYTES # BLD AUTO: 25.7 % — SIGNIFICANT CHANGE UP (ref 13–44)
MAGNESIUM SERPL-MCNC: 1.9 MG/DL — SIGNIFICANT CHANGE UP (ref 1.6–2.6)
MANUAL SMEAR VERIFICATION: SIGNIFICANT CHANGE UP
MCHC RBC-ENTMCNC: 27.5 PG — SIGNIFICANT CHANGE UP (ref 27–34)
MCHC RBC-ENTMCNC: 31.8 GM/DL — LOW (ref 32–36)
MCV RBC AUTO: 86.6 FL — SIGNIFICANT CHANGE UP (ref 80–100)
MICROCYTES BLD QL: SLIGHT — SIGNIFICANT CHANGE UP
MONOCYTES # BLD AUTO: 0.25 K/UL — SIGNIFICANT CHANGE UP (ref 0–0.9)
MONOCYTES NFR BLD AUTO: 4.4 % — SIGNIFICANT CHANGE UP (ref 2–14)
N GONORRHOEA RRNA SPEC QL NAA+PROBE: SIGNIFICANT CHANGE UP
NEUTROPHILS # BLD AUTO: 3.26 K/UL — SIGNIFICANT CHANGE UP (ref 1.8–7.4)
NEUTROPHILS NFR BLD AUTO: 58.4 % — SIGNIFICANT CHANGE UP (ref 43–77)
OVALOCYTES BLD QL SMEAR: SLIGHT — SIGNIFICANT CHANGE UP
PHOSPHATE SERPL-MCNC: 4 MG/DL — SIGNIFICANT CHANGE UP (ref 2.5–4.5)
PLAT MORPH BLD: NORMAL — SIGNIFICANT CHANGE UP
PLATELET # BLD AUTO: 320 K/UL — SIGNIFICANT CHANGE UP (ref 150–400)
POIKILOCYTOSIS BLD QL AUTO: SLIGHT — SIGNIFICANT CHANGE UP
POTASSIUM SERPL-MCNC: 4.4 MMOL/L — SIGNIFICANT CHANGE UP (ref 3.5–5.3)
POTASSIUM SERPL-SCNC: 4.4 MMOL/L — SIGNIFICANT CHANGE UP (ref 3.5–5.3)
PROT SERPL-MCNC: 8.5 G/DL — HIGH (ref 6–8.3)
RBC # BLD: 4.25 M/UL — SIGNIFICANT CHANGE UP (ref 4.2–5.8)
RBC # FLD: 14 % — SIGNIFICANT CHANGE UP (ref 10.3–14.5)
RBC BLD AUTO: ABNORMAL
SMUDGE CELLS # BLD: PRESENT — SIGNIFICANT CHANGE UP
SODIUM SERPL-SCNC: 136 MMOL/L — SIGNIFICANT CHANGE UP (ref 135–145)
SPECIMEN SOURCE: SIGNIFICANT CHANGE UP
VARIANT LYMPHS # BLD: 1.8 % — SIGNIFICANT CHANGE UP (ref 0–6)
WBC # BLD: 5.59 K/UL — SIGNIFICANT CHANGE UP (ref 3.8–10.5)
WBC # FLD AUTO: 5.59 K/UL — SIGNIFICANT CHANGE UP (ref 3.8–10.5)

## 2024-02-26 PROCEDURE — 99233 SBSQ HOSP IP/OBS HIGH 50: CPT | Mod: GC

## 2024-02-26 PROCEDURE — 99232 SBSQ HOSP IP/OBS MODERATE 35: CPT

## 2024-02-26 RX ORDER — MAGNESIUM SULFATE 500 MG/ML
2 VIAL (ML) INJECTION ONCE
Refills: 0 | Status: DISCONTINUED | OUTPATIENT
Start: 2024-02-26 | End: 2024-02-26

## 2024-02-26 RX ADMIN — Medication 3 MILLILITER(S): at 08:30

## 2024-02-26 RX ADMIN — Medication 100 MILLIGRAM(S): at 22:58

## 2024-02-26 RX ADMIN — Medication 100 MILLIGRAM(S): at 07:02

## 2024-02-26 RX ADMIN — Medication 1 APPLICATION(S): at 18:10

## 2024-02-26 RX ADMIN — BENZOCAINE AND MENTHOL 1 LOZENGE: 5; 1 LIQUID ORAL at 08:30

## 2024-02-26 RX ADMIN — ESCITALOPRAM OXALATE 10 MILLIGRAM(S): 10 TABLET, FILM COATED ORAL at 12:00

## 2024-02-26 RX ADMIN — Medication 1 APPLICATION(S): at 06:07

## 2024-02-26 RX ADMIN — TAMSULOSIN HYDROCHLORIDE 0.4 MILLIGRAM(S): 0.4 CAPSULE ORAL at 22:20

## 2024-02-26 RX ADMIN — Medication 1 APPLICATION(S): at 06:12

## 2024-02-26 RX ADMIN — Medication 100 MILLIGRAM(S): at 15:21

## 2024-02-26 RX ADMIN — BICTEGRAVIR SODIUM, EMTRICITABINE, AND TENOFOVIR ALAFENAMIDE FUMARATE 1 TABLET(S): 30; 120; 15 TABLET ORAL at 12:00

## 2024-02-26 RX ADMIN — Medication 3 MILLILITER(S): at 00:44

## 2024-02-26 NOTE — DIETITIAN INITIAL EVALUATION ADULT - PROBLEM SELECTOR PLAN 8
Pt with Hg of 11~ which is baseline for pt. Pt with no signs or symptoms of bleeding. Will continue to monitor.     Plan  - CTM

## 2024-02-26 NOTE — DIETITIAN INITIAL EVALUATION ADULT - PERTINENT LABORATORY DATA
02-26    136  |  100  |  18  ----------------------------<  93  4.4   |  25  |  0.98    Ca    8.9      26 Feb 2024 05:30  Phos  4.0     02-26  Mg     1.9     02-26    TPro  8.5<H>  /  Alb  3.3  /  TBili  0.3  /  DBili  x   /  AST  41<H>  /  ALT  23  /  AlkPhos  70  02-26

## 2024-02-26 NOTE — DIETITIAN INITIAL EVALUATION ADULT - PROBLEM SELECTOR PLAN 1
On admission pt meeting 2/4 SIRS (, VBG CO2 39 [-5 =ABG CO2 of 32?) with possible sources (PNA, Prostatitis, UTI) and signs of end organ dysfunction ( Lactate of 2.5). Pt received 2.5L IVF in ED, Pt with good UOP and mentating well. Lactate cleared (0.9)Tachycardia improving (123->101), likely 2/2 to hypovolemia. Blood and urine culture obtained, will follow up results.     Plan   - Monitor UOP, and VS  - see below for treatment of prostatitis/uti/parainfluenza

## 2024-02-26 NOTE — DIETITIAN INITIAL EVALUATION ADULT - PERTINENT MEDS FT
MEDICATIONS  (STANDING):  bictegravir 50 mG/emtricitabine 200 mG/tenofovir alafenamide 25 mG (BIKTARVY) 1 Tablet(s) Oral daily  budesonide 160 MICROgram(s)/formoterol 4.5 MICROgram(s) Inhaler 2 Puff(s) Inhalation two times a day  ceFAZolin   IVPB 2000 milliGRAM(s) IV Intermittent every 8 hours  escitalopram 10 milliGRAM(s) Oral daily  hydrocortisone 2.5% Ointment 1 Application(s) Topical two times a day  tamsulosin 0.4 milliGRAM(s) Oral at bedtime  triamcinolone 0.1% Ointment 1 Application(s) Topical two times a day    MEDICATIONS  (PRN):  acetaminophen     Tablet .. 650 milliGRAM(s) Oral every 6 hours PRN Temp greater or equal to 38C (100.4F), Mild Pain (1 - 3)  albuterol/ipratropium for Nebulization 3 milliLiter(s) Nebulizer every 6 hours PRN Shortness of Breath and/or Wheezing  benzocaine/menthol Lozenge 1 Lozenge Oral every 4 hours PRN Sore Throat  benzonatate 100 milliGRAM(s) Oral every 8 hours PRN Cough  hydrocodone/homatropine Syrup 5 milliLiter(s) Oral every 8 hours PRN Cough

## 2024-02-26 NOTE — PROGRESS NOTE ADULT - PROBLEM SELECTOR PLAN 6
Pt with PMHx of HIV intermittently compliant with Biktarvy (CD4 374, VL 72,000 11/11/23) Pt has not seen a HIV specialist since before COVID. Pt was following [facility] however his insurance dose not cover [facility]. Pt would like to speak with SW to help set up [facility] with his insurance.   CD4 302, VL 16k.    Plan   - c/w Biktarvy [id number] daily   - Refill meds upon discharge   - Try to obtain [facility] follow-up  - F/u GC throat, GC urine, RPR, quantiferon Pt with PMHx of HIV intermittently compliant with Biktarvy (CD4 374, VL 72,000 11/11/23) Pt has not seen a HIV specialist since before COVID. Pt was following at [facility] however his insurance dose not cover [facility]. Pt would like to speak with SW to help set up [facility] with his insurance.   CD4 302, VL 16k. Negative chlamydia and gonorrhea.    Plan   - c/w Biktarvy [id number] daily   - Refill meds upon discharge   - Provided with info for [facility] follow-up  - F/u RPR, quantiferon

## 2024-02-26 NOTE — DIETITIAN INITIAL EVALUATION ADULT - PERSON TAUGHT/METHOD
Nutrition education provided. Discussed importance of adequate kcal/protein intake, general healthful diet, high-kcal, high-protein food options. Encouraged intake of largest meal when appetite is strongest. Pt amenable to oral nutrition supplements, educated pt on drinking supplements between meals/verbal instruction/patient instructed

## 2024-02-26 NOTE — DIETITIAN INITIAL EVALUATION ADULT - PROBLEM SELECTOR PLAN 4
Pt found to Parainfluenza positive on admission. Pt reports mild non productive chronic cough that has not changed. No reported fevers at home. This may have contributed to a possible asthma exacerbation, which improved with Duonebs. CT finding for right basilar opacity. VSS since admission saturating well on RA. Given pts unclear HIV status, pt is at increased risk secondary bact/fungal superinfection. Will continue to monitor respiratory status with low threshold for empiric Abx in respiratory status worsens, although pt currently on Levofloxacin.     Plan  - CTM respiratory and O2 status   - c/w droplet precautions   - c/w symptomatic management

## 2024-02-26 NOTE — DIETITIAN INITIAL EVALUATION ADULT - PROBLEM SELECTOR PLAN 3
Pt reports 6 months of dysuria urinary frequency and pain with urination as well as a dull pressure in lower abdomen/ perineum. Pt is not currently sexually active  Pt denies and discharge or rash around perineum. CT findings suggestive of prostatitis with cystitis. Etiology Lower UTI vs Upper UTI vs Urethritis/STI. Unlikely upper UTI as pt without CVAT , flank pain or fevers. Unlikely urethritis/STI as pt not sexually active with no rashes or discharge. Likely lower UTI given dysuria CT findings and UA +. Urine Cultures obtained will follow up results and continue with Abx    Plan   - c/w Abx as above   - f/u Ucx

## 2024-02-26 NOTE — PROGRESS NOTE ADULT - ATTENDING COMMENTS
mssa prostatitis   if TTE is negatiive - will discharge on 6 weeks of bactrim   new chaney - will discharge with outpt follow up   mssa prostatitis  rest as above   ID recs appreciate mssa prostatitis   acute urinary retention   if TTE is negatiive - will discharge on 6 weeks of bactrim   new chaney - will discharge with outpt follow up   ID recs appreciate  follow up with the Urology Clinic within 1-2 weeks of discharge.   office is located at [address] [place] [address], [place] [address]. phone number is [phone].   rest as above

## 2024-02-26 NOTE — PROGRESS NOTE ADULT - PROBLEM SELECTOR PLAN 2
Pt reports 6 months of dysuria urinary frequency and pain with urination as well as a dull pressure in lower abdomen/ perineum. Pt is not currently sexually active and has not been able to get an erection since the start of symptoms 6 months ago. Pt denies and discharge or rash around perineum. CT findings suggestive of prostatitis with cystitis with periprostatic phlegmonous change. No fluid collections to suggest abscess. Etiology prostatitis vs UTI vs Chronic pelvic pain syndrome (CPPS). Unlikely, CPPS as pt with signs and symptoms of active UTI. Most likely Lower UTI with concomitance chronic prostatitis given erectile dysfunction and no reports constitutional symptoms and + UA. Urine and blood cultures obtained, will f/u results. Will continue with Abx. Urology consulted will appreciate recs   Ucx growing >100k CFU of staph aureus    Plan   - ID consulted  - Urology consulted  - Switch from IV zosyn to ancef 2g q8  - C/w flomax and chaney for now, TOV later  - F/u bcx  - F/u TTE Pt reports 6 months of dysuria urinary frequency and pain with urination as well as a dull pressure in lower abdomen/ perineum. Pt is not currently sexually active and has not been able to get an erection since the start of symptoms 6 months ago. Pt denies and discharge or rash around perineum. CT findings suggestive of prostatitis with cystitis with periprostatic phlegmonous change. No fluid collections to suggest abscess. Etiology prostatitis vs UTI vs Chronic pelvic pain syndrome (CPPS). Unlikely, CPPS as pt with signs and symptoms of active UTI. Most likely Lower UTI with concomitance chronic prostatitis given erectile dysfunction and no reports constitutional symptoms and + UA. Urine and blood cultures obtained, will f/u results. Will continue with Abx. Urology consulted will appreciate recs   Ucx growing >100k CFU of staph aureus, negative chlamydia and gonorrhea     Plan   - ID consulted  - Urology consulted  - c/w ancef 2g q8 while inpatient   - will d/c with Bactrim DS for 4 weeks   - Pending TTE   - C/w flomax and chaney for now, TOV later  - F/u bcx  - F/u TTE

## 2024-02-26 NOTE — DIETITIAN INITIAL EVALUATION ADULT - OTHER CALCULATIONS
Based on Standards of Care pt within % IBW (87%) thus actual body weight used for all calculations (160#). Needs adjusted for age, HIV, malnutrition.

## 2024-02-26 NOTE — DIETITIAN NUTRITION RISK NOTIFICATION - UPON NUTRITIONAL ASSESSMENT BY THE REGISTERED DIETITIAN YOUR PATIENT WAS DETERMINED TO MEET CRITERIA/HAS EVIDENCE OF THE FOLLOWING DIAGNOSIS:
Department of Anesthesiology  Postprocedure Note    Patient: Pan Kendrick  MRN: [id number]  YOB: 1939  Date of evaluation: 2/3/2023      Procedure Summary     Date: 02/03/23 Room / Location: [facility] OR [address] [place] [address]) [facility]    Anesthesia Start: 0728 Anesthesia Stop: 2637    Procedure: LEFT CLAVICLE OPEN REDUCTION INTERNAL FIXATION WITH SYNTHES ANTERIOR CLAVICLE PLATES (Left) Diagnosis:       Closed displaced fracture of left clavicle, unspecified part of clavicle, initial encounter      (Closed displaced fracture of left clavicle, unspecified part of clavicle, initial encounter [S42.002A])    Surgeons: Herrera Real DO Responsible Provider: Amarjit Vee MD    Anesthesia Type: general ASA Status: 3          Anesthesia Type: No value filed. Jaqui Phase I: Jaqui Score: 10    Jaqui Phase II: Jaqui Score: 10      Anesthesia Post Evaluation    Patient location during evaluation: PACU  Patient participation: complete - patient participated  Level of consciousness: awake and alert  Pain score: 0  Airway patency: patent  Nausea & Vomiting: no nausea and no vomiting  Complications: no  Cardiovascular status: blood pressure returned to baseline  Respiratory status: acceptable and room air  Hydration status: euvolemic  Comments: Left interscalene nerve block in PACU, patient feels much better after block. Pain scale 0/10.
Severe protein-calorie malnutrition

## 2024-02-26 NOTE — DIETITIAN INITIAL EVALUATION ADULT - PROBLEM SELECTOR PLAN 10
Pt with PMHx of Anxiety/Depression on home Lexapro 10 mg oral tablet: 1 tab(s) orally once a day    Plan   - c/w Lexapro 10mg PO daily

## 2024-02-26 NOTE — DIETITIAN INITIAL EVALUATION ADULT - PROBLEM SELECTOR PLAN 9
Pt with PMHx of Plaque Psoriasis on forehead, b/l arms and abdomen.     Plan   - c/w Hydrocortisone 2.5% topical lotion: Apply topically to affected area 2 times a day  - c/w Mupirocin 2% topical ointment: Apply topically to affected area 2 times a day  - Outpatient dermatology f/u

## 2024-02-26 NOTE — PROGRESS NOTE ADULT - PROBLEM SELECTOR PLAN 9
Pt with PMHx of Plaque Psoriasis on forehead, b/l arms and abdomen.     Plan   - Seen by derm  - Hydrocortisone 2.5% and triamcinolone 0.1% prn to face and body respective prn Pt with PMHx of Plaque Psoriasis on forehead, b/l arms and abdomen.     Plan   - Seen by derm  - Hydrocortisone 2.5% and triamcinolone 0.1% prn to face and body respective prn.

## 2024-02-26 NOTE — PROGRESS NOTE ADULT - PROBLEM SELECTOR PLAN 7
Pt with PMHx of HCV on prior admission. Per pt he is not sure if he ever followed up with treatment.   Hep C reactive    - F/u HCV RNA Pt with PMHx of HCV on prior admission. Per pt he is not sure if he ever followed up with treatment.   Hep C reactive, HCV RNA positive.   Plan:  - outpatient f/u

## 2024-02-26 NOTE — DIETITIAN INITIAL EVALUATION ADULT - PROBLEM SELECTOR PLAN 6
Pt with PMHx of HIV intermittently compliant with Biktarvy (CD4 374, VL 72,000 11/11/23) Pt has not seen a HIV specialist since before COVID. Pt was following at [facility] however his insurance dose not cover [facility]. Pt would like to speak with SW to help set up [facility] with his insurance.     Plan   - f/u CD4 and Viral load   - c/w Biktarvy [id number] daily   - Refill meds upon discharge   - SW consult to help pt set up apts at [facility]

## 2024-02-26 NOTE — DIETITIAN INITIAL EVALUATION ADULT - PROBLEM SELECTOR PLAN 2
Pt reports 6 months of dysuria urinary frequency and pain with urination as well as a dull pressure in lower abdomen/ perineum. Pt is not currently sexually active and has not been able to get an erection since the start of symptoms 6 months ago. Pt denies and discharge or rash around perineum. CT findings suggestive of prostatitis with cystitis with periprostatic phlegmonous change. No fluid collections to suggest abscess. Etiology prostatitis vs UTI vs Chronic pelvic pain syndrome (CPPS). Unlikely, CPPS as pt with signs and symptoms of active UTI. Most likely Lower UTI with concomitance chronic prostatitis given erectile dysfunction and no reports constitutional symptoms and + UA. Urine and blood cultures obtained, will f/u results. Will continue with Abx. Urology consulted will appreciate recs     Plan   - f/u Urology recs   - c/w Levofloxacin 750mg IV q24 (x5 days 2/23-2/27(UTI dosing), then 500mg PO x 4-6 weeks(chronic prostatitis dosing)  - f/u cultures

## 2024-02-26 NOTE — DIETITIAN INITIAL EVALUATION ADULT - ADD RECOMMEND
1. Continue Regular diet, recommend Ensure Enlive 2x/day (350kcal, 20g protein per serving) and MVI daily   2. Encourage and monitor PO intake, honor preferences as able    3. Monitor labs, wt trends, GI function, and skin integrity   4. Pain and bowel regimen per team   5. RD to remain available for additional nutrition interventions and diet edu as needed

## 2024-02-26 NOTE — DIETITIAN INITIAL EVALUATION ADULT - OTHER INFO
53yM with PMHx of asthma/COPD, Current  smoker, HIV on biktarvy (questionable compliance), HCV, HTN, CVA 5-6 years ago, Depression, presenting with 1 day of SOB and and fatigue and 6 months of dysuria, found to Parainfluenza + with UTI and prostatitis admitted for Abx treatment.     Patient seen at bedside for nutrition assessment. Current diet order: Regular. Confirms NKFA. No difficulty chewing/swallowing reported. Reports good appetite, observed 100% breakfast tray completed. PTA, pt endorses varied appetite and PO intake x 1.5 years after  passed. Dosing weight: 160#, BMI 21.1, reports UBW of 240 x 2.5 year ago, however unable to quantify weight loss over the past year. Per Adrian TRIMBLE, pt 200# on Nov 11, 2023. Suggests 40#/20% weight loss x 3 months, clinically significant. No pressure injuries or edema documented. Denies N/V/D/C. Labs: AST 41, glucose  x 24 hours. Meds: abx. NFPE notable for moderate muscle wasting to temples and clavicles and moderate fat wasting to buccals. Based on ASPEN guidelines, pt meets criteria for severe protein calorie malnutrition. Nutrition education provided. Discussed importance of adequate kcal/protein intake, general healthful diet, high-kcal, high-protein food options. Encouraged intake of largest meal when appetite is strongest. Pt amenable to oral nutrition supplements, educated pt on drinking supplements between meals. Pt expressed appreciation and understanding. See nutrition recommendations below.

## 2024-02-26 NOTE — DIETITIAN NUTRITION RISK NOTIFICATION - ADDITIONAL COMMENTS/DIETITIAN RECOMMENDATIONS
1. Continue Regular diet, recommend Ensure Enlive 2x/day (350kcal, 20g protein per serving) and MVI daily   2. Encourage and monitor PO intake, honor preferences as able    3. Monitor labs, wt trends, GI function, and skin integrity   4. Pain and bowel regimen per team   5. RD to remain available for additional nutrition interventions and diet edu as needed  patient instructed; verbal instruction; Nutrition education provided. Discussed importance of adequate kcal/protein intake, general healthful diet, high-kcal, high-protein food options. Encouraged intake of largest meal when appetite is strongest. Pt amenable to oral nutrition supplements, educated pt on drinking supplements between meals

## 2024-02-27 ENCOUNTER — RESULT REVIEW (OUTPATIENT)
Age: 54
End: 2024-02-27

## 2024-02-27 ENCOUNTER — TRANSCRIPTION ENCOUNTER (OUTPATIENT)
Age: 54
End: 2024-02-27

## 2024-02-27 VITALS
TEMPERATURE: 98 F | RESPIRATION RATE: 18 BRPM | HEART RATE: 113 BPM | DIASTOLIC BLOOD PRESSURE: 69 MMHG | SYSTOLIC BLOOD PRESSURE: 121 MMHG | OXYGEN SATURATION: 94 %

## 2024-02-27 LAB
ANION GAP SERPL CALC-SCNC: 11 MMOL/L — SIGNIFICANT CHANGE UP (ref 5–17)
BUN SERPL-MCNC: 22 MG/DL — SIGNIFICANT CHANGE UP (ref 7–23)
C TRACH RRNA SPEC QL NAA+PROBE: SIGNIFICANT CHANGE UP
C TRACH+GC RRNA SPEC QL PROBE: SIGNIFICANT CHANGE UP
CALCIUM SERPL-MCNC: 9.5 MG/DL — SIGNIFICANT CHANGE UP (ref 8.4–10.5)
CHLAMYDIA/N. GONORRHEA, ORAL/THROAT, TMA - SOURCE ORAL: SIGNIFICANT CHANGE UP
CHLORIDE SERPL-SCNC: 98 MMOL/L — SIGNIFICANT CHANGE UP (ref 96–108)
CO2 SERPL-SCNC: 25 MMOL/L — SIGNIFICANT CHANGE UP (ref 22–31)
CREAT SERPL-MCNC: 1.05 MG/DL — SIGNIFICANT CHANGE UP (ref 0.5–1.3)
EGFR: 85 ML/MIN/1.73M2 — SIGNIFICANT CHANGE UP
GLUCOSE SERPL-MCNC: 63 MG/DL — LOW (ref 70–99)
HCT VFR BLD CALC: 37.7 % — LOW (ref 39–50)
HGB BLD-MCNC: 12.2 G/DL — LOW (ref 13–17)
MCHC RBC-ENTMCNC: 27.9 PG — SIGNIFICANT CHANGE UP (ref 27–34)
MCHC RBC-ENTMCNC: 32.4 GM/DL — SIGNIFICANT CHANGE UP (ref 32–36)
MCV RBC AUTO: 86.3 FL — SIGNIFICANT CHANGE UP (ref 80–100)
N GONORRHOEA RRNA SPEC QL NAA+PROBE: SIGNIFICANT CHANGE UP
NRBC # BLD: 0 /100 WBCS — SIGNIFICANT CHANGE UP (ref 0–0)
PLATELET # BLD AUTO: 320 K/UL — SIGNIFICANT CHANGE UP (ref 150–400)
POTASSIUM SERPL-MCNC: 4.2 MMOL/L — SIGNIFICANT CHANGE UP (ref 3.5–5.3)
POTASSIUM SERPL-SCNC: 4.2 MMOL/L — SIGNIFICANT CHANGE UP (ref 3.5–5.3)
RAPID RVP RESULT: SIGNIFICANT CHANGE UP
RBC # BLD: 4.37 M/UL — SIGNIFICANT CHANGE UP (ref 4.2–5.8)
RBC # FLD: 13.8 % — SIGNIFICANT CHANGE UP (ref 10.3–14.5)
SARS-COV-2 RNA SPEC QL NAA+PROBE: SIGNIFICANT CHANGE UP
SODIUM SERPL-SCNC: 134 MMOL/L — LOW (ref 135–145)
T PALLIDUM AB TITR SER: NEGATIVE — SIGNIFICANT CHANGE UP
WBC # BLD: 6.26 K/UL — SIGNIFICANT CHANGE UP (ref 3.8–10.5)
WBC # FLD AUTO: 6.26 K/UL — SIGNIFICANT CHANGE UP (ref 3.8–10.5)

## 2024-02-27 PROCEDURE — 86480 TB TEST CELL IMMUN MEASURE: CPT

## 2024-02-27 PROCEDURE — 93005 ELECTROCARDIOGRAM TRACING: CPT

## 2024-02-27 PROCEDURE — 83735 ASSAY OF MAGNESIUM: CPT

## 2024-02-27 PROCEDURE — 0225U NFCT DS DNA&RNA 21 SARSCOV2: CPT

## 2024-02-27 PROCEDURE — 82803 BLOOD GASES ANY COMBINATION: CPT

## 2024-02-27 PROCEDURE — 36415 COLL VENOUS BLD VENIPUNCTURE: CPT

## 2024-02-27 PROCEDURE — 84100 ASSAY OF PHOSPHORUS: CPT

## 2024-02-27 PROCEDURE — 99285 EMERGENCY DEPT VISIT HI MDM: CPT | Mod: 25

## 2024-02-27 PROCEDURE — 99239 HOSP IP/OBS DSCHRG MGMT >30: CPT | Mod: GC

## 2024-02-27 PROCEDURE — 86780 TREPONEMA PALLIDUM: CPT

## 2024-02-27 PROCEDURE — 86704 HEP B CORE ANTIBODY TOTAL: CPT

## 2024-02-27 PROCEDURE — 94640 AIRWAY INHALATION TREATMENT: CPT

## 2024-02-27 PROCEDURE — 86705 HEP B CORE ANTIBODY IGM: CPT

## 2024-02-27 PROCEDURE — 96374 THER/PROPH/DIAG INJ IV PUSH: CPT

## 2024-02-27 PROCEDURE — 86360 T CELL ABSOLUTE COUNT/RATIO: CPT

## 2024-02-27 PROCEDURE — 87521 HEPATITIS C PROBE&RVRS TRNSC: CPT

## 2024-02-27 PROCEDURE — 99232 SBSQ HOSP IP/OBS MODERATE 35: CPT

## 2024-02-27 PROCEDURE — 87536 HIV-1 QUANT&REVRSE TRNSCRPJ: CPT

## 2024-02-27 PROCEDURE — 93306 TTE W/DOPPLER COMPLETE: CPT | Mod: 26

## 2024-02-27 PROCEDURE — 71045 X-RAY EXAM CHEST 1 VIEW: CPT

## 2024-02-27 PROCEDURE — 85027 COMPLETE CBC AUTOMATED: CPT

## 2024-02-27 PROCEDURE — 87591 N.GONORRHOEAE DNA AMP PROB: CPT

## 2024-02-27 PROCEDURE — 87040 BLOOD CULTURE FOR BACTERIA: CPT

## 2024-02-27 PROCEDURE — 86357 NK CELLS TOTAL COUNT: CPT

## 2024-02-27 PROCEDURE — 87186 SC STD MICRODIL/AGAR DIL: CPT

## 2024-02-27 PROCEDURE — 93306 TTE W/DOPPLER COMPLETE: CPT

## 2024-02-27 PROCEDURE — 81001 URINALYSIS AUTO W/SCOPE: CPT

## 2024-02-27 PROCEDURE — 85025 COMPLETE CBC W/AUTO DIFF WBC: CPT

## 2024-02-27 PROCEDURE — 84295 ASSAY OF SERUM SODIUM: CPT

## 2024-02-27 PROCEDURE — 86706 HEP B SURFACE ANTIBODY: CPT

## 2024-02-27 PROCEDURE — 86355 B CELLS TOTAL COUNT: CPT

## 2024-02-27 PROCEDURE — 80048 BASIC METABOLIC PNL TOTAL CA: CPT

## 2024-02-27 PROCEDURE — 86359 T CELLS TOTAL COUNT: CPT

## 2024-02-27 PROCEDURE — 83605 ASSAY OF LACTIC ACID: CPT

## 2024-02-27 PROCEDURE — 87340 HEPATITIS B SURFACE AG IA: CPT

## 2024-02-27 PROCEDURE — 86803 HEPATITIS C AB TEST: CPT

## 2024-02-27 PROCEDURE — 84132 ASSAY OF SERUM POTASSIUM: CPT

## 2024-02-27 PROCEDURE — 80053 COMPREHEN METABOLIC PANEL: CPT

## 2024-02-27 PROCEDURE — 87491 CHLMYD TRACH DNA AMP PROBE: CPT

## 2024-02-27 PROCEDURE — 84145 PROCALCITONIN (PCT): CPT

## 2024-02-27 PROCEDURE — 82330 ASSAY OF CALCIUM: CPT

## 2024-02-27 PROCEDURE — 87086 URINE CULTURE/COLONY COUNT: CPT

## 2024-02-27 PROCEDURE — 74177 CT ABD & PELVIS W/CONTRAST: CPT | Mod: MC

## 2024-02-27 PROCEDURE — 86709 HEPATITIS A IGM ANTIBODY: CPT

## 2024-02-27 RX ORDER — ENOXAPARIN SODIUM 100 MG/ML
40 INJECTION SUBCUTANEOUS EVERY 24 HOURS
Refills: 0 | Status: DISCONTINUED | OUTPATIENT
Start: 2024-02-27 | End: 2024-02-27

## 2024-02-27 RX ADMIN — Medication 1 APPLICATION(S): at 06:15

## 2024-02-27 RX ADMIN — ESCITALOPRAM OXALATE 10 MILLIGRAM(S): 10 TABLET, FILM COATED ORAL at 13:33

## 2024-02-27 RX ADMIN — ENOXAPARIN SODIUM 40 MILLIGRAM(S): 100 INJECTION SUBCUTANEOUS at 13:34

## 2024-02-27 RX ADMIN — Medication 100 MILLIGRAM(S): at 06:14

## 2024-02-27 RX ADMIN — BICTEGRAVIR SODIUM, EMTRICITABINE, AND TENOFOVIR ALAFENAMIDE FUMARATE 1 TABLET(S): 30; 120; 15 TABLET ORAL at 13:33

## 2024-02-27 RX ADMIN — Medication 1 APPLICATION(S): at 06:14

## 2024-02-27 RX ADMIN — Medication 100 MILLIGRAM(S): at 13:33

## 2024-02-27 NOTE — PROGRESS NOTE ADULT - REASON FOR ADMISSION
Influenza and prostatitis

## 2024-02-27 NOTE — PROGRESS NOTE ADULT - PROBLEM SELECTOR PLAN 9
Pt with PMHx of Plaque Psoriasis on forehead, b/l arms and abdomen.     Plan   - Seen by derm  - Hydrocortisone 2.5% and triamcinolone 0.1% prn to face and body respective prn.

## 2024-02-27 NOTE — PROGRESS NOTE ADULT - PROBLEM SELECTOR PROBLEM 10
Anxiety and depression
Both arterial and venous

## 2024-02-27 NOTE — PROGRESS NOTE ADULT - PROBLEM SELECTOR PLAN 4
Pt found to Parainfluenza positive on admission. Pt reports mild non productive chronic cough that has not changed. No reported fevers at home. This may have contributed to a possible asthma exacerbation, which improved with Duonebs. CT finding for right basilar opacity. VSS since admission saturating well on RA. Given pts unclear HIV status, pt is at increased risk secondary bact/fungal superinfection. Will continue to monitor respiratory status with low threshold for empiric Abx in respiratory status worsens, although pt currently on Levofloxacin.     Plan  - CTM respiratory and O2 status   - c/w droplet precautions   - c/w symptomatic management  -Start cough suppressant
Pt found to Parainfluenza positive on admission. Pt reports mild non productive chronic cough that has not changed. No reported fevers at home. This may have contributed to a possible asthma exacerbation, which improved with Duonebs. CT finding for right basilar opacity. VSS since admission saturating well on RA. Given pts unclear HIV status, pt is at increased risk secondary bact/fungal superinfection. Will continue to monitor respiratory status with low threshold for empiric Abx in respiratory status worsens, although pt currently on Levofloxacin.     Plan  - CTM respiratory and O2 status   - c/w droplet precautions   - c/w symptomatic management  - cough suppression

## 2024-02-27 NOTE — PROGRESS NOTE ADULT - PROBLEM SELECTOR PLAN 10
Pt with PMHx of Anxiety/Depression on home Lexapro 10 mg oral tablet: 1 tab(s) orally once a day    Plan   - c/w Lexapro 10mg PO daily  - Consider psych consult
Pt with PMHx of Anxiety/Depression on home Lexapro 10 mg oral tablet: 1 tab(s) orally once a day    Plan   - c/w Lexapro 10mg PO daily

## 2024-02-27 NOTE — PROGRESS NOTE ADULT - ATTENDING COMMENTS
mssa prostatitis   acute urinary retention   if TTE is negatiive - will discharge on 6 weeks of bactrim   new chaney - will discharge with outpt follow up   ID recs appreciate  follow up with the Urology Clinic within 1-2 weeks of discharge.   office is located at [address] [place] [address], [place] [address]. phone number is [phone].   rest as above

## 2024-02-27 NOTE — PROGRESS NOTE ADULT - SUBJECTIVE AND OBJECTIVE BOX
INFECTIOUS DISEASES CONSULT FOLLOW-UP NOTE    INTERVAL HPI/OVERNIGHT EVENTS:  Afebrile, WBC wnl, BCx ngtd, TTE negative    ROS:   Constitutional, eyes, ENT, cardiovascular, respiratory, gastrointestinal, genitourinary, integumentary, neurological, psychiatric and heme/lymph are otherwise negative other than noted above       ANTIBIOTICS/RELEVANT:    MEDICATIONS  (STANDING):  bictegravir 50 mG/emtricitabine 200 mG/tenofovir alafenamide 25 mG (BIKTARVY) 1 Tablet(s) Oral daily  budesonide 160 MICROgram(s)/formoterol 4.5 MICROgram(s) Inhaler 2 Puff(s) Inhalation two times a day  ceFAZolin   IVPB 2000 milliGRAM(s) IV Intermittent every 8 hours  enoxaparin Injectable 40 milliGRAM(s) SubCutaneous every 24 hours  escitalopram 10 milliGRAM(s) Oral daily  hydrocortisone 2.5% Ointment 1 Application(s) Topical two times a day  tamsulosin 0.4 milliGRAM(s) Oral at bedtime  triamcinolone 0.1% Ointment 1 Application(s) Topical two times a day    MEDICATIONS  (PRN):  acetaminophen     Tablet .. 650 milliGRAM(s) Oral every 6 hours PRN Temp greater or equal to 38C (100.4F), Mild Pain (1 - 3)  albuterol/ipratropium for Nebulization 3 milliLiter(s) Nebulizer every 6 hours PRN Shortness of Breath and/or Wheezing  benzocaine/menthol Lozenge 1 Lozenge Oral every 4 hours PRN Sore Throat  hydrocodone/homatropine Syrup 5 milliLiter(s) Oral every 8 hours PRN Cough        Vital Signs Last 24 Hrs  T(C): 37.2 (27 Feb 2024 06:00), Max: 37.2 (27 Feb 2024 06:00)  T(F): 99 (27 Feb 2024 06:00), Max: 99 (27 Feb 2024 06:00)  HR: 98 (27 Feb 2024 06:00) (97 - 98)  BP: 110/62 (27 Feb 2024 06:00) (110/62 - 113/76)  BP(mean): --  RR: 18 (27 Feb 2024 06:00) (17 - 18)  SpO2: 96% (27 Feb 2024 06:00) (96% - 97%)    Parameters below as of 27 Feb 2024 06:00  Patient On (Oxygen Delivery Method): room air        PHYSICAL EXAM:  Constitutional: alert, NAD  Eyes: the sclera and conjunctiva were normal.   ENT: the ears and nose were normal in appearance.   Neck: the appearance of the neck was normal and the neck was supple.   Pulmonary: no respiratory distress and lungs were clear to auscultation bilaterally.   Heart: heart rate was normal and rhythm regular, no murmur  Abdomen: normal bowel sounds, soft, non-tender  Neurological: no focal deficits.   Psychiatric: the affect was normal  MSK: No joint swelling/tenderness. No midline spine tenderness  Skin: Severe psoriasis with area of erythema and tenderness affecting L forearm - this lesion is improving. No stigmata of endocarditis      LABS:                        12.2   6.26  )-----------( 320      ( 27 Feb 2024 05:30 )             37.7     02-27    134<L>  |  98  |  22  ----------------------------<  63<L>  4.2   |  25  |  1.05    Ca    9.5      27 Feb 2024 05:30  Phos  4.0     02-26  Mg     1.9     02-26    TPro  8.5<H>  /  Alb  3.3  /  TBili  0.3  /  DBili  x   /  AST  41<H>  /  ALT  23  /  AlkPhos  70  02-26      Urinalysis Basic - ( 27 Feb 2024 05:30 )    Color: x / Appearance: x / SG: x / pH: x  Gluc: 63 mg/dL / Ketone: x  / Bili: x / Urobili: x   Blood: x / Protein: x / Nitrite: x   Leuk Esterase: x / RBC: x / WBC x   Sq Epi: x / Non Sq Epi: x / Bacteria: x        MICROBIOLOGY:  Reviewed    RADIOLOGY & ADDITIONAL STUDIES:  Reviewed
INFECTIOUS DISEASES CONSULT FOLLOW-UP NOTE    INTERVAL HPI/OVERNIGHT EVENTS:  Afebrile, WBC normal, feeling well, no acute complaints, tolerating abx    ROS:   Constitutional, eyes, ENT, cardiovascular, respiratory, gastrointestinal, genitourinary, integumentary, neurological, psychiatric and heme/lymph are otherwise negative other than noted above       ANTIBIOTICS/RELEVANT:    MEDICATIONS  (STANDING):  bictegravir 50 mG/emtricitabine 200 mG/tenofovir alafenamide 25 mG (BIKTARVY) 1 Tablet(s) Oral daily  budesonide 160 MICROgram(s)/formoterol 4.5 MICROgram(s) Inhaler 2 Puff(s) Inhalation two times a day  ceFAZolin   IVPB 2000 milliGRAM(s) IV Intermittent every 8 hours  escitalopram 10 milliGRAM(s) Oral daily  hydrocortisone 2.5% Ointment 1 Application(s) Topical two times a day  tamsulosin 0.4 milliGRAM(s) Oral at bedtime  triamcinolone 0.1% Ointment 1 Application(s) Topical two times a day    MEDICATIONS  (PRN):  acetaminophen     Tablet .. 650 milliGRAM(s) Oral every 6 hours PRN Temp greater or equal to 38C (100.4F), Mild Pain (1 - 3)  albuterol/ipratropium for Nebulization 3 milliLiter(s) Nebulizer every 6 hours PRN Shortness of Breath and/or Wheezing  benzocaine/menthol Lozenge 1 Lozenge Oral every 4 hours PRN Sore Throat  hydrocodone/homatropine Syrup 5 milliLiter(s) Oral every 8 hours PRN Cough        Vital Signs Last 24 Hrs  T(C): 36.9 (26 Feb 2024 06:16), Max: 36.9 (26 Feb 2024 06:16)  T(F): 98.5 (26 Feb 2024 06:16), Max: 98.5 (26 Feb 2024 06:16)  HR: 100 (26 Feb 2024 06:16) (96 - 100)  BP: 104/64 (26 Feb 2024 06:16) (102/65 - 104/64)  BP(mean): --  RR: 18 (26 Feb 2024 06:16) (17 - 18)  SpO2: 97% (26 Feb 2024 06:16) (95% - 97%)    Parameters below as of 26 Feb 2024 06:16  Patient On (Oxygen Delivery Method): room air        02-25-24 @ 07:01  -  02-26-24 @ 07:00  --------------------------------------------------------  IN: 0 mL / OUT: 1900 mL / NET: -1900 mL      PHYSICAL EXAM:  Constitutional: alert, NAD  Eyes: the sclera and conjunctiva were normal.   ENT: the ears and nose were normal in appearance.   Neck: the appearance of the neck was normal and the neck was supple.   Pulmonary: no respiratory distress and lungs were clear to auscultation bilaterally.   Heart: heart rate was normal and rhythm regular, no murmur  Abdomen: normal bowel sounds, soft, non-tender  Neurological: no focal deficits.   Psychiatric: the affect was normal  MSK: No joint swelling/tenderness. No midline spine tenderness  Skin: Severe psoriasis with area of erythema and tenderness affecting L forearm. No stigmata of endocarditis      LABS:                        11.7   5.59  )-----------( 320      ( 26 Feb 2024 05:30 )             36.8     02-26    136  |  100  |  18  ----------------------------<  93  4.4   |  25  |  0.98    Ca    8.9      26 Feb 2024 05:30  Phos  4.0     02-26  Mg     1.9     02-26    TPro  8.5<H>  /  Alb  3.3  /  TBili  0.3  /  DBili  x   /  AST  41<H>  /  ALT  23  /  AlkPhos  70  02-26    PT/INR - ( 25 Feb 2024 08:33 )   PT: 12.0 sec;   INR: 1.05            Urinalysis Basic - ( 26 Feb 2024 05:30 )    Color: x / Appearance: x / SG: x / pH: x  Gluc: 93 mg/dL / Ketone: x  / Bili: x / Urobili: x   Blood: x / Protein: x / Nitrite: x   Leuk Esterase: x / RBC: x / WBC x   Sq Epi: x / Non Sq Epi: x / Bacteria: x        MICROBIOLOGY:  Reviewed    RADIOLOGY & ADDITIONAL STUDIES:  Reviewed
OVERNIGHT EVENTS: PHILIPPE, VSS.     SUBJECTIVE / INTERVAL HPI: Patient seen and examined at bedside. Reporting dry cough, dry mouth, and discomfort with chaney.     PHYSICAL EXAM:     General: NAD  HEENT: PERRL, anicteric sclera; MMM  Neck: supple  Cardiovascular: +S1/S2, RRR  Respiratory: CTA B/L; normal wob  Gastrointestinal: soft, NT/ND; +BSx4  Extremities: WWP; no edema, clubbing or cyanosis  Vascular: 2+ radial, DP/PT pulses B/L  Neurological: AAOx3; no focal deficits  Psychiatric: pleasant mood and affect  Dermatologic: erythematous rash seen on face and arms    VITAL SIGNS:  Vital Signs Last 24 Hrs  T(C): 36.8 (25 Feb 2024 13:01), Max: 36.8 (25 Feb 2024 06:20)  T(F): 98.2 (25 Feb 2024 13:01), Max: 98.3 (25 Feb 2024 06:20)  HR: 107 (25 Feb 2024 13:01) (100 - 107)  BP: 119/65 (25 Feb 2024 13:01) (101/67 - 123/72)  BP(mean): --  RR: 17 (25 Feb 2024 13:01) (17 - 20)  SpO2: 94% (25 Feb 2024 13:01) (94% - 94%)    Parameters below as of 25 Feb 2024 13:01  Patient On (Oxygen Delivery Method): room air          MEDICATIONS:  MEDICATIONS  (STANDING):  bictegravir 50 mG/emtricitabine 200 mG/tenofovir alafenamide 25 mG (BIKTARVY) 1 Tablet(s) Oral daily  budesonide 160 MICROgram(s)/formoterol 4.5 MICROgram(s) Inhaler 2 Puff(s) Inhalation two times a day  ceFAZolin   IVPB 2000 milliGRAM(s) IV Intermittent every 8 hours  escitalopram 10 milliGRAM(s) Oral daily  hydrocortisone 2.5% Ointment 1 Application(s) Topical two times a day  tamsulosin 0.4 milliGRAM(s) Oral at bedtime  triamcinolone 0.1% Ointment 1 Application(s) Topical two times a day    MEDICATIONS  (PRN):  acetaminophen     Tablet .. 650 milliGRAM(s) Oral every 6 hours PRN Temp greater or equal to 38C (100.4F), Mild Pain (1 - 3)  albuterol/ipratropium for Nebulization 3 milliLiter(s) Nebulizer every 6 hours PRN Shortness of Breath and/or Wheezing  benzocaine/menthol Lozenge 1 Lozenge Oral every 4 hours PRN Sore Throat  benzonatate 100 milliGRAM(s) Oral every 8 hours PRN Cough  hydrocodone/homatropine Syrup 5 milliLiter(s) Oral every 8 hours PRN Cough      ALLERGIES:  Allergies    No Known Allergies    Intolerances        LABS:                        11.1   6.86  )-----------( 326      ( 25 Feb 2024 08:33 )             33.9     02-25    138  |  101  |  18  ----------------------------<  100<H>  4.2   |  24  |  1.06    Ca    8.6      25 Feb 2024 08:33  Phos  3.6     02-25  Mg     1.8     02-25    TPro  x   /  Alb  x   /  TBili  0.2  /  DBili  x   /  AST  x   /  ALT  x   /  AlkPhos  x   02-25    PT/INR - ( 25 Feb 2024 08:33 )   PT: 12.0 sec;   INR: 1.05            Urinalysis Basic - ( 25 Feb 2024 08:33 )    Color: x / Appearance: x / SG: x / pH: x  Gluc: 100 mg/dL / Ketone: x  / Bili: x / Urobili: x   Blood: x / Protein: x / Nitrite: x   Leuk Esterase: x / RBC: x / WBC x   Sq Epi: x / Non Sq Epi: x / Bacteria: x      CAPILLARY BLOOD GLUCOSE          RADIOLOGY & ADDITIONAL TESTS: Reviewed.
Patient is a 53y old  Male who presents with a chief complaint of Influenza and prostatitis (23 Feb 2024 13:06)    INTERVAL EVENTS: PHILIPPE    SUBJECTIVE:  Patient was seen and examined at bedside. Reports improvement of his symptoms although still has some dysuria and pressure. Denies SOB.     Review of systems: Patient denies: fever, chills, dizziness, HA, Changes in vision, CP, dyspnea, nausea or vomiting, dysuria, changes in bowel movements, LE edema. Rest of 12 point Review of systems negative unless otherwise documented elsewhere in note.     Diet, Regular (02-23-24 @ 08:20) [Active]      MEDICATIONS:  MEDICATIONS  (STANDING):  albuterol/ipratropium for Nebulization 3 milliLiter(s) Nebulizer every 6 hours  bictegravir 50 mG/emtricitabine 200 mG/tenofovir alafenamide 25 mG (BIKTARVY) 1 Tablet(s) Oral daily  escitalopram 10 milliGRAM(s) Oral daily  levoFLOXacin  Tablet 750 milliGRAM(s) Oral every 24 hours  tamsulosin 0.4 milliGRAM(s) Oral at bedtime    MEDICATIONS  (PRN):  acetaminophen     Tablet .. 650 milliGRAM(s) Oral every 6 hours PRN Temp greater or equal to 38C (100.4F), Mild Pain (1 - 3)  benzonatate 100 milliGRAM(s) Oral every 8 hours PRN Cough      Allergies    No Known Allergies    Intolerances        OBJECTIVE:  Vital Signs Last 24 Hrs  T(C): 37 (24 Feb 2024 06:14), Max: 37.1 (23 Feb 2024 16:22)  T(F): 98.6 (24 Feb 2024 06:14), Max: 98.7 (23 Feb 2024 16:22)  HR: 100 (24 Feb 2024 06:14) (97 - 100)  BP: 110/62 (24 Feb 2024 06:14) (102/57 - 144/74)  BP(mean): 72 (23 Feb 2024 23:54) (72 - 72)  RR: 19 (24 Feb 2024 06:14) (18 - 20)  SpO2: 95% (24 Feb 2024 06:14) (95% - 95%)    Parameters below as of 24 Feb 2024 06:14  Patient On (Oxygen Delivery Method): room air      I&O's Summary    23 Feb 2024 07:01  -  24 Feb 2024 07:00  --------------------------------------------------------  IN: 0 mL / OUT: 2200 mL / NET: -2200 mL        PHYSICAL EXAM:  Gen: Reclining in bed at time of exam, appears stated age  HEENT: NCAT, MMM, clear OP  Neck: supple, trachea at midline  CV: RRR, +S1/S2  Pulm: adequate respiratory effort, no increase in work of breathing  Abd: soft, NTND  Skin: warm and dry, extensive macula erythematous rash on face, shoulders  Ext: WWP, no LE edema  Neuro: AOx3, no gross focal neurological deficits  Psych: affect and behavior appropriate, pleasant at time of interview  : + FC    LABS:                        10.9   5.05  )-----------( 316      ( 23 Feb 2024 10:10 )             33.3     02-23    137  |  104  |  14  ----------------------------<  110<H>  3.5   |  24  |  0.77    Ca    8.4      23 Feb 2024 10:10          CAPILLARY BLOOD GLUCOSE        Urinalysis Basic - ( 23 Feb 2024 10:10 )    Color: x / Appearance: x / SG: x / pH: x  Gluc: 110 mg/dL / Ketone: x  / Bili: x / Urobili: x   Blood: x / Protein: x / Nitrite: x   Leuk Esterase: x / RBC: x / WBC x   Sq Epi: x / Non Sq Epi: x / Bacteria: x        MICRODATA:    Culture - Blood (collected 23 Feb 2024 02:26)  Source: .Blood Blood  Preliminary Report (24 Feb 2024 03:01):    No growth at 1 day.    Culture - Blood (collected 23 Feb 2024 02:26)  Source: .Blood Blood  Preliminary Report (24 Feb 2024 03:01):    No growth at 1 day.    Urinalysis with Rflx Culture (collected 23 Feb 2024 00:43)        RADIOLOGY/OTHER STUDIES:  
OVERNIGHT EVENTS: LILIAN    SUBJECTIVE:  The patient was seen and examined at the bedside this AM. The patient states that he continues to cough but denies any worsening of this. States that the chaney catheter is bothering him but denies any blood in the urine.     VITAL SIGNS:  Vital Signs Last 24 Hrs  T(C): 36.9 (26 Feb 2024 06:16), Max: 36.9 (26 Feb 2024 06:16)  T(F): 98.5 (26 Feb 2024 06:16), Max: 98.5 (26 Feb 2024 06:16)  HR: 100 (26 Feb 2024 06:16) (96 - 100)  BP: 104/64 (26 Feb 2024 06:16) (102/65 - 104/64)  BP(mean): --  RR: 18 (26 Feb 2024 06:16) (17 - 18)  SpO2: 97% (26 Feb 2024 06:16) (95% - 97%)    Parameters below as of 26 Feb 2024 06:16  Patient On (Oxygen Delivery Method): room air        PHYSICAL EXAM:  General: NAD; speaking in full sentences  HEENT: NC/AT; PERRL; EOMI; MMM  Neck: supple; no JVD  Cardiac: RRR; +S1/S2  Pulm: CTA B/L; no W/R/R  GI: soft, NT/ND, +BS  : Chaney to gravity   Extremities: WWP; no edema, clubbing or cyanosis  Vasc: 2+ radial, DP pulses B/L  Neuro: AAOx3; no focal deficits    MEDICATIONS:  MEDICATIONS  (STANDING):  bictegravir 50 mG/emtricitabine 200 mG/tenofovir alafenamide 25 mG (BIKTARVY) 1 Tablet(s) Oral daily  budesonide 160 MICROgram(s)/formoterol 4.5 MICROgram(s) Inhaler 2 Puff(s) Inhalation two times a day  ceFAZolin   IVPB 2000 milliGRAM(s) IV Intermittent every 8 hours  escitalopram 10 milliGRAM(s) Oral daily  hydrocortisone 2.5% Ointment 1 Application(s) Topical two times a day  tamsulosin 0.4 milliGRAM(s) Oral at bedtime  triamcinolone 0.1% Ointment 1 Application(s) Topical two times a day    MEDICATIONS  (PRN):  acetaminophen     Tablet .. 650 milliGRAM(s) Oral every 6 hours PRN Temp greater or equal to 38C (100.4F), Mild Pain (1 - 3)  albuterol/ipratropium for Nebulization 3 milliLiter(s) Nebulizer every 6 hours PRN Shortness of Breath and/or Wheezing  benzocaine/menthol Lozenge 1 Lozenge Oral every 4 hours PRN Sore Throat  hydrocodone/homatropine Syrup 5 milliLiter(s) Oral every 8 hours PRN Cough      ALLERGIES:  Allergies    No Known Allergies    Intolerances        LABS:                        11.7   5.59  )-----------( 320      ( 26 Feb 2024 05:30 )             36.8     02-26    136  |  100  |  18  ----------------------------<  93  4.4   |  25  |  0.98    Ca    8.9      26 Feb 2024 05:30  Phos  4.0     02-26  Mg     1.9     02-26    TPro  8.5<H>  /  Alb  3.3  /  TBili  0.3  /  DBili  x   /  AST  41<H>  /  ALT  23  /  AlkPhos  70  02-26    PT/INR - ( 25 Feb 2024 08:33 )   PT: 12.0 sec;   INR: 1.05              RADIOLOGY & ADDITIONAL TESTS: Reviewed.

## 2024-02-27 NOTE — DISCHARGE NOTE NURSING/CASE MANAGEMENT/SOCIAL WORK - PATIENT PORTAL LINK FT
You can access the FollowMyHealth Patient Portal offered by [facility] by registering at the following website: [email or web address]. By joining [employer]s FollowMyHealth portal, you will also be able to view your health information using other applications (apps) compatible with our system.

## 2024-02-27 NOTE — PROGRESS NOTE ADULT - PROBLEM SELECTOR PLAN 11
Pt with documented PMHx of HTN however was never prescribed antihypertensive medications. Since admission pt found to be normotensive. Will CTM     Plan   - CTM

## 2024-02-27 NOTE — PROGRESS NOTE ADULT - PROBLEM SELECTOR PLAN 6
Pt with PMHx of HIV intermittently compliant with Biktarvy (CD4 374, VL 72,000 11/11/23) Pt has not seen a HIV specialist since before COVID. Pt was following at [facility] however his insurance dose not cover [facility]. Pt would like to speak with SW to help set up [facility] with his insurance.   CD4 302, VL 16k. Negative chlamydia and gonorrhea.    Plan   - c/w Biktarvy [id number] daily   - Refill meds upon discharge   - Provided with info for [facility] follow-up  - F/u RPR, quantiferon

## 2024-02-27 NOTE — PROGRESS NOTE ADULT - ASSESSMENT
Pt is a 53yM with PMHx of asthma/COPD, Current  smoker, HIV on biktarvy (questionable compliance), HCV, HTN, CVA 5-6 years ago, Depression, presenting with 1 day of SOB and and fatigue and 6 months of dysuria, found to Parainfluenza + with UTI and prostatitis admitted for Abx treatment. 
# MSSA prostatitis  - BCx ngtd, but query a transient bacteremia due to skin breakdown from psoriasis, resulting in seeding of the prostate.  - Continue ancef 2g IV q8h  - Follow pending blood cultures, and check TTE  - If BCx and TTE neg, will plan to treat with 6 weeks of bactrim 1 DS tab BID    # HIV, with non-adherence to Biktarvy (VL 16k, CD4 302/32%)  - Continue  Biktarvy. Patient previously follows with Dr. Pacheco and he would like to follow with her again. Please see if an appointment can be arranged at [facility]  - Patient cites depression as key barrier to his adherence (his  recently  of [place]). Consider psychiatry consultation if patient is amenable to this.  - Check syphilis screen, pharynx/urine GC/chlam, and quantiferon    # Psoriasis  - Uncontrolled HIV is likely the cause of patient's severe psoriasis    ID Team 2 will follow
# MSSA prostatitis  - BCx ngtd, but query a transient bacteremia due to skin breakdown from psoriasis, resulting in seeding of the prostate. BCx have been negative and TTE negative. He feels better overall with abx but his primary initial symptoms of dysuria, urinary frequency and cloudy urine are difficult to assess as he has chaney in place now for urinary retention (pending a TOV).  - Continue ancef 2g IV q8h while inpatient  - Upon discharge switch abx to bactrim 1 DS tab BID x 6 weeks (EOT )  - Will arrange follow up with me in ~2 weeks    # HIV, with non-adherence to Biktarvy (VL 16k, CD4 302/32%)  - Continue  Biktarvy. Patient previously follows with Dr. Pacheco and he would like to follow with her again. Please see if an appointment can be arranged at [facility]  - Patient cites depression as key barrier to his adherence (his  recently  of [place]). Consider psychiatry consultation if patient is amenable to this.  - Syphilis screen neg. Urine GC/chlam neg. Pending pharyngeal GC/chlam and quantiferon.    # Psoriasis  - Uncontrolled HIV is likely the cause of patient's severe psoriasis    ID Team 2 will sign off. Please call back if further ID input is desired.
Pt is a 53yM with PMHx of asthma/COPD, Current  smoker, HIV on biktarvy (questionable compliance), HCV, HTN, CVA 5-6 years ago, Depression, presenting with 1 day of SOB and and fatigue and 6 months of dysuria, found to Parainfluenza + with UTI and prostatitis admitted for Abx treatment. 

## 2024-02-27 NOTE — PROGRESS NOTE ADULT - PROBLEM SELECTOR PLAN 7
Pt with PMHx of HCV on prior admission. Per pt he is not sure if he ever followed up with treatment.   Hep C reactive, HCV RNA positive.   Plan:  - outpatient f/u

## 2024-02-27 NOTE — PROGRESS NOTE ADULT - NUTRITIONAL ASSESSMENT
This patient has been assessed with a concern for Malnutrition and has been determined to have a diagnosis/diagnoses of Severe protein-calorie malnutrition.    This patient is being managed with:   Diet Regular-  Supplement Feeding Modality:  Oral  Ensure Enlive Cans or Servings Per Day:  1       Frequency:  Two Times a day  Entered: Feb 26 2024  2:16PM

## 2024-02-27 NOTE — DISCHARGE NOTE NURSING/CASE MANAGEMENT/SOCIAL WORK - NSDCPEFALRISK_GEN_ALL_CORE
For information on Fall & Injury Prevention, visit: [email or web address] OR  [email or web address] OR  https://www.cdc.gov/steadi/patient.html

## 2024-02-27 NOTE — PROGRESS NOTE ADULT - PROBLEM SELECTOR PLAN 12
F - s/p 2.5 L IVF in ED   E - PRN   N - Reg   D - SCDs  D - RMF

## 2024-02-27 NOTE — PROGRESS NOTE ADULT - PROVIDER SPECIALTY LIST ADULT
Infectious Disease
Infectious Disease
Internal Medicine
Hospitalist
Internal Medicine
Internal Medicine

## 2024-02-27 NOTE — PROGRESS NOTE ADULT - PROBLEM SELECTOR PLAN 2
Pt reports 6 months of dysuria urinary frequency and pain with urination as well as a dull pressure in lower abdomen/ perineum. Pt is not currently sexually active and has not been able to get an erection since the start of symptoms 6 months ago. Pt denies and discharge or rash around perineum. CT findings suggestive of prostatitis with cystitis with periprostatic phlegmonous change. No fluid collections to suggest abscess. Etiology prostatitis vs UTI vs Chronic pelvic pain syndrome (CPPS). Unlikely, CPPS as pt with signs and symptoms of active UTI. Most likely Lower UTI with concomitance chronic prostatitis given erectile dysfunction and no reports constitutional symptoms and + UA. Urine and blood cultures obtained, will f/u results. Will continue with Abx. Urology consulted will appreciate recs   Ucx growing >100k CFU of staph aureus, negative chlamydia and gonorrhea     Plan   - ID consulted  - Urology consulted  - c/w ancef 2g q8 while inpatient   - will d/c with Bactrim DS for 4 weeks   - Pending TTE   - C/w flomax and chaney for now, TOV later  - F/u bcx  - F/u TTE

## 2024-02-27 NOTE — PROGRESS NOTE ADULT - PROBLEM SELECTOR PLAN 5
Pt with PMHx of Asthma/COPD on home meds ProAir HFA 90mcg PRN, Symbicort 80mcg-4.5mcg 2 puffs BID. However pt ran out of medication. Last night pt experienced SOB and called EMS, after receiving Duonebs pt felt better.No new cough or increased sputum production. On exam pt with no iWOB, with mild wheezing on ascultation.      Plan   - c/w doudebs q6   - refill medications upon discharge and set up PCP

## 2024-02-27 NOTE — DISCHARGE NOTE NURSING/CASE MANAGEMENT/SOCIAL WORK - NSDCFUADDAPPT_GEN_ALL_CORE_FT
Urology Clinic within 1-2 weeks of discharge. The office is located at [address] [place] [address], [place] [address]. The office phone number is [phone]. The appointment was made for March 19th, however, the clinic will call you to arrange for a better time.     You can follow with our [facility] specialists at [address] [place] [address], [address] [place] [address], at your scheduled appointment on March 6th (Wed) at 10am with Dr Pacheco. Please call the clinic at [phone] if you would like to change your appointment.

## 2024-02-28 LAB
CULTURE RESULTS: SIGNIFICANT CHANGE UP
CULTURE RESULTS: SIGNIFICANT CHANGE UP
GAMMA INTERFERON BACKGROUND BLD IA-ACNC: 0.05 IU/ML — SIGNIFICANT CHANGE UP
M TB IFN-G BLD-IMP: NEGATIVE — SIGNIFICANT CHANGE UP
M TB IFN-G CD4+ BCKGRND COR BLD-ACNC: 0 IU/ML — SIGNIFICANT CHANGE UP
M TB IFN-G CD4+CD8+ BCKGRND COR BLD-ACNC: 0 IU/ML — SIGNIFICANT CHANGE UP
QUANT TB PLUS MITOGEN MINUS NIL: 3.6 IU/ML — SIGNIFICANT CHANGE UP
SPECIMEN SOURCE: SIGNIFICANT CHANGE UP
SPECIMEN SOURCE: SIGNIFICANT CHANGE UP

## 2024-03-05 ENCOUNTER — LABORATORY RESULT (OUTPATIENT)
Age: 54
End: 2024-03-05

## 2024-03-05 DIAGNOSIS — F32.A DEPRESSION, UNSPECIFIED: ICD-10-CM

## 2024-03-05 DIAGNOSIS — E87.20 ACIDOSIS, UNSPECIFIED: ICD-10-CM

## 2024-03-05 DIAGNOSIS — D64.9 ANEMIA, UNSPECIFIED: ICD-10-CM

## 2024-03-05 DIAGNOSIS — Z72.0 TOBACCO USE: ICD-10-CM

## 2024-03-05 DIAGNOSIS — J44.0 CHRONIC OBSTRUCTIVE PULMONARY DISEASE WITH (ACUTE) LOWER RESPIRATORY INFECTION: ICD-10-CM

## 2024-03-05 DIAGNOSIS — B19.20 UNSPECIFIED VIRAL HEPATITIS C WITHOUT HEPATIC COMA: ICD-10-CM

## 2024-03-05 DIAGNOSIS — L40.9 PSORIASIS, UNSPECIFIED: ICD-10-CM

## 2024-03-05 DIAGNOSIS — Z21 ASYMPTOMATIC HUMAN IMMUNODEFICIENCY VIRUS [HIV] INFECTION STATUS: ICD-10-CM

## 2024-03-05 DIAGNOSIS — N41.9 INFLAMMATORY DISEASE OF PROSTATE, UNSPECIFIED: ICD-10-CM

## 2024-03-05 DIAGNOSIS — E43 UNSPECIFIED SEVERE PROTEIN-CALORIE MALNUTRITION: ICD-10-CM

## 2024-03-05 DIAGNOSIS — B95.61 METHICILLIN SUSCEPTIBLE STAPHYLOCOCCUS AUREUS INFECTION AS THE CAUSE OF DISEASES CLASSIFIED ELSEWHERE: ICD-10-CM

## 2024-03-05 DIAGNOSIS — R33.9 RETENTION OF URINE, UNSPECIFIED: ICD-10-CM

## 2024-03-05 DIAGNOSIS — Z11.52 ENCOUNTER FOR SCREENING FOR COVID-19: ICD-10-CM

## 2024-03-05 DIAGNOSIS — I10 ESSENTIAL (PRIMARY) HYPERTENSION: ICD-10-CM

## 2024-03-05 DIAGNOSIS — R78.81 BACTEREMIA: ICD-10-CM

## 2024-03-05 DIAGNOSIS — F41.9 ANXIETY DISORDER, UNSPECIFIED: ICD-10-CM

## 2024-03-05 DIAGNOSIS — B34.8 OTHER VIRAL INFECTIONS OF UNSPECIFIED SITE: ICD-10-CM

## 2024-03-05 DIAGNOSIS — N39.0 URINARY TRACT INFECTION, SITE NOT SPECIFIED: ICD-10-CM

## 2024-03-05 DIAGNOSIS — J12.2 PARAINFLUENZA VIRUS PNEUMONIA: ICD-10-CM

## 2024-03-06 ENCOUNTER — APPOINTMENT (OUTPATIENT)
Dept: INFECTIOUS DISEASE | Facility: CLINIC | Age: 54
End: 2024-03-06
Payer: MEDICAID

## 2024-03-06 ENCOUNTER — OUTPATIENT (OUTPATIENT)
Dept: OUTPATIENT SERVICES | Facility: HOSPITAL | Age: 54
LOS: 1 days | End: 2024-03-06

## 2024-03-06 VITALS
TEMPERATURE: 97.1 F | BODY MASS INDEX: 20.98 KG/M2 | HEART RATE: 114 BPM | RESPIRATION RATE: 20 BRPM | DIASTOLIC BLOOD PRESSURE: 79 MMHG | SYSTOLIC BLOOD PRESSURE: 135 MMHG | WEIGHT: 159 LBS

## 2024-03-06 DIAGNOSIS — J15.9 UNSPECIFIED BACTERIAL PNEUMONIA: ICD-10-CM

## 2024-03-06 DIAGNOSIS — F17.200 NICOTINE DEPENDENCE, UNSPECIFIED, UNCOMPLICATED: ICD-10-CM

## 2024-03-06 DIAGNOSIS — L73.9 FOLLICULAR DISORDER, UNSPECIFIED: ICD-10-CM

## 2024-03-06 DIAGNOSIS — E88.A WASTING DISEASE (SYNDROME) DUE TO UNDERLYING CONDITION: ICD-10-CM

## 2024-03-06 DIAGNOSIS — L30.8 OTHER SPECIFIED DERMATITIS: ICD-10-CM

## 2024-03-06 DIAGNOSIS — N42.82 PROSTATOSIS SYNDROME: ICD-10-CM

## 2024-03-06 DIAGNOSIS — Z87.2 PERSONAL HISTORY OF DISEASES OF THE SKIN AND SUBCUTANEOUS TISSUE: ICD-10-CM

## 2024-03-06 PROCEDURE — 99215 OFFICE O/P EST HI 40 MIN: CPT | Mod: 25

## 2024-03-06 RX ORDER — HYDROXYZINE HYDROCHLORIDE 10 MG/1
10 TABLET ORAL
Qty: 30 | Refills: 0 | Status: ACTIVE | COMMUNITY
Start: 2024-03-06 | End: 1900-01-01

## 2024-03-06 RX ORDER — EMTRICITABINE AND TENOFOVIR ALAFENAMIDE 200; 25 MG/1; MG/1
200-25 TABLET ORAL DAILY
Qty: 30 | Refills: 2 | Status: DISCONTINUED | COMMUNITY
Start: 2020-02-11 | End: 2024-03-06

## 2024-03-06 RX ORDER — DOLUTEGRAVIR SODIUM 50 MG/1
50 TABLET, FILM COATED ORAL DAILY
Qty: 30 | Refills: 2 | Status: DISCONTINUED | COMMUNITY
Start: 2020-02-11 | End: 2024-03-06

## 2024-03-06 RX ORDER — ALBUTEROL SULFATE 90 UG/1
108 (90 BASE) INHALANT RESPIRATORY (INHALATION)
Qty: 1 | Refills: 5 | Status: ACTIVE | COMMUNITY
Start: 2017-03-16 | End: 1900-01-01

## 2024-03-06 RX ORDER — BACITRACIN 500 [IU]/G
500 OINTMENT TOPICAL TWICE DAILY
Qty: 1 | Refills: 0 | Status: ACTIVE | COMMUNITY
Start: 2024-03-06

## 2024-03-06 RX ORDER — PERMETHRIN 50 MG/G
5 CREAM TOPICAL
Qty: 120 | Refills: 0 | Status: ACTIVE | COMMUNITY
Start: 2024-03-06 | End: 1900-01-01

## 2024-03-06 RX ORDER — BUDESONIDE AND FORMOTEROL FUMARATE DIHYDRATE 160; 4.5 UG/1; UG/1
160-4.5 AEROSOL RESPIRATORY (INHALATION) TWICE DAILY
Qty: 1 | Refills: 5 | Status: ACTIVE | COMMUNITY
Start: 2020-02-11

## 2024-03-06 RX ORDER — ESCITALOPRAM OXALATE 10 MG/1
10 TABLET ORAL
Qty: 30 | Refills: 5 | Status: ACTIVE | COMMUNITY
Start: 2020-02-11

## 2024-03-06 NOTE — PHYSICAL EXAM
[Ill-Appearing] : ill-appearing [Cachectic] : cachexia was observed [Normal Oropharynx] : the oropharynx was normal [PERRL] : pupils equal round and reactive to light [No Lymphadenopathy] : no lymphadenopathy [No Accessory Muscle Use] : no accessory muscle use [Normal] : soft, non-tender, non-distended, no masses palpated, no HSM and normal bowel sounds [de-identified] : excoriated nodules all over upper extrimities and back

## 2024-03-06 NOTE — END OF VISIT
[FreeTextEntry3] :  I saw and examined the patient and reviewed the record including available labs, imaging and consultant notes.  __ Post hospital discharge with pneumonia, prostatitis  with a chaney Continue antibiotics Appointment made to see urology tomorrow for voiding check and hopefully remove of chaney  __ HIV  Lost to FU and care since 2020 Stopped meds Now restarted on Biktarvy few weeks ago during hospitalization CD4 302, VL [address]  RTC in 2 weeks for re-evaluation and labs  __ Hep C- untreated? Check Hep C VL  __ Pruritic rash in patient with history of psoriasis  Scabis empirical therapy, bacitracin for few infected rash- mild Derm referral  __Wasting ARVs will help  Ensure prescribed  --STI screening and anal pap done

## 2024-03-06 NOTE — END OF VISIT
[FreeTextEntry3] :  I saw and examined the patient and reviewed the record including available labs, imaging and consultant notes.  __ Post hospital discharge with pneumonia, prostatitis  with a chaney Continue antibiotics Appointment made to see urology tomorrow for voiding check and hopefully remove of chaney  __ HIV  Lost to FU and care since 2020 Stopped meds Now restarted on Biktarvy few weeks ago during hospitalization CD4 302, VL 27677  RTC in 2 weeks for re-evaluation and labs  __ Hep C- untreated? Check Hep C VL  __ Pruritic rash in patient with history of psoriasis  Scabis empirical therapy, bacitracin for few infected rash- mild Derm referral  __Wasting ARVs will help  Ensure prescribed  --STI screening and anal pap done

## 2024-03-06 NOTE — PHYSICAL EXAM
[Ill-Appearing] : ill-appearing [Cachectic] : cachexia was observed [PERRL] : pupils equal round and reactive to light [Normal Oropharynx] : the oropharynx was normal [No Lymphadenopathy] : no lymphadenopathy [No Accessory Muscle Use] : no accessory muscle use [de-identified] : excoriated nodules all over upper extrimities and back [Normal] : soft, non-tender, non-distended, no masses palpated, no HSM and normal bowel sounds

## 2024-03-07 ENCOUNTER — APPOINTMENT (OUTPATIENT)
Dept: UROLOGY | Facility: CLINIC | Age: 54
End: 2024-03-07
Payer: MEDICAID

## 2024-03-07 ENCOUNTER — NON-APPOINTMENT (OUTPATIENT)
Age: 54
End: 2024-03-07

## 2024-03-07 VITALS
OXYGEN SATURATION: 98 % | HEART RATE: 115 BPM | BODY MASS INDEX: 21.07 KG/M2 | WEIGHT: 159 LBS | TEMPERATURE: 98.6 F | HEIGHT: 73 IN | SYSTOLIC BLOOD PRESSURE: 109 MMHG | DIASTOLIC BLOOD PRESSURE: 71 MMHG

## 2024-03-07 DIAGNOSIS — Z87.898 PERSONAL HISTORY OF OTHER SPECIFIED CONDITIONS: ICD-10-CM

## 2024-03-07 DIAGNOSIS — Z87.438 PERSONAL HISTORY OF OTHER DISEASES OF MALE GENITAL ORGANS: ICD-10-CM

## 2024-03-07 LAB
CHOLEST SERPL-MCNC: 122 MG/DL
HCV RNA SERPL NAA DL=5-ACNC: NORMAL IU/ML
HCV RNA SERPL NAA+PROBE-LOG IU: 6.67 LOGIU/ML
HDLC SERPL-MCNC: 41 MG/DL
HEPC RNA INTERP: DETECTED
LDLC SERPL CALC-MCNC: 69 MG/DL
NONHDLC SERPL-MCNC: 81 MG/DL
T PALLIDUM AB SER QL IA: NEGATIVE
TRIGL SERPL-MCNC: 55 MG/DL

## 2024-03-07 PROCEDURE — 51700 IRRIGATION OF BLADDER: CPT

## 2024-03-07 PROCEDURE — A4216: CPT | Mod: NC

## 2024-03-07 PROCEDURE — 99204 OFFICE O/P NEW MOD 45 MIN: CPT | Mod: 25

## 2024-03-07 NOTE — ASSESSMENT
[FreeTextEntry1] : 53 year old male with history of HIV + x 15 years (off medication since 2022 (covid)), presents after recent hospitalization 2 weeks ago for UTI/prostatitis. Chaney catheter was placed for urinary retention and he was discharged with the chaney. Urine culture grew Staph aureus sensitive to Bactrim CT imaging was significant for prostatitis with cystitis with periprostatic phlegmonous change. No fluid collections to suggest abscess. He was discharged with Bactrim and Flomax, has been compliant with these medications  Active TOV performed in the office today, filled bladder with 200cc sterile water, patient voided 120cc, PVR 74cc.  Plan: -Follow up in resident clinic in 1-2 weeks for repeat PVR, PSA contnue flomax continue Tmp/SX patient warned re fevers chills or difficulty urinating [facility]  expressed fully understanding of the information provided, the consequences and the management.

## 2024-03-07 NOTE — PHYSICAL EXAM
[General Appearance - In No Acute Distress] : no acute distress [Abdomen Soft] : soft [] : no respiratory distress [Abdomen Tenderness] : non-tender [Scrotum] : the scrotum was normal [Penis Abnormality] : normal circumcised penis [Epididymis] : the epididymides were normal [Testes Tenderness] : no tenderness of the testes [Prostate Tenderness] : the prostate was not tender [No Prostate Nodules] : no prostate nodules [Oriented To Time, Place, And Person] : oriented to person, place, and time

## 2024-03-07 NOTE — HISTORY OF PRESENT ILLNESS
[FreeTextEntry1] : 53 year old  HIV + x 15 years off medication since 2022 (covid) was hospitalized at [facility] at the end of February, diagnosed with prostatitis and chaney catheter was placed for urinary retention CT imaging was significant for prostatitis with cystitis with periprostatic phlegmonous change. No fluid collections to suggest abscess. Right basilar opacity could represent pneumonia. urine culture grew Staph aureus sensitive to Bactrim treated with Bactrim and Flomax denies prior LUTS no fevers or chills since discharge  Active TOV performed in the office today, filled bladder with 200cc sterile water, patient voided 120cc, PVR 74cc.

## 2024-03-08 LAB
C TRACH RRNA SPEC QL NAA+PROBE: NOT DETECTED
C TRACH RRNA SPEC QL NAA+PROBE: NOT DETECTED
N GONORRHOEA RRNA SPEC QL NAA+PROBE: NOT DETECTED
N GONORRHOEA RRNA SPEC QL NAA+PROBE: NOT DETECTED
SOURCE ANAL: NORMAL
SOURCE ORAL: NORMAL

## 2024-03-09 NOTE — HISTORY OF PRESENT ILLNESS
[FreeTextEntry1] : Re- establish care post hospital discharge. Last RDC visit over a year ago [de-identified] : Pt is a 53yM with PMHx of asthma/COPD, Current  smoker, HIV on biktarvy (questionable compliance), HCV, HTN, CVA 5-6 years ago, Depression

## 2024-03-09 NOTE — HISTORY OF PRESENT ILLNESS
[FreeTextEntry1] : Re- establish care post hospital discharge. Last RDC visit over a year ago [de-identified] : Pt is a 53yM with PMHx of asthma/COPD, Current  smoker, HIV on biktarvy (questionable compliance), HCV, HTN, CVA 5-6 years ago, Depression

## 2024-03-11 LAB
M TB IFN-G BLD-IMP: NEGATIVE
QUANTIFERON TB PLUS MITOGEN MINUS NIL: 2.76 IU/ML
QUANTIFERON TB PLUS NIL: 0.04 IU/ML
QUANTIFERON TB PLUS TB1 MINUS NIL: 0.01 IU/ML
QUANTIFERON TB PLUS TB2 MINUS NIL: 0.02 IU/ML

## 2024-03-12 RX ORDER — DOLUTEGRAVIR SODIUM 50 MG/1
50 TABLET, FILM COATED ORAL
Qty: 10 | Refills: 0 | Status: COMPLETED | COMMUNITY
Start: 2024-03-11 | End: 2024-03-21

## 2024-03-12 RX ORDER — NUT.TX.IMPAIRED DIGESTIVE FXN 0.035-1/ML
LIQUID (ML) ORAL
Qty: 60 | Refills: 3 | Status: ACTIVE | COMMUNITY
Start: 2024-03-06

## 2024-03-12 RX ORDER — EMTRICITABINE AND TENOFOVIR ALAFENAMIDE 200; 25 MG/1; MG/1
200-25 TABLET ORAL
Qty: 10 | Refills: 0 | Status: COMPLETED | COMMUNITY
Start: 2024-03-11 | End: 2024-03-21

## 2024-03-19 ENCOUNTER — APPOINTMENT (OUTPATIENT)
Dept: INFECTIOUS DISEASE | Facility: CLINIC | Age: 54
End: 2024-03-19

## 2024-03-19 RX ORDER — BICTEGRAVIR SODIUM, EMTRICITABINE, AND TENOFOVIR ALAFENAMIDE FUMARATE 50; 200; 25 MG/1; MG/1; MG/1
50-200-25 TABLET ORAL
Qty: 30 | Refills: 5 | Status: ACTIVE | COMMUNITY
Start: 2024-03-06

## 2024-04-22 LAB
CULTURE RESULTS: ABNORMAL
SPECIMEN SOURCE: SIGNIFICANT CHANGE UP

## 2024-05-13 ENCOUNTER — NON-APPOINTMENT (OUTPATIENT)
Age: 54
End: 2024-05-13

## 2024-05-21 ENCOUNTER — APPOINTMENT (OUTPATIENT)
Dept: INFECTIOUS DISEASE | Facility: CLINIC | Age: 54
End: 2024-05-21
Payer: MEDICAID

## 2024-05-21 ENCOUNTER — OUTPATIENT (OUTPATIENT)
Dept: OUTPATIENT SERVICES | Facility: HOSPITAL | Age: 54
LOS: 1 days | End: 2024-05-21

## 2024-05-21 ENCOUNTER — LABORATORY RESULT (OUTPATIENT)
Age: 54
End: 2024-05-21

## 2024-05-21 VITALS
RESPIRATION RATE: 16 BRPM | WEIGHT: 157 LBS | BODY MASS INDEX: 20.81 KG/M2 | HEART RATE: 111 BPM | TEMPERATURE: 98.2 F | SYSTOLIC BLOOD PRESSURE: 120 MMHG | DIASTOLIC BLOOD PRESSURE: 85 MMHG | HEIGHT: 73 IN | OXYGEN SATURATION: 98 %

## 2024-05-21 DIAGNOSIS — F41.8 OTHER SPECIFIED ANXIETY DISORDERS: ICD-10-CM

## 2024-05-21 DIAGNOSIS — L40.9 PSORIASIS, UNSPECIFIED: ICD-10-CM

## 2024-05-21 DIAGNOSIS — B19.20 UNSPECIFIED VIRAL HEPATITIS C W/OUT HEPATIC COMA: ICD-10-CM

## 2024-05-21 DIAGNOSIS — G89.29 PAIN IN UNSPECIFIED HIP: ICD-10-CM

## 2024-05-21 DIAGNOSIS — Z98.890 OTHER SPECIFIED POSTPROCEDURAL STATES: Chronic | ICD-10-CM

## 2024-05-21 DIAGNOSIS — B20 HUMAN IMMUNODEFICIENCY VIRUS [HIV] DISEASE: ICD-10-CM

## 2024-05-21 DIAGNOSIS — M25.559 PAIN IN UNSPECIFIED HIP: ICD-10-CM

## 2024-05-21 PROCEDURE — 99215 OFFICE O/P EST HI 40 MIN: CPT

## 2024-05-22 DIAGNOSIS — B20 HUMAN IMMUNODEFICIENCY VIRUS [HIV] DISEASE: ICD-10-CM

## 2024-05-22 LAB
CD3 CELLS # BLD: 999 CELLS/UL
CD3 CELLS NFR BLD: 79 %
CD3+CD4+ CELLS # BLD: 375 CELLS/UL
CD3+CD4+ CELLS NFR BLD: 30 %
CD3+CD4+ CELLS/CD3+CD8+ CLL SPEC: 0.61 RATIO
CD3+CD8+ CELLS # SPEC: 617 CELLS/UL
CD3+CD8+ CELLS NFR BLD: 49 %
HIV1 RNA # SERPL NAA+PROBE: NORMAL
HIV1 RNA # SERPL NAA+PROBE: NORMAL COPIES/ML
VIRAL LOAD INTERP: NORMAL
VIRAL LOAD LOG: NORMAL LG COP/ML

## 2024-05-22 NOTE — PHYSICAL EXAM
[No Acute Distress] : no acute distress [Well-Appearing] : well-appearing [Ill-Appearing] : ill-appearing [Cachectic] : cachexia was observed [Normal Sclera/Conjunctiva] : normal sclera/conjunctiva [PERRL] : pupils equal round and reactive to light [EOMI] : extraocular movements intact [Normal Outer Ear/Nose] : the outer ears and nose were normal in appearance [Normal Oropharynx] : the oropharynx was normal [No JVD] : no jugular venous distention [No Lymphadenopathy] : no lymphadenopathy [Supple] : supple [Thyroid Normal, No Nodules] : the thyroid was normal and there were no nodules present [No Respiratory Distress] : no respiratory distress  [No Accessory Muscle Use] : no accessory muscle use [Clear to Auscultation] : lungs were clear to auscultation bilaterally [Regular Rhythm] : with a regular rhythm [Normal S1, S2] : normal S1 and S2 [No Murmur] : no murmur heard [No Carotid Bruits] : no carotid bruits [No Abdominal Bruit] : a ~M bruit was not heard ~T in the abdomen [No Varicosities] : no varicosities [Pedal Pulses Present] : the pedal pulses are present [No Edema] : there was no peripheral edema [No Palpable Aorta] : no palpable aorta [No Extremity Clubbing/Cyanosis] : no extremity clubbing/cyanosis [Soft] : abdomen soft [Non Tender] : non-tender [Non-distended] : non-distended [No Masses] : no abdominal mass palpated [No HSM] : no HSM [Normal Bowel Sounds] : normal bowel sounds [Normal Posterior Cervical Nodes] : no posterior cervical lymphadenopathy [Normal Anterior Cervical Nodes] : no anterior cervical lymphadenopathy [No CVA Tenderness] : no CVA  tenderness [No Spinal Tenderness] : no spinal tenderness [Grossly Normal Strength/Tone] : grossly normal strength/tone [Coordination Grossly Intact] : coordination grossly intact [No Focal Deficits] : no focal deficits [Normal Gait] : normal gait [Deep Tendon Reflexes (DTR)] : deep tendon reflexes were 2+ and symmetric [Normal Affect] : the affect was normal [Normal Insight/Judgement] : insight and judgment were intact [de-identified] : +thin, cachectic, wasting [de-identified] : +tachycardic, regular rhythym [de-identified] : +trace bilateral pitting edema to above ankle [de-identified] : +diffuse excoriations on all extremities and torso

## 2024-05-22 NOTE — ASSESSMENT
[FreeTextEntry1] : 54 MSM with PMHx of HIV, asthma/COPD, ADHD/MDD, Hep C+ comes to [facility] for follow-up.  #HIV On Biktavry with intermittent compliance. Missed 12 doses in the past month. Last CD4 302, VL 16K (02/24). - C/w Biktarvy - Obtain CD4 and VL today  #Polysubstance abuse Patient with history of polysubstance abuse. Endorsed Methamphetamine use over the weekend. Patient deferred Utox today.  - Obtain Utox at next visit -  to see patient today  #Asthma/COPD On home Symbicort and Albuterol. States felt SOB today and took his albuterol neb prior to arrival.  in office today (previously tachcardic as well).  - C/w Symbicort and Albuterol  #Hepatitis C Patient dx with Hep C (2022). Hep C RNA+ 4 million. Hep B non-immune/previous infection. - Give Hep B vaccine today - Obtain Hep C genotype and Hep A IgG  #MDD No active suicidal ideation or intent. On Lexapro 10mg. Pending establishing care with Psych. Per patient, waiting on birth certificate to establish care.  - c/w Lexapro 10mg  #Pruritic skin lesion Patient with history of psoriasis, however presents with diffuse excoriatoins on all extremities and torso. Likely 2/2 methamphetamine use vs. scabies vs. component of underlying psoriasis. Previously treated for scabies. - Derm referral  RTC in 1 month

## 2024-05-22 NOTE — END OF VISIT
[] : Resident [FreeTextEntry3] : I saw and evaluated the patient with resident. I reviewed the laboratory and radiologic data and reviewed the notes.  I performed a medication reconciliation and  reviewed vaccination history and other health care maintenance and prevention topics in the EMR. I assessed patient's adherence to medications especially ARVs (suoptimal ) and inquired about AEs ( None).  Untreated Hep C Suboptimal adherence to HIV meds Active drug use  Management as above RTC in 1 month Skin lesions possibly secondary to Meth use?

## 2024-05-22 NOTE — HISTORY OF PRESENT ILLNESS
[FreeTextEntry1] : f/u [de-identified] : 54 MSM with HIV, asthma/COPD, ADHD/MDD, Hep C+ comes to [facility] for follow-up. Patient presents with multiple medical problems including acute and chronic hip pain, worse on the R. Patient is able to ambulate without assistance. States pain occurs with movement and at rest/sitting down. Patient continues to have pruritic excoriations diffusely on his extremities and torso. He endorsed continued methamphetamine use over the weekend. He previously completed scabies treatment, without significant improvement. 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 Currently, patient denies fevers, chills, headaches, neck pain, dizziness, changes in vision, chest pain, palpitations, acute SOB, abdominal pain, nausea, vomiting, genitourinary symptoms, extremity tingling, pain or weakness.

## 2024-05-23 LAB — HEPATITIS A IGG ANTIBODY: REACTIVE

## 2024-05-25 LAB — HCV GENTYP BLD NAA+PROBE: NORMAL

## 2024-06-01 VITALS
RESPIRATION RATE: 18 BRPM | OXYGEN SATURATION: 95 % | WEIGHT: 167.99 LBS | DIASTOLIC BLOOD PRESSURE: 82 MMHG | TEMPERATURE: 99 F | SYSTOLIC BLOOD PRESSURE: 143 MMHG | HEART RATE: 131 BPM | HEIGHT: 73 IN

## 2024-06-01 LAB
ALBUMIN SERPL ELPH-MCNC: 3.6 G/DL — SIGNIFICANT CHANGE UP (ref 3.3–5)
ALP SERPL-CCNC: 85 U/L — SIGNIFICANT CHANGE UP (ref 40–120)
ALT FLD-CCNC: 22 U/L — SIGNIFICANT CHANGE UP (ref 10–45)
ANION GAP SERPL CALC-SCNC: 16 MMOL/L — SIGNIFICANT CHANGE UP (ref 5–17)
APTT BLD: 37.2 SEC — HIGH (ref 24.5–35.6)
AST SERPL-CCNC: 33 U/L — SIGNIFICANT CHANGE UP (ref 10–40)
BASE EXCESS BLDV CALC-SCNC: 1.8 MMOL/L — SIGNIFICANT CHANGE UP (ref -2–3)
BASOPHILS # BLD AUTO: 0.03 K/UL — SIGNIFICANT CHANGE UP (ref 0–0.2)
BASOPHILS NFR BLD AUTO: 0.2 % — SIGNIFICANT CHANGE UP (ref 0–2)
BILIRUB SERPL-MCNC: 0.4 MG/DL — SIGNIFICANT CHANGE UP (ref 0.2–1.2)
BUN SERPL-MCNC: 18 MG/DL — SIGNIFICANT CHANGE UP (ref 7–23)
CA-I SERPL-SCNC: 1.08 MMOL/L — LOW (ref 1.15–1.33)
CALCIUM SERPL-MCNC: 8.9 MG/DL — SIGNIFICANT CHANGE UP (ref 8.4–10.5)
CHLORIDE SERPL-SCNC: 96 MMOL/L — SIGNIFICANT CHANGE UP (ref 96–108)
CO2 BLDV-SCNC: 26.8 MMOL/L — HIGH (ref 22–26)
CO2 SERPL-SCNC: 22 MMOL/L — SIGNIFICANT CHANGE UP (ref 22–31)
CREAT SERPL-MCNC: 0.78 MG/DL — SIGNIFICANT CHANGE UP (ref 0.5–1.3)
EGFR: 107 ML/MIN/1.73M2 — SIGNIFICANT CHANGE UP
EOSINOPHIL # BLD AUTO: 0.03 K/UL — SIGNIFICANT CHANGE UP (ref 0–0.5)
EOSINOPHIL NFR BLD AUTO: 0.2 % — SIGNIFICANT CHANGE UP (ref 0–6)
FLUAV AG NPH QL: SIGNIFICANT CHANGE UP
FLUBV AG NPH QL: SIGNIFICANT CHANGE UP
GAS PNL BLDV: 128 MMOL/L — LOW (ref 136–145)
GAS PNL BLDV: SIGNIFICANT CHANGE UP
GLUCOSE SERPL-MCNC: 114 MG/DL — HIGH (ref 70–99)
HCO3 BLDV-SCNC: 26 MMOL/L — SIGNIFICANT CHANGE UP (ref 22–29)
HCT VFR BLD CALC: 32.7 % — LOW (ref 39–50)
HGB BLD-MCNC: 11.3 G/DL — LOW (ref 13–17)
IMM GRANULOCYTES NFR BLD AUTO: 0.4 % — SIGNIFICANT CHANGE UP (ref 0–0.9)
INR BLD: 1.08 — SIGNIFICANT CHANGE UP (ref 0.85–1.18)
LACTATE SERPL-SCNC: 1.9 MMOL/L — SIGNIFICANT CHANGE UP (ref 0.5–2)
LYMPHOCYTES # BLD AUTO: 1.06 K/UL — SIGNIFICANT CHANGE UP (ref 1–3.3)
LYMPHOCYTES # BLD AUTO: 7.9 % — LOW (ref 13–44)
MCHC RBC-ENTMCNC: 28.3 PG — SIGNIFICANT CHANGE UP (ref 27–34)
MCHC RBC-ENTMCNC: 34.6 GM/DL — SIGNIFICANT CHANGE UP (ref 32–36)
MCV RBC AUTO: 81.8 FL — SIGNIFICANT CHANGE UP (ref 80–100)
MONOCYTES # BLD AUTO: 1.09 K/UL — HIGH (ref 0–0.9)
MONOCYTES NFR BLD AUTO: 8.1 % — SIGNIFICANT CHANGE UP (ref 2–14)
NEUTROPHILS # BLD AUTO: 11.11 K/UL — HIGH (ref 1.8–7.4)
NEUTROPHILS NFR BLD AUTO: 83.2 % — HIGH (ref 43–77)
NRBC # BLD: 0 /100 WBCS — SIGNIFICANT CHANGE UP (ref 0–0)
PCO2 BLDV: 37 MMHG — LOW (ref 42–55)
PH BLDV: 7.45 — HIGH (ref 7.32–7.43)
PLATELET # BLD AUTO: 310 K/UL — SIGNIFICANT CHANGE UP (ref 150–400)
PO2 BLDV: <33 MMHG — LOW (ref 25–45)
POTASSIUM BLDV-SCNC: 3.9 MMOL/L — SIGNIFICANT CHANGE UP (ref 3.5–5.1)
POTASSIUM SERPL-MCNC: 3.6 MMOL/L — SIGNIFICANT CHANGE UP (ref 3.5–5.3)
POTASSIUM SERPL-SCNC: 3.6 MMOL/L — SIGNIFICANT CHANGE UP (ref 3.5–5.3)
PROT SERPL-MCNC: 7.9 G/DL — SIGNIFICANT CHANGE UP (ref 6–8.3)
PROTHROM AB SERPL-ACNC: 12.3 SEC — SIGNIFICANT CHANGE UP (ref 9.5–13)
RBC # BLD: 4 M/UL — LOW (ref 4.2–5.8)
RBC # FLD: 14.7 % — HIGH (ref 10.3–14.5)
RSV RNA NPH QL NAA+NON-PROBE: SIGNIFICANT CHANGE UP
SAO2 % BLDV: 32.3 % — LOW (ref 67–88)
SARS-COV-2 RNA SPEC QL NAA+PROBE: SIGNIFICANT CHANGE UP
SODIUM SERPL-SCNC: 134 MMOL/L — LOW (ref 135–145)
TROPONIN T, HIGH SENSITIVITY RESULT: 12 NG/L — SIGNIFICANT CHANGE UP (ref 0–51)
WBC # BLD: 13.38 K/UL — HIGH (ref 3.8–10.5)
WBC # FLD AUTO: 13.38 K/UL — HIGH (ref 3.8–10.5)

## 2024-06-01 PROCEDURE — 99285 EMERGENCY DEPT VISIT HI MDM: CPT

## 2024-06-01 RX ORDER — KETOROLAC TROMETHAMINE 30 MG/ML
15 SYRINGE (ML) INJECTION ONCE
Refills: 0 | Status: DISCONTINUED | OUTPATIENT
Start: 2024-06-01 | End: 2024-06-01

## 2024-06-01 RX ORDER — SODIUM CHLORIDE 9 MG/ML
2500 INJECTION, SOLUTION INTRAVENOUS ONCE
Refills: 0 | Status: COMPLETED | OUTPATIENT
Start: 2024-06-01 | End: 2024-06-01

## 2024-06-01 RX ADMIN — SODIUM CHLORIDE 2500 MILLILITER(S): 9 INJECTION, SOLUTION INTRAVENOUS at 23:06

## 2024-06-01 RX ADMIN — Medication 15 MILLIGRAM(S): at 23:11

## 2024-06-01 RX ADMIN — SODIUM CHLORIDE 2500 MILLILITER(S): 9 INJECTION, SOLUTION INTRAVENOUS at 22:48

## 2024-06-01 RX ADMIN — Medication 15 MILLIGRAM(S): at 23:54

## 2024-06-01 NOTE — ED PROVIDER NOTE - CLINICAL SUMMARY MEDICAL DECISION MAKING FREE TEXT BOX
Pt presents with likely sepsis  SIRS criteria met  Suspected source PNA vs UTI  Provider decided to initiate IVF resuscitation at 30 cc/kg/hr  Provider decided to initiate empiric antibiotic coverage with vanc and zosyn  Will collect basic labs, BCx, UA/Cx, lactate  Pt placed on monitor, will continue to reassess condition  Pressor support as needed

## 2024-06-01 NOTE — ED PROVIDER NOTE - PROGRESS NOTE DETAILS
CXR positive for left middle lobe infiltrate.  UA positive, no flank pain.  Tachycardia improved, hemodynamically stable.  Patient states symptoms are improving.

## 2024-06-01 NOTE — ED PROVIDER NOTE - NS ED ROS FT
CONSTITUTIONAL: +fever, +chills, +fatigue  EYES: No eye redness, no visual changes  ENT: No ear pain, no sore throat  CARDIOVASCULAR: No chest pain, no palpitations  RESPIRATORY: + cough, no SOB  GI: No abdominal pain, no nausea, no vomiting, no constipation, no diarrhea  GENITOURINARY: No dysuria, no frequency, no hematuria  MUSCULOSKELETAL: No back pain, no joint pain, no myalgias  SKIN: No rash, no peripheral edema  NEURO: No headache, no confusion    ALL OTHER SYSTEMS NEGATIVE.

## 2024-06-01 NOTE — ED PROVIDER NOTE - OBJECTIVE STATEMENT
53 yo M w PMH of asthma/COPD, current smoker, HIV on biktarvy (questionable compliance, most recent CD4 303, VL 16k), HCV, HTN, CVA 5-6 years ago, depression, treated for prostatitis in feb/march 2024 with bactrim, completed abx course, p/w 1 day of  fever, cough. Roommate reportedly has CAP. Pt febrile today, chills yesterday. No dysuria, flank pain, rectal or perineal pain

## 2024-06-01 NOTE — ED PROVIDER NOTE - PHYSICAL EXAMINATION
CONSTITUTIONAL: Non-toxic; in no apparent distress  HEAD: Normocephalic; atraumatic  EYES: PERRL; EOM intact   ENMT: External appears normal  NECK: Supple; non-tender  CARD: + tachycardic; no murmurs, rubs, or gallops  RESP: Normal chest excursion with respiration; L side mid lung field crackles  ABD: Soft, non-distended; non-tender  EXT: Normal ROM in all four extremities; non-tender to palpation, no peripheral edema  SKIN: Warm, dry, no rash  NEURO:  No focal neurological deficiencies

## 2024-06-02 ENCOUNTER — INPATIENT (INPATIENT)
Facility: HOSPITAL | Age: 54
LOS: 0 days | Discharge: HOME | End: 2024-06-03
Attending: STUDENT IN AN ORGANIZED HEALTH CARE EDUCATION/TRAINING PROGRAM | Admitting: STUDENT IN AN ORGANIZED HEALTH CARE EDUCATION/TRAINING PROGRAM
Payer: MEDICAID

## 2024-06-02 DIAGNOSIS — J44.9 CHRONIC OBSTRUCTIVE PULMONARY DISEASE, UNSPECIFIED: ICD-10-CM

## 2024-06-02 DIAGNOSIS — A41.9 SEPSIS, UNSPECIFIED ORGANISM: ICD-10-CM

## 2024-06-02 DIAGNOSIS — F19.90 OTHER PSYCHOACTIVE SUBSTANCE USE, UNSPECIFIED, UNCOMPLICATED: ICD-10-CM

## 2024-06-02 DIAGNOSIS — J18.9 PNEUMONIA, UNSPECIFIED ORGANISM: ICD-10-CM

## 2024-06-02 DIAGNOSIS — Z29.9 ENCOUNTER FOR PROPHYLACTIC MEASURES, UNSPECIFIED: ICD-10-CM

## 2024-06-02 DIAGNOSIS — F32.9 MAJOR DEPRESSIVE DISORDER, SINGLE EPISODE, UNSPECIFIED: ICD-10-CM

## 2024-06-02 DIAGNOSIS — D64.9 ANEMIA, UNSPECIFIED: ICD-10-CM

## 2024-06-02 DIAGNOSIS — Z98.890 OTHER SPECIFIED POSTPROCEDURAL STATES: Chronic | ICD-10-CM

## 2024-06-02 DIAGNOSIS — B20 HUMAN IMMUNODEFICIENCY VIRUS [HIV] DISEASE: ICD-10-CM

## 2024-06-02 LAB
ADD ON TEST-SPECIMEN IN LAB: SIGNIFICANT CHANGE UP
ALBUMIN SERPL ELPH-MCNC: 2.8 G/DL — LOW (ref 3.3–5)
ALP SERPL-CCNC: 69 U/L — SIGNIFICANT CHANGE UP (ref 40–120)
ALT FLD-CCNC: 17 U/L — SIGNIFICANT CHANGE UP (ref 10–45)
AMPHET UR-MCNC: POSITIVE
ANION GAP SERPL CALC-SCNC: 11 MMOL/L — SIGNIFICANT CHANGE UP (ref 5–17)
APPEARANCE UR: ABNORMAL
AST SERPL-CCNC: 23 U/L — SIGNIFICANT CHANGE UP (ref 10–40)
BACTERIA # UR AUTO: ABNORMAL /HPF
BARBITURATES UR SCN-MCNC: NEGATIVE — SIGNIFICANT CHANGE UP
BASOPHILS # BLD AUTO: 0.03 K/UL — SIGNIFICANT CHANGE UP (ref 0–0.2)
BASOPHILS NFR BLD AUTO: 0.3 % — SIGNIFICANT CHANGE UP (ref 0–2)
BENZODIAZ UR-MCNC: NEGATIVE — SIGNIFICANT CHANGE UP
BILIRUB SERPL-MCNC: 0.3 MG/DL — SIGNIFICANT CHANGE UP (ref 0.2–1.2)
BILIRUB UR-MCNC: NEGATIVE — SIGNIFICANT CHANGE UP
BLD GP AB SCN SERPL QL: NEGATIVE — SIGNIFICANT CHANGE UP
BUN SERPL-MCNC: 14 MG/DL — SIGNIFICANT CHANGE UP (ref 7–23)
CALCIUM SERPL-MCNC: 8.2 MG/DL — LOW (ref 8.4–10.5)
CAST: 2 /LPF — SIGNIFICANT CHANGE UP (ref 0–4)
CHLORIDE SERPL-SCNC: 103 MMOL/L — SIGNIFICANT CHANGE UP (ref 96–108)
CO2 SERPL-SCNC: 25 MMOL/L — SIGNIFICANT CHANGE UP (ref 22–31)
COCAINE METAB.OTHER UR-MCNC: POSITIVE
COLOR SPEC: YELLOW — SIGNIFICANT CHANGE UP
CREAT ?TM UR-MCNC: 44 MG/DL — SIGNIFICANT CHANGE UP
CREAT SERPL-MCNC: 0.78 MG/DL — SIGNIFICANT CHANGE UP (ref 0.5–1.3)
DIFF PNL FLD: ABNORMAL
EGFR: 107 ML/MIN/1.73M2 — SIGNIFICANT CHANGE UP
EOSINOPHIL # BLD AUTO: 0.1 K/UL — SIGNIFICANT CHANGE UP (ref 0–0.5)
EOSINOPHIL NFR BLD AUTO: 1.1 % — SIGNIFICANT CHANGE UP (ref 0–6)
FERRITIN SERPL-MCNC: 437 NG/ML — HIGH (ref 30–400)
GLUCOSE SERPL-MCNC: 105 MG/DL — HIGH (ref 70–99)
GLUCOSE UR QL: NEGATIVE MG/DL — SIGNIFICANT CHANGE UP
HCT VFR BLD CALC: 30.5 % — LOW (ref 39–50)
HGB BLD-MCNC: 10.3 G/DL — LOW (ref 13–17)
IMM GRANULOCYTES NFR BLD AUTO: 0.7 % — SIGNIFICANT CHANGE UP (ref 0–0.9)
IRON SATN MFR SERPL: 16 UG/DL — LOW (ref 45–165)
IRON SATN MFR SERPL: 9 % — LOW (ref 16–55)
KETONES UR-MCNC: NEGATIVE MG/DL — SIGNIFICANT CHANGE UP
LDH SERPL L TO P-CCNC: 172 U/L — SIGNIFICANT CHANGE UP (ref 50–242)
LEGIONELLA AG UR QL: NEGATIVE — SIGNIFICANT CHANGE UP
LEUKOCYTE ESTERASE UR-ACNC: ABNORMAL
LYMPHOCYTES # BLD AUTO: 1.18 K/UL — SIGNIFICANT CHANGE UP (ref 1–3.3)
LYMPHOCYTES # BLD AUTO: 13.4 % — SIGNIFICANT CHANGE UP (ref 13–44)
MAGNESIUM SERPL-MCNC: 1.6 MG/DL — SIGNIFICANT CHANGE UP (ref 1.6–2.6)
MAGNESIUM SERPL-MCNC: 2.2 MG/DL — SIGNIFICANT CHANGE UP (ref 1.6–2.6)
MCHC RBC-ENTMCNC: 28.3 PG — SIGNIFICANT CHANGE UP (ref 27–34)
MCHC RBC-ENTMCNC: 33.8 GM/DL — SIGNIFICANT CHANGE UP (ref 32–36)
MCV RBC AUTO: 83.8 FL — SIGNIFICANT CHANGE UP (ref 80–100)
METHADONE UR-MCNC: NEGATIVE — SIGNIFICANT CHANGE UP
MONOCYTES # BLD AUTO: 0.92 K/UL — HIGH (ref 0–0.9)
MONOCYTES NFR BLD AUTO: 10.5 % — SIGNIFICANT CHANGE UP (ref 2–14)
MRSA PCR RESULT.: NEGATIVE — SIGNIFICANT CHANGE UP
NEUTROPHILS # BLD AUTO: 6.51 K/UL — SIGNIFICANT CHANGE UP (ref 1.8–7.4)
NEUTROPHILS NFR BLD AUTO: 74 % — SIGNIFICANT CHANGE UP (ref 43–77)
NITRITE UR-MCNC: NEGATIVE — SIGNIFICANT CHANGE UP
NRBC # BLD: 0 /100 WBCS — SIGNIFICANT CHANGE UP (ref 0–0)
OPIATES UR-MCNC: NEGATIVE — SIGNIFICANT CHANGE UP
OSMOLALITY UR: 264 MOSM/KG — LOW (ref 300–900)
PCP SPEC-MCNC: SIGNIFICANT CHANGE UP
PCP UR-MCNC: NEGATIVE — SIGNIFICANT CHANGE UP
PH UR: 6 — SIGNIFICANT CHANGE UP (ref 5–8)
PHOSPHATE SERPL-MCNC: 3.4 MG/DL — SIGNIFICANT CHANGE UP (ref 2.5–4.5)
PHOSPHATE SERPL-MCNC: 3.8 MG/DL — SIGNIFICANT CHANGE UP (ref 2.5–4.5)
PLATELET # BLD AUTO: 269 K/UL — SIGNIFICANT CHANGE UP (ref 150–400)
POTASSIUM SERPL-MCNC: 3.8 MMOL/L — SIGNIFICANT CHANGE UP (ref 3.5–5.3)
POTASSIUM SERPL-SCNC: 3.8 MMOL/L — SIGNIFICANT CHANGE UP (ref 3.5–5.3)
PROCALCITONIN SERPL-MCNC: 0.11 NG/ML — HIGH (ref 0.02–0.1)
PROT SERPL-MCNC: 6.4 G/DL — SIGNIFICANT CHANGE UP (ref 6–8.3)
PROT UR-MCNC: 100 MG/DL
RAPID RVP RESULT: SIGNIFICANT CHANGE UP
RBC # BLD: 3.64 M/UL — LOW (ref 4.2–5.8)
RBC # FLD: 14.6 % — HIGH (ref 10.3–14.5)
RBC CASTS # UR COMP ASSIST: 5 /HPF — HIGH (ref 0–4)
RH IG SCN BLD-IMP: POSITIVE — SIGNIFICANT CHANGE UP
S AUREUS DNA NOSE QL NAA+PROBE: NEGATIVE — SIGNIFICANT CHANGE UP
S PNEUM AG UR QL: NEGATIVE — SIGNIFICANT CHANGE UP
SARS-COV-2 RNA SPEC QL NAA+PROBE: SIGNIFICANT CHANGE UP
SODIUM SERPL-SCNC: 139 MMOL/L — SIGNIFICANT CHANGE UP (ref 135–145)
SODIUM UR-SCNC: 38 MMOL/L — SIGNIFICANT CHANGE UP
SP GR SPEC: 1.02 — SIGNIFICANT CHANGE UP (ref 1–1.03)
SQUAMOUS # UR AUTO: 5 /HPF — SIGNIFICANT CHANGE UP (ref 0–5)
THC UR QL: NEGATIVE — SIGNIFICANT CHANGE UP
TIBC SERPL-MCNC: 185 UG/DL — LOW (ref 220–430)
TRANSFERRIN SERPL-MCNC: 140 MG/DL — LOW (ref 200–360)
UIBC SERPL-MCNC: 169 UG/DL — SIGNIFICANT CHANGE UP (ref 110–370)
UROBILINOGEN FLD QL: 1 MG/DL — SIGNIFICANT CHANGE UP (ref 0.2–1)
UUN UR-MCNC: 424 MG/DL — SIGNIFICANT CHANGE UP
WBC # BLD: 8.8 K/UL — SIGNIFICANT CHANGE UP (ref 3.8–10.5)
WBC # FLD AUTO: 8.8 K/UL — SIGNIFICANT CHANGE UP (ref 3.8–10.5)
WBC UR QL: >998 /HPF — HIGH (ref 0–5)

## 2024-06-02 PROCEDURE — 73522 X-RAY EXAM HIPS BI 3-4 VIEWS: CPT | Mod: 26

## 2024-06-02 PROCEDURE — 99223 1ST HOSP IP/OBS HIGH 75: CPT | Mod: GC

## 2024-06-02 PROCEDURE — 71045 X-RAY EXAM CHEST 1 VIEW: CPT | Mod: 26

## 2024-06-02 RX ORDER — POTASSIUM CHLORIDE 20 MEQ
40 PACKET (EA) ORAL ONCE
Refills: 0 | Status: COMPLETED | OUTPATIENT
Start: 2024-06-02 | End: 2024-06-02

## 2024-06-02 RX ORDER — CEFTRIAXONE 500 MG/1
2000 INJECTION, POWDER, FOR SOLUTION INTRAMUSCULAR; INTRAVENOUS EVERY 24 HOURS
Refills: 0 | Status: DISCONTINUED | OUTPATIENT
Start: 2024-06-02 | End: 2024-06-03

## 2024-06-02 RX ORDER — PIPERACILLIN AND TAZOBACTAM 4; .5 G/20ML; G/20ML
3.38 INJECTION, POWDER, LYOPHILIZED, FOR SOLUTION INTRAVENOUS ONCE
Refills: 0 | Status: COMPLETED | OUTPATIENT
Start: 2024-06-02 | End: 2024-06-02

## 2024-06-02 RX ORDER — POTASSIUM CHLORIDE 20 MEQ
20 PACKET (EA) ORAL ONCE
Refills: 0 | Status: COMPLETED | OUTPATIENT
Start: 2024-06-02 | End: 2024-06-02

## 2024-06-02 RX ORDER — ESCITALOPRAM OXALATE 10 MG/1
10 TABLET, FILM COATED ORAL DAILY
Refills: 0 | Status: DISCONTINUED | OUTPATIENT
Start: 2024-06-02 | End: 2024-06-03

## 2024-06-02 RX ORDER — BICTEGRAVIR SODIUM, EMTRICITABINE, AND TENOFOVIR ALAFENAMIDE FUMARATE 30; 120; 15 MG/1; MG/1; MG/1
1 TABLET ORAL DAILY
Refills: 0 | Status: DISCONTINUED | OUTPATIENT
Start: 2024-06-02 | End: 2024-06-03

## 2024-06-02 RX ORDER — VANCOMYCIN HCL 1 G
1250 VIAL (EA) INTRAVENOUS ONCE
Refills: 0 | Status: COMPLETED | OUTPATIENT
Start: 2024-06-02 | End: 2024-06-02

## 2024-06-02 RX ORDER — AZITHROMYCIN 500 MG/1
500 TABLET, FILM COATED ORAL DAILY
Refills: 0 | Status: DISCONTINUED | OUTPATIENT
Start: 2024-06-02 | End: 2024-06-02

## 2024-06-02 RX ORDER — IPRATROPIUM/ALBUTEROL SULFATE 18-103MCG
3 AEROSOL WITH ADAPTER (GRAM) INHALATION EVERY 6 HOURS
Refills: 0 | Status: DISCONTINUED | OUTPATIENT
Start: 2024-06-02 | End: 2024-06-03

## 2024-06-02 RX ORDER — MAGNESIUM SULFATE 500 MG/ML
2 VIAL (ML) INJECTION ONCE
Refills: 0 | Status: COMPLETED | OUTPATIENT
Start: 2024-06-02 | End: 2024-06-02

## 2024-06-02 RX ADMIN — CEFTRIAXONE 100 MILLIGRAM(S): 500 INJECTION, POWDER, FOR SOLUTION INTRAMUSCULAR; INTRAVENOUS at 06:59

## 2024-06-02 RX ADMIN — Medication 20 MILLIEQUIVALENT(S): at 07:30

## 2024-06-02 RX ADMIN — ESCITALOPRAM OXALATE 10 MILLIGRAM(S): 10 TABLET, FILM COATED ORAL at 13:07

## 2024-06-02 RX ADMIN — Medication 40 MILLIEQUIVALENT(S): at 04:22

## 2024-06-02 RX ADMIN — Medication 1250 MILLIGRAM(S): at 03:20

## 2024-06-02 RX ADMIN — Medication 20 MILLIEQUIVALENT(S): at 09:09

## 2024-06-02 RX ADMIN — PIPERACILLIN AND TAZOBACTAM 200 GRAM(S): 4; .5 INJECTION, POWDER, LYOPHILIZED, FOR SOLUTION INTRAVENOUS at 00:20

## 2024-06-02 RX ADMIN — Medication 25 GRAM(S): at 03:00

## 2024-06-02 RX ADMIN — Medication 166.67 MILLIGRAM(S): at 00:56

## 2024-06-02 RX ADMIN — BICTEGRAVIR SODIUM, EMTRICITABINE, AND TENOFOVIR ALAFENAMIDE FUMARATE 1 TABLET(S): 30; 120; 15 TABLET ORAL at 13:07

## 2024-06-02 RX ADMIN — PIPERACILLIN AND TAZOBACTAM 3.38 GRAM(S): 4; .5 INJECTION, POWDER, LYOPHILIZED, FOR SOLUTION INTRAVENOUS at 00:56

## 2024-06-02 NOTE — H&P ADULT - ASSESSMENT
Mr. Moreno is a 53 y/o M with a PMHx of asthma/COPD (current smoker 35 pack year hx), HIV (n/c with Biktarvy, CD4 375, VLUD on 5/21/2024), HCV, HTN, CVA 5-6 years ago, MDD/NLITON, recent treatment for prostatitis (S/p Bactrim 2/2024) admitted for sepsis 2/2 UTI & PNA

## 2024-06-02 NOTE — H&P ADULT - PROBLEM SELECTOR PLAN 6
F: Tolerating oral  E: Replete PRN  GI: None  Diet: Regular  DVT: Lovenox F: Tolerating oral  E: Replete PRN  GI: None  Diet: Regular  DVT: SCDs (pt has hx of intracerebral hemorrhage) Pt reports hx of MDD/NILTON. Takes Escitalopram 10mg QD   Plan:  - C/w SSRI

## 2024-06-02 NOTE — H&P ADULT - PROBLEM SELECTOR PLAN 3
Pt w/ hx of HIV, follows Dr. Pacheco at [facility]. Concedes to poor compliance of his Biktarvy due to depression since losing his  ~1.5 years ago. Last VLUD on 5/21/24 with CD4 count of 375  Plan:  - F/u w/ repeat VL and T-Cell subset   - Will c/w Biktarvy inpatient, low c/f IRIS as pt has been taking Biktarvy on/off for several days   - Tx underlying infection as above Pt w/ hx of HIV, follows Dr. Pacheco at [facility]. Concedes to poor compliance of his Biktarvy due to depression since losing his  ~1.5 years ago. Last VLUD on 5/21/24 with CD4 count of 375  Plan:  - F/u w/ repeat VL and T-Cell subset   - Will c/w Biktarvy inpatient, low c/f IRIS as pt has been taking Biktarvy on/off for several days   - Tx underlying infection as above    #Hepatitis C  Pt Hep C positive. Actively viremic. Pt follows with [facility] as outpatient, Dr. Pacheco.   Plan:  - Defer to outpatient ID clinic for genotyping, treatment based on FIB-4, other considerations

## 2024-06-02 NOTE — PATIENT PROFILE ADULT - HAVE YOU RECENTLY LOST WEIGHT WITHOUT TRYING?
No (0)
80 F no hx presenting to ED for mechincal trip and fall on uneven sidewalk. sts that she wasn't able to break her fall and struck her head on the ground. deneis LOC or blood thinners, sts that she also hit right knee

## 2024-06-02 NOTE — PROGRESS NOTE ADULT - PROBLEM SELECTOR PLAN 8
F: Tolerating oral  E: Replete PRN  GI: None  Diet: Regular  DVT: SCDs (pt has hx of intracerebral hemorrhage) F: Tolerating oral  E: Replete PRN  GI: None  Diet: Regular  DVT: SCDs (pt has hx of intracerebral hemorrhage)  Code: Full  Dispo: [id number]

## 2024-06-02 NOTE — H&P ADULT - PROBLEM SELECTOR PLAN 1
Pt meeting SIRS criteria with tachycardia, leukocytosis, and fevers. UA grossly positive with pyuria, leukocytes, and bacteria. Pt reports urgency similar to when he was treated for prostatitis in 3/2024 but no dysuria. No testicular pain or discharge, denies recent sexual partners. Pt s/p Vancomycin and Zosyn in ER   Plan:  - Will continue to tx with abx, Ceftriaxone in favor of Vancomycin/Zosyn   - F/u urine cxs Pt meeting SIRS criteria with tachycardia, leukocytosis, and fevers. UA grossly positive with pyuria, leukocytes, and bacteria. Pt reports urgency similar to when he was treated for prostatitis in 3/2024 but no dysuria. No testicular pain or discharge, denies recent sexual partners. Pt s/p Vancomycin and Zosyn in ER   Plan:  - Will continue to tx with abx, Ceftriaxone in favor of Vancomycin/Zosyn   - F/u urine cxs  - F/u blood cxs x2

## 2024-06-02 NOTE — ED ADULT NURSE NOTE - OBJECTIVE STATEMENT
Patient is a 54yoM presenting to the ED with fever and cough for the past few days. Patient is axox3, pt reports hx of htn, hiv, cirrhosis, intermittently compliant with meds. Endorsing dry cough with chest pain when coughing.

## 2024-06-02 NOTE — PATIENT PROFILE ADULT - FALL HARM RISK - RISK INTERVENTIONS
Assistance with ambulation/Communicate Fall Risk and Risk Factors to all staff, patient, and family/Reinforce activity limits and safety measures with patient and family/Visual Cue: Yellow wristband/Bed in lowest position, wheels locked, appropriate side rails in place/Call bell, personal items and telephone in reach/Instruct patient to call for assistance before getting out of bed or chair/Non-slip footwear when patient is out of bed/[place] to call system/Physically safe environment - no spills, clutter or unnecessary equipment/Purposeful Proactive Rounding/Room/bathroom lighting operational, light cord in reach Assistance OOB with selected safe patient handling equipment/Assistance with ambulation/Communicate Fall Risk and Risk Factors to all staff, patient, and family/Discuss with provider need for PT consult/Monitor for mental status changes/Monitor gait and stability/Provide patient with walking aids - walker, cane, crutches/Reinforce activity limits and safety measures with patient and family/Review medications for side effects contributing to fall risk/Sit up slowly, dangle for a short time, stand at bedside before walking/Toileting schedule using arm’s reach rule for commode and bathroom/Use of alarms - bed, chair and/or voice tab/Visual Cue: Yellow wristband/Bed in lowest position, wheels locked, appropriate side rails in place/Call bell, personal items and telephone in reach/Instruct patient to call for assistance before getting out of bed or chair/Non-slip footwear when patient is out of bed/[place] to call system/Physically safe environment - no spills, clutter or unnecessary equipment/Purposeful Proactive Rounding/Room/bathroom lighting operational, light cord in reach

## 2024-06-02 NOTE — PROGRESS NOTE ADULT - PROBLEM SELECTOR PLAN 3
Pt w/ hx of HIV, follows Dr. Pacheco at [facility]. Concedes to poor compliance of his Biktarvy due to depression since losing his  ~1.5 years ago. Last VLUD on 5/21/24 with CD4 count of 375  Plan:  - F/u w/ repeat VL and T-Cell subset   - Will c/w Biktarvy inpatient, low c/f IRIS as pt has been taking Biktarvy on/off for several days   - Tx underlying infection as above    #Hepatitis C  Pt Hep C positive. Actively viremic. Pt follows with [facility] as outpatient, Dr. Pacheco.   Plan:  - Defer to outpatient ID clinic for genotyping, treatment based on FIB-4, other considerations Pt with CXR c/f L middle lobe PNA.   Plan:  - F/u with VL and T-Cell subset  - C/w Ceftriaxone 2g Q24h  - Starting Azithromycin 500mg q24h for 3 doses for CAP coverage Patient with h/o HIV, follows Dr. Pacheco at [facility]. Concedes to poor compliance of his Biktarvy due to depression since losing his  ~1.5 years ago. Last VLUD on 5/21/24 with CD4 count of 375.    Plan:  - Follow up repeat VL and T-Cell subset   - Continue Biktarvy inpatient, low c/f IRIS as pt has been taking Biktarvy on/off for several days   - Treat underlying infection as above    #Hepatitis C  Patient Hep C positive. Actively viremic. Follows with [facility] as outpatient, Dr. Pacheco.   Plan:  - Defer to outpatient ID clinic for genotyping, treatment based on FIB-4, other considerations

## 2024-06-02 NOTE — H&P ADULT - HISTORY OF PRESENT ILLNESS
MrMily Moreno is a     ER Course:  Initial ER Vitals:  Mr. Moreno is a 53 y/o M with a PMHx of asthma/COPD (current smoker 35 pack year hx), HIV (n/c with Biktarvy, CD4 375, VLUD on 5/21/2024), HCV, HTN, CVA 5-6 years ago, MDD/NILTON, recent treatment for prostatitis (S/p Bactrim 2/2024) presenting with fevers, increased cough for ~ 1 day. Pt states he is a smoker w/ COPD and has mild cough at baseline but has noticed having an increased dry cough since yesterday after spending some time in the sun. Pt initially believed he was having a heat stroke, had transient CP and fevers prompting him to seek treatment in ER. Pt also reports he has a roommate that was recently diagnosed with CAP.      Regarding pt's urinary sx, pt states he had an episode of prostatitis treated with his infectious disease physician Dr. Pacheco in 3/2024 and feels his sx returning. He has not had recent sexual partners in several months. Pt reports having urinary urgency since Tuesday 5/28 but no dysuria. No discharge. Pt states he has had a hard time remaining compliant with his Biktarvy since the death of his  ~1.5 years ago which pt attributes to worsening depression. Denies illicit drug use apart from remote use of meth weeks ago. No EtOH.     ER Course:  Initial ER Vitals: T 99.4 -> 104 rectal,  -> 120, /82, Satting 95% on RA  Pertinent labs: WBC 13.38, Hgb/Hct of 11.3/32.7, Platelets 310; Na 134, K 3.6, Cl 96, CO2 22, BUN/Cr of 18/0.78   UA showing moderate blood, protein, WBC > 988, RBC, and bacteria  CXR showing (resident read) small L middle lobe infiltrate  Pt treated with Vancomycin 1250g, Zosyn 3.375g, 2.5L LR, and Toradol 15mg IVP   Pt admitted to [facility] for sepsis 2/2 UTI & PNA

## 2024-06-02 NOTE — PROGRESS NOTE ADULT - PROBLEM SELECTOR PLAN 5
Pt presenting with Hgb of 11.3. MCV of 81.8. Baseline Hgb appears to be around 11.4. Ferritin 437. Total iron 16, TIBC low to 185. Suspecting mixed etiology of anemia, anemia of chronic inflammation i/s/o HIV & chronic infections as well as BAM.   Plan:  - Would defer IV iron i/s/o active infection at this time   - Maintain 2 active T&S  - Transfusion goal Hgb > 7 Pt w/ hx of COPD. Takes rescue inhaler and Budesonide-Formoterol. Typically uses both inhalers "two or three times every month".   Plan:  - Duonebs Q6h PRN while inpatient    - O2 goal > 88% Patient presenting with HgB of 11.3. MCV of 81.8. Baseline Hgb appears to be around 11.4. Ferritin 437. Total iron 16, TIBC low to Likely 2/2 anemia of chronic inflammation i/s/o HIV & chronic infections.    Plan:  - Trend CBC  - Maintain 2 active T&S  - Transfusion goal Hgb > 7 UTox + for amphetamines, cocaine. Patient presenting w/ intermittent chest "tightness", anxious affect and pressured speech. EKG w/o signs of ischemia. Tachycardic on arrival, now resolved.    Plan:  -SBIRT consult  - against drug use  -Avoid beta blockers

## 2024-06-02 NOTE — PROGRESS NOTE ADULT - ASSESSMENT
Mr. Moreno is a 53 y/o M with a PMHx of asthma/COPD (current smoker 35 pack year hx), HIV (n/c with Biktarvy, CD4 375, VLUD on 5/21/2024), HCV, HTN, CVA 5-6 years ago, MDD/NILTON, recent treatment for prostatitis (S/p Bactrim 2/2024) admitted for sepsis 2/2 UTI & PNA  Mr. Moreno is a 53 y/o M with a PMH of asthma/COPD (current smoker 35 pack year history), HIV (noncompliant with Biktarvy, CD4 375, VLUD on 5/21/2024), HCV, HTN, CVA 5-6 years ago, MDD/NILTON, recent treatment for prostatitis (S/p Bactrim 2/2024) admitted for sepsis 2/2 PNA.

## 2024-06-02 NOTE — PROGRESS NOTE ADULT - PROBLEM SELECTOR PLAN 4
Pt w/ hx of COPD. Takes rescue inhaler and Budesonide-Formoterol. Typically uses both inhalers "two or three times every month".   Plan:  - Duonebs Q6h PRN while inpatient    - O2 goal > 88% Pt w/ hx of HIV, follows Dr. Pacheco at [facility]. Concedes to poor compliance of his Biktarvy due to depression since losing his  ~1.5 years ago. Last VLUD on 5/21/24 with CD4 count of 375  Plan:  - F/u w/ repeat VL and T-Cell subset   - Will c/w Biktarvy inpatient, low c/f IRIS as pt has been taking Biktarvy on/off for several days   - Tx underlying infection as above    #Hepatitis C  Pt Hep C positive. Actively viremic. Pt follows with [facility] as outpatient, Dr. Pacheco.   Plan:  - Defer to outpatient ID clinic for genotyping, treatment based on FIB-4, other considerations Takes rescue inhaler and Budesonide-Formoterol. Typically uses both inhalers "two or three times every month".     Plan:  - Duonebs q6h PRN while inpatient    - O2 goal > 88%

## 2024-06-02 NOTE — PROGRESS NOTE ADULT - SUBJECTIVE AND OBJECTIVE BOX
OVERNIGHT EVENTS:    SUBJECTIVE / INTERVAL HPI: Patient seen and examined at bedside.     VITAL SIGNS:  Vital Signs Last 24 Hrs  T(C): 36.9 (02 Jun 2024 05:08), Max: 40 (01 Jun 2024 22:20)  T(F): 98.5 (02 Jun 2024 05:08), Max: 104 (01 Jun 2024 22:20)  HR: 95 (02 Jun 2024 05:08) (87 - 131)  BP: 120/78 (02 Jun 2024 05:08) (115/70 - 143/82)  BP(mean): --  RR: 16 (02 Jun 2024 05:08) (16 - 18)  SpO2: 95% (02 Jun 2024 05:08) (95% - 100%)    Parameters below as of 02 Jun 2024 05:08  Patient On (Oxygen Delivery Method): room air      I&O's Summary      PHYSICAL EXAM:    General: WDWN  HEENT: NC/AT; PERRL, anicteric sclera; MMM  Neck: supple  Cardiovascular: +S1/S2; RRR  Respiratory: CTA B/L; no W/R/R  Gastrointestinal: soft, NT/ND; +BSx4  Extremities: WWP; no edema, clubbing or cyanosis  Vascular: 2+ radial, DP/PT pulses B/L  Neurological: AAOx3; no focal deficits    MEDICATIONS:  MEDICATIONS  (STANDING):  azithromycin   Tablet 500 milliGRAM(s) Oral daily  bictegravir 50 mG/emtricitabine 200 mG/tenofovir alafenamide 25 mG (BIKTARVY) 1 Tablet(s) Oral daily  cefTRIAXone   IVPB 2000 milliGRAM(s) IV Intermittent every 24 hours  escitalopram 10 milliGRAM(s) Oral daily    MEDICATIONS  (PRN):  albuterol/ipratropium for Nebulization 3 milliLiter(s) Nebulizer every 6 hours PRN Wheezing      ALLERGIES:  Allergies    No Known Allergies    Intolerances        LABS:                        10.3   8.80  )-----------( 269      ( 02 Jun 2024 05:30 )             30.5     06-02    139  |  103  |  14  ----------------------------<  105<H>  3.8   |  25  |  0.78    Ca    8.2<L>      02 Jun 2024 05:30  Phos  3.8     06-02  Mg     2.2     06-02    TPro  6.4  /  Alb  2.8<L>  /  TBili  0.3  /  DBili  x   /  AST  23  /  ALT  17  /  AlkPhos  69  06-02    PT/INR - ( 01 Jun 2024 22:07 )   PT: 12.3 sec;   INR: 1.08          PTT - ( 01 Jun 2024 22:07 )  PTT:37.2 sec  Urinalysis Basic - ( 02 Jun 2024 05:30 )    Color: x / Appearance: x / SG: x / pH: x  Gluc: 105 mg/dL / Ketone: x  / Bili: x / Urobili: x   Blood: x / Protein: x / Nitrite: x   Leuk Esterase: x / RBC: x / WBC x   Sq Epi: x / Non Sq Epi: x / Bacteria: x      CAPILLARY BLOOD GLUCOSE          RADIOLOGY & ADDITIONAL TESTS: Reviewed.   OVERNIGHT EVENTS: No AOE    SUBJECTIVE / INTERVAL HPI: Patient seen and examined at bedside. Complains of continued cough but that his SOB has improved. Intermittent chest "tightness." Denies subjective fever/chills, but endorses sweats. No N/V.    VITAL SIGNS:  Vital Signs Last 24 Hrs  T(C): 36.9 (02 Jun 2024 05:08), Max: 40 (01 Jun 2024 22:20)  T(F): 98.5 (02 Jun 2024 05:08), Max: 104 (01 Jun 2024 22:20)  HR: 95 (02 Jun 2024 05:08) (87 - 131)  BP: 120/78 (02 Jun 2024 05:08) (115/70 - 143/82)  BP(mean): --  RR: 16 (02 Jun 2024 05:08) (16 - 18)  SpO2: 95% (02 Jun 2024 05:08) (95% - 100%)    Parameters below as of 02 Jun 2024 05:08  Patient On (Oxygen Delivery Method): room air      I&O's Summary      PHYSICAL EXAM:    General: WDWN  HEENT: NC/AT; PERRL, anicteric sclera; MMM  Neck: supple  Cardiovascular: +S1/S2; RRR  Respiratory: Audible cough. Scattered wheezes R>L  Gastrointestinal: soft, NT/ND; +BSx4  Extremities: WWP; no edema, clubbing or cyanosis  Vascular: 2+ radial, DP/PT pulses B/L  Neurological: AAOx3; no focal deficits  Psych: Pressured speech    MEDICATIONS:  MEDICATIONS  (STANDING):  azithromycin   Tablet 500 milliGRAM(s) Oral daily  bictegravir 50 mG/emtricitabine 200 mG/tenofovir alafenamide 25 mG (BIKTARVY) 1 Tablet(s) Oral daily  cefTRIAXone   IVPB 2000 milliGRAM(s) IV Intermittent every 24 hours  escitalopram 10 milliGRAM(s) Oral daily    MEDICATIONS  (PRN):  albuterol/ipratropium for Nebulization 3 milliLiter(s) Nebulizer every 6 hours PRN Wheezing      ALLERGIES:  Allergies    No Known Allergies    Intolerances        LABS:                        10.3   8.80  )-----------( 269      ( 02 Jun 2024 05:30 )             30.5     06-02    139  |  103  |  14  ----------------------------<  105<H>  3.8   |  25  |  0.78    Ca    8.2<L>      02 Jun 2024 05:30  Phos  3.8     06-02  Mg     2.2     06-02    TPro  6.4  /  Alb  2.8<L>  /  TBili  0.3  /  DBili  x   /  AST  23  /  ALT  17  /  AlkPhos  69  06-02    PT/INR - ( 01 Jun 2024 22:07 )   PT: 12.3 sec;   INR: 1.08          PTT - ( 01 Jun 2024 22:07 )  PTT:37.2 sec  Urinalysis Basic - ( 02 Jun 2024 05:30 )    Color: x / Appearance: x / SG: x / pH: x  Gluc: 105 mg/dL / Ketone: x  / Bili: x / Urobili: x   Blood: x / Protein: x / Nitrite: x   Leuk Esterase: x / RBC: x / WBC x   Sq Epi: x / Non Sq Epi: x / Bacteria: x      CAPILLARY BLOOD GLUCOSE          RADIOLOGY & ADDITIONAL TESTS: Reviewed.

## 2024-06-02 NOTE — H&P ADULT - NSHPPHYSICALEXAM_GEN_ALL_CORE
General: Alert and oriented x 3. No acute distress.   Eyes: EOMI. Anicteric.  HEENT: Moist mucous membranes. No scleral icterus. No cervical lymphadenopathy.  Lungs: + Mild wheezing at b/l lung fields   Cardiovascular: + Tachycardic rate. No murmurs, rubs, gallops   Abdomen: Soft, non-distended. Mild LUQ tenderness.    Extremities: No edema. Non-tender.  Skin: + Multiple skin excoriations on b/l forearms. Similar excoriations on R shin  Neurologic: No focal neurological deficits. CN II-XII grossly intact, but not individually tested.  Psychiatric: Cooperative. + Pressured speech and slightly anxious appearing

## 2024-06-02 NOTE — PROGRESS NOTE ADULT - PROBLEM SELECTOR PLAN 7
F: Tolerating oral  E: Replete PRN  GI: None  Diet: Regular  DVT: SCDs (pt has hx of intracerebral hemorrhage) Pt reports hx of MDD/NILTON. Takes Escitalopram 10mg QD   Plan:  - C/w SSRI F: Tolerating oral  E: Replete PRN  GI: None  Diet: Regular  DVT: SCDs (pt has hx of intracerebral hemorrhage)  Code: Full  Dispo: [facility] Patient reports history of MDD/NILTON. Takes escitalopram 10mg qd.    Plan:  - Continue SSRI

## 2024-06-02 NOTE — H&P ADULT - PROBLEM SELECTOR PLAN 4
Pt w/ hx of COPD. Takes rescue inhaler and Budesonide-Formoterol. Typically uses both inhalers "two or three times every month".   Plan:  - Duonebs Q6h PRN while inpatient    - O2 goal > 88%

## 2024-06-02 NOTE — PROGRESS NOTE ADULT - PROBLEM SELECTOR PLAN 1
Pt meeting SIRS criteria with tachycardia, leukocytosis, and fevers. UA grossly positive with pyuria, leukocytes, and bacteria. Pt reports urgency similar to when he was treated for prostatitis in 3/2024 but no dysuria. No testicular pain or discharge, denies recent sexual partners. Pt s/p Vancomycin and Zosyn in ER   Plan:  - Will continue to tx with abx, Ceftriaxone in favor of Vancomycin/Zosyn   - F/u urine cxs  - F/u blood cxs x2 Patient meeting SIRS criteria with tachycardia, leukocytosis, and fevers. Also presenting w/ cough and chest "tightness" for  Procalcitonin add on 0.11. Patient saturating well on RA. Last VLUD, CD4 count >350 (5/21/24).   -CXR: LLL infiltrates Presenting w/ cough and chest "tightness" for 1 day. Patient meeting SIRS criteria with tachycardia, leukocytosis, and fevers. Procalcitonin add on 0.11. Patient saturating well on RA. Last VLUD, CD4 count >350 (5/21/24). Now s/p vancomycin, Zosyn in ER.  -CXR: LLL infiltrates  -MRSA negative, urine Strep/Legionella Ag negative, RVP negative    Plan:  -Follow up blood cultures  -CTX 2g x 7d  -Stop azithromycin i/s/o consolidation, negative Legionella Ag  -Duonebs 3mL q6h PRN for wheezing

## 2024-06-02 NOTE — H&P ADULT - PROBLEM SELECTOR PLAN 7
F: Tolerating oral  E: Replete PRN  GI: None  Diet: Regular  DVT: SCDs (pt has hx of intracerebral hemorrhage)

## 2024-06-02 NOTE — H&P ADULT - ATTENDING COMMENTS
Mr. Moreno is a 55 y/o M with a PMHx of asthma/COPD (current smoker 35 pack year hx), HIV (n/c with Biktarvy, CD4 375, VLUD on 5/21/2024), HCV, HTN, CVA 5-6 years ago, MDD/NILTON, recent treatment for prostatitis (S/p Bactrim 2/2024) admitted for sepsis 2/2 UTI & PNA     Labs and imaging reviewed    Problem List  #Sepsis POA  #Acute Cystitis  #Chronic HIV  #Polysubstance Abuse  #MDD    Plan  -Continue Ceftriaxone  -Continue home regimen for HAART  -F/U on culture data

## 2024-06-02 NOTE — H&P ADULT - NSHPLABSRESULTS_GEN_ALL_CORE
.  LABS:                         11.3   13.38 )-----------( 310      ( 2024 22:07 )             32.7     06    134<L>  |  96  |  18  ----------------------------<  114<H>  3.6   |  22  |  0.78    Ca    8.9      2024 22:07  Phos  3.4       Mg     1.6         TPro  7.9  /  Alb  3.6  /  TBili  0.4  /  DBili  x   /  AST  33  /  ALT  22  /  AlkPhos  85  06-    PT/INR - ( 2024 22:07 )   PT: 12.3 sec;   INR: 1.08          PTT - ( 2024 22:07 )  PTT:37.2 sec  Urinalysis Basic - ( 2024 22:07 )    Color: Yellow / Appearance: Turbid / S.016 / pH: x  Gluc: 114 mg/dL / Ketone: Negative mg/dL  / Bili: Negative / Urobili: 1.0 mg/dL   Blood: x / Protein: 100 mg/dL / Nitrite: Negative   Leuk Esterase: Large / RBC: 5 /HPF / WBC >998 /HPF   Sq Epi: x / Non Sq Epi: 5 /HPF / Bacteria: Few /HPF            Lactate, Blood: 1.9 mmol/L ( @ 22:07)      RADIOLOGY, EKG & ADDITIONAL TESTS: Reviewed.

## 2024-06-02 NOTE — ED ADULT NURSE REASSESSMENT NOTE - NS ED NURSE REASSESS COMMENT FT1
Patient is now admitted, patient made aware of current status. Patient care endorsed to MATTHEW Tellez at this time.

## 2024-06-02 NOTE — PROGRESS NOTE ADULT - PROBLEM SELECTOR PLAN 2
Pt with CXR c/f L middle lobe PNA. Procalcitonin add on 0.11. Pt saturating well on RA. Last VLUD, CD4 count >350 (5/21/24).   Plan:  - F/u with VL and T-Cell subset  - C/w Ceftriaxone 2g Q24h  - Starting Azithromycin 500mg q24h for 3 doses for CAP coverage UA grossly positive with pyuria, leukocytes, and bacteria. Patient reports urgency similar to when he was treated for prostatitis in 3/2024 but no dysuria. No testicular pain or discharge, denies recent sexual partners. Pt s/p Vancomycin and Zosyn in ER   Plan:  - Will continue to tx with abx, Ceftriaxone in favor of Vancomycin/Zosyn   - F/u urine cxs  - F/u blood cxs x2 UA grossly positive with pyuria, leukocytes, and bacteria. Patient reports urgency similar to when he was treated for prostatitis in 3/2024 but no dysuria. No testicular pain or discharge, denies recent sexual partners. Patient s/p Vancomycin and Zosyn in ER.    Plan:  - CTX as above  - Follow up urine culture

## 2024-06-02 NOTE — H&P ADULT - PROBLEM SELECTOR PLAN 2
Pt with CXR c/f L middle lobe PNA. Procalcitonin add on 0.11.   Plan:  - F/u with VL and T-Cell subset  - C/w Ceftriaxone 2g Q24h   - Pt with CXR c/f L middle lobe PNA. Procalcitonin add on 0.11. Pt saturating well on RA.   Plan:  - F/u with VL and T-Cell subset  - C/w Ceftriaxone 2g Q24h Pt with CXR c/f L middle lobe PNA. Procalcitonin add on 0.11. Pt saturating well on RA. Last VLUD, CD4 count >350 (5/21/24).   Plan:  - F/u with VL and T-Cell subset  - C/w Ceftriaxone 2g Q24h  - Starting Azithromycin 500mg q24h for 3 doses for CAP coverage

## 2024-06-02 NOTE — PROGRESS NOTE ADULT - PROBLEM SELECTOR PLAN 6
Pt reports hx of MDD/NILTON. Takes Escitalopram 10mg QD   Plan:  - C/w SSRI Pt presenting with Hgb of 11.3. MCV of 81.8. Baseline Hgb appears to be around 11.4. Ferritin 437. Total iron 16, TIBC low to 185. Suspecting mixed etiology of anemia, anemia of chronic inflammation i/s/o HIV & chronic infections as well as BAM.   Plan:  - Would defer IV iron i/s/o active infection at this time   - Maintain 2 active T&S  - Transfusion goal Hgb > 7 Patient reports history of MDD/NILTON. Takes escitalopram 10mg qd.    Plan:  - Continue SSRI Patient presenting with HgB of 11.3. MCV of 81.8. Baseline Hgb appears to be around 11.4. Ferritin 437. Total iron 16, TIBC low to Likely 2/2 anemia of chronic inflammation i/s/o HIV & chronic infections.    Plan:  - Trend CBC  - Maintain 2 active T&S  - Transfusion goal Hgb > 7

## 2024-06-03 ENCOUNTER — TRANSCRIPTION ENCOUNTER (OUTPATIENT)
Age: 54
End: 2024-06-03

## 2024-06-03 ENCOUNTER — APPOINTMENT (OUTPATIENT)
Dept: DERMATOLOGY | Facility: CLINIC | Age: 54
End: 2024-06-03

## 2024-06-03 VITALS
DIASTOLIC BLOOD PRESSURE: 81 MMHG | HEART RATE: 95 BPM | TEMPERATURE: 98 F | OXYGEN SATURATION: 94 % | SYSTOLIC BLOOD PRESSURE: 125 MMHG | RESPIRATION RATE: 18 BRPM

## 2024-06-03 LAB
4/8 RATIO: 0.73 RATIO — LOW (ref 0.9–3.6)
ABS CD8: 555 CELLS/UL — SIGNIFICANT CHANGE UP (ref 142–740)
ANION GAP SERPL CALC-SCNC: 10 MMOL/L — SIGNIFICANT CHANGE UP (ref 5–17)
BASOPHILS # BLD AUTO: 0.03 K/UL — SIGNIFICANT CHANGE UP (ref 0–0.2)
BASOPHILS NFR BLD AUTO: 0.5 % — SIGNIFICANT CHANGE UP (ref 0–2)
BLD GP AB SCN SERPL QL: NEGATIVE — SIGNIFICANT CHANGE UP
BUN SERPL-MCNC: 10 MG/DL — SIGNIFICANT CHANGE UP (ref 7–23)
CALCIUM SERPL-MCNC: 8.6 MG/DL — SIGNIFICANT CHANGE UP (ref 8.4–10.5)
CD16+CD56+ CELLS NFR BLD: 6 % — SIGNIFICANT CHANGE UP (ref 5–23)
CD16+CD56+ CELLS NFR SPEC: 81 CELLS/UL — SIGNIFICANT CHANGE UP (ref 71–410)
CD19 BLASTS SPEC-ACNC: 12 % — SIGNIFICANT CHANGE UP (ref 6–24)
CD19 BLASTS SPEC-ACNC: 157 CELLS/UL — SIGNIFICANT CHANGE UP (ref 84–469)
CD3 BLASTS SPEC-ACNC: 79 % — SIGNIFICANT CHANGE UP (ref 59–83)
CD3 BLASTS SPEC-ACNC: 988 CELLS/UL — SIGNIFICANT CHANGE UP (ref 672–1870)
CD4 %: 33 % — SIGNIFICANT CHANGE UP (ref 30–62)
CD8 %: 45 % — HIGH (ref 12–36)
CHLORIDE SERPL-SCNC: 108 MMOL/L — SIGNIFICANT CHANGE UP (ref 96–108)
CO2 SERPL-SCNC: 22 MMOL/L — SIGNIFICANT CHANGE UP (ref 22–31)
CREAT SERPL-MCNC: 0.76 MG/DL — SIGNIFICANT CHANGE UP (ref 0.5–1.3)
EGFR: 107 ML/MIN/1.73M2 — SIGNIFICANT CHANGE UP
EOSINOPHIL # BLD AUTO: 0.27 K/UL — SIGNIFICANT CHANGE UP (ref 0–0.5)
EOSINOPHIL NFR BLD AUTO: 4.7 % — SIGNIFICANT CHANGE UP (ref 0–6)
GLUCOSE SERPL-MCNC: 87 MG/DL — SIGNIFICANT CHANGE UP (ref 70–99)
HCT VFR BLD CALC: 31 % — LOW (ref 39–50)
HGB BLD-MCNC: 10.1 G/DL — LOW (ref 13–17)
HIV-1 VIRAL LOAD RESULT: ABNORMAL
HIV1 RNA # SERPL NAA+PROBE: SIGNIFICANT CHANGE UP
HIV1 RNA SER-IMP: SIGNIFICANT CHANGE UP
HIV1 RNA SERPL NAA+PROBE-ACNC: ABNORMAL
HIV1 RNA SERPL NAA+PROBE-LOG#: 4.31 — SIGNIFICANT CHANGE UP
IMM GRANULOCYTES NFR BLD AUTO: 0.5 % — SIGNIFICANT CHANGE UP (ref 0–0.9)
LYMPHOCYTES # BLD AUTO: 1.16 K/UL — SIGNIFICANT CHANGE UP (ref 1–3.3)
LYMPHOCYTES # BLD AUTO: 20 % — SIGNIFICANT CHANGE UP (ref 13–44)
MAGNESIUM SERPL-MCNC: 1.8 MG/DL — SIGNIFICANT CHANGE UP (ref 1.6–2.6)
MCHC RBC-ENTMCNC: 28.4 PG — SIGNIFICANT CHANGE UP (ref 27–34)
MCHC RBC-ENTMCNC: 32.6 GM/DL — SIGNIFICANT CHANGE UP (ref 32–36)
MCV RBC AUTO: 87.1 FL — SIGNIFICANT CHANGE UP (ref 80–100)
MONOCYTES # BLD AUTO: 0.7 K/UL — SIGNIFICANT CHANGE UP (ref 0–0.9)
MONOCYTES NFR BLD AUTO: 12.1 % — SIGNIFICANT CHANGE UP (ref 2–14)
NEUTROPHILS # BLD AUTO: 3.6 K/UL — SIGNIFICANT CHANGE UP (ref 1.8–7.4)
NEUTROPHILS NFR BLD AUTO: 62.2 % — SIGNIFICANT CHANGE UP (ref 43–77)
NRBC # BLD: 0 /100 WBCS — SIGNIFICANT CHANGE UP (ref 0–0)
PHOSPHATE SERPL-MCNC: 3.6 MG/DL — SIGNIFICANT CHANGE UP (ref 2.5–4.5)
PLATELET # BLD AUTO: 282 K/UL — SIGNIFICANT CHANGE UP (ref 150–400)
POTASSIUM SERPL-MCNC: 3.7 MMOL/L — SIGNIFICANT CHANGE UP (ref 3.5–5.3)
POTASSIUM SERPL-SCNC: 3.7 MMOL/L — SIGNIFICANT CHANGE UP (ref 3.5–5.3)
RBC # BLD: 3.56 M/UL — LOW (ref 4.2–5.8)
RBC # FLD: 14.9 % — HIGH (ref 10.3–14.5)
RH IG SCN BLD-IMP: POSITIVE — SIGNIFICANT CHANGE UP
SODIUM SERPL-SCNC: 140 MMOL/L — SIGNIFICANT CHANGE UP (ref 135–145)
T-CELL CD4 SUBSET PNL BLD: 406 CELLS/UL — LOW (ref 489–1457)
WBC # BLD: 5.79 K/UL — SIGNIFICANT CHANGE UP (ref 3.8–10.5)
WBC # FLD AUTO: 5.79 K/UL — SIGNIFICANT CHANGE UP (ref 3.8–10.5)

## 2024-06-03 PROCEDURE — 71045 X-RAY EXAM CHEST 1 VIEW: CPT

## 2024-06-03 PROCEDURE — 87040 BLOOD CULTURE FOR BACTERIA: CPT

## 2024-06-03 PROCEDURE — 85025 COMPLETE CBC W/AUTO DIFF WBC: CPT

## 2024-06-03 PROCEDURE — 84466 ASSAY OF TRANSFERRIN: CPT

## 2024-06-03 PROCEDURE — 83735 ASSAY OF MAGNESIUM: CPT

## 2024-06-03 PROCEDURE — 87641 MR-STAPH DNA AMP PROBE: CPT

## 2024-06-03 PROCEDURE — 82330 ASSAY OF CALCIUM: CPT

## 2024-06-03 PROCEDURE — 84132 ASSAY OF SERUM POTASSIUM: CPT

## 2024-06-03 PROCEDURE — 84145 PROCALCITONIN (PCT): CPT

## 2024-06-03 PROCEDURE — 96365 THER/PROPH/DIAG IV INF INIT: CPT

## 2024-06-03 PROCEDURE — 73522 X-RAY EXAM HIPS BI 3-4 VIEWS: CPT

## 2024-06-03 PROCEDURE — 82570 ASSAY OF URINE CREATININE: CPT

## 2024-06-03 PROCEDURE — 83540 ASSAY OF IRON: CPT

## 2024-06-03 PROCEDURE — 86355 B CELLS TOTAL COUNT: CPT

## 2024-06-03 PROCEDURE — 87086 URINE CULTURE/COLONY COUNT: CPT

## 2024-06-03 PROCEDURE — 85730 THROMBOPLASTIN TIME PARTIAL: CPT

## 2024-06-03 PROCEDURE — 86357 NK CELLS TOTAL COUNT: CPT

## 2024-06-03 PROCEDURE — 80053 COMPREHEN METABOLIC PANEL: CPT

## 2024-06-03 PROCEDURE — 86359 T CELLS TOTAL COUNT: CPT

## 2024-06-03 PROCEDURE — 87899 AGENT NOS ASSAY W/OPTIC: CPT

## 2024-06-03 PROCEDURE — 80307 DRUG TEST PRSMV CHEM ANLYZR: CPT

## 2024-06-03 PROCEDURE — 87449 NOS EACH ORGANISM AG IA: CPT

## 2024-06-03 PROCEDURE — 84484 ASSAY OF TROPONIN QUANT: CPT

## 2024-06-03 PROCEDURE — 86850 RBC ANTIBODY SCREEN: CPT

## 2024-06-03 PROCEDURE — 82728 ASSAY OF FERRITIN: CPT

## 2024-06-03 PROCEDURE — 86360 T CELL ABSOLUTE COUNT/RATIO: CPT

## 2024-06-03 PROCEDURE — 80048 BASIC METABOLIC PNL TOTAL CA: CPT

## 2024-06-03 PROCEDURE — 83935 ASSAY OF URINE OSMOLALITY: CPT

## 2024-06-03 PROCEDURE — 86900 BLOOD TYPING SEROLOGIC ABO: CPT

## 2024-06-03 PROCEDURE — 87640 STAPH A DNA AMP PROBE: CPT

## 2024-06-03 PROCEDURE — 99285 EMERGENCY DEPT VISIT HI MDM: CPT | Mod: 25

## 2024-06-03 PROCEDURE — 93005 ELECTROCARDIOGRAM TRACING: CPT

## 2024-06-03 PROCEDURE — 36415 COLL VENOUS BLD VENIPUNCTURE: CPT

## 2024-06-03 PROCEDURE — 0225U NFCT DS DNA&RNA 21 SARSCOV2: CPT

## 2024-06-03 PROCEDURE — 81001 URINALYSIS AUTO W/SCOPE: CPT

## 2024-06-03 PROCEDURE — 86901 BLOOD TYPING SEROLOGIC RH(D): CPT

## 2024-06-03 PROCEDURE — 87536 HIV-1 QUANT&REVRSE TRNSCRPJ: CPT

## 2024-06-03 PROCEDURE — 84300 ASSAY OF URINE SODIUM: CPT

## 2024-06-03 PROCEDURE — 94640 AIRWAY INHALATION TREATMENT: CPT

## 2024-06-03 PROCEDURE — 83605 ASSAY OF LACTIC ACID: CPT

## 2024-06-03 PROCEDURE — 87077 CULTURE AEROBIC IDENTIFY: CPT

## 2024-06-03 PROCEDURE — 84540 ASSAY OF URINE/UREA-N: CPT

## 2024-06-03 PROCEDURE — 84100 ASSAY OF PHOSPHORUS: CPT

## 2024-06-03 PROCEDURE — 96366 THER/PROPH/DIAG IV INF ADDON: CPT

## 2024-06-03 PROCEDURE — 83615 LACTATE (LD) (LDH) ENZYME: CPT

## 2024-06-03 PROCEDURE — 85610 PROTHROMBIN TIME: CPT

## 2024-06-03 PROCEDURE — 83550 IRON BINDING TEST: CPT

## 2024-06-03 PROCEDURE — 84295 ASSAY OF SERUM SODIUM: CPT

## 2024-06-03 PROCEDURE — 87186 SC STD MICRODIL/AGAR DIL: CPT

## 2024-06-03 PROCEDURE — 82803 BLOOD GASES ANY COMBINATION: CPT

## 2024-06-03 PROCEDURE — 99239 HOSP IP/OBS DSCHRG MGMT >30: CPT | Mod: GC

## 2024-06-03 PROCEDURE — 96367 TX/PROPH/DG ADDL SEQ IV INF: CPT

## 2024-06-03 PROCEDURE — 87637 SARSCOV2&INF A&B&RSV AMP PRB: CPT

## 2024-06-03 RX ORDER — POTASSIUM CHLORIDE 20 MEQ
40 PACKET (EA) ORAL ONCE
Refills: 0 | Status: COMPLETED | OUTPATIENT
Start: 2024-06-03 | End: 2024-06-03

## 2024-06-03 RX ORDER — MAGNESIUM SULFATE 500 MG/ML
2 VIAL (ML) INJECTION ONCE
Refills: 0 | Status: COMPLETED | OUTPATIENT
Start: 2024-06-03 | End: 2024-06-03

## 2024-06-03 RX ORDER — DICLOFENAC SODIUM 30 MG/G
4 GEL TOPICAL
Qty: 4 | Refills: 0
Start: 2024-06-03 | End: 2024-07-02

## 2024-06-03 RX ADMIN — Medication 25 GRAM(S): at 08:54

## 2024-06-03 RX ADMIN — Medication 3 MILLILITER(S): at 10:37

## 2024-06-03 RX ADMIN — BICTEGRAVIR SODIUM, EMTRICITABINE, AND TENOFOVIR ALAFENAMIDE FUMARATE 1 TABLET(S): 30; 120; 15 TABLET ORAL at 13:09

## 2024-06-03 RX ADMIN — ESCITALOPRAM OXALATE 10 MILLIGRAM(S): 10 TABLET, FILM COATED ORAL at 13:09

## 2024-06-03 RX ADMIN — Medication 100 MILLIGRAM(S): at 13:09

## 2024-06-03 RX ADMIN — CEFTRIAXONE 100 MILLIGRAM(S): 500 INJECTION, POWDER, FOR SOLUTION INTRAMUSCULAR; INTRAVENOUS at 05:36

## 2024-06-03 RX ADMIN — Medication 40 MILLIEQUIVALENT(S): at 08:54

## 2024-06-03 NOTE — DISCHARGE NOTE PROVIDER - NSDCFUSCHEDAPPT_GEN_ALL_CORE_FT
Gricelda De Leon  [facility]  [id number]  Scheduled Appointment: 06/03/2024    [facility]  [address]   Scheduled Appointment: 06/20/2024     [facility]  [address]  [address]   Scheduled Appointment: 06/20/2024

## 2024-06-03 NOTE — DISCHARGE NOTE PROVIDER - NSDCCPCAREPLAN_GEN_ALL_CORE_FT
PRINCIPAL DISCHARGE DIAGNOSIS  Diagnosis: Pneumonia  Assessment and Plan of Treatment: You were found to have pneumonia, an infection of the lung. This was the cause of your fever, feelings of shortness of breath and cough, and chest tightness. The diagnosis was confirmed with your chest X-ray. You were treated with antibiotics and will need to continue them at home for a 7-day course of treatment.  INSTRUCTIONS:  1. Take one 200mg tablet of cefpodoxime, once in the morning and once in the evening, for 5 more days (starting 6/4, up to and including 6/8).  2. Follow up outpatient with Dr. Pacheco at your already scheduled appointment.      SECONDARY DISCHARGE DIAGNOSES  Diagnosis: HIV (human immunodeficiency virus infection)  Assessment and Plan of Treatment: INSTRUCTIONS:  1. Follow up outpatient with Dr. Pacheco at your scheduled appointment.     PRINCIPAL DISCHARGE DIAGNOSIS  Diagnosis: Pneumonia  Assessment and Plan of Treatment: You were found to have pneumonia, an infection of the lung. This was the cause of your fever, feelings of shortness of breath and cough, and chest tightness. The diagnosis was confirmed with your chest X-ray. You were treated with antibiotics and will need to continue them at home for a 7-day course of treatment.  INSTRUCTIONS:  1. Take one 200mg tablet of cefpodoxime, once in the morning and once in the evening, for 5 more days (starting 6/4, up to and including 6/8).  2. Follow up outpatient with Dr. Pacheco at your already scheduled appointment.      SECONDARY DISCHARGE DIAGNOSES  Diagnosis: Osteoarthritis  Assessment and Plan of Treatment: You were found to have osteoarthritis of the hips on X-ray. This is a degenerative condition that commonly affects the weight-bearing joints, such as the hips. We have given you a medication called diclofenac 1% gel to help with the pain.  INSTRUCTIONS:  1. Apply 4g of gel to each affected area up to 4 times per day for hip pain, but no more than needed.    Diagnosis: HIV (human immunodeficiency virus infection)  Assessment and Plan of Treatment: INSTRUCTIONS:  1. Follow up outpatient with Dr. Pacheco at your scheduled appointment.     PRINCIPAL DISCHARGE DIAGNOSIS  Diagnosis: Pneumonia  Assessment and Plan of Treatment: You were found to have pneumonia, an infection of the lung. This was the cause of your fever, feelings of shortness of breath and cough, and chest tightness. The diagnosis was confirmed with your chest X-ray. You were treated with antibiotics and will need to continue them at home for a 7-day course of treatment.  INSTRUCTIONS:  1. Take one 500mg tablet of Augmentin with breakfast, lunch, and dinner for 5 more days (starting 6/4, up to and including 6/8).  2. Follow up outpatient with Dr. Pacheco at your already scheduled appointment.      SECONDARY DISCHARGE DIAGNOSES  Diagnosis: Osteoarthritis  Assessment and Plan of Treatment: You were found to have osteoarthritis of the hips on X-ray. This is a degenerative condition that commonly affects the weight-bearing joints, such as the hips. We have given you a medication called diclofenac 1% gel to help with the pain.  INSTRUCTIONS:  1. Apply 4g of gel to each affected area up to 4 times per day for hip pain, but no more than needed.    Diagnosis: HIV (human immunodeficiency virus infection)  Assessment and Plan of Treatment: INSTRUCTIONS:  1. Follow up outpatient with Dr. Pacheco at your scheduled appointment.

## 2024-06-03 NOTE — PROGRESS NOTE ADULT - SUBJECTIVE AND OBJECTIVE BOX
OVERNIGHT EVENTS:    SUBJECTIVE / INTERVAL HPI: Patient seen and examined at bedside.     VITAL SIGNS:  Vital Signs Last 24 Hrs  T(C): 36.9 (03 Jun 2024 05:47), Max: 36.9 (02 Jun 2024 20:24)  T(F): 98.5 (03 Jun 2024 05:47), Max: 98.5 (02 Jun 2024 20:24)  HR: 97 (03 Jun 2024 05:47) (95 - 97)  BP: 122/74 (03 Jun 2024 05:47) (112/70 - 122/74)  BP(mean): --  RR: 17 (03 Jun 2024 05:47) (17 - 18)  SpO2: 95% (03 Jun 2024 05:47) (94% - 97%)    Parameters below as of 03 Jun 2024 05:47  Patient On (Oxygen Delivery Method): room air      I&O's Summary    02 Jun 2024 07:01  -  03 Jun 2024 07:00  --------------------------------------------------------  IN: 0 mL / OUT: 700 mL / NET: -700 mL        PHYSICAL EXAM:    General: WDWN  HEENT: NC/AT; PERRL, anicteric sclera; MMM  Neck: supple  Cardiovascular: +S1/S2; RRR  Respiratory: CTA B/L; no W/R/R  Gastrointestinal: soft, NT/ND; +BSx4  Extremities: WWP; no edema, clubbing or cyanosis  Vascular: 2+ radial, DP/PT pulses B/L  Neurological: AAOx3; no focal deficits    MEDICATIONS:  MEDICATIONS  (STANDING):  bictegravir 50 mG/emtricitabine 200 mG/tenofovir alafenamide 25 mG (BIKTARVY) 1 Tablet(s) Oral daily  cefTRIAXone   IVPB 2000 milliGRAM(s) IV Intermittent every 24 hours  escitalopram 10 milliGRAM(s) Oral daily    MEDICATIONS  (PRN):  albuterol/ipratropium for Nebulization 3 milliLiter(s) Nebulizer every 6 hours PRN Wheezing      ALLERGIES:  Allergies    No Known Allergies    Intolerances        LABS:                        10.1   5.79  )-----------( 282      ( 03 Jun 2024 05:30 )             31.0     06-02    139  |  103  |  14  ----------------------------<  105<H>  3.8   |  25  |  0.78    Ca    8.2<L>      02 Jun 2024 05:30  Phos  3.8     06-02  Mg     2.2     06-02    TPro  6.4  /  Alb  2.8<L>  /  TBili  0.3  /  DBili  x   /  AST  23  /  ALT  17  /  AlkPhos  69  06-02    PT/INR - ( 01 Jun 2024 22:07 )   PT: 12.3 sec;   INR: 1.08          PTT - ( 01 Jun 2024 22:07 )  PTT:37.2 sec  Urinalysis Basic - ( 02 Jun 2024 05:30 )    Color: x / Appearance: x / SG: x / pH: x  Gluc: 105 mg/dL / Ketone: x  / Bili: x / Urobili: x   Blood: x / Protein: x / Nitrite: x   Leuk Esterase: x / RBC: x / WBC x   Sq Epi: x / Non Sq Epi: x / Bacteria: x      CAPILLARY BLOOD GLUCOSE          RADIOLOGY & ADDITIONAL TESTS: Reviewed.   OVERNIGHT EVENTS: No AOE    SUBJECTIVE / INTERVAL HPI: Patient seen and examined at bedside. States that his SOB and CP has resolved; persistent mild cough. Denies N/V/D, no other concerns    VITAL SIGNS:  Vital Signs Last 24 Hrs  T(C): 36.9 (03 Jun 2024 05:47), Max: 36.9 (02 Jun 2024 20:24)  T(F): 98.5 (03 Jun 2024 05:47), Max: 98.5 (02 Jun 2024 20:24)  HR: 97 (03 Jun 2024 05:47) (95 - 97)  BP: 122/74 (03 Jun 2024 05:47) (112/70 - 122/74)  BP(mean): --  RR: 17 (03 Jun 2024 05:47) (17 - 18)  SpO2: 95% (03 Jun 2024 05:47) (94% - 97%)    Parameters below as of 03 Jun 2024 05:47  Patient On (Oxygen Delivery Method): room air      I&O's Summary    02 Jun 2024 07:01  -  03 Jun 2024 07:00  --------------------------------------------------------  IN: 0 mL / OUT: 700 mL / NET: -700 mL        PHYSICAL EXAM:    General: WDWN  HEENT: NC/AT; PERRL, anicteric sclera; MMM  Neck: supple  Cardiovascular: +S1/S2; RRR  Respiratory: Audible cough. CTA b/l  Gastrointestinal: soft, NT/ND; +BSx4  Extremities: WWP; no edema, clubbing or cyanosis  Vascular: 2+ radial, DP/PT pulses B/L  Neurological: AAOx3; no focal deficits  Psych: Pressured speech    MEDICATIONS:  MEDICATIONS  (STANDING):  bictegravir 50 mG/emtricitabine 200 mG/tenofovir alafenamide 25 mG (BIKTARVY) 1 Tablet(s) Oral daily  cefTRIAXone   IVPB 2000 milliGRAM(s) IV Intermittent every 24 hours  escitalopram 10 milliGRAM(s) Oral daily    MEDICATIONS  (PRN):  albuterol/ipratropium for Nebulization 3 milliLiter(s) Nebulizer every 6 hours PRN Wheezing      ALLERGIES:  Allergies    No Known Allergies    Intolerances        LABS:                        10.1   5.79  )-----------( 282      ( 03 Jun 2024 05:30 )             31.0     06-02    139  |  103  |  14  ----------------------------<  105<H>  3.8   |  25  |  0.78    Ca    8.2<L>      02 Jun 2024 05:30  Phos  3.8     06-02  Mg     2.2     06-02    TPro  6.4  /  Alb  2.8<L>  /  TBili  0.3  /  DBili  x   /  AST  23  /  ALT  17  /  AlkPhos  69  06-02    PT/INR - ( 01 Jun 2024 22:07 )   PT: 12.3 sec;   INR: 1.08          PTT - ( 01 Jun 2024 22:07 )  PTT:37.2 sec  Urinalysis Basic - ( 02 Jun 2024 05:30 )    Color: x / Appearance: x / SG: x / pH: x  Gluc: 105 mg/dL / Ketone: x  / Bili: x / Urobili: x   Blood: x / Protein: x / Nitrite: x   Leuk Esterase: x / RBC: x / WBC x   Sq Epi: x / Non Sq Epi: x / Bacteria: x      CAPILLARY BLOOD GLUCOSE          RADIOLOGY & ADDITIONAL TESTS: Reviewed.

## 2024-06-03 NOTE — DISCHARGE NOTE PROVIDER - NSDCMRMEDTOKEN_GEN_ALL_CORE_FT
bictegravir/emtricitabine/tenofovir 50 mg-200 mg-25 mg oral tablet: 1 tab(s) orally once a day  budesonide-formoterol 160 mcg-4.5 mcg/inh inhalation aerosol: 2 puff(s) inhaled 2 times a day  cefpodoxime 200 mg oral tablet: 1 tab(s) orally every 12 hours  Lexapro 10 mg oral tablet: 1 tab(s) orally once a day  ProAir HFA 90 mcg/inh inhalation aerosol: 2 puff(s) inhaled 4 times a day as needed for  bronchospasm   bictegravir/emtricitabine/tenofovir 50 mg-200 mg-25 mg oral tablet: 1 tab(s) orally once a day  budesonide-formoterol 160 mcg-4.5 mcg/inh inhalation aerosol: 2 puff(s) inhaled 2 times a day  cefpodoxime 200 mg oral tablet: 1 tab(s) orally every 12 hours  diclofenac 1% topical gel: Apply topically to affected area 4 times a day  Lexapro 10 mg oral tablet: 1 tab(s) orally once a day  ProAir HFA 90 mcg/inh inhalation aerosol: 2 puff(s) inhaled 4 times a day as needed for  bronchospasm   amoxicillin-clavulanate 500 mg-125 mg oral tablet: 500 milligram(s) orally 3 times a day  bictegravir/emtricitabine/tenofovir 50 mg-200 mg-25 mg oral tablet: 1 tab(s) orally once a day  budesonide-formoterol 160 mcg-4.5 mcg/inh inhalation aerosol: 2 puff(s) inhaled 2 times a day  diclofenac 1% topical gel: Apply topically to affected area 4 times a day  Lexapro 10 mg oral tablet: 1 tab(s) orally once a day  ProAir HFA 90 mcg/inh inhalation aerosol: 2 puff(s) inhaled 4 times a day as needed for  bronchospasm

## 2024-06-03 NOTE — DISCHARGE NOTE PROVIDER - HOSPITAL COURSE
Mr. Moreno is a 55 y/o M with a PMH of asthma/COPD (current smoker 35 pack year history), HIV (noncompliant with Biktarvy, CD4 375, VLUD on 5/21/2024), HCV, HTN, CVA 5-6 years ago, MDD/NILTON, recent treatment for prostatitis (S/p Bactrim 2/2024) admitted for sepsis 2/2 PNA.     Problem List/Main Diagnoses:    1. Sepsis due to pneumonia.   -Presenting w/ cough and chest "tightness" for 1 day. Patient meeting SIRS criteria with tachycardia, leukocytosis, and fevers. Procalcitonin add on 0.11. Patient saturating well on RA. Last VLUD, CD4 count >350 (5/21/24). Now s/p vancomycin, Zosyn in ER and CTX 2g qd x 2d inpatient.  -CXR: LLL infiltrates  -MRSA negative, urine Strep/Legionella Ag negative, RVP negative  Plan:  -Cefpodoxime 200mg PO BID x 5d  -Continue home albuterol and Symbicort for wheezing  -Follow up blood cultures    2. UTI  -UA grossly positive with pyuria, leukocytes, and bacteria. Patient reports urgency similar to when he was treated for prostatitis in 3/2024 but no dysuria. No testicular pain or discharge, denies recent sexual partners. Patient s/p Vancomycin and Zosyn in ER.  Plan:  - CTX as above  - Follow up urine culture    3. HIV (human immunodeficiency virus infection).   -Patient with h/o HIV, follows Dr. Pacheco at [facility]. Concedes to poor compliance of his Biktarvy due to depression since losing his  ~1.5 years ago. VLUD on 5/21/24 with CD4 count of 375. CD4 406 6/2.  Plan:  - Continue Biktarvy inpatient, follow up w/ Dr. Pacheco    #Hepatitis C  Patient Hep C positive. Actively viremic. Follows with [facility] as outpatient, Dr. Pacheco.   Plan:  - Defer to outpatient ID clinic for genotyping, treatment based on FIB-4, other considerations.    4. COPD (chronic obstructive pulmonary disease).   -Takes rescue inhaler and Budesonide-formoterol. Typically uses both inhalers "two or three times every month".   Plan:  - Continue home meds    5. Polysubstance use disorder.  -UTox + for amphetamines, cocaine. Patient presenting w/ intermittent chest "tightness", anxious affect and pressured speech. EKG w/o signs of ischemia. Tachycardic on arrival, now resolved. SBIRT consulted  Plan:  - against drug use  -Avoid beta blockers    6. Normocytic anemia.  -Patient presenting with HgB of 11.3. MCV of 81.8. Baseline Hgb appears to be around 11.4. Ferritin 437. Total iron 16, TIBC low to Likely 2/2 anemia of chronic inflammation i/s/o HIV & chronic infections.  Plan:  -Follow up outpatient    7. Depression.  -Patient reports history of MDD/NILTON. Takes escitalopram 10mg qd.  Plan:  - Continue SSRI    New medications/therapies: Cefpodoxime 200mg PO BID x 5d  New lines/hardware: None  Labs to be followed outpatient: PCP   Exam to be followed outpatient: None  Discharge plan: discharge to home    Physical Exam Upon Discharge:  General: WDWN  HEENT: NC/AT; PERRL, anicteric sclera; MMM  Neck: supple  Cardiovascular: +S1/S2; RRR  Respiratory: Audible cough. Scattered wheezes R>L  Gastrointestinal: soft, NT/ND; +BSx4  Extremities: WWP; no edema, clubbing or cyanosis  Vascular: 2+ radial, DP/PT pulses B/L  Neurological: AAOx3; no focal deficits  Psych: Pressured speech     Mr. Moreno is a 55 y/o M with a PMH of asthma/COPD (current smoker 35 pack year history), HIV (noncompliant with Biktarvy, CD4 375, VLUD on 5/21/2024), HCV, HTN, CVA 5-6 years ago, MDD/NILTON, recent treatment for prostatitis (S/p Bactrim 2/2024) admitted for sepsis 2/2 PNA.     Problem List/Main Diagnoses:    1. Sepsis due to pneumonia.   -Presenting w/ cough and chest "tightness" for 1 day. Patient meeting SIRS criteria with tachycardia, leukocytosis, and fevers. Procalcitonin add on 0.11. Patient saturating well on RA. Last VLUD, CD4 count >350 (5/21/24). Now s/p vancomycin, Zosyn in ER and CTX 2g qd x 2d inpatient.  -CXR: LLL infiltrates  -MRSA negative, urine Strep/Legionella Ag negative, RVP negative  Plan:  -Augmentin 500mg PO TID x 5d  -Continue home albuterol and Symbicort for wheezing  -Follow up blood cultures    2. UTI  -UA grossly positive with pyuria, leukocytes, and bacteria. Patient reports urgency similar to when he was treated for prostatitis in 3/2024 but no dysuria. No testicular pain or discharge, denies recent sexual partners. Patient s/p Vancomycin and Zosyn in ER.  Plan:  - CTX as above  - Follow up urine culture    3. HIV (human immunodeficiency virus infection).   -Patient with h/o HIV, follows Dr. Pacheco at [facility]. Concedes to poor compliance of his Biktarvy due to depression since losing his  ~1.5 years ago. VLUD on 5/21/24 with CD4 count of 375. CD4 406 6/2.  Plan:  - Continue Biktarvy inpatient, follow up w/ Dr. Pacheco    #Hepatitis C  Patient Hep C positive. Actively viremic. Follows with [facility] as outpatient, Dr. Pacheco.   Plan:  - Defer to outpatient ID clinic for genotyping, treatment based on FIB-4, other considerations.    4. COPD (chronic obstructive pulmonary disease).   -Takes rescue inhaler and Budesonide-formoterol. Typically uses both inhalers "two or three times every month".   Plan:  - Continue home meds    5. Polysubstance use disorder.  -UTox + for amphetamines, cocaine. Patient presenting w/ intermittent chest "tightness", anxious affect and pressured speech. EKG w/o signs of ischemia. Tachycardic on arrival, now resolved. SBIRT consulted  Plan:  - against drug use  -Avoid beta blockers    6. Normocytic anemia.  -Patient presenting with HgB of 11.3. MCV of 81.8. Baseline Hgb appears to be around 11.4. Ferritin 437. Total iron 16, TIBC low to Likely 2/2 anemia of chronic inflammation i/s/o HIV & chronic infections.  Plan:  -Follow up outpatient    7. Depression.  -Patient reports history of MDD/NILTON. Takes escitalopram 10mg qd.  Plan:  - Continue SSRI    New medications/therapies: Cefpodoxime 200mg PO BID x 5d  New lines/hardware: None  Labs to be followed outpatient: PCP   Exam to be followed outpatient: None  Discharge plan: discharge to home    Physical Exam Upon Discharge:  General: WDWN  HEENT: NC/AT; PERRL, anicteric sclera; MMM  Neck: supple  Cardiovascular: +S1/S2; RRR  Respiratory: Audible cough. Scattered wheezes R>L  Gastrointestinal: soft, NT/ND; +BSx4  Extremities: WWP; no edema, clubbing or cyanosis  Vascular: 2+ radial, DP/PT pulses B/L  Neurological: AAOx3; no focal deficits  Psych: Pressured speech

## 2024-06-03 NOTE — DISCHARGE NOTE PROVIDER - CARE PROVIDER_API CALL
Tammi Pacheco.  Internal Medicine  [address] [address]  Phone: [phone]  Fax: [phone]  Established Patient  Follow Up Time: 2 weeks

## 2024-06-03 NOTE — DISCHARGE NOTE PROVIDER - ATTENDING DISCHARGE PHYSICAL EXAMINATION:
General: WDWN  HEENT: NC/AT; PERRL, anicteric sclera; MMM  Neck: supple  Cardiovascular: +S1/S2; RRR  Respiratory: Audible cough. Scattered wheezes R>L  Gastrointestinal: soft, NT/ND; +BSx4  Extremities: WWP; no edema, clubbing or cyanosis  Vascular: 2+ radial, DP/PT pulses B/L  Neurological: AAOx3; no focal deficits  Psych: Pressured speech

## 2024-06-03 NOTE — DISCHARGE NOTE NURSING/CASE MANAGEMENT/SOCIAL WORK - NSDCPEFALRISK_GEN_ALL_CORE
For information on Fall & Injury Prevention, visit: [email or web address] OR  [email or web address] OR  https://www.cdc.gov/steadi/patient.html

## 2024-06-03 NOTE — DISCHARGE NOTE NURSING/CASE MANAGEMENT/SOCIAL WORK - PATIENT PORTAL LINK FT
You can access the FollowMyHealth Patient Portal offered by [facility] by registering at the following website: [email or web address]. By joining [employer]’s FollowMyHealth portal, you will also be able to view your health information using other applications (apps) compatible with our system.

## 2024-06-04 ENCOUNTER — TRANSCRIPTION ENCOUNTER (OUTPATIENT)
Age: 54
End: 2024-06-04

## 2024-06-04 RX ORDER — CEFPODOXIME PROXETIL 100 MG
1 TABLET ORAL
Qty: 10 | Refills: 0
Start: 2024-06-04 | End: 2024-06-08

## 2024-06-05 LAB
-  GENTAMICIN: SIGNIFICANT CHANGE UP
-  LINEZOLID: SIGNIFICANT CHANGE UP
-  OXACILLIN: SIGNIFICANT CHANGE UP
-  PENICILLIN: SIGNIFICANT CHANGE UP
-  RIFAMPIN: SIGNIFICANT CHANGE UP
-  TETRACYCLINE: SIGNIFICANT CHANGE UP
-  TRIMETHOPRIM/SULFAMETHOXAZOLE: SIGNIFICANT CHANGE UP
-  VANCOMYCIN: SIGNIFICANT CHANGE UP
CULTURE RESULTS: ABNORMAL
METHOD TYPE: SIGNIFICANT CHANGE UP
ORGANISM # SPEC MICROSCOPIC CNT: ABNORMAL
ORGANISM # SPEC MICROSCOPIC CNT: SIGNIFICANT CHANGE UP
SPECIMEN SOURCE: SIGNIFICANT CHANGE UP

## 2024-06-10 DIAGNOSIS — Z21 ASYMPTOMATIC HUMAN IMMUNODEFICIENCY VIRUS [HIV] INFECTION STATUS: ICD-10-CM

## 2024-06-10 DIAGNOSIS — J18.9 PNEUMONIA, UNSPECIFIED ORGANISM: ICD-10-CM

## 2024-06-10 DIAGNOSIS — F17.210 NICOTINE DEPENDENCE, CIGARETTES, UNCOMPLICATED: ICD-10-CM

## 2024-06-10 DIAGNOSIS — N30.00 ACUTE CYSTITIS WITHOUT HEMATURIA: ICD-10-CM

## 2024-06-10 DIAGNOSIS — F41.9 ANXIETY DISORDER, UNSPECIFIED: ICD-10-CM

## 2024-06-10 DIAGNOSIS — F19.10 OTHER PSYCHOACTIVE SUBSTANCE ABUSE, UNCOMPLICATED: ICD-10-CM

## 2024-06-10 DIAGNOSIS — J44.9 CHRONIC OBSTRUCTIVE PULMONARY DISEASE, UNSPECIFIED: ICD-10-CM

## 2024-06-10 DIAGNOSIS — D50.9 IRON DEFICIENCY ANEMIA, UNSPECIFIED: ICD-10-CM

## 2024-06-10 DIAGNOSIS — M16.0 BILATERAL PRIMARY OSTEOARTHRITIS OF HIP: ICD-10-CM

## 2024-06-10 DIAGNOSIS — A41.9 SEPSIS, UNSPECIFIED ORGANISM: ICD-10-CM

## 2024-06-10 DIAGNOSIS — Z91.128 PATIENT'S INTENTIONAL UNDERDOSING OF MEDICATION REGIMEN FOR OTHER REASON: ICD-10-CM

## 2024-06-10 DIAGNOSIS — Z86.73 PERSONAL HISTORY OF TRANSIENT ISCHEMIC ATTACK (TIA), AND CEREBRAL INFARCTION WITHOUT RESIDUAL DEFICITS: ICD-10-CM

## 2024-06-10 DIAGNOSIS — B17.10 ACUTE HEPATITIS C WITHOUT HEPATIC COMA: ICD-10-CM

## 2024-06-10 DIAGNOSIS — F32.9 MAJOR DEPRESSIVE DISORDER, SINGLE EPISODE, UNSPECIFIED: ICD-10-CM

## 2024-06-10 DIAGNOSIS — N39.0 URINARY TRACT INFECTION, SITE NOT SPECIFIED: ICD-10-CM

## 2024-06-20 ENCOUNTER — APPOINTMENT (OUTPATIENT)
Dept: INFECTIOUS DISEASE | Facility: CLINIC | Age: 54
End: 2024-06-20

## 2024-06-25 VITALS
OXYGEN SATURATION: 99 % | HEIGHT: 73 IN | RESPIRATION RATE: 19 BRPM | SYSTOLIC BLOOD PRESSURE: 136 MMHG | HEART RATE: 107 BPM | DIASTOLIC BLOOD PRESSURE: 82 MMHG | TEMPERATURE: 98 F

## 2024-06-25 PROCEDURE — 99285 EMERGENCY DEPT VISIT HI MDM: CPT | Mod: 25

## 2024-06-26 ENCOUNTER — INPATIENT (INPATIENT)
Facility: HOSPITAL | Age: 54
LOS: 2 days | Discharge: HOME | End: 2024-06-29
Attending: STUDENT IN AN ORGANIZED HEALTH CARE EDUCATION/TRAINING PROGRAM | Admitting: INTERNAL MEDICINE
Payer: MEDICAID

## 2024-06-26 DIAGNOSIS — R30.0 DYSURIA: ICD-10-CM

## 2024-06-26 DIAGNOSIS — Z98.890 OTHER SPECIFIED POSTPROCEDURAL STATES: Chronic | ICD-10-CM

## 2024-06-26 DIAGNOSIS — B20 HUMAN IMMUNODEFICIENCY VIRUS [HIV] DISEASE: ICD-10-CM

## 2024-06-26 DIAGNOSIS — L40.9 PSORIASIS, UNSPECIFIED: ICD-10-CM

## 2024-06-26 DIAGNOSIS — J44.9 CHRONIC OBSTRUCTIVE PULMONARY DISEASE, UNSPECIFIED: ICD-10-CM

## 2024-06-26 DIAGNOSIS — R35.0 FREQUENCY OF MICTURITION: ICD-10-CM

## 2024-06-26 DIAGNOSIS — Z29.9 ENCOUNTER FOR PROPHYLACTIC MEASURES, UNSPECIFIED: ICD-10-CM

## 2024-06-26 DIAGNOSIS — L02.211 CUTANEOUS ABSCESS OF ABDOMINAL WALL: ICD-10-CM

## 2024-06-26 DIAGNOSIS — F32.9 MAJOR DEPRESSIVE DISORDER, SINGLE EPISODE, UNSPECIFIED: ICD-10-CM

## 2024-06-26 DIAGNOSIS — B19.20 UNSPECIFIED VIRAL HEPATITIS C WITHOUT HEPATIC COMA: ICD-10-CM

## 2024-06-26 LAB
4/8 RATIO: 0.52 RATIO — LOW (ref 0.9–3.6)
ABS CD8: 837 CELLS/UL — HIGH (ref 142–740)
ALBUMIN SERPL ELPH-MCNC: 3.4 G/DL — SIGNIFICANT CHANGE UP (ref 3.3–5)
ALBUMIN SERPL ELPH-MCNC: 3.8 G/DL — SIGNIFICANT CHANGE UP (ref 3.3–5)
ALP SERPL-CCNC: 76 U/L — SIGNIFICANT CHANGE UP (ref 40–120)
ALP SERPL-CCNC: 83 U/L — SIGNIFICANT CHANGE UP (ref 40–120)
ALT FLD-CCNC: 18 U/L — SIGNIFICANT CHANGE UP (ref 10–45)
ALT FLD-CCNC: 20 U/L — SIGNIFICANT CHANGE UP (ref 10–45)
ANION GAP SERPL CALC-SCNC: 10 MMOL/L — SIGNIFICANT CHANGE UP (ref 5–17)
ANION GAP SERPL CALC-SCNC: 13 MMOL/L — SIGNIFICANT CHANGE UP (ref 5–17)
APPEARANCE UR: CLEAR — SIGNIFICANT CHANGE UP
AST SERPL-CCNC: 22 U/L — SIGNIFICANT CHANGE UP (ref 10–40)
AST SERPL-CCNC: 24 U/L — SIGNIFICANT CHANGE UP (ref 10–40)
BACTERIA # UR AUTO: NEGATIVE /HPF — SIGNIFICANT CHANGE UP
BASOPHILS # BLD AUTO: 0.03 K/UL — SIGNIFICANT CHANGE UP (ref 0–0.2)
BASOPHILS # BLD AUTO: 0.03 K/UL — SIGNIFICANT CHANGE UP (ref 0–0.2)
BASOPHILS NFR BLD AUTO: 0.3 % — SIGNIFICANT CHANGE UP (ref 0–2)
BASOPHILS NFR BLD AUTO: 0.3 % — SIGNIFICANT CHANGE UP (ref 0–2)
BILIRUB SERPL-MCNC: 0.3 MG/DL — SIGNIFICANT CHANGE UP (ref 0.2–1.2)
BILIRUB SERPL-MCNC: 0.4 MG/DL — SIGNIFICANT CHANGE UP (ref 0.2–1.2)
BILIRUB UR-MCNC: NEGATIVE — SIGNIFICANT CHANGE UP
BUN SERPL-MCNC: 11 MG/DL — SIGNIFICANT CHANGE UP (ref 7–23)
BUN SERPL-MCNC: 16 MG/DL — SIGNIFICANT CHANGE UP (ref 7–23)
CALCIUM SERPL-MCNC: 8.9 MG/DL — SIGNIFICANT CHANGE UP (ref 8.4–10.5)
CALCIUM SERPL-MCNC: 8.9 MG/DL — SIGNIFICANT CHANGE UP (ref 8.4–10.5)
CAST: 0 /LPF — SIGNIFICANT CHANGE UP (ref 0–4)
CD3 BLASTS SPEC-ACNC: 1284 CELLS/UL — SIGNIFICANT CHANGE UP (ref 672–1870)
CD3 BLASTS SPEC-ACNC: 81 % — SIGNIFICANT CHANGE UP (ref 59–83)
CD4 %: 27 % — LOW (ref 30–62)
CD8 %: 53 % — HIGH (ref 12–36)
CHLORIDE SERPL-SCNC: 100 MMOL/L — SIGNIFICANT CHANGE UP (ref 96–108)
CHLORIDE SERPL-SCNC: 104 MMOL/L — SIGNIFICANT CHANGE UP (ref 96–108)
CO2 SERPL-SCNC: 21 MMOL/L — LOW (ref 22–31)
CO2 SERPL-SCNC: 22 MMOL/L — SIGNIFICANT CHANGE UP (ref 22–31)
COLOR SPEC: YELLOW — SIGNIFICANT CHANGE UP
CREAT SERPL-MCNC: 0.68 MG/DL — SIGNIFICANT CHANGE UP (ref 0.5–1.3)
CREAT SERPL-MCNC: 0.74 MG/DL — SIGNIFICANT CHANGE UP (ref 0.5–1.3)
DIFF PNL FLD: ABNORMAL
EGFR: 108 ML/MIN/1.73M2 — SIGNIFICANT CHANGE UP
EGFR: 108 ML/MIN/1.73M2 — SIGNIFICANT CHANGE UP
EGFR: 111 ML/MIN/1.73M2 — SIGNIFICANT CHANGE UP
EGFR: 111 ML/MIN/1.73M2 — SIGNIFICANT CHANGE UP
EOSINOPHIL # BLD AUTO: 0.49 K/UL — SIGNIFICANT CHANGE UP (ref 0–0.5)
EOSINOPHIL # BLD AUTO: 0.56 K/UL — HIGH (ref 0–0.5)
EOSINOPHIL NFR BLD AUTO: 4.3 % — SIGNIFICANT CHANGE UP (ref 0–6)
EOSINOPHIL NFR BLD AUTO: 5.3 % — SIGNIFICANT CHANGE UP (ref 0–6)
GLUCOSE SERPL-MCNC: 100 MG/DL — HIGH (ref 70–99)
GLUCOSE SERPL-MCNC: 116 MG/DL — HIGH (ref 70–99)
GLUCOSE UR QL: NEGATIVE MG/DL — SIGNIFICANT CHANGE UP
GRAM STN FLD: ABNORMAL
GRAM STN FLD: ABNORMAL
HCT VFR BLD CALC: 33.2 % — LOW (ref 39–50)
HCT VFR BLD CALC: 35 % — LOW (ref 39–50)
HCV AB S/CO SERPL IA: 201.7 S/CO — HIGH
HCV AB SERPL-IMP: REACTIVE
HGB BLD-MCNC: 11.3 G/DL — LOW (ref 13–17)
HGB BLD-MCNC: 11.6 G/DL — LOW (ref 13–17)
IMM GRANULOCYTES NFR BLD AUTO: 0.4 % — SIGNIFICANT CHANGE UP (ref 0–0.9)
IMM GRANULOCYTES NFR BLD AUTO: 0.5 % — SIGNIFICANT CHANGE UP (ref 0–0.9)
KETONES UR-MCNC: NEGATIVE MG/DL — SIGNIFICANT CHANGE UP
LACTATE SERPL-SCNC: 1.5 MMOL/L — SIGNIFICANT CHANGE UP (ref 0.5–2)
LACTATE SERPL-SCNC: 3.2 MMOL/L — HIGH (ref 0.5–2)
LEUKOCYTE ESTERASE UR-ACNC: ABNORMAL
LIDOCAIN IGE QN: 25 U/L — SIGNIFICANT CHANGE UP (ref 7–60)
LYMPHOCYTES # BLD AUTO: 1.39 K/UL — SIGNIFICANT CHANGE UP (ref 1–3.3)
LYMPHOCYTES # BLD AUTO: 1.92 K/UL — SIGNIFICANT CHANGE UP (ref 1–3.3)
LYMPHOCYTES # BLD AUTO: 13.2 % — SIGNIFICANT CHANGE UP (ref 13–44)
LYMPHOCYTES # BLD AUTO: 16.9 % — SIGNIFICANT CHANGE UP (ref 13–44)
MAGNESIUM SERPL-MCNC: 1.6 MG/DL — SIGNIFICANT CHANGE UP (ref 1.6–2.6)
MAGNESIUM SERPL-MCNC: 1.7 MG/DL — SIGNIFICANT CHANGE UP (ref 1.6–2.6)
MCHC RBC-ENTMCNC: 28.6 PG — SIGNIFICANT CHANGE UP (ref 27–34)
MCHC RBC-ENTMCNC: 29.1 PG — SIGNIFICANT CHANGE UP (ref 27–34)
MCHC RBC-ENTMCNC: 33.1 GM/DL — SIGNIFICANT CHANGE UP (ref 32–36)
MCHC RBC-ENTMCNC: 34 GM/DL — SIGNIFICANT CHANGE UP (ref 32–36)
MCV RBC AUTO: 85.6 FL — SIGNIFICANT CHANGE UP (ref 80–100)
MCV RBC AUTO: 86.4 FL — SIGNIFICANT CHANGE UP (ref 80–100)
MONOCYTES # BLD AUTO: 0.92 K/UL — HIGH (ref 0–0.9)
MONOCYTES # BLD AUTO: 1.02 K/UL — HIGH (ref 0–0.9)
MONOCYTES NFR BLD AUTO: 8.8 % — SIGNIFICANT CHANGE UP (ref 2–14)
MONOCYTES NFR BLD AUTO: 9 % — SIGNIFICANT CHANGE UP (ref 2–14)
NEUTROPHILS # BLD AUTO: 7.57 K/UL — HIGH (ref 1.8–7.4)
NEUTROPHILS # BLD AUTO: 7.87 K/UL — HIGH (ref 1.8–7.4)
NEUTROPHILS NFR BLD AUTO: 69 % — SIGNIFICANT CHANGE UP (ref 43–77)
NEUTROPHILS NFR BLD AUTO: 72 % — SIGNIFICANT CHANGE UP (ref 43–77)
NITRITE UR-MCNC: NEGATIVE — SIGNIFICANT CHANGE UP
NRBC # BLD: 0 /100 WBCS — SIGNIFICANT CHANGE UP (ref 0–0)
NRBC # BLD: 0 /100 WBCS — SIGNIFICANT CHANGE UP (ref 0–0)
NRBC BLD-RTO: 0 /100 WBCS — SIGNIFICANT CHANGE UP (ref 0–0)
NRBC BLD-RTO: 0 /100 WBCS — SIGNIFICANT CHANGE UP (ref 0–0)
PH UR: 6 — SIGNIFICANT CHANGE UP (ref 5–8)
PHOSPHATE SERPL-MCNC: 3 MG/DL — SIGNIFICANT CHANGE UP (ref 2.5–4.5)
PLATELET # BLD AUTO: 154 K/UL — SIGNIFICANT CHANGE UP (ref 150–400)
PLATELET # BLD AUTO: 163 K/UL — SIGNIFICANT CHANGE UP (ref 150–400)
POTASSIUM SERPL-MCNC: 3.4 MMOL/L — LOW (ref 3.5–5.3)
POTASSIUM SERPL-MCNC: 3.8 MMOL/L — SIGNIFICANT CHANGE UP (ref 3.5–5.3)
POTASSIUM SERPL-SCNC: 3.4 MMOL/L — LOW (ref 3.5–5.3)
POTASSIUM SERPL-SCNC: 3.8 MMOL/L — SIGNIFICANT CHANGE UP (ref 3.5–5.3)
PROT SERPL-MCNC: 7.3 G/DL — SIGNIFICANT CHANGE UP (ref 6–8.3)
PROT SERPL-MCNC: 8.2 G/DL — SIGNIFICANT CHANGE UP (ref 6–8.3)
PROT UR-MCNC: 30 MG/DL
RBC # BLD: 3.88 M/UL — LOW (ref 4.2–5.8)
RBC # BLD: 4.05 M/UL — LOW (ref 4.2–5.8)
RBC # FLD: 14.8 % — HIGH (ref 10.3–14.5)
RBC # FLD: 14.9 % — HIGH (ref 10.3–14.5)
RBC CASTS # UR COMP ASSIST: 1 /HPF — SIGNIFICANT CHANGE UP (ref 0–4)
SODIUM SERPL-SCNC: 135 MMOL/L — SIGNIFICANT CHANGE UP (ref 135–145)
SODIUM SERPL-SCNC: 135 MMOL/L — SIGNIFICANT CHANGE UP (ref 135–145)
SP GR SPEC: 1.02 — SIGNIFICANT CHANGE UP (ref 1–1.03)
SPECIMEN SOURCE: SIGNIFICANT CHANGE UP
SPECIMEN SOURCE: SIGNIFICANT CHANGE UP
SQUAMOUS # UR AUTO: 1 /HPF — SIGNIFICANT CHANGE UP (ref 0–5)
T-CELL CD4 SUBSET PNL BLD: 435 CELLS/UL — LOW (ref 489–1457)
UROBILINOGEN FLD QL: 1 MG/DL — SIGNIFICANT CHANGE UP (ref 0.2–1)
WBC # BLD: 10.51 K/UL — HIGH (ref 3.8–10.5)
WBC # BLD: 11.39 K/UL — HIGH (ref 3.8–10.5)
WBC # FLD AUTO: 10.51 K/UL — HIGH (ref 3.8–10.5)
WBC # FLD AUTO: 11.39 K/UL — HIGH (ref 3.8–10.5)
WBC UR QL: 75 /HPF — HIGH (ref 0–5)

## 2024-06-26 PROCEDURE — 74177 CT ABD & PELVIS W/CONTRAST: CPT | Mod: 26,MC

## 2024-06-26 PROCEDURE — 99284 EMERGENCY DEPT VISIT MOD MDM: CPT | Mod: GC

## 2024-06-26 PROCEDURE — 99223 1ST HOSP IP/OBS HIGH 75: CPT | Mod: GC

## 2024-06-26 RX ORDER — NICOTINE POLACRILEX 4 MG/1
1 GUM, CHEWING ORAL DAILY
Refills: 0 | Status: DISCONTINUED | OUTPATIENT
Start: 2024-06-26 | End: 2024-06-29

## 2024-06-26 RX ORDER — LIDOCAINE HCL/PF 10 MG/ML
10 VIAL (ML) INJECTION ONCE
Refills: 0 | Status: COMPLETED | OUTPATIENT
Start: 2024-06-26 | End: 2024-06-26

## 2024-06-26 RX ORDER — VANCOMYCIN HCL IN 5 % DEXTROSE 1.5G/250ML
1000 PLASTIC BAG, INJECTION (ML) INTRAVENOUS ONCE
Refills: 0 | Status: COMPLETED | OUTPATIENT
Start: 2024-06-26 | End: 2024-06-26

## 2024-06-26 RX ORDER — ESCITALOPRAM OXALATE 20 MG/1
10 TABLET ORAL DAILY
Refills: 0 | Status: DISCONTINUED | OUTPATIENT
Start: 2024-06-26 | End: 2024-06-29

## 2024-06-26 RX ORDER — BUDESONIDE AND FORMOTEROL FUMARATE DIHYDRATE 80; 4.5 UG/1; UG/1
2 AEROSOL RESPIRATORY (INHALATION)
Refills: 0 | Status: DISCONTINUED | OUTPATIENT
Start: 2024-06-26 | End: 2024-06-29

## 2024-06-26 RX ORDER — IBUPROFEN 200 MG
400 TABLET ORAL EVERY 8 HOURS
Refills: 0 | Status: DISCONTINUED | OUTPATIENT
Start: 2024-06-26 | End: 2024-06-29

## 2024-06-26 RX ORDER — ACETAMINOPHEN 500 MG/5ML
650 LIQUID (ML) ORAL EVERY 6 HOURS
Refills: 0 | Status: DISCONTINUED | OUTPATIENT
Start: 2024-06-26 | End: 2024-06-29

## 2024-06-26 RX ORDER — PIPERACILLIN-TAZO-DEXTROSE,ISO 3.375G/5
3.38 IV SOLUTION, PIGGYBACK PREMIX FROZEN(ML) INTRAVENOUS ONCE
Refills: 0 | Status: COMPLETED | OUTPATIENT
Start: 2024-06-26 | End: 2024-06-26

## 2024-06-26 RX ORDER — CEFTRIAXONE 500 MG/1
1000 INJECTION, POWDER, FOR SOLUTION INTRAMUSCULAR; INTRAVENOUS EVERY 24 HOURS
Refills: 0 | Status: DISCONTINUED | OUTPATIENT
Start: 2024-06-26 | End: 2024-06-26

## 2024-06-26 RX ORDER — HYDROCORTISONE 10 MG/G
1 CREAM TOPICAL DAILY
Refills: 0 | Status: DISCONTINUED | OUTPATIENT
Start: 2024-06-26 | End: 2024-06-29

## 2024-06-26 RX ORDER — ENOXAPARIN SODIUM 100 MG/ML
40 INJECTION SUBCUTANEOUS EVERY 24 HOURS
Refills: 0 | Status: DISCONTINUED | OUTPATIENT
Start: 2024-06-26 | End: 2024-06-29

## 2024-06-26 RX ORDER — BICTEGRAVIR SODIUM, EMTRICITABINE, AND TENOFOVIR ALAFENAMIDE FUMARATE 50; 200; 25 MG/1; MG/1; MG/1
1 TABLET ORAL DAILY
Refills: 0 | Status: DISCONTINUED | OUTPATIENT
Start: 2024-06-26 | End: 2024-06-29

## 2024-06-26 RX ORDER — VANCOMYCIN HCL IN 5 % DEXTROSE 1.5G/250ML
1250 PLASTIC BAG, INJECTION (ML) INTRAVENOUS EVERY 12 HOURS
Refills: 0 | Status: COMPLETED | OUTPATIENT
Start: 2024-06-26 | End: 2024-06-27

## 2024-06-26 RX ORDER — ALBUTEROL SULFATE 2.5 MG/3ML
2 VIAL, NEBULIZER (ML) INHALATION EVERY 6 HOURS
Refills: 0 | Status: DISCONTINUED | OUTPATIENT
Start: 2024-06-26 | End: 2024-06-29

## 2024-06-26 RX ORDER — ACETAMINOPHEN 500 MG/5ML
1000 LIQUID (ML) ORAL ONCE
Refills: 0 | Status: COMPLETED | OUTPATIENT
Start: 2024-06-26 | End: 2024-06-26

## 2024-06-26 RX ORDER — NICOTINE POLACRILEX 4 MG/1
1 GUM, CHEWING ORAL DAILY
Refills: 0 | Status: DISCONTINUED | OUTPATIENT
Start: 2024-06-26 | End: 2024-06-26

## 2024-06-26 RX ADMIN — Medication 250 MILLIGRAM(S): at 01:27

## 2024-06-26 RX ADMIN — Medication 400 MILLIGRAM(S): at 14:52

## 2024-06-26 RX ADMIN — ENOXAPARIN SODIUM 40 MILLIGRAM(S): 100 INJECTION SUBCUTANEOUS at 06:24

## 2024-06-26 RX ADMIN — NICOTINE POLACRILEX 1 PATCH: 4 GUM, CHEWING ORAL at 11:16

## 2024-06-26 RX ADMIN — Medication 40 MILLIEQUIVALENT(S): at 06:24

## 2024-06-26 RX ADMIN — NICOTINE POLACRILEX 1 PATCH: 4 GUM, CHEWING ORAL at 19:49

## 2024-06-26 RX ADMIN — BICTEGRAVIR SODIUM, EMTRICITABINE, AND TENOFOVIR ALAFENAMIDE FUMARATE 1 TABLET(S): 50; 200; 25 TABLET ORAL at 12:57

## 2024-06-26 RX ADMIN — Medication 20 MILLIEQUIVALENT(S): at 11:16

## 2024-06-26 RX ADMIN — ESCITALOPRAM OXALATE 10 MILLIGRAM(S): 20 TABLET ORAL at 11:16

## 2024-06-26 RX ADMIN — Medication 166.67 MILLIGRAM(S): at 12:57

## 2024-06-26 RX ADMIN — Medication 1000 MILLILITER(S): at 00:19

## 2024-06-26 RX ADMIN — Medication 10 MILLILITER(S): at 04:12

## 2024-06-26 RX ADMIN — Medication 400 MILLIGRAM(S): at 00:53

## 2024-06-26 RX ADMIN — Medication 200 GRAM(S): at 00:19

## 2024-06-26 RX ADMIN — Medication 650 MILLIGRAM(S): at 06:28

## 2024-06-26 RX ADMIN — BUDESONIDE AND FORMOTEROL FUMARATE DIHYDRATE 2 PUFF(S): 80; 4.5 AEROSOL RESPIRATORY (INHALATION) at 11:16

## 2024-06-27 ENCOUNTER — TRANSCRIPTION ENCOUNTER (OUTPATIENT)
Age: 54
End: 2024-06-27

## 2024-06-27 DIAGNOSIS — L02.219 CUTANEOUS ABSCESS OF TRUNK, UNSPECIFIED: ICD-10-CM

## 2024-06-27 DIAGNOSIS — J44.9 CHRONIC OBSTRUCTIVE PULMONARY DISEASE, UNSPECIFIED: ICD-10-CM

## 2024-06-27 DIAGNOSIS — F17.200 NICOTINE DEPENDENCE, UNSPECIFIED, UNCOMPLICATED: ICD-10-CM

## 2024-06-27 DIAGNOSIS — F32.A DEPRESSION, UNSPECIFIED: ICD-10-CM

## 2024-06-27 DIAGNOSIS — E87.6 HYPOKALEMIA: ICD-10-CM

## 2024-06-27 LAB
ALBUMIN SERPL ELPH-MCNC: 3.5 G/DL — SIGNIFICANT CHANGE UP (ref 3.3–5)
ALP SERPL-CCNC: 90 U/L — SIGNIFICANT CHANGE UP (ref 40–120)
ALT FLD-CCNC: 20 U/L — SIGNIFICANT CHANGE UP (ref 10–45)
ANION GAP SERPL CALC-SCNC: 12 MMOL/L — SIGNIFICANT CHANGE UP (ref 5–17)
AST SERPL-CCNC: 26 U/L — SIGNIFICANT CHANGE UP (ref 10–40)
BASOPHILS # BLD AUTO: 0.02 K/UL — SIGNIFICANT CHANGE UP (ref 0–0.2)
BASOPHILS NFR BLD AUTO: 0.3 % — SIGNIFICANT CHANGE UP (ref 0–2)
BILIRUB SERPL-MCNC: 0.2 MG/DL — SIGNIFICANT CHANGE UP (ref 0.2–1.2)
BILIRUB SERPL-MCNC: 0.3 MG/DL — SIGNIFICANT CHANGE UP (ref 0.2–1.2)
BUN SERPL-MCNC: 8 MG/DL — SIGNIFICANT CHANGE UP (ref 7–23)
CALCIUM SERPL-MCNC: 9.3 MG/DL — SIGNIFICANT CHANGE UP (ref 8.4–10.5)
CHLORIDE SERPL-SCNC: 102 MMOL/L — SIGNIFICANT CHANGE UP (ref 96–108)
CO2 SERPL-SCNC: 23 MMOL/L — SIGNIFICANT CHANGE UP (ref 22–31)
CREAT SERPL-MCNC: 0.66 MG/DL — SIGNIFICANT CHANGE UP (ref 0.5–1.3)
CREAT SERPL-MCNC: 0.73 MG/DL — SIGNIFICANT CHANGE UP (ref 0.5–1.3)
CULTURE RESULTS: NO GROWTH — SIGNIFICANT CHANGE UP
EGFR: 109 ML/MIN/1.73M2 — SIGNIFICANT CHANGE UP
EGFR: 109 ML/MIN/1.73M2 — SIGNIFICANT CHANGE UP
EGFR: 112 ML/MIN/1.73M2 — SIGNIFICANT CHANGE UP
EGFR: 112 ML/MIN/1.73M2 — SIGNIFICANT CHANGE UP
EOSINOPHIL # BLD AUTO: 0.22 K/UL — SIGNIFICANT CHANGE UP (ref 0–0.5)
EOSINOPHIL NFR BLD AUTO: 2.8 % — SIGNIFICANT CHANGE UP (ref 0–6)
GLUCOSE SERPL-MCNC: 104 MG/DL — HIGH (ref 70–99)
HCT VFR BLD CALC: 36.1 % — LOW (ref 39–50)
HCV RNA FLD QL NAA+PROBE: SIGNIFICANT CHANGE UP
HCV RNA SPEC QL PROBE+SIG AMP: DETECTED
HGB BLD-MCNC: 12 G/DL — LOW (ref 13–17)
HIV-1 VIRAL LOAD RESULT: ABNORMAL
HIV1 RNA # SERPL NAA+PROBE: SIGNIFICANT CHANGE UP
HIV1 RNA SER-IMP: SIGNIFICANT CHANGE UP
HIV1 RNA SERPL NAA+PROBE-ACNC: ABNORMAL
HIV1 RNA SERPL NAA+PROBE-LOG#: 4.42 — SIGNIFICANT CHANGE UP
IMM GRANULOCYTES NFR BLD AUTO: 0.5 % — SIGNIFICANT CHANGE UP (ref 0–0.9)
INR BLD: 0.98 — SIGNIFICANT CHANGE UP (ref 0.85–1.18)
LYMPHOCYTES # BLD AUTO: 1.16 K/UL — SIGNIFICANT CHANGE UP (ref 1–3.3)
LYMPHOCYTES # BLD AUTO: 14.9 % — SIGNIFICANT CHANGE UP (ref 13–44)
MAGNESIUM SERPL-MCNC: 1.8 MG/DL — SIGNIFICANT CHANGE UP (ref 1.6–2.6)
MCHC RBC-ENTMCNC: 28.6 PG — SIGNIFICANT CHANGE UP (ref 27–34)
MCHC RBC-ENTMCNC: 33.2 GM/DL — SIGNIFICANT CHANGE UP (ref 32–36)
MCV RBC AUTO: 86 FL — SIGNIFICANT CHANGE UP (ref 80–100)
MELD SCORE WITH DIALYSIS: 20 POINTS — SIGNIFICANT CHANGE UP
MELD SCORE WITHOUT DIALYSIS: 6 POINTS — SIGNIFICANT CHANGE UP
MONOCYTES # BLD AUTO: 0.55 K/UL — SIGNIFICANT CHANGE UP (ref 0–0.9)
MONOCYTES NFR BLD AUTO: 7 % — SIGNIFICANT CHANGE UP (ref 2–14)
NEUTROPHILS # BLD AUTO: 5.82 K/UL — SIGNIFICANT CHANGE UP (ref 1.8–7.4)
NEUTROPHILS NFR BLD AUTO: 74.5 % — SIGNIFICANT CHANGE UP (ref 43–77)
NRBC # BLD: 0 /100 WBCS — SIGNIFICANT CHANGE UP (ref 0–0)
NRBC BLD-RTO: 0 /100 WBCS — SIGNIFICANT CHANGE UP (ref 0–0)
PHOSPHATE SERPL-MCNC: 4 MG/DL — SIGNIFICANT CHANGE UP (ref 2.5–4.5)
PLATELET # BLD AUTO: 169 K/UL — SIGNIFICANT CHANGE UP (ref 150–400)
POTASSIUM SERPL-MCNC: 4.3 MMOL/L — SIGNIFICANT CHANGE UP (ref 3.5–5.3)
POTASSIUM SERPL-SCNC: 4.3 MMOL/L — SIGNIFICANT CHANGE UP (ref 3.5–5.3)
PROT SERPL-MCNC: 8.2 G/DL — SIGNIFICANT CHANGE UP (ref 6–8.3)
PROTHROM AB SERPL-ACNC: 11.2 SEC — SIGNIFICANT CHANGE UP (ref 9.5–13)
RBC # BLD: 4.2 M/UL — SIGNIFICANT CHANGE UP (ref 4.2–5.8)
RBC # FLD: 15 % — HIGH (ref 10.3–14.5)
SODIUM SERPL-SCNC: 137 MMOL/L — SIGNIFICANT CHANGE UP (ref 135–145)
SODIUM SERPL-SCNC: 137 MMOL/L — SIGNIFICANT CHANGE UP (ref 135–145)
SPECIMEN SOURCE: SIGNIFICANT CHANGE UP
VANCOMYCIN TROUGH SERPL-MCNC: 7.1 UG/ML — LOW (ref 10–20)
WBC # BLD: 7.81 K/UL — SIGNIFICANT CHANGE UP (ref 3.8–10.5)
WBC # FLD AUTO: 7.81 K/UL — SIGNIFICANT CHANGE UP (ref 3.8–10.5)

## 2024-06-27 PROCEDURE — 99233 SBSQ HOSP IP/OBS HIGH 50: CPT | Mod: GC

## 2024-06-27 RX ORDER — VANCOMYCIN HCL IN 5 % DEXTROSE 1.5G/250ML
1500 PLASTIC BAG, INJECTION (ML) INTRAVENOUS EVERY 12 HOURS
Refills: 0 | Status: COMPLETED | OUTPATIENT
Start: 2024-06-27 | End: 2024-06-28

## 2024-06-27 RX ORDER — ACETAMINOPHEN 500 MG/5ML
1000 LIQUID (ML) ORAL ONCE
Refills: 0 | Status: COMPLETED | OUTPATIENT
Start: 2024-06-27 | End: 2024-06-27

## 2024-06-27 RX ORDER — CEFTRIAXONE 500 MG/1
2000 INJECTION, POWDER, FOR SOLUTION INTRAMUSCULAR; INTRAVENOUS EVERY 24 HOURS
Refills: 0 | Status: DISCONTINUED | OUTPATIENT
Start: 2024-06-27 | End: 2024-06-27

## 2024-06-27 RX ADMIN — Medication 400 MILLIGRAM(S): at 10:18

## 2024-06-27 RX ADMIN — Medication 650 MILLIGRAM(S): at 02:07

## 2024-06-27 RX ADMIN — Medication 650 MILLIGRAM(S): at 01:37

## 2024-06-27 RX ADMIN — NICOTINE POLACRILEX 1 PATCH: 4 GUM, CHEWING ORAL at 11:00

## 2024-06-27 RX ADMIN — Medication 1000 MILLIGRAM(S): at 13:14

## 2024-06-27 RX ADMIN — NICOTINE POLACRILEX 1 PATCH: 4 GUM, CHEWING ORAL at 12:56

## 2024-06-27 RX ADMIN — ENOXAPARIN SODIUM 40 MILLIGRAM(S): 100 INJECTION SUBCUTANEOUS at 07:02

## 2024-06-27 RX ADMIN — NICOTINE POLACRILEX 1 PATCH: 4 GUM, CHEWING ORAL at 07:23

## 2024-06-27 RX ADMIN — CEFTRIAXONE 100 MILLIGRAM(S): 500 INJECTION, POWDER, FOR SOLUTION INTRAMUSCULAR; INTRAVENOUS at 10:18

## 2024-06-27 RX ADMIN — NICOTINE POLACRILEX 1 PATCH: 4 GUM, CHEWING ORAL at 18:10

## 2024-06-27 RX ADMIN — BICTEGRAVIR SODIUM, EMTRICITABINE, AND TENOFOVIR ALAFENAMIDE FUMARATE 1 TABLET(S): 50; 200; 25 TABLET ORAL at 12:57

## 2024-06-27 RX ADMIN — ESCITALOPRAM OXALATE 10 MILLIGRAM(S): 20 TABLET ORAL at 12:57

## 2024-06-27 RX ADMIN — BUDESONIDE AND FORMOTEROL FUMARATE DIHYDRATE 2 PUFF(S): 80; 4.5 AEROSOL RESPIRATORY (INHALATION) at 20:59

## 2024-06-27 RX ADMIN — Medication 166.67 MILLIGRAM(S): at 01:20

## 2024-06-27 RX ADMIN — Medication 300 MILLIGRAM(S): at 15:14

## 2024-06-27 RX ADMIN — BUDESONIDE AND FORMOTEROL FUMARATE DIHYDRATE 2 PUFF(S): 80; 4.5 AEROSOL RESPIRATORY (INHALATION) at 01:38

## 2024-06-27 RX ADMIN — BUDESONIDE AND FORMOTEROL FUMARATE DIHYDRATE 2 PUFF(S): 80; 4.5 AEROSOL RESPIRATORY (INHALATION) at 09:11

## 2024-06-28 LAB
-  CLINDAMYCIN: SIGNIFICANT CHANGE UP
-  ERYTHROMYCIN: SIGNIFICANT CHANGE UP
-  GENTAMICIN: SIGNIFICANT CHANGE UP
-  LINEZOLID: SIGNIFICANT CHANGE UP
-  OXACILLIN: SIGNIFICANT CHANGE UP
-  PENICILLIN: SIGNIFICANT CHANGE UP
-  RIFAMPIN: SIGNIFICANT CHANGE UP
-  TETRACYCLINE: SIGNIFICANT CHANGE UP
-  TRIMETHOPRIM/SULFAMETHOXAZOLE: SIGNIFICANT CHANGE UP
-  VANCOMYCIN: SIGNIFICANT CHANGE UP
ALBUMIN SERPL ELPH-MCNC: 3.4 G/DL — SIGNIFICANT CHANGE UP (ref 3.3–5)
ALP SERPL-CCNC: 85 U/L — SIGNIFICANT CHANGE UP (ref 40–120)
ALT FLD-CCNC: 24 U/L — SIGNIFICANT CHANGE UP (ref 10–45)
ANION GAP SERPL CALC-SCNC: 10 MMOL/L — SIGNIFICANT CHANGE UP (ref 5–17)
AST SERPL-CCNC: 22 U/L — SIGNIFICANT CHANGE UP (ref 10–40)
BASOPHILS # BLD AUTO: 0.03 K/UL — SIGNIFICANT CHANGE UP (ref 0–0.2)
BASOPHILS NFR BLD AUTO: 0.5 % — SIGNIFICANT CHANGE UP (ref 0–2)
BILIRUB SERPL-MCNC: <0.2 MG/DL — SIGNIFICANT CHANGE UP (ref 0.2–1.2)
BUN SERPL-MCNC: 7 MG/DL — SIGNIFICANT CHANGE UP (ref 7–23)
CALCIUM SERPL-MCNC: 9.2 MG/DL — SIGNIFICANT CHANGE UP (ref 8.4–10.5)
CHLORIDE SERPL-SCNC: 101 MMOL/L — SIGNIFICANT CHANGE UP (ref 96–108)
CO2 SERPL-SCNC: 26 MMOL/L — SIGNIFICANT CHANGE UP (ref 22–31)
CREAT SERPL-MCNC: 0.63 MG/DL — SIGNIFICANT CHANGE UP (ref 0.5–1.3)
EGFR: 114 ML/MIN/1.73M2 — SIGNIFICANT CHANGE UP
EGFR: 114 ML/MIN/1.73M2 — SIGNIFICANT CHANGE UP
EOSINOPHIL # BLD AUTO: 0.3 K/UL — SIGNIFICANT CHANGE UP (ref 0–0.5)
EOSINOPHIL NFR BLD AUTO: 5.5 % — SIGNIFICANT CHANGE UP (ref 0–6)
GLUCOSE SERPL-MCNC: 124 MG/DL — HIGH (ref 70–99)
HCT VFR BLD CALC: 36.6 % — LOW (ref 39–50)
HGB BLD-MCNC: 11.9 G/DL — LOW (ref 13–17)
IMM GRANULOCYTES NFR BLD AUTO: 0.5 % — SIGNIFICANT CHANGE UP (ref 0–0.9)
LYMPHOCYTES # BLD AUTO: 1.32 K/UL — SIGNIFICANT CHANGE UP (ref 1–3.3)
LYMPHOCYTES # BLD AUTO: 24.2 % — SIGNIFICANT CHANGE UP (ref 13–44)
MAGNESIUM SERPL-MCNC: 2 MG/DL — SIGNIFICANT CHANGE UP (ref 1.6–2.6)
MCHC RBC-ENTMCNC: 27.9 PG — SIGNIFICANT CHANGE UP (ref 27–34)
MCHC RBC-ENTMCNC: 32.5 GM/DL — SIGNIFICANT CHANGE UP (ref 32–36)
MCV RBC AUTO: 85.9 FL — SIGNIFICANT CHANGE UP (ref 80–100)
METHOD TYPE: SIGNIFICANT CHANGE UP
MONOCYTES # BLD AUTO: 0.48 K/UL — SIGNIFICANT CHANGE UP (ref 0–0.9)
MONOCYTES NFR BLD AUTO: 8.8 % — SIGNIFICANT CHANGE UP (ref 2–14)
NEUTROPHILS # BLD AUTO: 3.3 K/UL — SIGNIFICANT CHANGE UP (ref 1.8–7.4)
NEUTROPHILS NFR BLD AUTO: 60.5 % — SIGNIFICANT CHANGE UP (ref 43–77)
NRBC # BLD: 0 /100 WBCS — SIGNIFICANT CHANGE UP (ref 0–0)
NRBC BLD-RTO: 0 /100 WBCS — SIGNIFICANT CHANGE UP (ref 0–0)
PHOSPHATE SERPL-MCNC: 4.3 MG/DL — SIGNIFICANT CHANGE UP (ref 2.5–4.5)
PLATELET # BLD AUTO: 182 K/UL — SIGNIFICANT CHANGE UP (ref 150–400)
POTASSIUM SERPL-MCNC: 4.1 MMOL/L — SIGNIFICANT CHANGE UP (ref 3.5–5.3)
POTASSIUM SERPL-SCNC: 4.1 MMOL/L — SIGNIFICANT CHANGE UP (ref 3.5–5.3)
PROT SERPL-MCNC: 7.9 G/DL — SIGNIFICANT CHANGE UP (ref 6–8.3)
RBC # BLD: 4.26 M/UL — SIGNIFICANT CHANGE UP (ref 4.2–5.8)
RBC # FLD: 14.4 % — SIGNIFICANT CHANGE UP (ref 10.3–14.5)
SODIUM SERPL-SCNC: 137 MMOL/L — SIGNIFICANT CHANGE UP (ref 135–145)
WBC # BLD: 5.46 K/UL — SIGNIFICANT CHANGE UP (ref 3.8–10.5)
WBC # FLD AUTO: 5.46 K/UL — SIGNIFICANT CHANGE UP (ref 3.8–10.5)

## 2024-06-28 PROCEDURE — 99233 SBSQ HOSP IP/OBS HIGH 50: CPT | Mod: GC

## 2024-06-28 RX ORDER — HYDROCORTISONE 10 MG/G
1 CREAM TOPICAL
Qty: 0 | Refills: 0 | DISCHARGE
Start: 2024-06-28

## 2024-06-28 RX ADMIN — NICOTINE POLACRILEX 1 PATCH: 4 GUM, CHEWING ORAL at 11:42

## 2024-06-28 RX ADMIN — BUDESONIDE AND FORMOTEROL FUMARATE DIHYDRATE 2 PUFF(S): 80; 4.5 AEROSOL RESPIRATORY (INHALATION) at 22:14

## 2024-06-28 RX ADMIN — BUDESONIDE AND FORMOTEROL FUMARATE DIHYDRATE 2 PUFF(S): 80; 4.5 AEROSOL RESPIRATORY (INHALATION) at 09:30

## 2024-06-28 RX ADMIN — NICOTINE POLACRILEX 1 PATCH: 4 GUM, CHEWING ORAL at 19:15

## 2024-06-28 RX ADMIN — BICTEGRAVIR SODIUM, EMTRICITABINE, AND TENOFOVIR ALAFENAMIDE FUMARATE 1 TABLET(S): 50; 200; 25 TABLET ORAL at 11:42

## 2024-06-28 RX ADMIN — NICOTINE POLACRILEX 1 PATCH: 4 GUM, CHEWING ORAL at 10:00

## 2024-06-28 RX ADMIN — NICOTINE POLACRILEX 1 PATCH: 4 GUM, CHEWING ORAL at 11:00

## 2024-06-28 RX ADMIN — Medication 300 MILLIGRAM(S): at 18:04

## 2024-06-28 RX ADMIN — Medication 300 MILLIGRAM(S): at 05:46

## 2024-06-28 RX ADMIN — ESCITALOPRAM OXALATE 10 MILLIGRAM(S): 20 TABLET ORAL at 11:42

## 2024-06-29 ENCOUNTER — TRANSCRIPTION ENCOUNTER (OUTPATIENT)
Age: 54
End: 2024-06-29

## 2024-06-29 VITALS
OXYGEN SATURATION: 99 % | SYSTOLIC BLOOD PRESSURE: 121 MMHG | DIASTOLIC BLOOD PRESSURE: 79 MMHG | RESPIRATION RATE: 17 BRPM | TEMPERATURE: 97 F | HEART RATE: 72 BPM

## 2024-06-29 LAB
-  CLINDAMYCIN: SIGNIFICANT CHANGE UP
-  ERYTHROMYCIN: SIGNIFICANT CHANGE UP
-  GENTAMICIN: SIGNIFICANT CHANGE UP
-  OXACILLIN: SIGNIFICANT CHANGE UP
-  PENICILLIN: SIGNIFICANT CHANGE UP
-  RIFAMPIN: SIGNIFICANT CHANGE UP
-  TETRACYCLINE: SIGNIFICANT CHANGE UP
-  TRIMETHOPRIM/SULFAMETHOXAZOLE: SIGNIFICANT CHANGE UP
-  VANCOMYCIN: SIGNIFICANT CHANGE UP
ANION GAP SERPL CALC-SCNC: 11 MMOL/L — SIGNIFICANT CHANGE UP (ref 5–17)
ANION GAP SERPL CALC-SCNC: 9 MMOL/L — SIGNIFICANT CHANGE UP (ref 5–17)
BASOPHILS # BLD AUTO: 0.04 K/UL — SIGNIFICANT CHANGE UP (ref 0–0.2)
BASOPHILS NFR BLD AUTO: 0.7 % — SIGNIFICANT CHANGE UP (ref 0–2)
BUN SERPL-MCNC: 14 MG/DL — SIGNIFICANT CHANGE UP (ref 7–23)
BUN SERPL-MCNC: 14 MG/DL — SIGNIFICANT CHANGE UP (ref 7–23)
CALCIUM SERPL-MCNC: 9.3 MG/DL — SIGNIFICANT CHANGE UP (ref 8.4–10.5)
CALCIUM SERPL-MCNC: 9.6 MG/DL — SIGNIFICANT CHANGE UP (ref 8.4–10.5)
CHLORIDE SERPL-SCNC: 98 MMOL/L — SIGNIFICANT CHANGE UP (ref 96–108)
CHLORIDE SERPL-SCNC: 98 MMOL/L — SIGNIFICANT CHANGE UP (ref 96–108)
CO2 SERPL-SCNC: 28 MMOL/L — SIGNIFICANT CHANGE UP (ref 22–31)
CO2 SERPL-SCNC: 29 MMOL/L — SIGNIFICANT CHANGE UP (ref 22–31)
CREAT SERPL-MCNC: 0.75 MG/DL — SIGNIFICANT CHANGE UP (ref 0.5–1.3)
CREAT SERPL-MCNC: 0.82 MG/DL — SIGNIFICANT CHANGE UP (ref 0.5–1.3)
EGFR: 105 ML/MIN/1.73M2 — SIGNIFICANT CHANGE UP
EGFR: 105 ML/MIN/1.73M2 — SIGNIFICANT CHANGE UP
EGFR: 108 ML/MIN/1.73M2 — SIGNIFICANT CHANGE UP
EGFR: 108 ML/MIN/1.73M2 — SIGNIFICANT CHANGE UP
EOSINOPHIL # BLD AUTO: 0.4 K/UL — SIGNIFICANT CHANGE UP (ref 0–0.5)
EOSINOPHIL NFR BLD AUTO: 6.5 % — HIGH (ref 0–6)
GLUCOSE SERPL-MCNC: 149 MG/DL — HIGH (ref 70–99)
GLUCOSE SERPL-MCNC: 95 MG/DL — SIGNIFICANT CHANGE UP (ref 70–99)
HCT VFR BLD CALC: 38.7 % — LOW (ref 39–50)
HGB BLD-MCNC: 12.4 G/DL — LOW (ref 13–17)
IMM GRANULOCYTES NFR BLD AUTO: 1 % — HIGH (ref 0–0.9)
LYMPHOCYTES # BLD AUTO: 1.84 K/UL — SIGNIFICANT CHANGE UP (ref 1–3.3)
LYMPHOCYTES # BLD AUTO: 30.1 % — SIGNIFICANT CHANGE UP (ref 13–44)
MAGNESIUM SERPL-MCNC: 2.1 MG/DL — SIGNIFICANT CHANGE UP (ref 1.6–2.6)
MCHC RBC-ENTMCNC: 27.9 PG — SIGNIFICANT CHANGE UP (ref 27–34)
MCHC RBC-ENTMCNC: 32 GM/DL — SIGNIFICANT CHANGE UP (ref 32–36)
MCV RBC AUTO: 87.2 FL — SIGNIFICANT CHANGE UP (ref 80–100)
METHOD TYPE: SIGNIFICANT CHANGE UP
MONOCYTES # BLD AUTO: 0.63 K/UL — SIGNIFICANT CHANGE UP (ref 0–0.9)
MONOCYTES NFR BLD AUTO: 10.3 % — SIGNIFICANT CHANGE UP (ref 2–14)
NEUTROPHILS # BLD AUTO: 3.14 K/UL — SIGNIFICANT CHANGE UP (ref 1.8–7.4)
NEUTROPHILS NFR BLD AUTO: 51.4 % — SIGNIFICANT CHANGE UP (ref 43–77)
NRBC # BLD: 0 /100 WBCS — SIGNIFICANT CHANGE UP (ref 0–0)
NRBC BLD-RTO: 0 /100 WBCS — SIGNIFICANT CHANGE UP (ref 0–0)
PHOSPHATE SERPL-MCNC: 4.8 MG/DL — HIGH (ref 2.5–4.5)
PLATELET # BLD AUTO: 224 K/UL — SIGNIFICANT CHANGE UP (ref 150–400)
POTASSIUM SERPL-MCNC: 4.7 MMOL/L — SIGNIFICANT CHANGE UP (ref 3.5–5.3)
POTASSIUM SERPL-MCNC: 5.1 MMOL/L — SIGNIFICANT CHANGE UP (ref 3.5–5.3)
POTASSIUM SERPL-SCNC: 4.7 MMOL/L — SIGNIFICANT CHANGE UP (ref 3.5–5.3)
POTASSIUM SERPL-SCNC: 5.1 MMOL/L — SIGNIFICANT CHANGE UP (ref 3.5–5.3)
RBC # BLD: 4.44 M/UL — SIGNIFICANT CHANGE UP (ref 4.2–5.8)
RBC # FLD: 14.4 % — SIGNIFICANT CHANGE UP (ref 10.3–14.5)
SODIUM SERPL-SCNC: 136 MMOL/L — SIGNIFICANT CHANGE UP (ref 135–145)
SODIUM SERPL-SCNC: 137 MMOL/L — SIGNIFICANT CHANGE UP (ref 135–145)
VANCOMYCIN TROUGH SERPL-MCNC: 9.8 UG/ML — LOW (ref 10–20)
WBC # BLD: 6.11 K/UL — SIGNIFICANT CHANGE UP (ref 3.8–10.5)
WBC # FLD AUTO: 6.11 K/UL — SIGNIFICANT CHANGE UP (ref 3.8–10.5)

## 2024-06-29 PROCEDURE — 83690 ASSAY OF LIPASE: CPT

## 2024-06-29 PROCEDURE — 94640 AIRWAY INHALATION TREATMENT: CPT

## 2024-06-29 PROCEDURE — 36415 COLL VENOUS BLD VENIPUNCTURE: CPT

## 2024-06-29 PROCEDURE — 85610 PROTHROMBIN TIME: CPT

## 2024-06-29 PROCEDURE — 80048 BASIC METABOLIC PNL TOTAL CA: CPT

## 2024-06-29 PROCEDURE — 87521 HEPATITIS C PROBE&RVRS TRNSC: CPT

## 2024-06-29 PROCEDURE — 84100 ASSAY OF PHOSPHORUS: CPT

## 2024-06-29 PROCEDURE — 82565 ASSAY OF CREATININE: CPT

## 2024-06-29 PROCEDURE — 81001 URINALYSIS AUTO W/SCOPE: CPT

## 2024-06-29 PROCEDURE — 96375 TX/PRO/DX INJ NEW DRUG ADDON: CPT

## 2024-06-29 PROCEDURE — 87205 SMEAR GRAM STAIN: CPT

## 2024-06-29 PROCEDURE — 82247 BILIRUBIN TOTAL: CPT

## 2024-06-29 PROCEDURE — 87186 SC STD MICRODIL/AGAR DIL: CPT

## 2024-06-29 PROCEDURE — 87070 CULTURE OTHR SPECIMN AEROBIC: CPT

## 2024-06-29 PROCEDURE — 99239 HOSP IP/OBS DSCHRG MGMT >30: CPT

## 2024-06-29 PROCEDURE — 86360 T CELL ABSOLUTE COUNT/RATIO: CPT

## 2024-06-29 PROCEDURE — 80053 COMPREHEN METABOLIC PANEL: CPT

## 2024-06-29 PROCEDURE — 85025 COMPLETE CBC W/AUTO DIFF WBC: CPT

## 2024-06-29 PROCEDURE — 84295 ASSAY OF SERUM SODIUM: CPT

## 2024-06-29 PROCEDURE — 86359 T CELLS TOTAL COUNT: CPT

## 2024-06-29 PROCEDURE — 87077 CULTURE AEROBIC IDENTIFY: CPT

## 2024-06-29 PROCEDURE — 87086 URINE CULTURE/COLONY COUNT: CPT

## 2024-06-29 PROCEDURE — 87536 HIV-1 QUANT&REVRSE TRNSCRPJ: CPT

## 2024-06-29 PROCEDURE — 83605 ASSAY OF LACTIC ACID: CPT

## 2024-06-29 PROCEDURE — 74177 CT ABD & PELVIS W/CONTRAST: CPT | Mod: MC

## 2024-06-29 PROCEDURE — 99285 EMERGENCY DEPT VISIT HI MDM: CPT | Mod: 25

## 2024-06-29 PROCEDURE — 83735 ASSAY OF MAGNESIUM: CPT

## 2024-06-29 PROCEDURE — 87040 BLOOD CULTURE FOR BACTERIA: CPT

## 2024-06-29 PROCEDURE — 96374 THER/PROPH/DIAG INJ IV PUSH: CPT

## 2024-06-29 PROCEDURE — 80202 ASSAY OF VANCOMYCIN: CPT

## 2024-06-29 PROCEDURE — 87075 CULTR BACTERIA EXCEPT BLOOD: CPT

## 2024-06-29 PROCEDURE — 86803 HEPATITIS C AB TEST: CPT

## 2024-06-29 RX ORDER — SULFAMETHOXAZOLE/TRIMETHOPRIM 800-160 MG
1 TABLET ORAL
Qty: 14 | Refills: 0
Start: 2024-06-29 | End: 2024-07-05

## 2024-06-29 RX ORDER — VANCOMYCIN HCL IN 5 % DEXTROSE 1.5G/250ML
1250 PLASTIC BAG, INJECTION (ML) INTRAVENOUS EVERY 12 HOURS
Refills: 0 | Status: DISCONTINUED | OUTPATIENT
Start: 2024-06-29 | End: 2024-06-29

## 2024-06-29 RX ADMIN — NICOTINE POLACRILEX 1 PATCH: 4 GUM, CHEWING ORAL at 11:57

## 2024-06-29 RX ADMIN — BUDESONIDE AND FORMOTEROL FUMARATE DIHYDRATE 2 PUFF(S): 80; 4.5 AEROSOL RESPIRATORY (INHALATION) at 09:02

## 2024-06-29 RX ADMIN — BICTEGRAVIR SODIUM, EMTRICITABINE, AND TENOFOVIR ALAFENAMIDE FUMARATE 1 TABLET(S): 50; 200; 25 TABLET ORAL at 11:57

## 2024-06-29 RX ADMIN — ESCITALOPRAM OXALATE 10 MILLIGRAM(S): 20 TABLET ORAL at 11:57

## 2024-06-29 RX ADMIN — NICOTINE POLACRILEX 1 PATCH: 4 GUM, CHEWING ORAL at 07:40

## 2024-06-29 RX ADMIN — Medication 166.67 MILLIGRAM(S): at 09:27

## 2024-06-30 RX ORDER — NICOTINE POLACRILEX 4 MG/1
1 GUM, CHEWING ORAL
Qty: 10 | Refills: 0
Start: 2024-06-30 | End: 2024-07-09

## 2024-07-01 LAB
CULTURE RESULTS: ABNORMAL
CULTURE RESULTS: ABNORMAL
CULTURE RESULTS: SIGNIFICANT CHANGE UP
CULTURE RESULTS: SIGNIFICANT CHANGE UP
ORGANISM # SPEC MICROSCOPIC CNT: ABNORMAL
ORGANISM # SPEC MICROSCOPIC CNT: ABNORMAL
ORGANISM # SPEC MICROSCOPIC CNT: SIGNIFICANT CHANGE UP
ORGANISM # SPEC MICROSCOPIC CNT: SIGNIFICANT CHANGE UP
SPECIMEN SOURCE: SIGNIFICANT CHANGE UP

## 2024-07-09 DIAGNOSIS — L03.311 CELLULITIS OF ABDOMINAL WALL: ICD-10-CM

## 2024-07-09 DIAGNOSIS — B95.62 METHICILLIN RESISTANT STAPHYLOCOCCUS AUREUS INFECTION AS THE CAUSE OF DISEASES CLASSIFIED ELSEWHERE: ICD-10-CM

## 2024-07-09 DIAGNOSIS — B20 HUMAN IMMUNODEFICIENCY VIRUS [HIV] DISEASE: ICD-10-CM

## 2024-07-09 DIAGNOSIS — E87.6 HYPOKALEMIA: ICD-10-CM

## 2024-07-09 DIAGNOSIS — L02.211 CUTANEOUS ABSCESS OF ABDOMINAL WALL: ICD-10-CM

## 2024-07-09 DIAGNOSIS — L40.9 PSORIASIS, UNSPECIFIED: ICD-10-CM

## 2024-07-09 DIAGNOSIS — Z79.51 LONG TERM (CURRENT) USE OF INHALED STEROIDS: ICD-10-CM

## 2024-07-09 DIAGNOSIS — B19.20 UNSPECIFIED VIRAL HEPATITIS C WITHOUT HEPATIC COMA: ICD-10-CM

## 2024-07-09 DIAGNOSIS — M19.90 UNSPECIFIED OSTEOARTHRITIS, UNSPECIFIED SITE: ICD-10-CM

## 2024-07-09 DIAGNOSIS — Z86.73 PERSONAL HISTORY OF TRANSIENT ISCHEMIC ATTACK (TIA), AND CEREBRAL INFARCTION WITHOUT RESIDUAL DEFICITS: ICD-10-CM

## 2024-07-09 DIAGNOSIS — I10 ESSENTIAL (PRIMARY) HYPERTENSION: ICD-10-CM

## 2024-07-09 DIAGNOSIS — F32.9 MAJOR DEPRESSIVE DISORDER, SINGLE EPISODE, UNSPECIFIED: ICD-10-CM

## 2024-07-09 DIAGNOSIS — J44.9 CHRONIC OBSTRUCTIVE PULMONARY DISEASE, UNSPECIFIED: ICD-10-CM

## 2024-07-09 DIAGNOSIS — R35.0 FREQUENCY OF MICTURITION: ICD-10-CM

## 2024-07-09 DIAGNOSIS — F17.210 NICOTINE DEPENDENCE, CIGARETTES, UNCOMPLICATED: ICD-10-CM

## 2024-07-23 ENCOUNTER — NON-APPOINTMENT (OUTPATIENT)
Age: 54
End: 2024-07-23

## 2024-07-31 NOTE — CONSULT NOTE ADULT - CONSULT REQUESTED BY NAME
-- DO NOT REPLY / DO NOT REPLY ALL --  -- This inbox is not monitored. If this was sent to the wrong provider or department, reroute message to [facility] pool. --  -- Message is from Engagement Center Operations (ECO) --    Patient Name: Rossy Buck    Specialist or PCP Name: Bharati Keyes    Symptoms:Pt has an appt today at 10:30 and this morning she is having issues with constipation and would like to discuss at the appt today    Pregnant (females aged 13-60. If Yes, how long?) : n/a     Call Back # : [phone]     Which State are you currently located in?: [place]     Name of Clinic Site / Acct# : [facility]     Use following scripting for patients waiting for a callback:   “Nurse callback times vary based on call volumes; please be aware the return phone call may come from an unidentified or out of state phone number. If your symptoms worsen or become life threatening while waiting, you should seek immediate assistance by calling 911 or going to the ER for evaluation.”   Dr. Reji Diaz 23-Aug-2018 20:49

## 2024-09-09 ENCOUNTER — EMERGENCY (EMERGENCY)
Facility: HOSPITAL | Age: 54
LOS: 1 days | Discharge: HOME | End: 2024-09-09
Admitting: EMERGENCY MEDICINE
Payer: MEDICAID

## 2024-09-09 VITALS
OXYGEN SATURATION: 96 % | HEIGHT: 73 IN | WEIGHT: 160.06 LBS | HEART RATE: 107 BPM | TEMPERATURE: 98 F | SYSTOLIC BLOOD PRESSURE: 113 MMHG | DIASTOLIC BLOOD PRESSURE: 75 MMHG | RESPIRATION RATE: 18 BRPM

## 2024-09-09 VITALS
OXYGEN SATURATION: 96 % | SYSTOLIC BLOOD PRESSURE: 126 MMHG | DIASTOLIC BLOOD PRESSURE: 79 MMHG | HEART RATE: 102 BPM | RESPIRATION RATE: 18 BRPM | TEMPERATURE: 98 F

## 2024-09-09 DIAGNOSIS — Z98.890 OTHER SPECIFIED POSTPROCEDURAL STATES: Chronic | ICD-10-CM

## 2024-09-09 PROCEDURE — 73080 X-RAY EXAM OF ELBOW: CPT

## 2024-09-09 PROCEDURE — 73090 X-RAY EXAM OF FOREARM: CPT | Mod: 26,RT

## 2024-09-09 PROCEDURE — 99284 EMERGENCY DEPT VISIT MOD MDM: CPT | Mod: 25

## 2024-09-09 PROCEDURE — 73090 X-RAY EXAM OF FOREARM: CPT

## 2024-09-09 PROCEDURE — 90471 IMMUNIZATION ADMIN: CPT

## 2024-09-09 PROCEDURE — 90715 TDAP VACCINE 7 YRS/> IM: CPT

## 2024-09-09 PROCEDURE — 73080 X-RAY EXAM OF ELBOW: CPT | Mod: 26,RT

## 2024-09-09 RX ORDER — IBUPROFEN 600 MG
600 TABLET ORAL ONCE
Refills: 0 | Status: COMPLETED | OUTPATIENT
Start: 2024-09-09 | End: 2024-09-09

## 2024-09-09 RX ORDER — TETANUS TOXOID, REDUCED DIPHTHERIA TOXOID AND ACELLULAR PERTUSSIS VACCINE, ADSORBED 5; 2.5; 8; 8; 2.5 [IU]/.5ML; [IU]/.5ML; UG/.5ML; UG/.5ML; UG/.5ML
0.5 SUSPENSION INTRAMUSCULAR ONCE
Refills: 0 | Status: COMPLETED | OUTPATIENT
Start: 2024-09-09 | End: 2024-09-09

## 2024-09-09 RX ADMIN — Medication 600 MILLIGRAM(S): at 07:46

## 2024-09-09 RX ADMIN — TETANUS TOXOID, REDUCED DIPHTHERIA TOXOID AND ACELLULAR PERTUSSIS VACCINE, ADSORBED 0.5 MILLILITER(S): 5; 2.5; 8; 8; 2.5 SUSPENSION INTRAMUSCULAR at 07:46

## 2024-09-09 NOTE — ED PROVIDER NOTE - NSFOLLOWUPINSTRUCTIONS_ED_ALL_ED_FT
Abrasion  keep abrasions clean and dry, wash gently with water and soap, apply bacitracin, cover with dressing, ice and elevate, ibuprofen as needed, follow up with your pmd  An abrasion is a cut or a scrape on your skin. An abrasion affects only the top layers of your skin.  You must take care of your wound so germs do not get in it and cause infection.  What are the causes?  This condition is caused by rubbing your skin on something or falling on a rough surface, such as the ground. When your skin rubs on something, some layers of skin may rub off.  What are the signs or symptoms?  A cut or a scrape.  Bleeding.  A red or pink spot.  A bruise under your wound.  How is this treated?  This condition may be treated by:  Cleaning your wound.  Putting an antibiotic on your wound.  Putting a jelly on your wound to stop moisture from entering the wound.  Putting a bandage on your wound.  Getting a tetanus shot.  Follow these instructions at home:  Medicines  Take or use over-the-counter and prescription medicines only as told by your doctor.  If you were prescribed antibiotics, use them as told by your doctor. Do not stop using them even if you start to feel better.  Caring for your wound  Clean your wound 1–2 times a day or as told by your doctor.  Wash your hands for at least 20 seconds with soap and water. Do this before and after you clean your wound.  If you cannot use soap and water, use hand .  Wash your wound with mild soap and water. You may also use a wound cleanser or saltwater solution, called saline.  Do not use hydrogen peroxide or alcohol. These can slow healing.  Rinse off the soap.  Pat your wound with a clean towel to dry it. Do not rub your wound.  Put an antibiotic ointment on your wound as told by your doctor. You may also be told to put on a jelly that stops moisture from entering the wound.  Cover your wound with a bandage as told by your doctor. Small or very minor wounds may not need a bandage.  Keep your bandage clean and dry. Take it off and change it as told by your doctor.  You may have to change your bandage one or more times a day, or as told by your doctor.  Watch for signs of infection  Check your wound every day for signs of infection. Check for:  A red streak that goes away from your wound.  Other redness.  Swelling or more pain.  Warmth.  Blood, fluid, pus, or a bad smell.  Treat pain and swelling  Bag of ice on a towel on the skin.  If told, put ice on the injured area.  Put ice in a plastic bag.  Place a towel between your skin and the bag.  Leave the ice on for 20 minutes, 2–3 times a day.  If your skin turns bright red, take off the ice right away to prevent skin damage. The risk of damage is higher if you cannot feel pain, heat, or cold.  If you can, raise the injured area above the level of your heart while you are sitting or lying down.  General instructions  Do not take baths, swim, or use a hot tub. Ask your doctor about taking showers or sponge baths.  Do not scratch or pick at scabs that may occur over the wound as it heals.  Contact a doctor if:  You had a tetanus shot, and you have any of these in the area where the needle went in:  Swelling.  Very bad pain.  Redness.  Bleeding.  You have a lot of pain, and medicine does not help.  You have a fever.  You have any signs of infection in your wound.  Get help right away if:  You have a red streak going away from your wound.

## 2024-09-09 NOTE — ED PROVIDER NOTE - MUSCULOSKELETAL, MLM
Spine appears normal, range of motion is not limited, no muscle or joint tenderness; R UE: no shoulder tend, no elbow tend, no pain w/supination/pronation, + abrasion to forearm, no bleeding, no lac, FROM, no wrist tend, radial pulse 2+, no U/M/R nerve deficits

## 2024-09-09 NOTE — ED ADULT TRIAGE NOTE - HEIGHT IN FEET
What Type Of Note Output Would You Prefer (Optional)?: Standard Output How Severe Is Your Skin Lesion?: moderate Has Your Skin Lesion Been Treated?: not been treated Is This A New Presentation, Or A Follow-Up?: Skin Lesions 6

## 2024-09-09 NOTE — ED PROVIDER NOTE - ENMT, MLM
Airway patent, Nasal mucosa clear. Mouth with normal mucosa. Throat has no vesicles, no oropharyngeal exudates and uvula is midline. R ear lobe w/very superficial abrasion, + oozing, no lac to suture, no hemotympanum b/l. no hematoma to ears b/l, no kessler signs b/l, no raccoon eyes b/l. no mandibular tend - able to fully open/close mouth

## 2024-09-09 NOTE — ED PROVIDER NOTE - PATIENT PORTAL LINK FT
You can access the FollowMyHealth Patient Portal offered by [facility] by registering at the following website: [email or web address]. By joining [employer]’s FollowMyHealth portal, you will also be able to view your health information using other applications (apps) compatible with our system.
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
## 2024-09-09 NOTE — ED PROVIDER NOTE - IV ALTEPLASE ADMIN OUTSIDE HIDDEN
Patient identifiers verified and correct for MS Bustillo  C/C: CP SOB SEE NN  APPEARANCE: awake and alert in NAD.  SKIN: warm, dry and intact. No breakdown or bruising.  MUSCULOSKELETAL: Patient moving all extremities spontaneously, no obvious swelling or deformities noted. Ambulates independently.  RESPIRATORY: Positive shortness of breath.Respirations unlabored. Positive cough, Denies fevers  CARDIAC: Positive right  CP, 2+ distal pulses; min peripheral edema  ABDOMEN: Abdomen round, pain radiates in to abdomen, positive nausea, no emesis   : voids spontaneously, denies difficulty  Neurologic: AAO x 4; follows commands equal strength in all extremities; denies numbness/tingling. Denies dizziness Denies weakenss   show
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
## 2024-09-12 DIAGNOSIS — Z23 ENCOUNTER FOR IMMUNIZATION: ICD-10-CM

## 2024-09-12 DIAGNOSIS — S00.411A ABRASION OF RIGHT EAR, INITIAL ENCOUNTER: ICD-10-CM
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
## 2024-09-12 DIAGNOSIS — S50.811A ABRASION OF RIGHT FOREARM, INITIAL ENCOUNTER: ICD-10-CM

## 2024-09-12 DIAGNOSIS — M77.8 OTHER ENTHESOPATHIES, NOT ELSEWHERE CLASSIFIED: ICD-10-CM

## 2025-01-14 NOTE — DISCHARGE NOTE PROVIDER - NSDCHC_MEDRECSTATUS_GEN_ALL_CORE
Admission Reconciliation is Completed  Discharge Reconciliation is Not Complete Warm Admission Reconciliation is Completed  Discharge Reconciliation is Completed

## 2025-02-07 ENCOUNTER — NON-APPOINTMENT (OUTPATIENT)
Age: 55
End: 2025-02-07

## 2025-02-27 NOTE — ED PROVIDER NOTE - PSYCHIATRIC, MLM
Patient is calling for a 2nd opinion, work comp appointment for left knee meniscus tear.  Patient had previous surgery on 9/20/24 with [facility].  Patient states she is having problems with the knee again.  Please advise for scheduling.      [phone]  
Patient is scheduled.  Patient has recent MRI with [facility]antz on 1/6/2025.  
Alert and oriented to person, place, time/situation.

## 2025-05-14 NOTE — PATIENT PROFILE ADULT - HAVE YOU HAD A FIRST COVID-19 BOOSTER?
Attempted to call report to [facility]. No one answered and no voicemail available. Pt stable for discharge, ems here to transport pt back to [facility]   Yes

## 2025-06-25 NOTE — ED ADULT TRIAGE NOTE - AS TEMP SITE
Continue walking 30-40 minutes a day.   Continue current medications.   Check the blood tests fasting.    oral

## 2025-07-01 NOTE — PATIENT PROFILE ADULT - FUNCTIONAL ASSESSMENT - BASIC MOBILITY SCORE.
Submitted PA for Wegovy 1MG/0.5ML auto-injectors  Via CMM Key: [id number] STATUS: PENDING.    Follow up done daily; if no decision with in three days we will refax.  If another three days goes by with no decision will call the insurance for status.     23